# Patient Record
Sex: FEMALE | Race: BLACK OR AFRICAN AMERICAN | HISPANIC OR LATINO | Employment: UNEMPLOYED | URBAN - METROPOLITAN AREA
[De-identification: names, ages, dates, MRNs, and addresses within clinical notes are randomized per-mention and may not be internally consistent; named-entity substitution may affect disease eponyms.]

---

## 2018-09-02 ENCOUNTER — HOSPITAL ENCOUNTER (EMERGENCY)
Facility: HOSPITAL | Age: 53
Discharge: HOME | End: 2018-09-02
Attending: EMERGENCY MEDICINE | Admitting: EMERGENCY MEDICINE

## 2018-09-02 VITALS
OXYGEN SATURATION: 100 % | DIASTOLIC BLOOD PRESSURE: 97 MMHG | HEART RATE: 59 BPM | SYSTOLIC BLOOD PRESSURE: 183 MMHG | RESPIRATION RATE: 16 BRPM | TEMPERATURE: 97.5 F | BODY MASS INDEX: 26.93 KG/M2 | WEIGHT: 171.96 LBS

## 2018-09-02 DIAGNOSIS — K29.70 GASTRITIS: Primary | ICD-10-CM

## 2018-09-02 DIAGNOSIS — K82.8 BILIARY DYSKINESIA: ICD-10-CM

## 2018-09-02 LAB
ALBUMIN SERPL BCP-MCNC: 4.1 G/DL (ref 3–5.2)
ALP SERPL-CCNC: 48 U/L (ref 43–122)
ALT SERPL W P-5'-P-CCNC: 24 U/L (ref 9–52)
ANION GAP SERPL CALCULATED.3IONS-SCNC: 3 MMOL/L (ref 5–14)
AST SERPL W P-5'-P-CCNC: 36 U/L (ref 14–36)
ATRIAL RATE: 47 BPM
BASOPHILS # BLD AUTO: 0 THOUSANDS/ΜL (ref 0–0.1)
BASOPHILS NFR BLD AUTO: 1 % (ref 0–1)
BILIRUB SERPL-MCNC: 0.8 MG/DL
BILIRUB UR QL STRIP: NEGATIVE
BUN SERPL-MCNC: 9 MG/DL (ref 5–25)
CALCIUM SERPL-MCNC: 8.4 MG/DL (ref 8.4–10.2)
CHLORIDE SERPL-SCNC: 104 MMOL/L (ref 97–108)
CLARITY UR: CLEAR
CO2 SERPL-SCNC: 29 MMOL/L (ref 22–30)
COLOR UR: YELLOW
CREAT SERPL-MCNC: 0.64 MG/DL (ref 0.6–1.2)
EOSINOPHIL # BLD AUTO: 0.3 THOUSAND/ΜL (ref 0–0.4)
EOSINOPHIL NFR BLD AUTO: 8 % (ref 0–6)
ERYTHROCYTE [DISTWIDTH] IN BLOOD BY AUTOMATED COUNT: 14.1 %
EXT PREG TEST URINE: NORMAL
GFR SERPL CREATININE-BSD FRML MDRD: 104 ML/MIN/1.73SQ M
GLUCOSE SERPL-MCNC: 85 MG/DL (ref 70–99)
GLUCOSE UR STRIP-MCNC: NEGATIVE MG/DL
HCT VFR BLD AUTO: 39.5 % (ref 36–46)
HGB BLD-MCNC: 13.1 G/DL (ref 12–16)
HGB UR QL STRIP.AUTO: NEGATIVE
KETONES UR STRIP-MCNC: NEGATIVE MG/DL
LEUKOCYTE ESTERASE UR QL STRIP: NEGATIVE
LIPASE SERPL-CCNC: 49 U/L (ref 23–300)
LYMPHOCYTES # BLD AUTO: 1.5 THOUSANDS/ΜL (ref 0.5–4)
LYMPHOCYTES NFR BLD AUTO: 34 % (ref 20–50)
MCH RBC QN AUTO: 33.2 PG (ref 26–34)
MCHC RBC AUTO-ENTMCNC: 33.1 G/DL (ref 31–36)
MCV RBC AUTO: 100 FL (ref 80–100)
MONOCYTES # BLD AUTO: 0.5 THOUSAND/ΜL (ref 0.2–0.9)
MONOCYTES NFR BLD AUTO: 11 % (ref 1–10)
NEUTROPHILS # BLD AUTO: 2.1 THOUSANDS/ΜL (ref 1.8–7.8)
NEUTS SEG NFR BLD AUTO: 47 % (ref 45–65)
NITRITE UR QL STRIP: NEGATIVE
P AXIS: 49 DEGREES
PH UR STRIP.AUTO: 7 [PH] (ref 4.5–8)
PLATELET # BLD AUTO: 299 THOUSANDS/UL (ref 150–450)
PLATELET BLD QL SMEAR: ADEQUATE
PMV BLD AUTO: 7.3 FL (ref 8.9–12.7)
POTASSIUM SERPL-SCNC: 5 MMOL/L (ref 3.6–5)
PR INTERVAL: 146 MS
PROT SERPL-MCNC: 7.9 G/DL (ref 5.9–8.4)
PROT UR STRIP-MCNC: NEGATIVE MG/DL
QRS AXIS: -8 DEGREES
QRSD INTERVAL: 86 MS
QT INTERVAL: 478 MS
QTC INTERVAL: 423 MS
RBC # BLD AUTO: 3.94 MILLION/UL (ref 4–5.2)
RBC MORPH BLD: NORMAL
SODIUM SERPL-SCNC: 136 MMOL/L (ref 137–147)
SP GR UR STRIP.AUTO: 1.01 (ref 1–1.04)
T WAVE AXIS: 26 DEGREES
TROPONIN I SERPL-MCNC: <0.01 NG/ML (ref 0–0.03)
UROBILINOGEN UA: NEGATIVE MG/DL
VENTRICULAR RATE: 47 BPM
WBC # BLD AUTO: 4.5 THOUSAND/UL (ref 4.5–11)

## 2018-09-02 PROCEDURE — 36415 COLL VENOUS BLD VENIPUNCTURE: CPT | Performed by: PHYSICIAN ASSISTANT

## 2018-09-02 PROCEDURE — 99284 EMERGENCY DEPT VISIT MOD MDM: CPT

## 2018-09-02 PROCEDURE — 96361 HYDRATE IV INFUSION ADD-ON: CPT

## 2018-09-02 PROCEDURE — 83690 ASSAY OF LIPASE: CPT | Performed by: PHYSICIAN ASSISTANT

## 2018-09-02 PROCEDURE — 80053 COMPREHEN METABOLIC PANEL: CPT | Performed by: PHYSICIAN ASSISTANT

## 2018-09-02 PROCEDURE — 96374 THER/PROPH/DIAG INJ IV PUSH: CPT

## 2018-09-02 PROCEDURE — 85025 COMPLETE CBC W/AUTO DIFF WBC: CPT | Performed by: PHYSICIAN ASSISTANT

## 2018-09-02 PROCEDURE — 93010 ELECTROCARDIOGRAM REPORT: CPT | Performed by: INTERNAL MEDICINE

## 2018-09-02 PROCEDURE — 81003 URINALYSIS AUTO W/O SCOPE: CPT | Performed by: PHYSICIAN ASSISTANT

## 2018-09-02 PROCEDURE — 96375 TX/PRO/DX INJ NEW DRUG ADDON: CPT

## 2018-09-02 PROCEDURE — 84484 ASSAY OF TROPONIN QUANT: CPT | Performed by: PHYSICIAN ASSISTANT

## 2018-09-02 PROCEDURE — 81025 URINE PREGNANCY TEST: CPT | Performed by: PHYSICIAN ASSISTANT

## 2018-09-02 PROCEDURE — 93005 ELECTROCARDIOGRAM TRACING: CPT

## 2018-09-02 RX ORDER — SODIUM CHLORIDE 9 MG/ML
250 INJECTION, SOLUTION INTRAVENOUS CONTINUOUS
Status: DISCONTINUED | OUTPATIENT
Start: 2018-09-02 | End: 2018-09-02 | Stop reason: HOSPADM

## 2018-09-02 RX ORDER — KETOROLAC TROMETHAMINE 30 MG/ML
30 INJECTION, SOLUTION INTRAMUSCULAR; INTRAVENOUS ONCE
Status: COMPLETED | OUTPATIENT
Start: 2018-09-02 | End: 2018-09-02

## 2018-09-02 RX ORDER — SUCRALFATE ORAL 1 G/10ML
SUSPENSION ORAL
Status: COMPLETED
Start: 2018-09-02 | End: 2018-09-02

## 2018-09-02 RX ORDER — SUCRALFATE 1 G/1
1 TABLET ORAL 4 TIMES DAILY
Qty: 40 TABLET | Refills: 0 | Status: SHIPPED | OUTPATIENT
Start: 2018-09-02 | End: 2018-12-15 | Stop reason: ALTCHOICE

## 2018-09-02 RX ORDER — SUCRALFATE ORAL 1 G/10ML
1000 SUSPENSION ORAL ONCE
Status: COMPLETED | OUTPATIENT
Start: 2018-09-02 | End: 2018-09-02

## 2018-09-02 RX ORDER — KETOROLAC TROMETHAMINE 30 MG/ML
INJECTION, SOLUTION INTRAMUSCULAR; INTRAVENOUS
Status: COMPLETED
Start: 2018-09-02 | End: 2018-09-02

## 2018-09-02 RX ORDER — ONDANSETRON 2 MG/ML
4 INJECTION INTRAMUSCULAR; INTRAVENOUS ONCE
Status: COMPLETED | OUTPATIENT
Start: 2018-09-02 | End: 2018-09-02

## 2018-09-02 RX ORDER — ONDANSETRON 2 MG/ML
INJECTION INTRAMUSCULAR; INTRAVENOUS
Status: COMPLETED
Start: 2018-09-02 | End: 2018-09-02

## 2018-09-02 RX ORDER — MAGNESIUM HYDROXIDE/ALUMINUM HYDROXICE/SIMETHICONE 120; 1200; 1200 MG/30ML; MG/30ML; MG/30ML
SUSPENSION ORAL
Status: COMPLETED
Start: 2018-09-02 | End: 2018-09-02

## 2018-09-02 RX ORDER — MAGNESIUM HYDROXIDE/ALUMINUM HYDROXICE/SIMETHICONE 120; 1200; 1200 MG/30ML; MG/30ML; MG/30ML
15 SUSPENSION ORAL ONCE
Status: COMPLETED | OUTPATIENT
Start: 2018-09-02 | End: 2018-09-02

## 2018-09-02 RX ORDER — ONDANSETRON 4 MG/1
4 TABLET, FILM COATED ORAL EVERY 6 HOURS
Qty: 12 TABLET | Refills: 0 | Status: SHIPPED | OUTPATIENT
Start: 2018-09-02 | End: 2018-12-15 | Stop reason: ALTCHOICE

## 2018-09-02 RX ORDER — FAMOTIDINE 20 MG/1
20 TABLET, FILM COATED ORAL 2 TIMES DAILY
Qty: 30 TABLET | Refills: 0 | Status: SHIPPED | OUTPATIENT
Start: 2018-09-02 | End: 2018-12-15 | Stop reason: ALTCHOICE

## 2018-09-02 RX ADMIN — SODIUM CHLORIDE 250 ML/HR: 9 INJECTION, SOLUTION INTRAVENOUS at 07:56

## 2018-09-02 RX ADMIN — MAGNESIUM HYDROXIDE/ALUMINUM HYDROXICE/SIMETHICONE 15 ML: 120; 1200; 1200 SUSPENSION ORAL at 09:47

## 2018-09-02 RX ADMIN — ONDANSETRON 4 MG: 2 INJECTION INTRAMUSCULAR; INTRAVENOUS at 07:53

## 2018-09-02 RX ADMIN — KETOROLAC TROMETHAMINE 30 MG: 30 INJECTION, SOLUTION INTRAMUSCULAR; INTRAVENOUS at 09:47

## 2018-09-02 RX ADMIN — ONDANSETRON 4 MG: 2 INJECTION, SOLUTION INTRAMUSCULAR; INTRAVENOUS at 07:53

## 2018-09-02 RX ADMIN — ALUMINUM HYDROXIDE, MAGNESIUM HYDROXIDE, AND SIMETHICONE 15 ML: 200; 200; 20 SUSPENSION ORAL at 09:47

## 2018-09-02 RX ADMIN — SUCRALFATE 1000 MG: 1 SUSPENSION ORAL at 09:47

## 2018-09-02 RX ADMIN — SUCRALFATE ORAL 1000 MG: 1 SUSPENSION ORAL at 09:47

## 2018-09-02 NOTE — DISCHARGE INSTRUCTIONS
Discinesia biliar   LO QUE NECESITA SABER:   ¿Qué es la discinesia biliar? La discinesia biliar es nicole condición que causa dolor en torres vesícula biliar (en la parte superior derecha de torres abdomen)  La vesícula biliar almacena bilis creado por el hígado  La bilis se [place] para descomponer la grasa que usted consume  La vesícula biliar tiene nicole válvula llamada esfínter que previene que la bilis se salga de la vesícula biliar hasta que sea necesario  La bilis se mueve a través de un conducto al intestino pequeño  Si el esfínter esta cicatrizado o tiene espasmos, la bilis no puede salir de la vesícula biliar  La bilis entonces regresa a torres vesícula biliar y causa dolor  ¿Qué aumenta mi riesgo de discinesia biliar? · Inflamación en los músculos que controlan la bilis que sale de torres vesícula biliar    · Problemas con la forma que shan músculos colaboran juntos    · Nicole enfermedad crónica josé antonio diabetes o celiaquía    · Obesidad    · Desequilibrio hormonal  ¿Cuáles son los signos y síntomas de la discinesia biliar? · Dolor en la parte superior derecha de torres abdomen que dura por lo menos 30 minutos a la vez, y va y viene    · Dolor severo que no le permite hacer shan actividades diarias o lo despierta en la noche    · Dolor después de comer que continua aún después de jairo tenido nicole evacuación intestinal o cambia de posición    · Ictericia    · Náusea, vómito, o inflamación    · Pérdida de peso sin proponérselo o falta de apetito  ¿Cómo se diagnostica la discinesia biliar? Torres médico lo examinará y le hará preguntas acerca de shan síntomas  Infórmele cuando ocurre el dolor y cuanto dura cada vez que ocurre  Infórmele si usted tiene [employer] después de comer ciertos alimentos  Es posible que también necesite alguno de los siguientes tratamientos:  · Un escán de hígado y vesícula biliar  También llamado [place] de hígado y de la vesícula biliar   A usted le administran nicole pequeña cantidad de tinte radioactivo por medio de [facility] y se nichole imágenes con un escáner  Torres médico [address] imágenes para determinar si torres hígado y vesícula biliar están funcionando normalmente  · Los análisis de norma:  podrían usarse para revisar las enzimas de torres hígado  Offerle muestra cuan ayesha torres hígado esta funcionando  · Un ultrasonido o nicole tomografía computarizada (TC)  se pueden usar para buscar cálculos biliares u otros problemas en el área de torres vesícula biliar  El dolor de discinesia biliar ocurre sin cálculos biliares  · Nicole colangiopancreatografía retrógrada endoscópica  es un procedimiento usado para revisar los conductos que transportan la bilis de la vesícula biliar  Torres médico guía un endoscopio por torres boca y dentro de [employer] abertura entre torres estómago e intestino pequeño  Se nichole radiografías de los conductos  Se [place] un líquido de contraste para ayudar a que las imágenes se vean mejor en la radiografía  Dígale al médico si usted alguna vez ha tenido nicole reacción alérgica al líquido de [place]  ¿Cómo se trata la discinesia biliar? Ju síntomas podrían desaparecer sin tratamiento  Usted podría necesitar alguno de los siguientes si ju síntomas son graves o persisten:  · Un medicamento con receta para el dolor  podrían ser [place]  Pregunte al médico cómo debe edith reji medicamento de forma norton  Algunos medicamentos recetados para el dolor contienen acetaminofén  No tome otros medicamentos que contengan acetaminofén sin consultarlo con torres médico  Demasiado acetaminofeno puede causar daño al hígado  Los medicamentos recetados para el dolor podrían causar estreñimiento  Pregunte a torres médico josé antonio prevenir o tratar estreñimiento  · AINEs (Analgésicos antiinflamatorios no esteroides) josé antonio el ibuprofeno, ayudan a disminuir la inflamación, el dolor y la [place]  Reji medicamento esta disponible con o sin nicole receta médica   Los AINEs pueden causar sangrado estomacal o problemas renales en ciertas personas  Si usted esta tomando un anticoágulante,  siempre  pregunte si los AINEs son seguros para usted  Siempre radha la etiqueta de reji medicamento y [place] instrucciones  No administre reji medicamento a niños menores de 6 meses de jannette sin antes obtener la autorización de torres médico      · Cirugía  para remover torres vesícula biliar  Por lo general, nicole cirugía de vesícula biliar no se realiza en niños pequeños  ¿Qué puedo hacer para manejar la discinesia biliar? · Mantenga un peso saludable  El [place] afb de [place] puede aumentar torres riesgo para problemas con torres vesícula biliar y empeorar torres dolor  Trate de no aumentar o perder nicole cantidad de peso rápidamente  [place] también podría aumentar o empeorar torres riesgo para problemas con torres vesícula biliar  Solicite a torres médico que lo ayude a crear un programa para perder peso si tiene sobrepeso  · Consuma alimentos saludables y variados  Los alimentos saludables incluyen frutas, vegetales, productos lácteos bajos en grasa, oneil magras, pescado, y frijoles cocidos  Pregunte si necesita seguir nicole dieta especial  Torres médico podría recomendar nicole dieta baja en grasa  Elija grasas saludables josé antonio el aceite de [place], canola, aguacate, y nueces  Las grasas [place] 3 también son saludables  Las [place] 3 se encuentran en pescados, josé antonio el Vannie Rujo[place] y [place] breeze  También se encuentra en plantas josé antonio linaza, nueces, y soya  Es posible que usted también necesite evitar alimentos que podrían desencadenar ju síntomas  ¿Cuándo clive buscar atención inmediata? · Usted tiene fiebre o escalofríos  · Ju ojos o piel se vuelven thanh  ¿Cuándo clive comunicarme con mi médico?   · Torres orina está oscura  · Torres dolor no mejora aún con medicamento para el dolor  · Usted tiene evacuaciones intestinales del color de arcilla  · Usted tiene nuevos síntomas o ju síntomas empeoran  · Usted tiene preguntas o inquietudes acerca de torres condición o cuidado    ACUERDOS SOBRE TORRES CUIDADO:   Usted tiene el derecho de ayudar a planear land cuidado  Aprenda todo lo que pueda sobre land condición y josé antonio darle tratamiento  Discuta ju opciones de tratamiento con ju médicos para decidir el cuidado que usted desea recibir  Usted siempre tiene el derecho de rechazar el tratamiento  Esta información es sólo para uso en educación  Land intención no es darle un consejo médico sobre enfermedades o tratamientos  Colsulte con land Estevan Hoover farmacéutico antes de seguir cualquier régimen médico para saber si es seguro y efectivo para usted  © 2017 [address] Information is for End User's use only and may not be sold, redistributed or otherwise used for commercial purposes  All illustrations and images included in CareNotes® are the copyrighted property of [employer]  or Cleve Chun  Gastritis   CUIDADO AMBULATORIO:   Gastritis  es nicole inflamación o irritación del revestimiento del estómago  Los síntomas más comunes [facility] siguientes:   · Dolor de [place], ardor o dolor con la palpación    · Sensación de llenura y opresión    · Náuseas o vómito    · Falta de apetito o rápidamente se siente lleno mientras está comiendo    · Mal aliento    · Fatiga o más cansancio que de costumbre    · Acidez estomacal  Llame al 911 en cathy de presentar lo siguiente:   · Tiene dolor de pecho o le falta el aire  Busque atención médica de inmediato si:   · Usted vomita norma  · Usted tiene evacuaciones intestinales negras o con norma  · Usted tiene un kike dolor de estómago o de espalda  Pregúntele a land Nadja Remedies vitaminas y minerales son adecuados para usted  · Usted tiene fiebre  · Usted tiene síntomas nuevos o estos empeoran, aun después del [place]  · Usted tiene preguntas o inquietudes acerca de land condición o cuidado  Tratamiento para la gastritis:  Ju síntomas podrían desaparecer sin tratamiento   El tratamiento dependerá de lo que le está causando torres gastritis  Torres médico le podría recomendar cambios a los [place] sina  Los medicamentos podrían ser recetados para ayudar a tratar la infección bacteriana o para disminuir el ácido estomacal    Maneje o evite la gastritis:   · No fume  La nicotina y otras sustancias químicas de los cigarrillos y los cigarros pueden empeorar shan síntomas y causar daño pulmonar  Pida información a torres médico si usted actualmente fuma y necesita ayuda para dejar de fumar  Los cigarrillos electrónicos o tabaco sin humo todavía contienen nicotina  Consulte con torres médico antes de [employer]  · No consuma alcohol  El alcohol puede evitar la cicatrización y empeorar la gastritis  Consulte con torres médico si usted necesita ayuda para dejar de edith alcohol  · No tome medicamentos JOSE o aspirina a menos que así se lo indiquen  Estos analgésicos y medicamentos similares pueden causar irritación  Si torres médico lo autoriza a edith The mir [place] con la comida  · No coma alimentos que le provocan irritación:  Los alimentos josé antonio las naranjas y la salsa pueden causar ardor o dolor  Consuma alimentos saludables y variados  Unos [address] frutas (no las cítricas), verduras, productos lácteos descremados, legumbres, pan integral al [employer] las oneil [place] y pescado  Trate de comer porciones más pequeñas y edith agua con shan comidas  No coma nada al menos por 3 horas antes de acostarse  · Encuentre maneras de relajarse y reducir el estrés  El estrés puede aumentar el ácido estomacal y empeorar la gastritis  Las [employer] yoga, la meditación o el escuchar música pueden ayudarlo a [place]  Pase tiempo con amigos, o riley cosas que disfruta  Acuda a shan consultas de control con torres médico según le indicaron  Puede que necesite exámenes o tratamiento continuos o derivación a un gastroenterólogo   Anote shan preguntas para que se acuerde de hacerlas seun shan visitas  © 2017 [address] Information is for End User's use only and may not be sold, redistributed or otherwise used for commercial purposes  All illustrations and images included in CareNotes® are the copyrighted property of A D A M , Inc  or Cleve Chun  Esta información es sólo para uso en educación  Land intención no es darle un consejo médico sobre enfermedades o tratamientos  Colsulte con land Kailey Kind farmacéutico antes de seguir cualquier régimen médico para saber si es seguro y efectivo para usted

## 2018-09-02 NOTE — ED PROVIDER NOTES
History  Chief Complaint   Patient presents with    Abdominal Pain     "For 2 months now I throw up all the time, I have too much acid  I take Prilosec but it's not helping  And it hurts here too (RUQ)  It used to come and go but now it's constant"       History provided by:  Patient   used: No    Medical Problem   Location:  Pt with nausea  abdomen pain for months and months worse over past week   Severity:  Moderate  Onset quality:  Gradual  Duration:  6 months  Timing:  Intermittent  Progression:  Unchanged  Chronicity:  Recurrent  Associated symptoms: abdominal pain, diarrhea, nausea and vomiting    Associated symptoms: no chest pain, no congestion, no cough, no ear pain, no fatigue, no fever, no headaches, no loss of consciousness, no myalgias, no rash, no rhinorrhea, no shortness of breath, no sore throat and no wheezing        None       Past Medical History:   Diagnosis Date    GERD (gastroesophageal reflux disease)     Hypertension        Past Surgical History:   Procedure Laterality Date    APPENDECTOMY       SECTION      HERNIA REPAIR         No family history on file  I have reviewed and agree with the history as documented  Social History   Substance Use Topics    Smoking status: Never Smoker    Smokeless tobacco: Never Used    Alcohol use Yes      Comment: occas        Review of Systems   Constitutional: Negative  Negative for fatigue and fever  HENT: Negative  Negative for congestion, ear pain, rhinorrhea and sore throat  Eyes: Negative  Respiratory: Negative  Negative for cough, shortness of breath and wheezing  Cardiovascular: Negative  Negative for chest pain  Gastrointestinal: Positive for abdominal pain, diarrhea, nausea and vomiting  Endocrine: Negative  Genitourinary: Negative  Musculoskeletal: Negative  Negative for myalgias  Skin: Negative  Negative for rash  Allergic/Immunologic: Negative  Neurological: Negative  Negative for loss of consciousness and headaches  Hematological: Negative  Psychiatric/Behavioral: Negative  All other systems reviewed and are negative  Physical Exam  Physical Exam   Constitutional: She appears well-developed and well-nourished  Pt states her family doctor and this er never help her  Pt screaming in exam room     Using   Discussed with pt about having another gb test a hida scan pt still screaming     Using    Discussed having other tests outpt  hida scan endoscopy gi specialist    None of this available today      Pt states she is unhappy with her family doctor since she has had tests done and she does not know the results   Suggest to pt to ask for new family doctor at office      HENT:   Head: Normocephalic and atraumatic  Right Ear: External ear normal    Left Ear: External ear normal    Nose: Nose normal    Mouth/Throat: Oropharynx is clear and moist    Eyes: Conjunctivae and EOM are normal  Pupils are equal, round, and reactive to light  Neck: Normal range of motion  Neck supple  Cardiovascular: Normal rate, regular rhythm and normal heart sounds  Pulmonary/Chest: Effort normal and breath sounds normal    Abdominal: Soft  Bowel sounds are normal    midepigastric and ruq tender    Musculoskeletal: Normal range of motion  Neurological: She is alert  Skin: Skin is warm  Psychiatric: She has a normal mood and affect  Her behavior is normal  Judgment and thought content normal    Nursing note and vitals reviewed        Vital Signs  ED Triage Vitals   Temperature Pulse Respirations Blood Pressure SpO2   09/02/18 0723 09/02/18 0723 09/02/18 0723 09/02/18 0723 09/02/18 0723   97 5 °F (36 4 °C) 72 18 (!) 172/102 99 %      Temp src Heart Rate Source Patient Position - Orthostatic VS BP Location FiO2 (%)   -- 09/02/18 0947 -- -- --    Monitor         Pain Score       --                  Vitals:    09/02/18 0723 09/02/18 0947   BP: (!) 172/102 (!) 183/97 Pulse: 72 59       Visual Acuity      ED Medications  Medications   ondansetron (ZOFRAN) injection 4 mg (4 mg Intravenous Given 9/2/18 0753)   ketorolac (TORADOL) injection 30 mg (30 mg Intravenous Given 9/2/18 0947)   aluminum-magnesium hydroxide-simethicone (MYLANTA) 200-200-20 mg/5 mL oral suspension 15 mL (15 mL Oral Given 9/2/18 0947)   sucralfate (CARAFATE) oral suspension 1,000 mg (1,000 mg Oral Given 9/2/18 0947)       Diagnostic Studies  Results Reviewed     Procedure Component Value Units Date/Time    UA w Reflex to Microscopic w Reflex to Culture [[id number]]  (Normal) Collected:  09/02/18 0808    Lab Status:  Final result Specimen:  Urine from Urine, Clean Catch Updated:  09/02/18 0937     Color, UA Yellow     Clarity, UA Clear     Specific Gravity, UA 1 015     pH, UA 7 0     Leukocytes, UA Negative     Nitrite, UA Negative     Protein, UA Negative mg/dl      Glucose, UA Negative mg/dl      Ketones, UA Negative mg/dl      Bilirubin, UA Negative     Blood, UA Negative     UROBILINOGEN UA Negative mg/dL     Troponin I [[id number]]  (Normal) Collected:  09/02/18 0806    Lab Status:  Final result Specimen:  Blood from Arm, Left Updated:  09/02/18 0856     Troponin I <0 01 ng/mL     Lipase [[id number]]  (Normal) Collected:  09/02/18 0801    Lab Status:  Final result Specimen:  Blood from Arm, Right Updated:  09/02/18 0826     Lipase 49 u/L     Narrative:       Hemolysis    Comprehensive metabolic panel [[id number]]  (Abnormal) Collected:  09/02/18 0801    Lab Status:  Final result Specimen:  Blood from Arm, Right Updated:  09/02/18 0826     Sodium 136 (L) mmol/L      Potassium 5 0 mmol/L      Chloride 104 mmol/L      CO2 29 mmol/L      ANION GAP 3 (L) mmol/L      BUN 9 mg/dL      Creatinine 0 64 mg/dL      Glucose 85 mg/dL      Calcium 8 4 mg/dL      AST 36 U/L      ALT 24 U/L      Alkaline Phosphatase 48 U/L      Total Protein 7 9 g/dL      Albumin 4 1 g/dL      Total Bilirubin 0 80 mg/dL      eGFR 104 ml/min/1 73sq m     Narrative:       Hemolysis  National Kidney Disease Education Program recommendations are as follows:  GFR calculation is accurate only with a steady state creatinine  Chronic Kidney disease less than 60 ml/min/1 73 sq  meters  Kidney failure less than 15 ml/min/1 73 sq  meters  CBC and differential [[id number]]  (Abnormal) Collected:  09/02/18 0801    Lab Status:  Final result Specimen:  Blood from Arm, Right Updated:  09/02/18 0822     WBC 4 50 Thousand/uL      RBC 3 94 (L) Million/uL      Hemoglobin 13 1 g/dL      Hematocrit 39 5 %       fL      MCH 33 2 pg      MCHC 33 1 g/dL      RDW 14 1 %      MPV 7 3 (L) fL      Platelets 215 Thousands/uL      Neutrophils Relative 47 %      Lymphocytes Relative 34 %      Monocytes Relative 11 (H) %      Eosinophils Relative 8 (H) %      Basophils Relative 1 %      Neutrophils Absolute 2 10 Thousands/µL      Lymphocytes Absolute 1 50 Thousands/µL      Monocytes Absolute 0 50 Thousand/µL      Eosinophils Absolute 0 30 Thousand/µL      Basophils Absolute 0 00 Thousands/µL     POCT pregnancy, urine [[id number]]  (Normal) Resulted:  09/02/18 0809    Lab Status:  Final result Updated:  09/02/18 0809     EXT PREG TEST UR (Ref: Negative) neg preg  No orders to display              Procedures  Procedures       Phone Contacts  ED Phone Contact    ED Course                               MDM  CritCare Time    Disposition  Final diagnoses:   Gastritis   Biliary dyskinesia     Time reflects when diagnosis was documented in both MDM as applicable and the Disposition within this note     Time User Action Codes Description Comment    9/2/2018 10:10 AM Monica Bolus Add [K29 70] Gastritis     9/2/2018 10:10 AM Monica Bolus Add [K82 8] Biliary dyskinesia       ED Disposition     ED Disposition Condition Comment    Discharge  Katie Ackerman discharge to home/self care      Condition at discharge: Good        Follow-up Information     Follow up With Specialties Details Why [address], [address] Medicine Schedule an appointment as soon as possible for a visit  [address], [address] [address] [phone]          Discharge Medication List as of 9/2/2018 10:12 AM      START taking these medications    Details   famotidine (PEPCID) 20 mg tablet Take 1 tablet (20 mg total) by mouth 2 (two) times a day, Starting Sun 9/2/2018, Print      ondansetron (ZOFRAN) 4 mg tablet Take 1 tablet (4 mg total) by mouth every 6 (six) hours, Starting Sun 9/2/2018, Print      sucralfate (CARAFATE) 1 g tablet Take 1 tablet (1 g total) by mouth 4 (four) times a day, Starting Sun 9/2/2018, Print           No discharge procedures on file      ED Provider  Electronically Signed by           Ivelisse Layton PA-C  09/02/18 9641

## 2018-09-02 NOTE — ED NOTES
Pt arguing with PA at this time,  at bedside to also assist with care, pt wants IV removed at this time, pt continuing to argue in [address] to       Marquis Melanie RN  09/02/18 6310

## 2018-12-15 ENCOUNTER — APPOINTMENT (EMERGENCY)
Dept: CT IMAGING | Facility: HOSPITAL | Age: 53
End: 2018-12-15

## 2018-12-15 ENCOUNTER — HOSPITAL ENCOUNTER (EMERGENCY)
Facility: HOSPITAL | Age: 53
Discharge: HOME | End: 2018-12-15
Attending: EMERGENCY MEDICINE | Admitting: EMERGENCY MEDICINE

## 2018-12-15 VITALS
DIASTOLIC BLOOD PRESSURE: 74 MMHG | OXYGEN SATURATION: 100 % | SYSTOLIC BLOOD PRESSURE: 155 MMHG | WEIGHT: 180 LBS | RESPIRATION RATE: 18 BRPM | TEMPERATURE: 97.6 F | HEART RATE: 73 BPM | BODY MASS INDEX: 28.19 KG/M2

## 2018-12-15 DIAGNOSIS — R10.9 CHRONIC ABDOMINAL PAIN: ICD-10-CM

## 2018-12-15 DIAGNOSIS — K57.92 DIVERTICULITIS: Primary | ICD-10-CM

## 2018-12-15 DIAGNOSIS — G89.29 CHRONIC ABDOMINAL PAIN: ICD-10-CM

## 2018-12-15 LAB
ALBUMIN SERPL BCP-MCNC: 3.9 G/DL (ref 3.5–5)
ALP SERPL-CCNC: 58 U/L (ref 46–116)
ALT SERPL W P-5'-P-CCNC: 29 U/L (ref 12–78)
ANION GAP SERPL CALCULATED.3IONS-SCNC: 9 MMOL/L (ref 4–13)
AST SERPL W P-5'-P-CCNC: 18 U/L (ref 5–45)
BASOPHILS # BLD AUTO: 0.04 THOUSANDS/ΜL (ref 0–0.1)
BASOPHILS NFR BLD AUTO: 1 % (ref 0–1)
BILIRUB SERPL-MCNC: 0.27 MG/DL (ref 0.2–1)
BILIRUB UR QL STRIP: NEGATIVE
BUN SERPL-MCNC: 15 MG/DL (ref 5–25)
CALCIUM SERPL-MCNC: 8.3 MG/DL (ref 8.3–10.1)
CHLORIDE SERPL-SCNC: 104 MMOL/L (ref 100–108)
CLARITY UR: ABNORMAL
CO2 SERPL-SCNC: 26 MMOL/L (ref 21–32)
COLOR UR: ABNORMAL
CREAT SERPL-MCNC: 0.91 MG/DL (ref 0.6–1.3)
EOSINOPHIL # BLD AUTO: 0.29 THOUSAND/ΜL (ref 0–0.61)
EOSINOPHIL NFR BLD AUTO: 5 % (ref 0–6)
ERYTHROCYTE [DISTWIDTH] IN BLOOD BY AUTOMATED COUNT: 13.3 % (ref 11.6–15.1)
EXT PREG TEST URINE: NEGATIVE
GFR SERPL CREATININE-BSD FRML MDRD: 73 ML/MIN/1.73SQ M
GLUCOSE SERPL-MCNC: 92 MG/DL (ref 65–140)
GLUCOSE UR STRIP-MCNC: NEGATIVE MG/DL
HCT VFR BLD AUTO: 37.5 % (ref 34.8–46.1)
HGB BLD-MCNC: 12.4 G/DL (ref 11.5–15.4)
HGB UR QL STRIP.AUTO: NEGATIVE
IMM GRANULOCYTES # BLD AUTO: 0.01 THOUSAND/UL (ref 0–0.2)
IMM GRANULOCYTES NFR BLD AUTO: 0 % (ref 0–2)
KETONES UR STRIP-MCNC: ABNORMAL MG/DL
LEUKOCYTE ESTERASE UR QL STRIP: NEGATIVE
LIPASE SERPL-CCNC: 142 U/L (ref 73–393)
LYMPHOCYTES # BLD AUTO: 2.31 THOUSANDS/ΜL (ref 0.6–4.47)
LYMPHOCYTES NFR BLD AUTO: 37 % (ref 14–44)
MCH RBC QN AUTO: 32.7 PG (ref 26.8–34.3)
MCHC RBC AUTO-ENTMCNC: 33.1 G/DL (ref 31.4–37.4)
MCV RBC AUTO: 99 FL (ref 82–98)
MONOCYTES # BLD AUTO: 0.55 THOUSAND/ΜL (ref 0.17–1.22)
MONOCYTES NFR BLD AUTO: 9 % (ref 4–12)
NEUTROPHILS # BLD AUTO: 3.05 THOUSANDS/ΜL (ref 1.85–7.62)
NEUTS SEG NFR BLD AUTO: 48 % (ref 43–75)
NITRITE UR QL STRIP: NEGATIVE
NRBC BLD AUTO-RTO: 0 /100 WBCS
PH UR STRIP.AUTO: 8.5 [PH] (ref 4.5–8)
PLATELET # BLD AUTO: 308 THOUSANDS/UL (ref 149–390)
PMV BLD AUTO: 8.9 FL (ref 8.9–12.7)
POTASSIUM SERPL-SCNC: 3.6 MMOL/L (ref 3.5–5.3)
PROT SERPL-MCNC: 7.2 G/DL (ref 6.4–8.2)
PROT UR STRIP-MCNC: NEGATIVE MG/DL
RBC # BLD AUTO: 3.79 MILLION/UL (ref 3.81–5.12)
SODIUM SERPL-SCNC: 139 MMOL/L (ref 136–145)
SP GR UR STRIP.AUTO: 1.02 (ref 1–1.03)
UROBILINOGEN UR QL STRIP.AUTO: 0.2 E.U./DL
WBC # BLD AUTO: 6.25 THOUSAND/UL (ref 4.31–10.16)

## 2018-12-15 PROCEDURE — 99284 EMERGENCY DEPT VISIT MOD MDM: CPT

## 2018-12-15 PROCEDURE — 36415 COLL VENOUS BLD VENIPUNCTURE: CPT

## 2018-12-15 PROCEDURE — 83690 ASSAY OF LIPASE: CPT | Performed by: EMERGENCY MEDICINE

## 2018-12-15 PROCEDURE — 96375 TX/PRO/DX INJ NEW DRUG ADDON: CPT

## 2018-12-15 PROCEDURE — 74177 CT ABD & PELVIS W/CONTRAST: CPT

## 2018-12-15 PROCEDURE — 96374 THER/PROPH/DIAG INJ IV PUSH: CPT

## 2018-12-15 PROCEDURE — 96361 HYDRATE IV INFUSION ADD-ON: CPT

## 2018-12-15 PROCEDURE — C9113 INJ PANTOPRAZOLE SODIUM, VIA: HCPCS | Performed by: EMERGENCY MEDICINE

## 2018-12-15 PROCEDURE — 81025 URINE PREGNANCY TEST: CPT | Performed by: EMERGENCY MEDICINE

## 2018-12-15 PROCEDURE — 85025 COMPLETE CBC W/AUTO DIFF WBC: CPT | Performed by: EMERGENCY MEDICINE

## 2018-12-15 PROCEDURE — 80053 COMPREHEN METABOLIC PANEL: CPT | Performed by: EMERGENCY MEDICINE

## 2018-12-15 PROCEDURE — 81003 URINALYSIS AUTO W/O SCOPE: CPT

## 2018-12-15 RX ORDER — PANTOPRAZOLE SODIUM 40 MG/1
40 INJECTION, POWDER, FOR SOLUTION INTRAVENOUS ONCE
Status: COMPLETED | OUTPATIENT
Start: 2018-12-15 | End: 2018-12-15

## 2018-12-15 RX ORDER — AMOXICILLIN AND CLAVULANATE POTASSIUM 500; 125 MG/1; MG/1
1 TABLET, FILM COATED ORAL ONCE
Status: COMPLETED | OUTPATIENT
Start: 2018-12-15 | End: 2018-12-15

## 2018-12-15 RX ORDER — ONDANSETRON 2 MG/ML
4 INJECTION INTRAMUSCULAR; INTRAVENOUS ONCE
Status: COMPLETED | OUTPATIENT
Start: 2018-12-15 | End: 2018-12-15

## 2018-12-15 RX ORDER — LEVOTHYROXINE SODIUM 112 UG/1
112 TABLET ORAL DAILY
COMMUNITY
End: 2021-03-01 | Stop reason: SDUPTHER

## 2018-12-15 RX ORDER — SUCRALFATE 1 G/1
1 TABLET ORAL
Qty: 56 TABLET | Refills: 0 | Status: SHIPPED | OUTPATIENT
Start: 2018-12-15 | End: 2019-11-30

## 2018-12-15 RX ORDER — AMOXICILLIN AND CLAVULANATE POTASSIUM 500; 125 MG/1; MG/1
1 TABLET, FILM COATED ORAL 2 TIMES DAILY
Qty: 14 TABLET | Refills: 0 | Status: SHIPPED | OUTPATIENT
Start: 2018-12-15 | End: 2018-12-22

## 2018-12-15 RX ORDER — OMEPRAZOLE 20 MG/1
20 CAPSULE, DELAYED RELEASE ORAL DAILY
Qty: 30 CAPSULE | Refills: 0 | Status: SHIPPED | OUTPATIENT
Start: 2018-12-15 | End: 2020-06-05 | Stop reason: SDUPTHER

## 2018-12-15 RX ORDER — LISINOPRIL 30 MG/1
25 TABLET ORAL DAILY
COMMUNITY
End: 2020-06-05 | Stop reason: SDUPTHER

## 2018-12-15 RX ORDER — MAGNESIUM HYDROXIDE/ALUMINUM HYDROXICE/SIMETHICONE 120; 1200; 1200 MG/30ML; MG/30ML; MG/30ML
30 SUSPENSION ORAL ONCE
Status: COMPLETED | OUTPATIENT
Start: 2018-12-15 | End: 2018-12-15

## 2018-12-15 RX ADMIN — ONDANSETRON 4 MG: 2 INJECTION INTRAMUSCULAR; INTRAVENOUS at 18:47

## 2018-12-15 RX ADMIN — ALUMINUM HYDROXIDE, MAGNESIUM HYDROXIDE, AND SIMETHICONE 30 ML: 200; 200; 20 SUSPENSION ORAL at 18:47

## 2018-12-15 RX ADMIN — PANTOPRAZOLE SODIUM 40 MG: 40 INJECTION, POWDER, FOR SOLUTION INTRAVENOUS at 18:45

## 2018-12-15 RX ADMIN — IOHEXOL 100 ML: 350 INJECTION, SOLUTION INTRAVENOUS at 19:24

## 2018-12-15 RX ADMIN — AMOXICILLIN AND CLAVULANATE POTASSIUM 1 TABLET: 500; 125 TABLET, FILM COATED ORAL at 21:04

## 2018-12-15 RX ADMIN — SODIUM CHLORIDE 1000 ML: 0.9 INJECTION, SOLUTION INTRAVENOUS at 18:47

## 2018-12-15 RX ADMIN — LIDOCAINE HYDROCHLORIDE 10 ML: 20 SOLUTION ORAL; TOPICAL at 18:47

## 2018-12-15 NOTE — ED PROVIDER NOTES
History  Chief Complaint   Patient presents with    Abdominal Pain     States with upper abd pain and back pain x1 month seen at [place] for told nothing is wrong pain getting worse, feeling like she is going to pass out, diarrhea, vomiting, and dark colored urine  47 yo female c/o abdominal pain, which she localizes to epigastric and RUQ, unrelenting and constant for 1 month since she was seen at [facility] ED, which at that time she said was already a longstanding issue for years, that she had been following up with her PCP, but she stopped trying to go to her regular doctor, "because he always told me nothing was wrong", which mirrors her frustration with her experience at [facility], where she was told everything was "normal", and "there is nothing they could do for me "  She says it causes her great anxiety, and is not allowing her to work  She seems to affirm that eating and drinking are exacerbating factors, uses OTC prilosec and mylanta  History provided by:  Patient  Abdominal Pain   Pain location:  Suprapubic, LUQ and RUQ  Pain quality: aching    Pain radiates to:  Does not radiate  Timing:  Constant  Chronicity:  Chronic  Relieved by:  Nothing  Worsened by:  Nothing  Associated symptoms: nausea    Associated symptoms: no fever        Prior to Admission Medications   Prescriptions Last Dose Informant Patient Reported?  Taking?   levothyroxine 112 mcg tablet   Yes No   Sig: Take 112 mcg by mouth daily   lisinopril (ZESTRIL) 30 mg tablet   Yes No   Sig: Take 25 mg by mouth daily      Facility-Administered Medications: None       Past Medical History:   Diagnosis Date    GERD (gastroesophageal reflux disease)     Hypertension     Hyperthyroiditis     HYPERTHYROID; POST IODINE THERAPY       Past Surgical History:   Procedure Laterality Date    APPENDECTOMY       SECTION      HERNIA REPAIR         Family History   Problem Relation Age of Onset    Endometrial cancer Family      I have reviewed and agree with the history as documented  Social History   Substance Use Topics    Smoking status: Never Smoker    Smokeless tobacco: Never Used    Alcohol use Yes      Comment: occas, DOES NOT DRINK ALCOHOL AS PER 9/22/15 IN [facility]        Review of Systems   Constitutional: Negative for fever  Gastrointestinal: Positive for abdominal pain and nausea  All other systems reviewed and are negative  Physical Exam  Physical Exam   Constitutional: She is oriented to person, place, and time  Vital signs are normal  She appears well-developed and well-nourished  Non-toxic appearance  HENT:   Head: Normocephalic and atraumatic  Right Ear: Tympanic membrane and external ear normal    Left Ear: Tympanic membrane and external ear normal    Nose: Nose normal    Mouth/Throat: Oropharynx is clear and moist    Eyes: Pupils are equal, round, and reactive to light  Conjunctivae and EOM are normal    Neck: Normal range of motion and full passive range of motion without pain  Neck supple  No Brudzinski's sign and no Kernig's sign noted  Cardiovascular: Normal rate, regular rhythm, normal heart sounds, intact distal pulses and normal pulses  No murmur heard  Pulmonary/Chest: Effort normal and breath sounds normal  No tachypnea  No respiratory distress  She has no wheezes  Abdominal: Soft  Bowel sounds are normal  She exhibits no distension  There is no tenderness  There is no rigidity, no rebound and no guarding  Musculoskeletal: Normal range of motion  Right lower leg: She exhibits no swelling  Left lower leg: She exhibits no swelling  Lymphadenopathy:     She has no cervical adenopathy  Neurological: She is alert and oriented to person, place, and time  She has normal strength and normal reflexes  No cranial nerve deficit or sensory deficit  Coordination and gait normal  GCS eye subscore is 4  GCS verbal subscore is 5  GCS motor subscore is 6  Skin: Skin is warm and dry  No rash noted  She is not diaphoretic  No pallor  Psychiatric: Her speech is normal and behavior is normal  Judgment and thought content normal  Her mood appears anxious  Cognition and memory are normal    Nursing note and vitals reviewed        Vital Signs  ED Triage Vitals [12/15/18 1646]   Temperature Pulse Respirations Blood Pressure SpO2   97 6 °F (36 4 °C) 82 20 (!) 172/117 100 %      Temp Source Heart Rate Source Patient Position - Orthostatic VS BP Location FiO2 (%)   Oral Monitor Sitting Left arm --      Pain Score       Worst Possible Pain           Vitals:    12/15/18 1646 12/15/18 1927   BP: (!) 172/117 155/74   Pulse: 82 73   Patient Position - Orthostatic VS: Sitting Sitting       Visual Acuity      ED Medications  Medications   sodium chloride 0 9 % bolus 1,000 mL (0 mL Intravenous Stopped 12/15/18 2100)   ondansetron (ZOFRAN) injection 4 mg (4 mg Intravenous Given 12/15/18 1847)   aluminum-magnesium hydroxide-simethicone (MYLANTA) 200-200-20 mg/5 mL oral suspension 30 mL (30 mL Oral Given 12/15/18 1847)   lidocaine viscous (XYLOCAINE) 2 % mucosal solution 10 mL (10 mL Swish & Swallow Given 12/15/18 1847)   pantoprazole (PROTONIX) injection 40 mg (40 mg Intravenous Given 12/15/18 1845)   iohexol (OMNIPAQUE) 350 MG/ML injection (MULTI-DOSE) 100 mL (100 mL Intravenous Given 12/15/18 1924)   amoxicillin-clavulanate (AUGMENTIN) 500-125 mg per tablet 1 tablet (1 tablet Oral Given 12/15/18 2104)       Diagnostic Studies  Results Reviewed     Procedure Component Value Units Date/Time    Comprehensive metabolic panel [[id number]] Collected:  12/15/18 1713    Lab Status:  Final result Specimen:  Blood from Arm, Right Updated:  12/15/18 1747     Sodium 139 mmol/L      Potassium 3 6 mmol/L      Chloride 104 mmol/L      CO2 26 mmol/L      ANION GAP 9 mmol/L      BUN 15 mg/dL      Creatinine 0 91 mg/dL      Glucose 92 mg/dL      Calcium 8 3 mg/dL      AST 18 U/L      ALT 29 U/L      Alkaline Phosphatase 58 U/L Total Protein 7 2 g/dL      Albumin 3 9 g/dL      Total Bilirubin 0 27 mg/dL      eGFR 73 ml/min/1 73sq m     Narrative:         National Kidney Disease Education Program recommendations are as follows:  GFR calculation is accurate only with a steady state creatinine  Chronic Kidney disease less than 60 ml/min/1 73 sq  meters  Kidney failure less than 15 ml/min/1 73 sq  meters      Lipase [[id number]]  (Normal) Collected:  12/15/18 1713    Lab Status:  Final result Specimen:  Blood from Arm, Right Updated:  12/15/18 1747     Lipase 142 u/L     CBC and differential [[id number]]  (Abnormal) Collected:  12/15/18 1713    Lab Status:  Final result Specimen:  Blood from Arm, Right Updated:  12/15/18 1721     WBC 6 25 Thousand/uL      RBC 3 79 (L) Million/uL      Hemoglobin 12 4 g/dL      Hematocrit 37 5 %      MCV 99 (H) fL      MCH 32 7 pg      MCHC 33 1 g/dL      RDW 13 3 %      MPV 8 9 fL      Platelets 295 Thousands/uL      nRBC 0 /100 WBCs      Neutrophils Relative 48 %      Immat GRANS % 0 %      Lymphocytes Relative 37 %      Monocytes Relative 9 %      Eosinophils Relative 5 %      Basophils Relative 1 %      Neutrophils Absolute 3 05 Thousands/µL      Immature Grans Absolute 0 01 Thousand/uL      Lymphocytes Absolute 2 31 Thousands/µL      Monocytes Absolute 0 55 Thousand/µL      Eosinophils Absolute 0 29 Thousand/µL      Basophils Absolute 0 04 Thousands/µL     POCT urinalysis dipstick [[id number]]  (Abnormal) Resulted:  12/15/18 1709    Lab Status:  Final result Specimen:  Urine from Urine, Other Updated:  12/15/18 1709    POCT pregnancy, urine [[id number]]  (Normal) Resulted:  12/15/18 1709    Lab Status:  Final result Specimen:  Urine Updated:  12/15/18 1709     EXT PREG TEST UR (Ref: Negative) Negative    ED Urine Macroscopic [[id number]]  (Abnormal) Collected:  12/15/18 1717    Lab Status:  Final result Specimen:  Urine Updated:  12/15/18 1703     Color, UA Antoinette     Clarity, UA Cloudy     pH, UA 8 5 (H) Leukocytes, UA Negative     Nitrite, UA Negative     Protein, UA Negative mg/dl      Glucose, UA Negative mg/dl      Ketones, UA Trace (A) mg/dl      Urobilinogen, UA 0 2 E U /dl      Bilirubin, UA Negative     Blood, UA Negative     Specific Gravity, UA 1 020    Narrative:       CLINITEK RESULT                 CT abdomen pelvis with contrast   Final Result by Mirella Almanza DO (12/15 2037)      Colonic diverticulosis with minimal stranding in the left lower quadrant which may represent acute diverticulitis  No drainable fluid collection  Recommend posttreatment colonoscopy to rule out underlying neoplasm  The study was marked in [facility] for immediate notification  Workstation performed: [id number]                    Procedures  Procedures       Phone Contacts  ED Phone Contact    ED Course  ED Course as of Dec 16 0030   Sat Dec 15, 2018   2044 CT findings showing some diverticulitis CT abdomen pelvis with contrast                               MDM  CritCare Time    Disposition  Final diagnoses:   Diverticulitis   Chronic abdominal pain     Time reflects when diagnosis was documented in both MDM as applicable and the Disposition within this note     Time User Action Codes Description Comment    12/15/2018  8:59 PM Donnamae Rebekah Add [R10 9] Abdominal pain     12/15/2018  8:59 PM Bart Castillo [R10 9] Abdominal pain     12/15/2018  8:59 PM Donnamae Rebekah Add [K57 92] Diverticulitis     12/15/2018  8:59 PM Donnamae Rebekah Add [R10 9,  G89 29] Chronic abdominal pain       ED Disposition     ED Disposition Condition Comment    Discharge  Vince James discharge to home/self care      Condition at discharge: Good        Follow-up Information     Follow up With Specialties Details Why Contact Info Additional [address], [address] [place] [address]  [address] [address] [facility] Call For [facility] [id number] [phone] [facility] [place], [address], [place], [place], [address]          Discharge Medication List as of 12/15/2018  9:12 PM      START taking these medications    Details   amoxicillin-clavulanate (AUGMENTIN) 500-125 mg per tablet Take 1 tablet by mouth 2 (two) times a day for 7 days, Starting Sat 12/15/2018, Until Sat 12/22/2018, Print      omeprazole (PriLOSEC) 20 mg delayed release capsule Take 1 capsule (20 mg total) by mouth daily, Starting Sat 12/15/2018, Print      sucralfate (CARAFATE) 1 g tablet Take 1 tablet (1 g total) by mouth 4 (four) times a day (before meals and at bedtime) for 14 days, Starting Sat 12/15/2018, Until Sat 12/29/2018, Print         CONTINUE these medications which have NOT CHANGED    Details   levothyroxine 112 mcg tablet Take 112 mcg by mouth daily, Historical Med      lisinopril (ZESTRIL) 30 mg tablet Take 25 mg by mouth daily, Historical Med           No discharge procedures on file      ED Provider  Electronically Signed by           Deisi Gracia MD  12/16/18 7786

## 2018-12-16 NOTE — DISCHARGE INSTRUCTIONS
Diverticulitis  LO QUE NECESITAS SABER:  La diverticulitis es nicole condición que hace que las bolsas pequeñas a lo jae de torres intestino, llamadas divertículos, se inflamen o se infecten  [place] se debe a movimientos intestinales duros, alimentos o bacterias que se atascan en los bolsillos  INSTRUCCIONES DE DESCARGA:  Regrese al departamento de emergencias si:    Usted tiene diarrea severa  Usted orina menos de lo normal o nada  Usted no puede tener un movimiento intestinal     No puedes dejar de vomitar  Tiene dolor abdominal intenso, fiebre y torres abdomen es más johnson de lo normal     Usted tiene norma nueva o mayor en shan movimientos intestinales  [place] alimentos blandos  Cuando tengas [place], comienza a comer alimentos blandos y [place]  Elizabeth Span son los plátanos, la sopa josé luis, las carlos eduardo y la compota de [place]  No tome productos lácteos, alcohol, bebidas azucaradas o bebidas con cafeína hasta que se sienta mejor

## 2019-11-30 ENCOUNTER — HOSPITAL ENCOUNTER (EMERGENCY)
Facility: HOSPITAL | Age: 54
Discharge: HOME | End: 2019-11-30
Attending: EMERGENCY MEDICINE

## 2019-11-30 ENCOUNTER — APPOINTMENT (EMERGENCY)
Dept: CT IMAGING | Facility: HOSPITAL | Age: 54
End: 2019-11-30

## 2019-11-30 VITALS
HEART RATE: 60 BPM | BODY MASS INDEX: 27.41 KG/M2 | WEIGHT: 175 LBS | RESPIRATION RATE: 16 BRPM | SYSTOLIC BLOOD PRESSURE: 153 MMHG | OXYGEN SATURATION: 100 % | TEMPERATURE: 97.9 F | DIASTOLIC BLOOD PRESSURE: 86 MMHG

## 2019-11-30 DIAGNOSIS — K52.9 GASTROENTERITIS: Primary | ICD-10-CM

## 2019-11-30 LAB
ALBUMIN SERPL BCP-MCNC: 3.5 G/DL (ref 3.5–5)
ALP SERPL-CCNC: 50 U/L (ref 46–116)
ALT SERPL W P-5'-P-CCNC: 22 U/L (ref 12–78)
ANION GAP SERPL CALCULATED.3IONS-SCNC: 6 MMOL/L (ref 4–13)
AST SERPL W P-5'-P-CCNC: 14 U/L (ref 5–45)
BACTERIA UR QL AUTO: ABNORMAL /HPF
BASOPHILS # BLD AUTO: 0.04 THOUSANDS/ΜL (ref 0–0.1)
BASOPHILS NFR BLD AUTO: 1 % (ref 0–1)
BILIRUB SERPL-MCNC: 0.45 MG/DL (ref 0.2–1)
BILIRUB UR QL STRIP: NEGATIVE
BUN SERPL-MCNC: 8 MG/DL (ref 5–25)
CALCIUM SERPL-MCNC: 8.4 MG/DL (ref 8.3–10.1)
CHLORIDE SERPL-SCNC: 104 MMOL/L (ref 100–108)
CLARITY UR: CLEAR
CO2 SERPL-SCNC: 26 MMOL/L (ref 21–32)
COLOR UR: YELLOW
COLOR, POC: YELLOW
CREAT SERPL-MCNC: 0.8 MG/DL (ref 0.6–1.3)
EOSINOPHIL # BLD AUTO: 0.28 THOUSAND/ΜL (ref 0–0.61)
EOSINOPHIL NFR BLD AUTO: 6 % (ref 0–6)
ERYTHROCYTE [DISTWIDTH] IN BLOOD BY AUTOMATED COUNT: 13.7 % (ref 11.6–15.1)
GFR SERPL CREATININE-BSD FRML MDRD: 85 ML/MIN/1.73SQ M
GLUCOSE SERPL-MCNC: 88 MG/DL (ref 65–140)
GLUCOSE UR STRIP-MCNC: NEGATIVE MG/DL
HCT VFR BLD AUTO: 38.6 % (ref 34.8–46.1)
HGB BLD-MCNC: 12.7 G/DL (ref 11.5–15.4)
HGB UR QL STRIP.AUTO: ABNORMAL
IMM GRANULOCYTES # BLD AUTO: 0.01 THOUSAND/UL (ref 0–0.2)
IMM GRANULOCYTES NFR BLD AUTO: 0 % (ref 0–2)
KETONES UR STRIP-MCNC: NEGATIVE MG/DL
LEUKOCYTE ESTERASE UR QL STRIP: NEGATIVE
LIPASE SERPL-CCNC: 108 U/L (ref 73–393)
LYMPHOCYTES # BLD AUTO: 1.84 THOUSANDS/ΜL (ref 0.6–4.47)
LYMPHOCYTES NFR BLD AUTO: 40 % (ref 14–44)
MCH RBC QN AUTO: 32.5 PG (ref 26.8–34.3)
MCHC RBC AUTO-ENTMCNC: 32.9 G/DL (ref 31.4–37.4)
MCV RBC AUTO: 99 FL (ref 82–98)
MONOCYTES # BLD AUTO: 0.43 THOUSAND/ΜL (ref 0.17–1.22)
MONOCYTES NFR BLD AUTO: 9 % (ref 4–12)
NEUTROPHILS # BLD AUTO: 2.05 THOUSANDS/ΜL (ref 1.85–7.62)
NEUTS SEG NFR BLD AUTO: 44 % (ref 43–75)
NITRITE UR QL STRIP: NEGATIVE
NON-SQ EPI CELLS URNS QL MICRO: ABNORMAL /HPF
NRBC BLD AUTO-RTO: 0 /100 WBCS
PH UR STRIP.AUTO: 6.5 [PH] (ref 4.5–8)
PLATELET # BLD AUTO: 307 THOUSANDS/UL (ref 149–390)
PMV BLD AUTO: 8.7 FL (ref 8.9–12.7)
POTASSIUM SERPL-SCNC: 3.7 MMOL/L (ref 3.5–5.3)
PROT SERPL-MCNC: 7.1 G/DL (ref 6.4–8.2)
PROT UR STRIP-MCNC: NEGATIVE MG/DL
RBC # BLD AUTO: 3.91 MILLION/UL (ref 3.81–5.12)
RBC #/AREA URNS AUTO: ABNORMAL /HPF
SODIUM SERPL-SCNC: 136 MMOL/L (ref 136–145)
SP GR UR STRIP.AUTO: 1.02 (ref 1–1.03)
UROBILINOGEN UR QL STRIP.AUTO: 0.2 E.U./DL
WBC # BLD AUTO: 4.65 THOUSAND/UL (ref 4.31–10.16)
WBC #/AREA URNS AUTO: ABNORMAL /HPF

## 2019-11-30 PROCEDURE — 85025 COMPLETE CBC W/AUTO DIFF WBC: CPT | Performed by: EMERGENCY MEDICINE

## 2019-11-30 PROCEDURE — 36415 COLL VENOUS BLD VENIPUNCTURE: CPT | Performed by: EMERGENCY MEDICINE

## 2019-11-30 PROCEDURE — 81001 URINALYSIS AUTO W/SCOPE: CPT

## 2019-11-30 PROCEDURE — 74177 CT ABD & PELVIS W/CONTRAST: CPT

## 2019-11-30 PROCEDURE — 99284 EMERGENCY DEPT VISIT MOD MDM: CPT

## 2019-11-30 PROCEDURE — 80053 COMPREHEN METABOLIC PANEL: CPT | Performed by: EMERGENCY MEDICINE

## 2019-11-30 PROCEDURE — 83690 ASSAY OF LIPASE: CPT | Performed by: EMERGENCY MEDICINE

## 2019-11-30 PROCEDURE — 96375 TX/PRO/DX INJ NEW DRUG ADDON: CPT

## 2019-11-30 PROCEDURE — 96374 THER/PROPH/DIAG INJ IV PUSH: CPT

## 2019-11-30 PROCEDURE — 96361 HYDRATE IV INFUSION ADD-ON: CPT

## 2019-11-30 PROCEDURE — 99284 EMERGENCY DEPT VISIT MOD MDM: CPT | Performed by: EMERGENCY MEDICINE

## 2019-11-30 RX ORDER — SUCRALFATE 1 G/1
1 TABLET ORAL 4 TIMES DAILY
Qty: 20 TABLET | Refills: 0 | Status: SHIPPED | OUTPATIENT
Start: 2019-11-30 | End: 2020-06-05 | Stop reason: ALTCHOICE

## 2019-11-30 RX ORDER — SUCRALFATE 1 G/1
1 TABLET ORAL 4 TIMES DAILY
Qty: 20 TABLET | Refills: 0 | Status: SHIPPED | OUTPATIENT
Start: 2019-11-30 | End: 2019-11-30 | Stop reason: SDUPTHER

## 2019-11-30 RX ORDER — ONDANSETRON 4 MG/1
4 TABLET, ORALLY DISINTEGRATING ORAL EVERY 8 HOURS PRN
Qty: 20 TABLET | Refills: 0 | Status: SHIPPED | OUTPATIENT
Start: 2019-11-30 | End: 2019-11-30 | Stop reason: SDUPTHER

## 2019-11-30 RX ORDER — FAMOTIDINE 20 MG/1
20 TABLET, FILM COATED ORAL 2 TIMES DAILY
Qty: 30 TABLET | Refills: 0 | Status: SHIPPED | OUTPATIENT
Start: 2019-11-30 | End: 2020-06-05 | Stop reason: SDUPTHER

## 2019-11-30 RX ORDER — FAMOTIDINE 20 MG/1
20 TABLET, FILM COATED ORAL 2 TIMES DAILY
Qty: 30 TABLET | Refills: 0 | Status: SHIPPED | OUTPATIENT
Start: 2019-11-30 | End: 2020-06-05 | Stop reason: ALTCHOICE

## 2019-11-30 RX ORDER — KETOROLAC TROMETHAMINE 30 MG/ML
15 INJECTION, SOLUTION INTRAMUSCULAR; INTRAVENOUS ONCE
Status: COMPLETED | OUTPATIENT
Start: 2019-11-30 | End: 2019-11-30

## 2019-11-30 RX ORDER — ONDANSETRON 2 MG/ML
4 INJECTION INTRAMUSCULAR; INTRAVENOUS ONCE
Status: COMPLETED | OUTPATIENT
Start: 2019-11-30 | End: 2019-11-30

## 2019-11-30 RX ORDER — ONDANSETRON 4 MG/1
4 TABLET, ORALLY DISINTEGRATING ORAL EVERY 8 HOURS PRN
Qty: 20 TABLET | Refills: 0 | Status: SHIPPED | OUTPATIENT
Start: 2019-11-30 | End: 2020-06-05 | Stop reason: ALTCHOICE

## 2019-11-30 RX ORDER — DICYCLOMINE HCL 20 MG
20 TABLET ORAL 2 TIMES DAILY
Qty: 20 TABLET | Refills: 0 | Status: SHIPPED | OUTPATIENT
Start: 2019-11-30 | End: 2020-06-05 | Stop reason: ALTCHOICE

## 2019-11-30 RX ADMIN — KETOROLAC TROMETHAMINE 15 MG: 30 INJECTION, SOLUTION INTRAMUSCULAR at 09:47

## 2019-11-30 RX ADMIN — IOHEXOL 100 ML: 350 INJECTION, SOLUTION INTRAVENOUS at 10:32

## 2019-11-30 RX ADMIN — ONDANSETRON 4 MG: 2 INJECTION INTRAMUSCULAR; INTRAVENOUS at 09:45

## 2019-11-30 RX ADMIN — SODIUM CHLORIDE 1000 ML: 0.9 INJECTION, SOLUTION INTRAVENOUS at 09:40

## 2019-11-30 NOTE — ED PROVIDER NOTES
History  Chief Complaint   Patient presents with    Abdominal Pain     Pt reports b/l abd pain and vomiting for the past 5 days  Also diarrhea for 2 days  Denies fevers    Back Pain     reports mid lower back pain  No Hx kidney stones  Denies urinary symptoms     57-year-old [place]i-speaking female presents for abdominal pain back pain and vomiting  Number he is non bloody vomiting for the past five days  Up to 5 times a day  Has little appetite  Also reports midback pain lower  Abdominal pain is in the upper quadrants mainly  Cannot tell if it is worse after eating  She does have diarrhea that is nonbloody  No fevers or chills no chest pain or shortness of breath no other modifying factors or associated symptoms  She has previous history of  section x3 also incisional hernia repair it sounds like  She has a history of GERD hypertension and hypothyroidism  On exam she has tenderness in the upper quadrant without rebound and has a negative Daniel sign  She has no evidence of peritonitis  She has no evidence of CVA tenderness  Assessment plan:  Abdominal pain and back pain  Could be reflux versus pancreatitis versus obstruction although unlikely  Plan is to check labs treat for gastritis CT abdomen pelvis given age and multiple surgeries  Prior to Admission Medications   Prescriptions Last Dose Informant Patient Reported?  Taking?   levothyroxine 112 mcg tablet   Yes Yes   Sig: Take 112 mcg by mouth daily   lisinopril (ZESTRIL) 30 mg tablet 2019 at Unknown time  Yes Yes   Sig: Take 25 mg by mouth daily   omeprazole (PriLOSEC) 20 mg delayed release capsule 2019 at Unknown time  No Yes   Sig: Take 1 capsule (20 mg total) by mouth daily      Facility-Administered Medications: None       Past Medical History:   Diagnosis Date    GERD (gastroesophageal reflux disease)     Hypertension     Hyperthyroiditis     HYPERTHYROID; POST IODINE THERAPY       Past Surgical History: Procedure Laterality Date    APPENDECTOMY       SECTION      HERNIA REPAIR         Family History   Problem Relation Age of Onset    Endometrial cancer Family      I have reviewed and agree with the history as documented  Social History     Tobacco Use    Smoking status: Never Smoker    Smokeless tobacco: Never Used   Substance Use Topics    Alcohol use: Yes     Comment: occas, DOES NOT DRINK ALCOHOL AS PER 9/22/15 IN [facility]    Drug use: No        Review of Systems   Constitutional: Negative for chills, fatigue and fever  Eyes: Negative for photophobia and visual disturbance  Respiratory: Negative for cough and shortness of breath  Cardiovascular: Negative for chest pain, palpitations and leg swelling  Gastrointestinal: Negative for diarrhea, nausea and vomiting  Endocrine: Negative for polydipsia and polyuria  Genitourinary: Negative for decreased urine volume, difficulty urinating, dysuria and frequency  Musculoskeletal: Negative for back pain, neck pain and neck stiffness  Skin: Negative for color change and rash  Allergic/Immunologic: Negative for environmental allergies and immunocompromised state  Neurological: Negative for dizziness and headaches  Hematological: Negative for adenopathy  Does not bruise/bleed easily  Psychiatric/Behavioral: Negative for dysphoric mood  The patient is not nervous/anxious  Physical Exam  Physical Exam   Constitutional: She is oriented to person, place, and time  She appears well-developed and well-nourished  No distress  HENT:   Head: Normocephalic and atraumatic  Nose: Nose normal    Eyes: Pupils are equal, round, and reactive to light  Conjunctivae and EOM are normal  No scleral icterus  Neck: Normal range of motion  Neck supple  No JVD present  No tracheal deviation present  No thyromegaly present  Cardiovascular: Normal rate, regular rhythm, normal heart sounds and intact distal pulses   Exam reveals no gallop and no friction rub  Pulmonary/Chest: Effort normal and breath sounds normal  No respiratory distress  She has no wheezes  She has no rales  She exhibits no tenderness  Abdominal: Soft  Bowel sounds are normal  She exhibits no distension and no mass  There is no tenderness  There is no rebound and no guarding  No hernia  Musculoskeletal: Normal range of motion  She exhibits no edema, tenderness or deformity  Neurological: She is alert and oriented to person, place, and time  She has normal reflexes  No cranial nerve deficit  Coordination normal    Skin: Skin is warm and dry  She is not diaphoretic  No erythema  Psychiatric: She has a normal mood and affect  Her behavior is normal    Nursing note and vitals reviewed  Vital Signs  ED Triage Vitals [11/30/19 0917]   Temperature Pulse Respirations Blood Pressure SpO2   97 8 °F (36 6 °C) 66 18 (!) 178/84 99 %      Temp Source Heart Rate Source Patient Position - Orthostatic VS BP Location FiO2 (%)   Oral Monitor Lying Left arm --      Pain Score       8           Vitals:    11/30/19 0917   BP: (!) 178/84   Pulse: 66   Patient Position - Orthostatic VS: Lying         Visual Acuity      ED Medications  Medications   sodium chloride 0 9 % bolus 1,000 mL (has no administration in time range)   ketorolac (TORADOL) injection 15 mg (has no administration in time range)   ondansetron (ZOFRAN) injection 4 mg (has no administration in time range)       Diagnostic Studies  Results Reviewed     Procedure Component Value Units Date/Time    Urine Microscopic [[id number]] Collected:  11/30/19 0932    Lab Status:   In process Specimen:  Urine, Clean Catch Updated:  11/30/19 0935    Urine Macroscopic, POC [[id number]]  (Abnormal) Collected:  11/30/19 0932    Lab Status:  Final result Specimen:  Urine Updated:  11/30/19 0933     Color, UA Yellow     Clarity, UA Clear     pH, UA 6 5     Leukocytes, UA Negative     Nitrite, UA Negative     Protein, UA Negative mg/dl      Glucose, UA Negative mg/dl      Ketones, UA Negative mg/dl      Urobilinogen, UA 0 2 E U /dl      Bilirubin, UA Negative     Blood, UA Small     Specific [place], UA 1 020    Narrative:       CLINITEK RESULT    CBC and differential [[id number]]     Lab Status:  No result Specimen:  Blood     Comprehensive metabolic panel [[id number]]     Lab Status:  No result Specimen:  Blood     Lipase [[id number]]     Lab Status:  No result Specimen:  Blood     POCT urinalysis dipstick [[id number]]     Lab Status:  No result Specimen:  Urine                  No orders to display              Procedures  Procedures       ED Course                               MDM    Disposition  Final diagnoses:   None     ED Disposition     None      Follow-up Information    None         Patient's Medications   Discharge Prescriptions    No medications on file     No discharge procedures on file      ED Provider  Electronically Signed by           Arnaud Phillips DO  12/02/19 [address] [place] [address],   12/06/19 9070

## 2020-06-05 ENCOUNTER — OFFICE VISIT (OUTPATIENT)
Dept: FAMILY MEDICINE CLINIC | Facility: CLINIC | Age: 55
End: 2020-06-05

## 2020-06-05 VITALS
SYSTOLIC BLOOD PRESSURE: 130 MMHG | OXYGEN SATURATION: 99 % | HEART RATE: 59 BPM | HEIGHT: 67 IN | TEMPERATURE: 97.5 F | DIASTOLIC BLOOD PRESSURE: 90 MMHG | BODY MASS INDEX: 29.03 KG/M2 | WEIGHT: 185 LBS | RESPIRATION RATE: 18 BRPM

## 2020-06-05 DIAGNOSIS — K21.9 GASTROESOPHAGEAL REFLUX DISEASE WITHOUT ESOPHAGITIS: ICD-10-CM

## 2020-06-05 DIAGNOSIS — I10 ESSENTIAL HYPERTENSION: Primary | ICD-10-CM

## 2020-06-05 DIAGNOSIS — E03.9 HYPOTHYROIDISM, UNSPECIFIED TYPE: ICD-10-CM

## 2020-06-05 DIAGNOSIS — R10.11 COLICKY RUQ ABDOMINAL PAIN: ICD-10-CM

## 2020-06-05 DIAGNOSIS — K64.4 EXTERNAL HEMORRHOIDS: ICD-10-CM

## 2020-06-05 PROCEDURE — 99203 OFFICE O/P NEW LOW 30 MIN: CPT | Performed by: FAMILY MEDICINE

## 2020-06-05 PROCEDURE — 3008F BODY MASS INDEX DOCD: CPT | Performed by: FAMILY MEDICINE

## 2020-06-05 PROCEDURE — 3075F SYST BP GE 130 - 139MM HG: CPT | Performed by: FAMILY MEDICINE

## 2020-06-05 PROCEDURE — 1036F TOBACCO NON-USER: CPT | Performed by: FAMILY MEDICINE

## 2020-06-05 PROCEDURE — 3080F DIAST BP >= 90 MM HG: CPT | Performed by: FAMILY MEDICINE

## 2020-06-05 RX ORDER — HYDROCORTISONE 25 MG/G
CREAM TOPICAL 2 TIMES DAILY
Qty: 1 TUBE | Refills: 1 | Status: SHIPPED | OUTPATIENT
Start: 2020-06-05 | End: 2021-04-16

## 2020-06-05 RX ORDER — MULTIVITAMIN
1 CAPSULE ORAL DAILY
COMMUNITY
End: 2021-12-04

## 2020-06-05 RX ORDER — OMEPRAZOLE 20 MG/1
20 CAPSULE, DELAYED RELEASE ORAL DAILY
Qty: 30 CAPSULE | Refills: 0 | Status: SHIPPED | OUTPATIENT
Start: 2020-06-05 | End: 2020-08-17 | Stop reason: SDUPTHER

## 2020-06-05 RX ORDER — LISINOPRIL 20 MG/1
20 TABLET ORAL DAILY
Qty: 30 TABLET | Refills: 1 | Status: SHIPPED | OUTPATIENT
Start: 2020-06-05 | End: 2020-06-19

## 2020-06-18 ENCOUNTER — HOSPITAL ENCOUNTER (EMERGENCY)
Facility: HOSPITAL | Age: 55
Discharge: HOME | End: 2020-06-18
Attending: EMERGENCY MEDICINE | Admitting: EMERGENCY MEDICINE

## 2020-06-18 ENCOUNTER — APPOINTMENT (EMERGENCY)
Dept: CT IMAGING | Facility: HOSPITAL | Age: 55
End: 2020-06-18

## 2020-06-18 VITALS
HEART RATE: 62 BPM | BODY MASS INDEX: 28.66 KG/M2 | WEIGHT: 182.98 LBS | OXYGEN SATURATION: 100 % | TEMPERATURE: 97.6 F | RESPIRATION RATE: 16 BRPM | SYSTOLIC BLOOD PRESSURE: 162 MMHG | DIASTOLIC BLOOD PRESSURE: 90 MMHG

## 2020-06-18 DIAGNOSIS — R11.0 NAUSEA: ICD-10-CM

## 2020-06-18 DIAGNOSIS — R10.10 PAIN OF UPPER ABDOMEN: Primary | ICD-10-CM

## 2020-06-18 LAB
ALBUMIN SERPL BCP-MCNC: 3.4 G/DL (ref 3.5–5)
ALP SERPL-CCNC: 51 U/L (ref 46–116)
ALT SERPL W P-5'-P-CCNC: 29 U/L (ref 12–78)
ANION GAP SERPL CALCULATED.3IONS-SCNC: 7 MMOL/L (ref 4–13)
AST SERPL W P-5'-P-CCNC: 27 U/L (ref 5–45)
BASOPHILS # BLD AUTO: 0.04 THOUSANDS/ΜL (ref 0–0.1)
BASOPHILS NFR BLD AUTO: 1 % (ref 0–1)
BILIRUB DIRECT SERPL-MCNC: 0.1 MG/DL (ref 0–0.2)
BILIRUB SERPL-MCNC: 0.36 MG/DL (ref 0.2–1)
BILIRUB UR QL STRIP: NEGATIVE
BUN SERPL-MCNC: 12 MG/DL (ref 5–25)
CALCIUM SERPL-MCNC: 8.2 MG/DL (ref 8.3–10.1)
CHLORIDE SERPL-SCNC: 105 MMOL/L (ref 100–108)
CLARITY UR: CLEAR
CLARITY, POC: CLEAR
CO2 SERPL-SCNC: 26 MMOL/L (ref 21–32)
COLOR UR: YELLOW
COLOR, POC: YELLOW
CREAT SERPL-MCNC: 0.79 MG/DL (ref 0.6–1.3)
EOSINOPHIL # BLD AUTO: 0.2 THOUSAND/ΜL (ref 0–0.61)
EOSINOPHIL NFR BLD AUTO: 5 % (ref 0–6)
ERYTHROCYTE [DISTWIDTH] IN BLOOD BY AUTOMATED COUNT: 13.4 % (ref 11.6–15.1)
GFR SERPL CREATININE-BSD FRML MDRD: 86 ML/MIN/1.73SQ M
GLUCOSE SERPL-MCNC: 90 MG/DL (ref 65–140)
GLUCOSE UR STRIP-MCNC: NEGATIVE MG/DL
HCT VFR BLD AUTO: 38.4 % (ref 34.8–46.1)
HGB BLD-MCNC: 12.8 G/DL (ref 11.5–15.4)
HGB UR QL STRIP.AUTO: NEGATIVE
IMM GRANULOCYTES # BLD AUTO: 0.01 THOUSAND/UL (ref 0–0.2)
IMM GRANULOCYTES NFR BLD AUTO: 0 % (ref 0–2)
KETONES UR STRIP-MCNC: NEGATIVE MG/DL
LEUKOCYTE ESTERASE UR QL STRIP: NEGATIVE
LIPASE SERPL-CCNC: 87 U/L (ref 73–393)
LYMPHOCYTES # BLD AUTO: 1.84 THOUSANDS/ΜL (ref 0.6–4.47)
LYMPHOCYTES NFR BLD AUTO: 45 % (ref 14–44)
MCH RBC QN AUTO: 33.8 PG (ref 26.8–34.3)
MCHC RBC AUTO-ENTMCNC: 33.3 G/DL (ref 31.4–37.4)
MCV RBC AUTO: 101 FL (ref 82–98)
MONOCYTES # BLD AUTO: 0.47 THOUSAND/ΜL (ref 0.17–1.22)
MONOCYTES NFR BLD AUTO: 12 % (ref 4–12)
NEUTROPHILS # BLD AUTO: 1.53 THOUSANDS/ΜL (ref 1.85–7.62)
NEUTS SEG NFR BLD AUTO: 37 % (ref 43–75)
NITRITE UR QL STRIP: NEGATIVE
NRBC BLD AUTO-RTO: 0 /100 WBCS
PH UR STRIP.AUTO: 7.5 [PH] (ref 4.5–8)
PLATELET # BLD AUTO: 273 THOUSANDS/UL (ref 149–390)
PMV BLD AUTO: 9 FL (ref 8.9–12.7)
POTASSIUM SERPL-SCNC: 4.2 MMOL/L (ref 3.5–5.3)
PROT SERPL-MCNC: 6.8 G/DL (ref 6.4–8.2)
PROT UR STRIP-MCNC: NEGATIVE MG/DL
RBC # BLD AUTO: 3.79 MILLION/UL (ref 3.81–5.12)
SODIUM SERPL-SCNC: 138 MMOL/L (ref 136–145)
SP GR UR STRIP.AUTO: 1.02 (ref 1–1.03)
TSH SERPL DL<=0.05 MIU/L-ACNC: 0.74 UIU/ML (ref 0.36–3.74)
UROBILINOGEN UR QL STRIP.AUTO: 0.2 E.U./DL
WBC # BLD AUTO: 4.09 THOUSAND/UL (ref 4.31–10.16)

## 2020-06-18 PROCEDURE — 74177 CT ABD & PELVIS W/CONTRAST: CPT

## 2020-06-18 PROCEDURE — 96361 HYDRATE IV INFUSION ADD-ON: CPT

## 2020-06-18 PROCEDURE — 80048 BASIC METABOLIC PNL TOTAL CA: CPT | Performed by: EMERGENCY MEDICINE

## 2020-06-18 PROCEDURE — 85025 COMPLETE CBC W/AUTO DIFF WBC: CPT | Performed by: EMERGENCY MEDICINE

## 2020-06-18 PROCEDURE — 96374 THER/PROPH/DIAG INJ IV PUSH: CPT

## 2020-06-18 PROCEDURE — 84443 ASSAY THYROID STIM HORMONE: CPT | Performed by: EMERGENCY MEDICINE

## 2020-06-18 PROCEDURE — 83690 ASSAY OF LIPASE: CPT | Performed by: EMERGENCY MEDICINE

## 2020-06-18 PROCEDURE — 99284 EMERGENCY DEPT VISIT MOD MDM: CPT

## 2020-06-18 PROCEDURE — 96375 TX/PRO/DX INJ NEW DRUG ADDON: CPT

## 2020-06-18 PROCEDURE — 99284 EMERGENCY DEPT VISIT MOD MDM: CPT | Performed by: EMERGENCY MEDICINE

## 2020-06-18 PROCEDURE — 36415 COLL VENOUS BLD VENIPUNCTURE: CPT | Performed by: EMERGENCY MEDICINE

## 2020-06-18 PROCEDURE — 80076 HEPATIC FUNCTION PANEL: CPT | Performed by: EMERGENCY MEDICINE

## 2020-06-18 PROCEDURE — 81003 URINALYSIS AUTO W/O SCOPE: CPT

## 2020-06-18 RX ORDER — SUCRALFATE 1 G/1
1 TABLET ORAL 4 TIMES DAILY
Qty: 40 TABLET | Refills: 0 | Status: SHIPPED | OUTPATIENT
Start: 2020-06-18 | End: 2020-08-17 | Stop reason: SDUPTHER

## 2020-06-18 RX ORDER — ONDANSETRON 4 MG/1
4 TABLET, ORALLY DISINTEGRATING ORAL EVERY 6 HOURS PRN
Qty: 20 TABLET | Refills: 0 | Status: SHIPPED | OUTPATIENT
Start: 2020-06-18 | End: 2020-06-19 | Stop reason: ALTCHOICE

## 2020-06-18 RX ORDER — METOCLOPRAMIDE HYDROCHLORIDE 5 MG/ML
10 INJECTION INTRAMUSCULAR; INTRAVENOUS ONCE
Status: COMPLETED | OUTPATIENT
Start: 2020-06-18 | End: 2020-06-18

## 2020-06-18 RX ORDER — PANTOPRAZOLE SODIUM 40 MG/1
40 TABLET, DELAYED RELEASE ORAL DAILY
Qty: 30 TABLET | Refills: 0 | Status: SHIPPED | OUTPATIENT
Start: 2020-06-18 | End: 2020-08-17 | Stop reason: SDUPTHER

## 2020-06-18 RX ADMIN — IOHEXOL 100 ML: 350 INJECTION, SOLUTION INTRAVENOUS at 10:56

## 2020-06-18 RX ADMIN — FAMOTIDINE 20 MG: 10 INJECTION, SOLUTION INTRAVENOUS at 09:11

## 2020-06-18 RX ADMIN — METOCLOPRAMIDE 10 MG: 5 INJECTION, SOLUTION INTRAMUSCULAR; INTRAVENOUS at 09:11

## 2020-06-18 RX ADMIN — SODIUM CHLORIDE 1000 ML: 0.9 INJECTION, SOLUTION INTRAVENOUS at 09:11

## 2020-06-19 ENCOUNTER — OFFICE VISIT (OUTPATIENT)
Dept: FAMILY MEDICINE CLINIC | Facility: CLINIC | Age: 55
End: 2020-06-19

## 2020-06-19 VITALS
OXYGEN SATURATION: 96 % | HEART RATE: 86 BPM | DIASTOLIC BLOOD PRESSURE: 92 MMHG | RESPIRATION RATE: 12 BRPM | SYSTOLIC BLOOD PRESSURE: 132 MMHG | WEIGHT: 185 LBS | BODY MASS INDEX: 28.98 KG/M2 | TEMPERATURE: 96.9 F

## 2020-06-19 DIAGNOSIS — I10 ESSENTIAL HYPERTENSION: ICD-10-CM

## 2020-06-19 DIAGNOSIS — K21.9 GASTROESOPHAGEAL REFLUX DISEASE WITHOUT ESOPHAGITIS: Primary | ICD-10-CM

## 2020-06-19 PROCEDURE — 3080F DIAST BP >= 90 MM HG: CPT | Performed by: FAMILY MEDICINE

## 2020-06-19 PROCEDURE — 3075F SYST BP GE 130 - 139MM HG: CPT | Performed by: FAMILY MEDICINE

## 2020-06-19 PROCEDURE — 1036F TOBACCO NON-USER: CPT | Performed by: FAMILY MEDICINE

## 2020-06-19 PROCEDURE — 99213 OFFICE O/P EST LOW 20 MIN: CPT | Performed by: FAMILY MEDICINE

## 2020-06-19 RX ORDER — LISINOPRIL 40 MG/1
40 TABLET ORAL DAILY
Qty: 30 TABLET | Refills: 2 | Status: SHIPPED | OUTPATIENT
Start: 2020-06-19 | End: 2020-12-28 | Stop reason: SDUPTHER

## 2020-06-25 ENCOUNTER — TELEPHONE (OUTPATIENT)
Dept: FAMILY MEDICINE CLINIC | Facility: CLINIC | Age: 55
End: 2020-06-25

## 2020-07-16 ENCOUNTER — TELEPHONE (OUTPATIENT)
Dept: FAMILY MEDICINE CLINIC | Facility: CLINIC | Age: 55
End: 2020-07-16

## 2020-08-17 PROBLEM — M54.50 LOW BACK PAIN: Status: ACTIVE | Noted: 2020-08-17

## 2020-08-17 PROBLEM — Z12.11 ENCOUNTER FOR SCREENING COLONOSCOPY: Status: ACTIVE | Noted: 2020-08-17

## 2020-09-03 ENCOUNTER — OFFICE VISIT (OUTPATIENT)
Dept: FAMILY MEDICINE CLINIC | Facility: CLINIC | Age: 55
End: 2020-09-03

## 2020-09-03 VITALS
RESPIRATION RATE: 16 BRPM | OXYGEN SATURATION: 98 % | DIASTOLIC BLOOD PRESSURE: 88 MMHG | TEMPERATURE: 97.1 F | BODY MASS INDEX: 28.35 KG/M2 | HEART RATE: 68 BPM | SYSTOLIC BLOOD PRESSURE: 128 MMHG | WEIGHT: 181 LBS

## 2020-09-03 DIAGNOSIS — M54.31 SCIATICA OF RIGHT SIDE: ICD-10-CM

## 2020-09-03 DIAGNOSIS — Z12.31 SCREENING MAMMOGRAM, ENCOUNTER FOR: ICD-10-CM

## 2020-09-03 DIAGNOSIS — I10 ESSENTIAL HYPERTENSION: Primary | ICD-10-CM

## 2020-09-03 PROCEDURE — 99213 OFFICE O/P EST LOW 20 MIN: CPT | Performed by: INTERNAL MEDICINE

## 2020-09-03 PROCEDURE — 1036F TOBACCO NON-USER: CPT | Performed by: INTERNAL MEDICINE

## 2020-09-03 RX ORDER — LIDOCAINE 50 MG/G
1 PATCH TOPICAL DAILY
Qty: 15 PATCH | Refills: 1 | Status: SHIPPED | OUTPATIENT
Start: 2020-09-03 | End: 2021-04-16

## 2020-09-03 RX ORDER — NAPROXEN 500 MG/1
500 TABLET ORAL 2 TIMES DAILY WITH MEALS
Qty: 60 TABLET | Refills: 1 | Status: SHIPPED | OUTPATIENT
Start: 2020-09-03 | End: 2020-10-09 | Stop reason: HOSPADM

## 2020-09-03 NOTE — PATIENT INSTRUCTIONS
Ciática   LO QUE NECESITA SABER:   ¿Qué es la ciática? La ciática es nicole condición que causa dolor en el nervio ciático  El nervio ciático sale de la agata dorsal y corre por ambos lados de los glúteos  Luego baja por la parte posterior del muslo, la parte inferior de la pierna y el pie  El nervio ciático puede estar comprimido, [place], irritado o estirado en alguna parte y causar síntomas  ¿Qué causa la ciática? La ciática puede estar relacionada con ciertas actividades, kayy postura y tensión física o psicológica  [place] de los factores siguientes puede causar o incrementar torres riesgo de tener ciática:  · Problema con los discos:  La causa más común de la ciática es nicole hernia de disco (tejido acolchado que se encuentra entre los huesos de la columna)  Es posible que el disco ponga presión en el nervio ciático  Puede que un hueso de la columna se deslice sobre otro, o que se haya estrechado el espacio en los huesos de la columna  · Lesión muscular:  [place] puede suceder después de torcerse o levantar un objeto pesado  La inflamación que resulta del esguince o irritación muscular en los glúteos, los muslos o las piernas pone presión en el nervio ciático     · Obesidad o embarazo:  El exceso de peso pone más presión en la espalda y las piernas  · Trauma:  Los golpes [employer], los muslos o las piernas, los accidentes automovilísticos o las caídas pueden lesionar el nervio ciático     · Enfermedades de la columna vertebral:  La artritis, osteoporosis, cáncer o infección de la columna también pueden afectar el nervio ciático   ¿Cuáles son los signos y síntomas de la ciática?   La ciática puede tener síntomas a corto o a jae plazo:  · Dolor que comienza en la parte inferior de la espalda y baja por los glúteos y la parte posterior del muslo    · Pérdida de la sensación u hormigueo en los glúteos y las piernas    · Debilidad muscular, dificultad para moverse o para controlar la pierna o el pie    · Dolor en las piernas que aumenta cuando está de pie, sentado o en cuclillas  ¿Cómo se diagnostica la ciática? Torres médico le preguntará sobre shan otras condiciones médicas  Puede que le pregunte acerca de torres Sabina Hence, torres historial de dolor en la espalda y las enfermedades o cirugías que lucia tenido  [place] y [employer] las piernas para comprobar qué hace que el dolor aumente  Es posible que también necesite alguno de los siguientes tratamientos:  · Radiografía: Son imágenes de los huesos y los tejidos de torres espalda, caderas, muslos o piernas  Es posible que esta prueba muestre otros [place], josé antonio fracturas (Iwona Gallop)  · Tomografía computarizada:  Sandee examen también se conoce josé antonio escán TAC  Nilesh Bulla de ben x [place] nicole computadora para edith imágenes de shan caderas, muslos y piernas  Puede que las imágenes muestren torres nervio ciático, músculos y vasos sanguíneos  Es posible que le administren un medio de contraste antes de edith las imágenes para que los médicos las puedan jw con mayor claridad  Dígale al médico si usted alguna vez ha tenido nicole reacción alérgica al tinte de [place]  · Imágenes por resonancia magnética (IRM):  En sandee estudio se utilizan imanes potentes y Loann León computadora para edith imágenes de las caderas, muslos y piernas  Puede que la IRM muestre si se ha dañado los nervios, músculos, huesos y vasos sanguíneos  Le podrían administrar un tinte para ayudar a que las imágenes se vean mejor  Dígale al médico si usted alguna vez ha tenido nicole reacción alérgica al tinte de [place]  No entre a la ovi donde se realiza la resonancia magnética con algo de metal  El metal puede causar lesiones serias  Dígale al médico si usted tiene algo de metal por dentro o sobre torres cuerpo  · Rozanne Nearing (EMG)  es un examen que mide la actividad eléctrica de shan músculos en descanso y en movimiento      · Pruebas de conducción nerviosa:  Estos estudios comprueban la manera en que los nervios superficiales y los músculos relacionados reaccionan a la estimulación  Se colocan electrodos con cables o agujas diminutas en ciertas áreas, josé antonio los glúteos y las piernas  ¿Cuál es el tratamiento para la ciática? · AINEs (analgésicos antiinflamatorios no esteroides):  Estos medicamentos disminuyen la inflamación y el dolor  Los AINEs se pueden obtener sin receta médica  Consulte con torres médico cuál medicamento es el adecuado para usted  Pregunte cuánto edith y cuándo  Tómelos josé antonio se le indique  Cuando no se nichole de la [employer], los medicamentos antiinflamatorios no esteroides pueden causar sangrado estomacal o problemas renales  · Acetaminofeno:  Sandee medicamento disminuye el dolor  El acetaminofeno puede obtener sin receta médica  Pregunte la cantidad y la frecuencia con que debe tomarlos  [id number]  El acetaminofén puede causar daño en el hígado cuando no se sina de forma correcta  · Relajantes musculares  ayudan a reducir dolor y espasmos musculares  · Medicamento esteroide epidural:  Puede incluir tanto un anestésico (medicamento para adormecer) josé antonio un esteroide, que puede bajar la inflamación y calmar el dolor  Se administra en forma de inyección cerca de la agata dorsal, en el área donde siente el dolor  · Quimionucleólisis:  Se administra sherry inyección en el disco afectado para ablandarlo o encogerlo  · Cirugía:  Es posible que se realice sherry cirugía para corregir problemas josé antonio un disco dañado o un tumor en la columna  Se puede realizar para disminuir la presión en el nervio ciático  Los médicos también pueden liberar los músculos que están presionando el nervio ciático   ¿Cómo puedo ayudar a controlar la ciática? · Terapia de ultrasonido:  Sherry máquina emplea ondas sonoras para aliviar el dolor  Es posible que se use además un medicamento tópico para contribuir a calmar el dolor y la inflamación      · Fisioterapia:  [place] fisioterapeuta le puede enseñar ejercicios para ayudarle a mejorar el movimiento y la fuerza, y para disminuir el dolor  Un terapeuta ocupacional le enseña habilidades para ayudarlo con shan actividades diarias  · Dispositivos de asistencia:  Es posible que deba usar un soporte para la espalda, josé antonio nicole Harlene Seamen  Puede que necesite muletas, un bastón o un andador para disminuir la presión en los músculos de la parte inferior de la espalda y las piernas  Pregunte a torres médico por más [employer] dispositivos de asistencia y cómo se usan de forma correcta  ¿Cómo se puede prevenir la ciática? · Evite la presión en la espalda y las piernas:  No  levante objetos pesados, ni esté de pie o sentado por períodos prolongados de tiempo  · Levante los objetos de [place]: Mantenga la Janice Passer y doble las rodillas para alzar un objeto  No doble ni tuerza la espalda cuando levante algo  · Mantenga un peso saludable:  Consulte con torres médico cuánto debería pesar  Pida que le ayude a crear un plan para bajar de peso si usted tiene sobrepeso  · Ejercicio:  Pídale a torres médico que le recomiende el programa de estiramiento, calentamiento y ejercicio más apropiado para usted  ¿Cuáles son los riesgos de la ciática? Si no se administra correctamente, la inyección epidural de esteroide puede resultar en trastornos de dolor o parálisis  También puede causar dolor de santiago, dolor en la pierna y bloqueo del flujo sanguíneo a la médula gallegos  Puede sangrar o contraer nicole infección josé antonio resultado de Diannia Loveless  Si no recibe tratamiento, shan músculos y nervios se podrían dañar de forma permanente  Es posible que tenga menos fuerza  Es posible que no pueda  la pierna o controlar cuándo orina o [employer] intestinos  ¿Cuándo clive comunicarme con mi médico?   · Le duele la parte inferior de la espalda por la noche o cuando descansa      · Le duele la parte inferior de la espalda y se le adormece la pierna por debajo de la rodilla  · Tiene debilidad en nicole pierna solamente  · Usted tiene preguntas o inquietudes acerca de arvizu condición o cuidado  ¿Cuándo clive buscar atención inmediata o llamar al 911? · Tiene dificultad para contener la orina o las evacuaciones intestinales  · Tiene debilidad en ambas piernas  · Pierde la sensación en la jyothi o los glúteos  ACUERDOS SOBRE ARVIZU CUIDADO:   Usted tiene el derecho de ayudar a planear arvizu cuidado  Aprenda todo lo que pueda sobre arvizu condición y josé antonio darle tratamiento  Discuta shan opciones de tratamiento con shan médicos para decidir el cuidado que usted desea recibir  Usted siempre tiene el derecho de rechazar el tratamiento  Esta información es sólo para uso en educación  Arvizu intención no es darle un consejo médico sobre enfermedades o tratamientos  Colsulte con arvizu Priscilla [place]orn[place] farmacéutico antes de seguir cualquier régimen médico para saber si es seguro y efectivo para usted  © 2017 [address] Information is for End User's use only and may not be sold, redistributed or otherwise used for commercial purposes  All illustrations and images included in CareNotes® are the copyrighted property of [employer]  or Cleve Chun

## 2020-09-03 NOTE — ASSESSMENT & PLAN NOTE
Well controlled at this time, though she has infrequent readings at 919 mmHg systolic Significantly improved control with reduced stress at work and lifestyle modifications  Reviewed BP goals with patient  Goal BP <140/90 mmHg per JNC 8 guidelines  Patient congratulated on efforts  Continue to maintain healthy balanced diet with focus on low salt intake  Encouraged physical activity  Limit alcohol intake  Encouraged home blood pressure monitoring

## 2020-09-03 NOTE — PROGRESS NOTES
Assessment/Plan:    Essential hypertension  Well controlled at this time, though she has infrequent readings at 041 mmHg systolic Significantly improved control with reduced stress at work and lifestyle modifications  Reviewed BP goals with patient  Goal BP <140/90 mmHg per JNC 8 guidelines  Patient congratulated on efforts  Continue to maintain healthy balanced diet with focus on low salt intake  Encouraged physical activity  Limit alcohol intake  Encouraged home blood pressure monitoring  Sciatica of right side  History suggestive of right sided sciatica with positive straight leg raise  Advised naproxen 500 mg BID for one week  Also advised application of heat, lidocaine patches if needed, as well as physical therapy  Sleeping on her side with a pillow between her legs can also be helpful  BMI Counseling: Body mass index is 28 35 kg/m²  The BMI is above normal  Nutrition recommendations include reducing portion sizes, decreasing overall calorie intake, 3-5 servings of fruits/vegetables daily and decreasing soda and/or juice intake  Exercise recommendations include exercising 3-5 times per week  Return in about 3 months (around 12/3/2020) for Next scheduled follow up  Return for GYN exam with pap smear  Patient Instructions     Ciática   LO QUE NECESITA SABER:   ¿Qué es la ciática? La ciática es nicole condición que causa dolor en el nervio ciático  El nervio ciático sale de la agata dorsal y corre por ambos lados de los glúteos  Luego baja por la parte posterior del muslo, la parte inferior de la pierna y el pie  El nervio ciático puede estar comprimido, [place], irritado o estirado en alguna parte y causar síntomas  ¿Qué causa la ciática? La ciática puede estar relacionada con ciertas actividades, kayy postura y tensión física o psicológica   [place] de los factores siguientes puede causar o incrementar torres riesgo de tener ciática:  · Problema con los discos:  La causa más común de la ciática es nicole hernia de disco (tejido acolchado que se encuentra entre los huesos de la columna)  Es posible que el disco ponga presión en el nervio ciático  Puede que un hueso de la columna se deslice sobre otro, o que se haya estrechado el espacio en los huesos de la columna  · Lesión muscular:  [place] puede suceder después de torcerse o levantar un objeto pesado  La inflamación que resulta del esguince o irritación muscular en los glúteos, los muslos o las piernas pone presión en el nervio ciático     · Obesidad o embarazo:  El exceso de peso pone más presión en la espalda y las piernas  · Trauma:  Los golpes [place], los muslos o las piernas, los accidentes automovilísticos o las caídas pueden lesionar el nervio ciático     · Enfermedades de la columna vertebral:  La artritis, osteoporosis, cáncer o infección de la columna también pueden afectar el nervio ciático   ¿Cuáles son los signos y síntomas de la ciática? La ciática puede tener síntomas a corto o a jae plazo:  · Dolor que comienza en la parte inferior de la espalda y baja por los glúteos y la parte posterior del muslo    · Pérdida de la sensación u hormigueo en los glúteos y las piernas    · Debilidad muscular, dificultad para moverse o para controlar la pierna o el pie    · Dolor en las piernas que aumenta cuando está de pie, sentado o en cuclillas  ¿Cómo se diagnostica la ciática? Torres médico le preguntará sobre shan otras condiciones médicas  Puede que le pregunte acerca de torres [place], torres historial de dolor en la espalda y las enfermedades o cirugías que lucia tenido  Judyann Brine y [employer] las piernas para comprobar qué hace que el dolor aumente  Es posible que también necesite alguno de los siguientes tratamientos:  · Radiografía: Son imágenes de los huesos y los tejidos de torres espalda, caderas, muslos o piernas  Es posible que esta prueba muestre otros [address], josé antonio fracturas (Shalini Aures)       · Tomografía computarizada:  Sandee examen también se conoce josé antonio escán TAC  Verdene Evans de ben x [place] nicole computadora para edith imágenes de shan caderas, muslos y piernas  Puede que las imágenes muestren torres nervio ciático, músculos y vasos sanguíneos  Es posible que le administren un medio de contraste antes de edith las imágenes para que los médicos las puedan jw con mayor claridad  Dígale al médico si usted alguna vez ha tenido nicole reacción alérgica al tinte de [place]  · Imágenes por resonancia magnética (IRM):  En sandee estudio se utilizan imanes potentes y Cait Shillings computadora para edith imágenes de las caderas, muslos y piernas  Puede que la IRM muestre si se ha dañado los nervios, músculos, huesos y vasos sanguíneos  Le podrían administrar un tinte para ayudar a que las imágenes se vean mejor  Dígale al médico si usted alguna vez ha tenido nicole reacción alérgica al tinte de [place]  No entre a la ovi donde se realiza la resonancia magnética con algo de metal  El metal puede causar lesiones serias  Dígale al médico si usted tiene algo de metal por dentro o sobre torres cuerpo  · Tia Nati (EMG)  es un examen que mide la actividad eléctrica de shan músculos en descanso y en movimiento  · Pruebas de conducción nerviosa:  Estos estudios comprueban la manera en que los nervios superficiales y los músculos relacionados reaccionan a la estimulación  Se colocan electrodos con cables o agujas diminutas en ciertas áreas, josé antonio los glúteos y las piernas  ¿Cuál es el tratamiento para la ciática? · AINEs (analgésicos antiinflamatorios no esteroides):  Estos medicamentos disminuyen la inflamación y el dolor  Los AINEs se pueden obtener sin receta médica  Consulte con torres médico cuál medicamento es el adecuado para usted  Pregunte cuánto edith y cuándo  Tómelos josé antonio se le indique   Cuando no se nichole de la [employer], los medicamentos antiinflamatorios no esteroides pueden causar sangrado estomacal o problemas renales  · Acetaminofeno:  Sandee medicamento disminuye el dolor  El acetaminofeno puede obtener sin receta médica  Pregunte la cantidad y la frecuencia con que debe tomarlos  [address]  El acetaminofén puede causar daño en el hígado cuando no se sina de forma correcta  · Relajantes musculares  ayudan a reducir dolor y espasmos musculares  · Medicamento esteroide epidural:  Puede incluir tanto un anestésico (medicamento para adormecer) josé antonio un esteroide, que puede bajar la inflamación y calmar el dolor  Se administra en forma de inyección cerca de la agata dorsal, en el área donde siente el dolor  · Quimionucleólisis:  Se administra sherry inyección en el disco afectado para ablandarlo o encogerlo  · Cirugía:  Es posible que se realice sherry cirugía para corregir problemas josé antonio un disco dañado o un tumor en la columna  Se puede realizar para disminuir la presión en el nervio ciático  Los médicos también pueden liberar los músculos que están presionando el nervio ciático   ¿Cómo puedo ayudar a controlar la ciática? · Terapia de ultrasonido:  Sherry máquina emplea ondas sonoras para aliviar el dolor  Es posible que se use además un medicamento tópico para contribuir a calmar el dolor y la inflamación  · Fisioterapia:  Un fisioterapeuta le puede enseñar ejercicios para ayudarle a mejorar el movimiento y la fuerza, y para disminuir el dolor  Un terapeuta ocupacional le enseña habilidades para ayudarlo con shan actividades diarias  · Dispositivos de asistencia:  Es posible que deba usar un soporte para la espalda, josé antonio sherry Zoey Royce  Puede que necesite muletas, un bastón o un andador para disminuir la presión en los músculos de la parte inferior de la espalda y las piernas  Pregunte a torres médico por más [employer] dispositivos de asistencia y cómo se usan de forma correcta  ¿Cómo se puede prevenir la ciática?    · Evite la presión en la espalda y las piernas:  No  levante objetos pesados, ni esté de pie o sentado por períodos prolongados de tiempo  · Levante los objetos de [place]: Mantenga la Jannice Myla y doble las rodillas para alzar un objeto  No doble ni tuerza la espalda cuando levante algo  · Mantenga un peso saludable:  Consulte con torres médico cuánto debería pesar  Pida que le ayude a crear un plan para bajar de peso si usted tiene sobrepeso  · Ejercicio:  [place] a torres médico que le recomiende el programa de estiramiento, calentamiento y ejercicio más apropiado para usted  ¿Cuáles son los riesgos de la ciática? Si no se administra correctamente, la inyección epidural de esteroide puede resultar en trastornos de dolor o parálisis  También puede causar dolor de santiago, dolor en la pierna y bloqueo del flujo sanguíneo a la médula gallegos  Puede sangrar o contraer nicole infección josé antonio resultado de Evelina Bilberry  Si no recibe tratamiento, shan músculos y nervios se podrían dañar de forma permanente  Es posible que tenga menos fuerza  Es posible que no pueda  la pierna o controlar cuándo orina o [employer] intestinos  ¿Cuándo clive comunicarme con mi médico?   · Le duele la parte inferior de la espalda por la noche o cuando descansa  · Le duele la parte inferior de la espalda y se le adormece la pierna por debajo de la rodilla  · Tiene debilidad en nicole pierna solamente  · Usted tiene preguntas o inquietudes acerca de torres condición o cuidado  ¿Cuándo clive buscar atención inmediata o llamar al 911? · Tiene dificultad para contener la orina o las evacuaciones intestinales  · Tiene debilidad en ambas piernas  · Pierde la sensación en la jyothi o los glúteos  ACUERDOS SOBRE TORRES CUIDADO:   Usted tiene el derecho de ayudar a planear torres cuidado  Aprenda todo lo que pueda sobre torres condición y josé antonio darle tratamiento  Discuta shan opciones de tratamiento con shan médicos para decidir el cuidado que usted desea recibir   Usted siempre tiene el derecho de rechazar el tratamiento  Esta información es sólo para uso en educación  Torres intención no es darle un consejo médico sobre enfermedades o tratamientos  Colsulte con torres Taniya Jason farmacéutico antes de seguir cualquier régimen médico para saber si es seguro y efectivo para usted  © 2017 [address] Information is for End User's use only and may not be sold, redistributed or otherwise used for commercial purposes  All illustrations and images included in CareNotes® are the copyrighted property of A D A M , Inc  or [place]  Diagnoses and all orders for this visit:    Essential hypertension    Sciatica of right side  -     Ambulatory referral to Physical Therapy; Future  -     naproxen (NAPROSYN) 500 mg tablet; Take 1 tablet (500 mg total) by mouth 2 (two) times a day with meals  -     lidocaine (LIDODERM) 5 %; Apply 1 patch topically daily Remove & Discard patch within 12 hours or as directed by MD    Screening mammogram, encounter for  -     Mammo screening bilateral w cad; Future          Subjective:     Darinel Siddiqi is a 50 y o  female who  has a past medical history of GERD (gastroesophageal reflux disease), Hypertension, and Hyperthyroiditis  who presented to the office today for follow up of hypertension  She also reports back pain  HPI  Back Pain   This is a chronic problem  The current episode started more than 1 month ago  The problem occurs daily  The problem is unchanged  The pain is present in the gluteal  The quality of the pain is described as aching and shooting  The pain radiates to the right thigh and right knee  The pain is at a severity of 5/10  The pain is moderate  The pain is worse during the night  The symptoms are aggravated by lying down, standing and twisting  Associated symptoms include numbness, paresthesias and tingling   Pertinent negatives include no abdominal pain, bladder incontinence, bowel incontinence, chest pain, dysuria, fever or weakness  Risk factors include obesity  She has tried NSAIDs and heat (sleeping on her side with pillow between her legs) for the symptoms  The treatment provided moderate relief  Her hypertension is managed with lisinopril 40 mg daily  Her daughter measures her blood pressure at home  It is usually less than 451 mmHg systolic  However, if she is stressed she can have blood pressures as high as 180 mmHg associated with dizziness  Since our last visit, her workload has been reduced, and she feels less stressed  She goes jogging with her daughter several times per week  She takes care to avoid adding salt, carbohydrates in her diet  She drinks mostly water  She denies any chest pain, SOB, palpitations, headaches, blurry vision, dizziness, or any other symptoms at this time  Screening colonoscopy is scheduled for 28-Sept-2020  Review of Systems   Constitutional: Negative for fatigue and fever  Respiratory: Negative for cough and shortness of breath  Cardiovascular: Negative for chest pain and palpitations  Gastrointestinal: Negative for abdominal pain and bowel incontinence  Genitourinary: Negative for bladder incontinence and dysuria  Musculoskeletal: Positive for back pain  Negative for myalgias  Skin: Negative for rash  Neurological: Positive for tingling, numbness and paresthesias  Negative for dizziness and weakness  Objective:    /88 (BP Location: Left arm, Patient Position: Sitting, Cuff Size: Adult)   Pulse 68   Temp (!) 97 1 °F (36 2 °C) (Temporal)   Resp 16   Wt 82 1 kg (181 lb)   LMP 08/30/2020   SpO2 98%   BMI 28 35 kg/m²     Physical Exam  Constitutional:       General: She is not in acute distress  Appearance: She is well-developed  HENT:      Head: Normocephalic and atraumatic  Eyes:      General: No scleral icterus  Conjunctiva/sclera: Conjunctivae normal       Pupils: Pupils are equal, round, and reactive to light     Neck: Musculoskeletal: Normal range of motion and neck supple  Trachea: No tracheal deviation  Cardiovascular:      Rate and Rhythm: Normal rate and regular rhythm  Heart sounds: Normal heart sounds  No murmur  No friction rub  Pulmonary:      Effort: Pulmonary effort is normal  No respiratory distress  Breath sounds: Normal breath sounds  No wheezing  Musculoskeletal:         General: No tenderness or deformity  Comments: Right sided low back/gluteal pain that radiates down to the right knee  Straight leg raise is positive at 30 degrees leg elevation  Skin:     General: Skin is warm and dry  Findings: No rash  Neurological:      Mental Status: She is alert and oriented to person, place, and time     Psychiatric:         Mood and Affect: Mood normal          Toya Cronin MD  09/03/20  9:54 AM

## 2020-09-03 NOTE — ASSESSMENT & PLAN NOTE
History suggestive of right sided sciatica with positive straight leg raise  Advised naproxen 500 mg BID for one week  Also advised application of heat, lidocaine patches if needed, as well as physical therapy  Sleeping on her side with a pillow between her legs can also be helpful

## 2020-09-28 ENCOUNTER — OFFICE VISIT (OUTPATIENT)
Dept: GASTROENTEROLOGY | Facility: MEDICAL CENTER | Age: 55
End: 2020-09-28
Payer: COMMERCIAL

## 2020-09-28 VITALS
WEIGHT: 183 LBS | DIASTOLIC BLOOD PRESSURE: 90 MMHG | HEIGHT: 67 IN | HEART RATE: 86 BPM | SYSTOLIC BLOOD PRESSURE: 160 MMHG | TEMPERATURE: 98.4 F | BODY MASS INDEX: 28.72 KG/M2

## 2020-09-28 DIAGNOSIS — K92.0 HEMATEMESIS, PRESENCE OF NAUSEA NOT SPECIFIED: ICD-10-CM

## 2020-09-28 DIAGNOSIS — K21.9 GASTROESOPHAGEAL REFLUX DISEASE WITHOUT ESOPHAGITIS: Primary | ICD-10-CM

## 2020-09-28 DIAGNOSIS — Z12.11 ENCOUNTER FOR SCREENING COLONOSCOPY: ICD-10-CM

## 2020-09-28 PROCEDURE — 3008F BODY MASS INDEX DOCD: CPT | Performed by: INTERNAL MEDICINE

## 2020-09-28 PROCEDURE — 1036F TOBACCO NON-USER: CPT | Performed by: INTERNAL MEDICINE

## 2020-09-28 PROCEDURE — 99204 OFFICE O/P NEW MOD 45 MIN: CPT | Performed by: INTERNAL MEDICINE

## 2020-09-28 RX ORDER — OMEPRAZOLE 20 MG/1
20 CAPSULE, DELAYED RELEASE ORAL DAILY
Qty: 30 CAPSULE | Refills: 3 | Status: SHIPPED | OUTPATIENT
Start: 2020-09-28 | End: 2020-10-09 | Stop reason: SDUPTHER

## 2020-09-28 NOTE — PATIENT INSTRUCTIONS
Enfermedad por reflujo gastroesofágico   CUIDADO AMBULATORIO:   La enfermedad por reflujo gastroesofágico  (Dalton Brash) ocurre cuando el ácido y los alimentos en el estómago regresan al esófago  La enfermedad por reflujo gastroesofágico es el reflujo que se produce más de [address] a la semana seun varias semanas  Generalmente causa acidez y otros síntomas  La ERGE puede causar otros problemas de codie con el tiempo si no es tratada  Los síntomas comunes incluyen:  La acidez es el síntoma más común de la Dalton Brash  Usted podría sentir dolor quemante en el pecho o debajo del esternón  [place] ocurre generalmente después de las comidas y se extiende al hosea, a la mandíbula o al hombro  El dolor se lo cuando cambia de posición  Usted también podría presentar alguno de los siguientes:  · Sabor amargo o ácido en torres boca    · Tos seca    · Dificultad para tragar o dolor al tragar    · Ronquera o dolor de garganta    · Eructar o tener hipo con frecuencia    · Sensación de llenura pronto después de empezar a comer  Busque atención médica de inmediato si:  · Usted siente William Tabor y no puede eructar o vomitar  · Usted siente dolor kike en el pecho y dificultad repentina para respirar  · Ju evacuaciones intestinales son de color sofi, con [place], o de apariencia alquitranada  · Torres vómito parece josé antonio café molido o contiene norma  Pregúntele a torres Osiel Banker vitaminas y minerales son adecuados para usted  · Vomita grandes cantidades, o vomita con frecuencia  · Tiene dificultad para respirar después de vomitar  · Tiene dificultad para tragar o dolor al tragar  · Usted pierde peso sin proponérselo  · Ju síntomas empeoran o no mejoran con el tratamiento  · Usted tiene preguntas o inquietudes acerca de torres condición o cuidado    El tratamiento para la ERGE:  Es probable que torres médico le recete medicamentos para disminuir el ácido estomacal  También podría recetarle medicamentos que [place] a torres esófago y estómago a  los alimentos y líquidos a shan intestinos  Se podría requerir de [place] en cathy de que otros tratamientos no hayan funcionado  Usted también podría necesitar de cirugía para envolver la parte superior del estómago alrededor del esfínter esofágico  [place] fortalecerá el esfínter y prevendrá el reflujo  Control de la ERGE:   · No consuma alimentos o bebidas que puedan aumentar la [place]  Estos incluyen chocolate, menta, comidas fritas o grasosas, bebidas que contienen cafeína o bebidas gaseosas  Otros alimentos incluyen comidas picantes, cebollas, tomates y alimentos a base de tomate  No consuma alimentos y bebidas que puedan irritar torres esófago, josé antonio las frutas cítricas, los jugos y las bebidas alcohólicas  · No ingiera comidas abundantes  Cuando usted come [employer] a la vez, torres estómago necesita más ácido para digerirla  Consuma 6 comidas pequeñas al día en vez de 3 comidas grandes y coma lentamente  No consuma alimentos entre 2 y 3 horas antes de [id number]  · Eleve la cabecera de torres cama  Coloque bloques de 6 pulgadas debajo de la cabecera de la estructura de torres cama  También podría usar más nicole almohada para apoyar torres santiago y hombros mientras duerme  · Mantenga un peso saludable  Si usted tiene sobrepeso, la pérdida de peso podría ayudar a aliviar los síntomas de la [id number]  · No fume  Fumar debilita el esfínter esofágico inferior y [place] el riesgo de [id number]  Pida información a torres médico si usted actualmente fuma y necesita ayuda para dejar de fumar  Los cigarrillos electrónicos o tabaco sin humo todavía contienen nicotina  Consulte con torres médico antes de [employer]  · No use ropa que [place] alrededor de la cintura  La ropa apretada puede ejercer presión [employer] y causar o empeorar los síntomas de la [id number]  Acuda a shan consultas de control con torres médico según le indicaron    Anote shan preguntas para que se acuerde de hacerlas seun shan visitas  © 2017 [address] [facility] Information is for End User's use only and may not be sold, redistributed or otherwise used for commercial purposes  All illustrations and images included in CareNotes® are the copyrighted property of A D A M , Inc  or Cleve Chun  Esta información es sólo para uso en educación  Torres intención no es darle un consejo médico sobre enfermedades o tratamientos  Colsulte con torres [facility] [place] farmacéutico antes de seguir cualquier régimen médico para saber si es seguro y efectivo para usted  The patient is scheduled at [facility] for a colon/egd with Eligio Cruz on 10/09/2020  miralax/dulcolax prep instructions have been gone over in the office, with the patient, by the MA  The patient is aware that they will receive a call with the arrival time the day prior to procedure and that they will need a  the day of the procedure   I have asked the patient to call with any questions that they might have prior to procedure,

## 2020-09-28 NOTE — PROGRESS NOTES
[facility]  Kota Reyna 50 y o  female MRN: [id number]  Encounter: [id number]          ASSESSMENT AND PLAN:   50 y o  female with a history of hypothyroidism, hypertension who presents for evaluation of GERD and for colonoscopy  1  Gastroesophageal reflux disease without esophagitis  2  Hematemesis, presence of nausea not specified    She reports symptoms of gastroesophageal reflux are minimally in the past which has been worsening recently  She had 2 episodes of nausea 1 of which had small volume streak of red blood  Differential diagnosis includes Loretta-Contreras tear, gastritis, peptic ulcer disease  I recommend she continue to take omeprazole 20 mg daily 30-60 minutes before breakfast   I discussed dietary/lifestyle anti-reflux measures with her including sleeping with the head of the bed elevated, avoiding trigger foods, not eating 2-3 hours before bed and weight loss  Upper endoscopy will be performed for evaluation of mucosal abnormality, biopsy for H pylori and screen for Lawson's esophagus given her history of reflux for greater than 5 years  - omeprazole (PriLOSEC) 20 mg delayed release capsule; Take 1 capsule (20 mg total) by mouth daily  Dispense: 30 capsule; Refill: 3  - continue carafate four times daily    - EGD; Future      3  Encounter for screening colonoscopy  She has no prior colonoscopy in the past and is overdue for screening  I discussed the indication, risk and benefit of colonoscopy for colon cancer screening with her today  She is also due for screening for history of diverticulitis in the past  Informed consent was obtained for the procedure  Risks of infection, perforation and hemorrhage were discussed  The patient was agreeable to proceed with the procedure  - Colonoscopy;  Future    Follow up after procedures    ______________________________________________________________________    HPI:  Kota Reyna is a 50 y o  female with a history of hypothyroidism, hypertension who presents for evaluation of GERD and for colonoscopy  She reports greater than 5 years of intermittent gastroesophageal reflux  This is been worsening in the last several months  She reports nausea and 2 episodes of emesis, 1 was streaked with bright red blood  This has since resolved  She distally reports epigastric abdominal pain which is burning and radiates to her throat  She has had intermittent voice hoarseness and sour taste in her mouth  Her symptoms can be worse after certain kinds of foods like red sauces and heavy foods  She denies dysphagia or odynophagia  Her appetite is good her weight is increasing  She reports having a daily, formed bowel movement  She denies melena, hematochezia, diarrhea or constipation  She reports history of hemorrhoids which cause anal itching for which she uses Anusol topical cream with good relief of her symptoms  She reports no family history of colorectal cancer or other gastrointestinal diseases  Her past GI surgical history includes appendectomy  She takes no anti-platelet or anticoagulant medications  She denies NSAID use  She has history of diverticulitis twice in the past last was in 2018 which was treated with antibiotics  Prior EGD/colonoscopy:  Undergoing an upper endoscopy greater than 10 years ago which was normal other than gastritis  REVIEW OF SYSTEMS:    CONSTITUTIONAL: Denies any fever, chills, rigors, and weight loss  HEENT: No earache or tinnitus  Denies hearing loss or visual disturbances  CARDIOVASCULAR: No chest pain or palpitations  RESPIRATORY: Denies any cough, hemoptysis, shortness of breath or dyspnea on exertion  GASTROINTESTINAL: As noted in the History of Present Illness  GENITOURINARY: No problems with urination  Denies any hematuria or dysuria  NEUROLOGIC: No dizziness or vertigo, denies headaches  MUSCULOSKELETAL: Denies any muscle or joint pain  SKIN: Denies skin rashes or itching  ENDOCRINE: Denies excessive thirst  Denies intolerance to heat or cold  PSYCHOSOCIAL: Denies depression or anxiety  Denies any recent memory loss  Historical Information   Past Medical History:   Diagnosis Date    GERD (gastroesophageal reflux disease)     Hypertension     Hyperthyroiditis     HYPERTHYROID; POST IODINE THERAPY     Past Surgical History:   Procedure Laterality Date    APPENDECTOMY       SECTION      HERNIA REPAIR       Social History   Social History     Substance and Sexual Activity   Alcohol Use Yes    Comment: occas, DOES NOT DRINK ALCOHOL AS PER 9/22/15 IN [facility]     Social History     Substance and Sexual Activity   Drug Use No     Social History     Tobacco Use   Smoking Status Never Smoker   Smokeless Tobacco Never Used     Family History   Problem Relation Age of Onset    Endometrial cancer Family     Ovarian cancer Mother     Heart defect Mother     Heart attack Father     Stroke Brother        Meds/Allergies       Current Outpatient Medications:     hydrocortisone (ANUSOL-HC) 2 5 % rectal cream    levothyroxine 112 mcg tablet    lidocaine (LIDODERM) 5 %    lisinopril (ZESTRIL) 40 mg tablet    Multiple Vitamin (MULTIVITAMIN) capsule    naproxen (NAPROSYN) 500 mg tablet    omeprazole (PriLOSEC) 20 mg delayed release capsule    sucralfate (CARAFATE) 1 g tablet    No Known Allergies        Objective     Blood pressure 160/90, pulse 86, temperature 98 4 °F (36 9 °C), temperature source Tympanic, height 5' 7" (1 702 m), weight 83 kg (183 lb), last menstrual period 2020  Body mass index is 28 66 kg/m²  PHYSICAL EXAM:      General Appearance:   Well-appearing older female Alert, cooperative, no distress   HEENT:   Normocephalic, atraumatic, anicteric       Neck:  Supple, symmetrical, trachea midline   Lungs:   Clear to auscultation bilaterally; no rales, rhonchi or wheezing; respirations unlabored    Heart[de-identified] Regular rate and rhythm; no murmur, rub, or gallop  Abdomen:   Soft, mild epigastric tenderness to deep palpation without rebound or guarding non-distended; normal bowel sounds; no masses, no organomegaly    Genitalia:   Deferred    Rectal:   Deferred    Extremities:  No cyanosis, clubbing or edema    Pulses:  2+ and symmetric    Skin:  No jaundice, rashes, or lesions    Lymph nodes:  No palpable cervical lymphadenopathy        Lab Results:   No visits with results within 1 Day(s) from this visit     Latest known visit with results is:   Admission on 06/18/2020, Discharged on 06/18/2020   Component Date Value    WBC 06/18/2020 4 09*    RBC 06/18/2020 3 79*    Hemoglobin 06/18/2020 12 8     Hematocrit 06/18/2020 38 4     MCV 06/18/2020 101*    MCH 06/18/2020 33 8     MCHC 06/18/2020 33 3     RDW 06/18/2020 13 4     MPV 06/18/2020 9 0     Platelets 10/35/9023 273     nRBC 06/18/2020 0     Neutrophils Relative 06/18/2020 37*    Immat GRANS % 06/18/2020 0     Lymphocytes Relative 06/18/2020 45*    Monocytes Relative 06/18/2020 12     Eosinophils Relative 06/18/2020 5     Basophils Relative 06/18/2020 1     Neutrophils Absolute 06/18/2020 1 53*    Immature Grans Absolute 06/18/2020 0 01     Lymphocytes Absolute 06/18/2020 1 84     Monocytes Absolute 06/18/2020 0 47     Eosinophils Absolute 06/18/2020 0 20     Basophils Absolute 06/18/2020 0 04     Sodium 06/18/2020 138     Potassium 06/18/2020 4 2     Chloride 06/18/2020 105     CO2 06/18/2020 26     ANION GAP 06/18/2020 7     BUN 06/18/2020 12     Creatinine 06/18/2020 0 79     Glucose 06/18/2020 90     Calcium 06/18/2020 8 2*    eGFR 06/18/2020 86     Total Bilirubin 06/18/2020 0 36     Bilirubin, Direct 06/18/2020 0 10     Alkaline Phosphatase 06/18/2020 51     AST 06/18/2020 27     ALT 06/18/2020 29     Total Protein 06/18/2020 6 8     Albumin 06/18/2020 3 4*    Color, UA 06/18/2020 yellow     Clarity, UA 06/18/2020 clear  Lipase 06/18/2020 87     TSH 3RD GENERATON 06/18/2020 0 743     Color, UA 06/18/2020 Yellow     Clarity, UA 06/18/2020 Clear     pH, UA 06/18/2020 7 5     Leukocytes, UA 06/18/2020 Negative     Nitrite, UA 06/18/2020 Negative     Protein, UA 06/18/2020 Negative     Glucose, UA 06/18/2020 Negative     Ketones, UA 06/18/2020 Negative     Urobilinogen, UA 06/18/2020 0 2     Bilirubin, UA 06/18/2020 Negative     Blood, UA 06/18/2020 Negative     Specific Gravity, UA 06/18/2020 1 020          Radiology Results:   No results found

## 2020-10-05 ENCOUNTER — TRANSCRIBE ORDERS (OUTPATIENT)
Dept: GASTROENTEROLOGY | Facility: CLINIC | Age: 55
End: 2020-10-05

## 2020-10-08 ENCOUNTER — ANESTHESIA EVENT (OUTPATIENT)
Dept: GASTROENTEROLOGY | Facility: MEDICAL CENTER | Age: 55
End: 2020-10-08

## 2020-10-09 ENCOUNTER — HOSPITAL ENCOUNTER (OUTPATIENT)
Dept: GASTROENTEROLOGY | Facility: MEDICAL CENTER | Age: 55
Setting detail: OUTPATIENT SURGERY
Discharge: HOME | End: 2020-10-09
Payer: COMMERCIAL

## 2020-10-09 ENCOUNTER — ANESTHESIA (OUTPATIENT)
Dept: GASTROENTEROLOGY | Facility: MEDICAL CENTER | Age: 55
End: 2020-10-09

## 2020-10-09 VITALS
SYSTOLIC BLOOD PRESSURE: 146 MMHG | HEART RATE: 55 BPM | RESPIRATION RATE: 18 BRPM | OXYGEN SATURATION: 99 % | DIASTOLIC BLOOD PRESSURE: 82 MMHG

## 2020-10-09 VITALS — HEART RATE: 59 BPM

## 2020-10-09 DIAGNOSIS — K21.9 GASTROESOPHAGEAL REFLUX DISEASE WITHOUT ESOPHAGITIS: ICD-10-CM

## 2020-10-09 DIAGNOSIS — Z12.11 ENCOUNTER FOR SCREENING COLONOSCOPY: ICD-10-CM

## 2020-10-09 DIAGNOSIS — R10.10 PAIN OF UPPER ABDOMEN: ICD-10-CM

## 2020-10-09 DIAGNOSIS — K92.0 HEMATEMESIS, PRESENCE OF NAUSEA NOT SPECIFIED: ICD-10-CM

## 2020-10-09 PROCEDURE — 88342 IMHCHEM/IMCYTCHM 1ST ANTB: CPT | Performed by: PATHOLOGY

## 2020-10-09 PROCEDURE — 43239 EGD BIOPSY SINGLE/MULTIPLE: CPT | Performed by: INTERNAL MEDICINE

## 2020-10-09 PROCEDURE — 88305 TISSUE EXAM BY PATHOLOGIST: CPT | Performed by: PATHOLOGY

## 2020-10-09 PROCEDURE — G0121 COLON CA SCRN NOT HI RSK IND: HCPCS | Performed by: INTERNAL MEDICINE

## 2020-10-09 PROCEDURE — NC001 PR NO CHARGE: Performed by: INTERNAL MEDICINE

## 2020-10-09 RX ORDER — SUCRALFATE 1 G/1
1 TABLET ORAL 4 TIMES DAILY
Qty: 120 TABLET | Refills: 0 | Status: SHIPPED | OUTPATIENT
Start: 2020-10-09 | End: 2021-06-14 | Stop reason: SDUPTHER

## 2020-10-09 RX ORDER — LIDOCAINE HYDROCHLORIDE 20 MG/ML
INJECTION, SOLUTION EPIDURAL; INFILTRATION; INTRACAUDAL; PERINEURAL AS NEEDED
Status: DISCONTINUED | OUTPATIENT
Start: 2020-10-09 | End: 2020-10-09

## 2020-10-09 RX ORDER — OMEPRAZOLE 20 MG/1
40 CAPSULE, DELAYED RELEASE ORAL DAILY
Qty: 60 CAPSULE | Refills: 3 | Status: SHIPPED | OUTPATIENT
Start: 2020-10-09 | End: 2021-03-01 | Stop reason: SDUPTHER

## 2020-10-09 RX ORDER — PROPOFOL 10 MG/ML
INJECTION, EMULSION INTRAVENOUS AS NEEDED
Status: DISCONTINUED | OUTPATIENT
Start: 2020-10-09 | End: 2020-10-09

## 2020-10-09 RX ORDER — SODIUM CHLORIDE 9 MG/ML
125 INJECTION, SOLUTION INTRAVENOUS CONTINUOUS
Status: DISCONTINUED | OUTPATIENT
Start: 2020-10-09 | End: 2020-10-13 | Stop reason: HOSPADM

## 2020-10-09 RX ADMIN — PROPOFOL 100 MG: 10 INJECTION, EMULSION INTRAVENOUS at 08:24

## 2020-10-09 RX ADMIN — PROPOFOL 50 MG: 10 INJECTION, EMULSION INTRAVENOUS at 08:26

## 2020-10-09 RX ADMIN — PROPOFOL 50 MG: 10 INJECTION, EMULSION INTRAVENOUS at 08:46

## 2020-10-09 RX ADMIN — PROPOFOL 50 MG: 10 INJECTION, EMULSION INTRAVENOUS at 08:31

## 2020-10-09 RX ADMIN — PROPOFOL 50 MG: 10 INJECTION, EMULSION INTRAVENOUS at 08:25

## 2020-10-09 RX ADMIN — PROPOFOL 50 MG: 10 INJECTION, EMULSION INTRAVENOUS at 08:43

## 2020-10-09 RX ADMIN — LIDOCAINE HYDROCHLORIDE 100 MG: 20 INJECTION, SOLUTION EPIDURAL; INFILTRATION; INTRACAUDAL; PERINEURAL at 08:24

## 2020-10-09 RX ADMIN — PROPOFOL 50 MG: 10 INJECTION, EMULSION INTRAVENOUS at 08:34

## 2020-10-09 RX ADMIN — SODIUM CHLORIDE 125 ML/HR: 0.9 INJECTION, SOLUTION INTRAVENOUS at 07:58

## 2020-10-09 RX ADMIN — PROPOFOL 50 MG: 10 INJECTION, EMULSION INTRAVENOUS at 08:28

## 2020-10-09 RX ADMIN — PROPOFOL 50 MG: 10 INJECTION, EMULSION INTRAVENOUS at 08:49

## 2020-10-09 RX ADMIN — PROPOFOL 50 MG: 10 INJECTION, EMULSION INTRAVENOUS at 08:51

## 2020-10-09 RX ADMIN — PROPOFOL 50 MG: 10 INJECTION, EMULSION INTRAVENOUS at 08:37

## 2020-10-09 RX ADMIN — PROPOFOL 50 MG: 10 INJECTION, EMULSION INTRAVENOUS at 08:27

## 2020-10-09 RX ADMIN — PROPOFOL 50 MG: 10 INJECTION, EMULSION INTRAVENOUS at 08:41

## 2020-10-16 DIAGNOSIS — R10.10 PAIN OF UPPER ABDOMEN: Primary | ICD-10-CM

## 2020-11-03 ENCOUNTER — LAB (OUTPATIENT)
Dept: LAB | Facility: HOSPITAL | Age: 55
End: 2020-11-03
Payer: COMMERCIAL

## 2020-11-03 DIAGNOSIS — E03.9 HYPOTHYROIDISM, UNSPECIFIED TYPE: ICD-10-CM

## 2020-11-03 DIAGNOSIS — R10.10 PAIN OF UPPER ABDOMEN: ICD-10-CM

## 2020-11-03 DIAGNOSIS — R10.11 COLICKY RUQ ABDOMINAL PAIN: ICD-10-CM

## 2020-11-03 LAB
ALBUMIN SERPL BCP-MCNC: 4.2 G/DL (ref 3–5.2)
ALP SERPL-CCNC: 54 U/L (ref 43–122)
ALT SERPL W P-5'-P-CCNC: 23 U/L (ref 9–52)
ANION GAP SERPL CALCULATED.3IONS-SCNC: 7 MMOL/L (ref 5–14)
AST SERPL W P-5'-P-CCNC: 24 U/L (ref 14–36)
BASOPHILS # BLD AUTO: 0 THOUSANDS/ΜL (ref 0–0.1)
BASOPHILS NFR BLD AUTO: 0 % (ref 0–1)
BILIRUB SERPL-MCNC: 0.6 MG/DL
BUN SERPL-MCNC: 11 MG/DL (ref 5–25)
CALCIUM SERPL-MCNC: 8.8 MG/DL (ref 8.4–10.2)
CHLORIDE SERPL-SCNC: 102 MMOL/L (ref 97–108)
CO2 SERPL-SCNC: 27 MMOL/L (ref 22–30)
CREAT SERPL-MCNC: 0.62 MG/DL (ref 0.6–1.2)
EOSINOPHIL # BLD AUTO: 0.2 THOUSAND/ΜL (ref 0–0.4)
EOSINOPHIL NFR BLD AUTO: 3 % (ref 0–6)
ERYTHROCYTE [DISTWIDTH] IN BLOOD BY AUTOMATED COUNT: 13.9 %
GFR SERPL CREATININE-BSD FRML MDRD: 103 ML/MIN/1.73SQ M
GLUCOSE P FAST SERPL-MCNC: 87 MG/DL (ref 70–99)
HCT VFR BLD AUTO: 40 % (ref 36–46)
HGB BLD-MCNC: 13.1 G/DL (ref 12–16)
LIPASE SERPL-CCNC: 91 U/L (ref 23–300)
LYMPHOCYTES # BLD AUTO: 2.2 THOUSANDS/ΜL (ref 0.5–4)
LYMPHOCYTES NFR BLD AUTO: 29 % (ref 25–45)
MCH RBC QN AUTO: 32.4 PG (ref 26–34)
MCHC RBC AUTO-ENTMCNC: 32.8 G/DL (ref 31–36)
MCV RBC AUTO: 99 FL (ref 80–100)
MONOCYTES # BLD AUTO: 0.5 THOUSAND/ΜL (ref 0.2–0.9)
MONOCYTES NFR BLD AUTO: 7 % (ref 1–10)
NEUTROPHILS # BLD AUTO: 4.6 THOUSANDS/ΜL (ref 1.8–7.8)
NEUTS SEG NFR BLD AUTO: 61 % (ref 45–65)
PLATELET # BLD AUTO: 308 THOUSANDS/UL (ref 150–450)
PMV BLD AUTO: 7.9 FL (ref 8.9–12.7)
POTASSIUM SERPL-SCNC: 3.9 MMOL/L (ref 3.6–5)
PROT SERPL-MCNC: 7.6 G/DL (ref 5.9–8.4)
RBC # BLD AUTO: 4.04 MILLION/UL (ref 4–5.2)
SODIUM SERPL-SCNC: 136 MMOL/L (ref 137–147)
T4 FREE SERPL-MCNC: 0.72 NG/DL (ref 0.76–1.46)
TSH SERPL DL<=0.05 MIU/L-ACNC: 0.94 UIU/ML (ref 0.47–4.68)
WBC # BLD AUTO: 7.5 THOUSAND/UL (ref 4.5–11)

## 2020-11-03 PROCEDURE — 85025 COMPLETE CBC W/AUTO DIFF WBC: CPT

## 2020-11-03 PROCEDURE — 82784 ASSAY IGA/IGD/IGG/IGM EACH: CPT

## 2020-11-03 PROCEDURE — 86255 FLUORESCENT ANTIBODY SCREEN: CPT

## 2020-11-03 PROCEDURE — 84439 ASSAY OF FREE THYROXINE: CPT

## 2020-11-03 PROCEDURE — 36415 COLL VENOUS BLD VENIPUNCTURE: CPT

## 2020-11-03 PROCEDURE — 83516 IMMUNOASSAY NONANTIBODY: CPT

## 2020-11-03 PROCEDURE — 80053 COMPREHEN METABOLIC PANEL: CPT

## 2020-11-03 PROCEDURE — 84443 ASSAY THYROID STIM HORMONE: CPT

## 2020-11-03 PROCEDURE — 83690 ASSAY OF LIPASE: CPT

## 2020-11-05 LAB
ENDOMYSIUM IGA SER QL: NEGATIVE
GLIADIN PEPTIDE IGA SER-ACNC: 8 UNITS (ref 0–19)
GLIADIN PEPTIDE IGG SER-ACNC: 3 UNITS (ref 0–19)
IGA SERPL-MCNC: 192 MG/DL (ref 87–352)
TTG IGA SER-ACNC: <2 U/ML (ref 0–3)
TTG IGG SER-ACNC: <2 U/ML (ref 0–5)

## 2020-11-16 ENCOUNTER — HOSPITAL ENCOUNTER (OUTPATIENT)
Dept: MAMMOGRAPHY | Facility: CLINIC | Age: 55
Discharge: HOME | End: 2020-11-16
Payer: COMMERCIAL

## 2020-11-16 DIAGNOSIS — Z12.31 SCREENING MAMMOGRAM, ENCOUNTER FOR: ICD-10-CM

## 2020-11-16 DIAGNOSIS — Z12.31 ENCOUNTER FOR SCREENING MAMMOGRAM FOR MALIGNANT NEOPLASM OF BREAST: ICD-10-CM

## 2020-11-16 PROCEDURE — 77067 SCR MAMMO BI INCL CAD: CPT

## 2020-11-16 PROCEDURE — 77063 BREAST TOMOSYNTHESIS BI: CPT

## 2020-12-27 DIAGNOSIS — I10 ESSENTIAL HYPERTENSION: ICD-10-CM

## 2020-12-28 RX ORDER — LISINOPRIL 40 MG/1
TABLET ORAL
Qty: 30 TABLET | Refills: 2 | Status: SHIPPED | OUTPATIENT
Start: 2020-12-28 | End: 2021-03-01 | Stop reason: SDUPTHER

## 2021-01-26 ENCOUNTER — APPOINTMENT (EMERGENCY)
Dept: CT IMAGING | Facility: HOSPITAL | Age: 56
End: 2021-01-26
Payer: COMMERCIAL

## 2021-01-26 ENCOUNTER — HOSPITAL ENCOUNTER (EMERGENCY)
Facility: HOSPITAL | Age: 56
Discharge: HOME | End: 2021-01-26
Attending: EMERGENCY MEDICINE | Admitting: EMERGENCY MEDICINE
Payer: COMMERCIAL

## 2021-01-26 VITALS
WEIGHT: 173.06 LBS | OXYGEN SATURATION: 99 % | DIASTOLIC BLOOD PRESSURE: 76 MMHG | TEMPERATURE: 98.2 F | RESPIRATION RATE: 18 BRPM | BODY MASS INDEX: 27.11 KG/M2 | HEART RATE: 72 BPM | SYSTOLIC BLOOD PRESSURE: 132 MMHG

## 2021-01-26 DIAGNOSIS — R11.0 NAUSEA: ICD-10-CM

## 2021-01-26 DIAGNOSIS — R05.9 COUGH: Primary | ICD-10-CM

## 2021-01-26 DIAGNOSIS — B34.9 VIRAL ILLNESS: ICD-10-CM

## 2021-01-26 DIAGNOSIS — R10.9 FLANK PAIN: ICD-10-CM

## 2021-01-26 LAB
ANION GAP SERPL CALCULATED.3IONS-SCNC: 8 MMOL/L (ref 4–13)
BACTERIA UR QL AUTO: ABNORMAL /HPF
BASOPHILS # BLD AUTO: 0.02 THOUSANDS/ΜL (ref 0–0.1)
BASOPHILS NFR BLD AUTO: 0 % (ref 0–1)
BILIRUB UR QL STRIP: ABNORMAL
BUN SERPL-MCNC: 6 MG/DL (ref 5–25)
CALCIUM SERPL-MCNC: 8.6 MG/DL (ref 8.3–10.1)
CHLORIDE SERPL-SCNC: 102 MMOL/L (ref 100–108)
CLARITY UR: CLEAR
CO2 SERPL-SCNC: 27 MMOL/L (ref 21–32)
COLOR UR: YELLOW
CREAT SERPL-MCNC: 0.71 MG/DL (ref 0.6–1.3)
EOSINOPHIL # BLD AUTO: 0.02 THOUSAND/ΜL (ref 0–0.61)
EOSINOPHIL NFR BLD AUTO: 0 % (ref 0–6)
ERYTHROCYTE [DISTWIDTH] IN BLOOD BY AUTOMATED COUNT: 13.7 % (ref 11.6–15.1)
GFR SERPL CREATININE-BSD FRML MDRD: 98 ML/MIN/1.73SQ M
GLUCOSE SERPL-MCNC: 88 MG/DL (ref 65–140)
GLUCOSE UR STRIP-MCNC: NEGATIVE MG/DL
HCT VFR BLD AUTO: 38.6 % (ref 34.8–46.1)
HGB BLD-MCNC: 12.8 G/DL (ref 11.5–15.4)
HGB UR QL STRIP.AUTO: NEGATIVE
IMM GRANULOCYTES # BLD AUTO: 0.01 THOUSAND/UL (ref 0–0.2)
IMM GRANULOCYTES NFR BLD AUTO: 0 % (ref 0–2)
KETONES UR STRIP-MCNC: ABNORMAL MG/DL
LEUKOCYTE ESTERASE UR QL STRIP: NEGATIVE
LYMPHOCYTES # BLD AUTO: 1.66 THOUSANDS/ΜL (ref 0.6–4.47)
LYMPHOCYTES NFR BLD AUTO: 32 % (ref 14–44)
MCH RBC QN AUTO: 32.3 PG (ref 26.8–34.3)
MCHC RBC AUTO-ENTMCNC: 33.2 G/DL (ref 31.4–37.4)
MCV RBC AUTO: 98 FL (ref 82–98)
MONOCYTES # BLD AUTO: 0.55 THOUSAND/ΜL (ref 0.17–1.22)
MONOCYTES NFR BLD AUTO: 11 % (ref 4–12)
NEUTROPHILS # BLD AUTO: 2.97 THOUSANDS/ΜL (ref 1.85–7.62)
NEUTS SEG NFR BLD AUTO: 57 % (ref 43–75)
NITRITE UR QL STRIP: NEGATIVE
NON-SQ EPI CELLS URNS QL MICRO: ABNORMAL /HPF
NRBC BLD AUTO-RTO: 0 /100 WBCS
PH UR STRIP.AUTO: 5.5 [PH] (ref 4.5–8)
PLATELET # BLD AUTO: 268 THOUSANDS/UL (ref 149–390)
PMV BLD AUTO: 9.1 FL (ref 8.9–12.7)
POTASSIUM SERPL-SCNC: 4 MMOL/L (ref 3.5–5.3)
PROT UR STRIP-MCNC: ABNORMAL MG/DL
RBC # BLD AUTO: 3.96 MILLION/UL (ref 3.81–5.12)
RBC #/AREA URNS AUTO: ABNORMAL /HPF
SARS-COV-2 N GENE RESP QL NAA+PROBE: POSITIVE
SODIUM SERPL-SCNC: 137 MMOL/L (ref 136–145)
SP GR UR STRIP.AUTO: 1.02 (ref 1–1.03)
UROBILINOGEN UR QL STRIP.AUTO: 0.2 E.U./DL
WBC # BLD AUTO: 5.23 THOUSAND/UL (ref 4.31–10.16)
WBC #/AREA URNS AUTO: ABNORMAL /HPF

## 2021-01-26 PROCEDURE — 74176 CT ABD & PELVIS W/O CONTRAST: CPT

## 2021-01-26 PROCEDURE — 96375 TX/PRO/DX INJ NEW DRUG ADDON: CPT

## 2021-01-26 PROCEDURE — U0003 INFECTIOUS AGENT DETECTION BY NUCLEIC ACID (DNA OR RNA); SEVERE ACUTE RESPIRATORY SYNDROME CORONAVIRUS 2 (SARS-COV-2) (CORONAVIRUS DISEASE [COVID-19]), AMPLIFIED PROBE TECHNIQUE, MAKING USE OF HIGH THROUGHPUT TECHNOLOGIES AS DESCRIBED BY CMS-2020-01-R: HCPCS | Performed by: PHYSICIAN ASSISTANT

## 2021-01-26 PROCEDURE — 99284 EMERGENCY DEPT VISIT MOD MDM: CPT | Performed by: PHYSICIAN ASSISTANT

## 2021-01-26 PROCEDURE — 99284 EMERGENCY DEPT VISIT MOD MDM: CPT

## 2021-01-26 PROCEDURE — 80048 BASIC METABOLIC PNL TOTAL CA: CPT | Performed by: PHYSICIAN ASSISTANT

## 2021-01-26 PROCEDURE — 36415 COLL VENOUS BLD VENIPUNCTURE: CPT | Performed by: PHYSICIAN ASSISTANT

## 2021-01-26 PROCEDURE — U0005 INFEC AGEN DETEC AMPLI PROBE: HCPCS | Performed by: PHYSICIAN ASSISTANT

## 2021-01-26 PROCEDURE — G1004 CDSM NDSC: HCPCS

## 2021-01-26 PROCEDURE — 85025 COMPLETE CBC W/AUTO DIFF WBC: CPT | Performed by: PHYSICIAN ASSISTANT

## 2021-01-26 PROCEDURE — 96374 THER/PROPH/DIAG INJ IV PUSH: CPT

## 2021-01-26 PROCEDURE — 81001 URINALYSIS AUTO W/SCOPE: CPT

## 2021-01-26 PROCEDURE — 96361 HYDRATE IV INFUSION ADD-ON: CPT

## 2021-01-26 RX ORDER — ACETAMINOPHEN 325 MG/1
650 TABLET ORAL EVERY 6 HOURS PRN
Qty: 30 TABLET | Refills: 0 | Status: SHIPPED | OUTPATIENT
Start: 2021-01-26

## 2021-01-26 RX ORDER — ONDANSETRON 2 MG/ML
4 INJECTION INTRAMUSCULAR; INTRAVENOUS ONCE
Status: COMPLETED | OUTPATIENT
Start: 2021-01-26 | End: 2021-01-26

## 2021-01-26 RX ORDER — ONDANSETRON 4 MG/1
4 TABLET, ORALLY DISINTEGRATING ORAL EVERY 6 HOURS PRN
Qty: 20 TABLET | Refills: 0 | Status: SHIPPED | OUTPATIENT
Start: 2021-01-26 | End: 2021-09-22 | Stop reason: SDUPTHER

## 2021-01-26 RX ORDER — KETOROLAC TROMETHAMINE 30 MG/ML
15 INJECTION, SOLUTION INTRAMUSCULAR; INTRAVENOUS ONCE
Status: COMPLETED | OUTPATIENT
Start: 2021-01-26 | End: 2021-01-26

## 2021-01-26 RX ADMIN — SODIUM CHLORIDE 1000 ML: 0.9 INJECTION, SOLUTION INTRAVENOUS at 11:40

## 2021-01-26 RX ADMIN — ONDANSETRON 4 MG: 2 INJECTION INTRAMUSCULAR; INTRAVENOUS at 11:40

## 2021-01-26 RX ADMIN — KETOROLAC TROMETHAMINE 15 MG: 30 INJECTION, SOLUTION INTRAMUSCULAR at 11:42

## 2021-01-26 NOTE — DISCHARGE INSTRUCTIONS
Return to ED if you develop shortness of breath, chest pain or worsening of symptoms     You should take the following medications:  Vitamin D3 2000 IU daily  Vitamin C One Gram every 12 hours  Daily multivitamin

## 2021-01-26 NOTE — Clinical Note
Willem Hudson was seen and treated in our emergency department on 1/26/2021  Diagnosis: Suspected Covid-19    Lakeshia    She may return on this date:     Quarantine until Covid-19 results are back  If positive, self isolate/quarantine for 14 days and until symptoms are resolved for at least 3 days  If negative, quarantine until symptoms are resolved for at least 3 days  If you have any questions or concerns, please don't hesitate to call        Kimberlyn Leung PA-C    ______________________________           _______________          _______________  Hospital Representative                              Date                                Time

## 2021-01-26 NOTE — ED PROVIDER NOTES
History  Chief Complaint   Patient presents with    Fever - 9 weeks to 74 years     Pt requesting test for COVID  Pt reports fevers at home and lower back pain that radiates into both sides of abdomen since Saturday  Pt reports coughing last night   Back Pain     Patient is a 51 y/o female presenting to the ED with right-sided flank radiating to the RLQ and dysuria x3 days  Patient describes the pain as sharp, 10/10 and says it is constant, waxing and waning  She has associated nausea/vomiting and urinary frequency/urgency  Last episode of vomiting was yesterday and was non-bilious/non-bloody  She denies headaches, dizziness, chest pain, SOB, hematuria, diarrhea or constipation  Patient also c/o a cough and fevers/chills that started yesterday  She took Tylenol last night and this AM (last dose 0800) with no relief of symptoms  She reports multiple +covid cases at her work and would like to be tested  Prior to Admission Medications   Prescriptions Last Dose Informant Patient Reported? Taking?    Multiple Vitamin (MULTIVITAMIN) capsule Not Taking at Unknown time  Yes No   Sig: Take 1 capsule by mouth daily   hydrocortisone (ANUSOL-HC) 2 5 % rectal cream Not Taking at Unknown time  No No   Sig: Apply topically 2 (two) times a day   Patient not taking: Reported on 1/26/2021   levothyroxine 112 mcg tablet Not Taking at Unknown time  Yes No   Sig: Take 112 mcg by mouth daily   lidocaine (LIDODERM) 5 % Not Taking at Unknown time  No No   Sig: Apply 1 patch topically daily Remove & Discard patch within 12 hours or as directed by MD   Patient not taking: Reported on 1/26/2021   lisinopril (ZESTRIL) 40 mg tablet Not Taking at Unknown time  No No   Sig: take 1 tablet by mouth once daily   Patient not taking: Reported on 1/26/2021   omeprazole (PriLOSEC) 20 mg delayed release capsule Not Taking at Unknown time  No No   Sig: Take 2 capsules (40 mg total) by mouth daily   Patient not taking: Reported on 1/26/2021 sucralfate (CARAFATE) 1 g tablet   No No   Sig: Take 1 tablet (1 g total) by mouth 4 (four) times a day      Facility-Administered Medications: None       Past Medical History:   Diagnosis Date    GERD (gastroesophageal reflux disease)     Hypertension     Hyperthyroiditis     HYPERTHYROID; POST IODINE THERAPY       Past Surgical History:   Procedure Laterality Date    APPENDECTOMY       SECTION      HERNIA REPAIR         Family History   Problem Relation Age of Onset    Endometrial cancer Family     Ovarian cancer Mother     Heart defect Mother     Heart attack Father     Stroke Brother     Breast cancer Paternal Grandmother 61     I have reviewed and agree with the history as documented  E-Cigarette/Vaping    E-Cigarette Use Never User      E-Cigarette/Vaping Substances    Nicotine No     THC No     CBD No      Social History     Tobacco Use    Smoking status: Never Smoker    Smokeless tobacco: Never Used   Substance Use Topics    Alcohol use: Yes     Comment: occas, DOES NOT DRINK ALCOHOL AS PER 9/22/15 IN [facility]    Drug use: No       Review of Systems   Constitutional: Positive for chills, fatigue and fever  HENT: Negative for congestion, rhinorrhea, sinus pressure, sinus pain and sore throat  Eyes: Negative for photophobia and visual disturbance  Respiratory: Positive for cough  Negative for shortness of breath and wheezing  Cardiovascular: Negative for chest pain, palpitations and leg swelling  Gastrointestinal: Positive for abdominal pain (RLQ), nausea and vomiting  Negative for abdominal distention, blood in stool, constipation and diarrhea  Genitourinary: Positive for dysuria, flank pain (right-sided), frequency and urgency  Negative for decreased urine volume, difficulty urinating, hematuria, vaginal bleeding and vaginal discharge  Musculoskeletal: Negative for arthralgias, back pain, myalgias and neck pain  Skin: Negative for pallor and rash  Neurological: Negative for dizziness, syncope, weakness, light-headedness and headaches  All other systems reviewed and are negative  Physical Exam  Physical Exam  Vitals signs and nursing note reviewed  Constitutional:       General: She is awake  Appearance: Normal appearance  She is well-developed  She is not toxic-appearing  HENT:      Head: Normocephalic and atraumatic  Nose: Nose normal       Mouth/Throat:      Lips: Pink  Mouth: Mucous membranes are dry  Eyes:      Conjunctiva/sclera: Conjunctivae normal    Neck:      Musculoskeletal: Normal range of motion and neck supple  Cardiovascular:      Rate and Rhythm: Normal rate and regular rhythm  Pulses: Normal pulses  Heart sounds: Normal heart sounds, S1 normal and S2 normal    Pulmonary:      Effort: Pulmonary effort is normal  No accessory muscle usage or respiratory distress  Breath sounds: Normal breath sounds  No decreased breath sounds, wheezing, rhonchi or rales  Abdominal:      General: Abdomen is flat  Bowel sounds are normal  There is no distension  Palpations: Abdomen is soft  Tenderness: There is abdominal tenderness in the right lower quadrant  There is right CVA tenderness  There is no left CVA tenderness, guarding or rebound  Negative signs include Daniel's sign  Musculoskeletal:      Right lower leg: No edema  Left lower leg: No edema  Lymphadenopathy:      Cervical: No cervical adenopathy  Skin:     General: Skin is warm and dry  Capillary Refill: Capillary refill takes less than 2 seconds  Coloration: Skin is not jaundiced or pale  Neurological:      Mental Status: She is alert and oriented to person, place, and time  GCS: GCS eye subscore is 4  GCS verbal subscore is 5  GCS motor subscore is 6  Psychiatric:         Behavior: Behavior is cooperative           Vital Signs  ED Triage Vitals [01/26/21 1100]   Temperature Pulse Respirations Blood Pressure SpO2 98 2 °F (36 8 °C) 94 19 (!) 172/80 99 %      Temp Source Heart Rate Source Patient Position - Orthostatic VS BP Location FiO2 (%)   Oral Monitor Sitting Right arm --      Pain Score       Worst Possible Pain           Vitals:    01/26/21 1100 01/26/21 1242   BP: (!) 172/80 132/76   Pulse: 94 72   Patient Position - Orthostatic VS: Sitting Lying         Visual Acuity      ED Medications  Medications   ketorolac (TORADOL) injection 15 mg (15 mg Intravenous Given 1/26/21 1142)   sodium chloride 0 9 % bolus 1,000 mL (1,000 mL Intravenous New Bag 1/26/21 1140)   ondansetron (ZOFRAN) injection 4 mg (4 mg Intravenous Given 1/26/21 1140)       Diagnostic Studies  Results Reviewed     Procedure Component Value Units Date/Time    Urine Microscopic [[id number]] Collected: 01/26/21 1323    Lab Status:  In process Specimen: Urine, Clean Catch Updated: 01/26/21 1328    Urine Macroscopic, POC [[id number]]  (Abnormal) Collected: 01/26/21 1323    Lab Status: Final result Specimen: Urine Updated: 01/26/21 1324     Color, UA Yellow     Clarity, UA Clear     pH, UA 5 5     Leukocytes, UA Negative     Nitrite, UA Negative     Protein, UA 30 (1+) mg/dl      Glucose, UA Negative mg/dl      Ketones, UA 40 (2+) mg/dl      Urobilinogen, UA 0 2 E U /dl      Bilirubin, UA Interference- unable to analyze     Blood, UA Negative     Specific Gravity, UA 1 025    Narrative:      CLINITEK RESULT    Basic metabolic panel [[id number]] Collected: 01/26/21 1140    Lab Status: Final result Specimen: Blood from Arm, Right Updated: 01/26/21 1210     Sodium 137 mmol/L      Potassium 4 0 mmol/L      Chloride 102 mmol/L      CO2 27 mmol/L      ANION GAP 8 mmol/L      BUN 6 mg/dL      Creatinine 0 71 mg/dL      Glucose 88 mg/dL      Calcium 8 6 mg/dL      eGFR 98 ml/min/1 73sq m     Narrative:      [facility] guidelines for Chronic Kidney Disease (CKD):     Stage 1 with normal or high GFR (GFR > 90 mL/min/1 73 square meters)    Stage 2 Mild CKD (GFR = 60-89 mL/min/1 73 square meters)    Stage 3A Moderate CKD (GFR = 45-59 mL/min/1 73 square meters)    Stage 3B Moderate CKD (GFR = 30-44 mL/min/1 73 square meters)    Stage 4 Severe CKD (GFR = 15-29 mL/min/1 73 square meters)    Stage 5 End Stage CKD (GFR <15 mL/min/1 73 square meters)  Note: GFR calculation is accurate only with a steady state creatinine    CBC and differential [[id number]] Collected: 01/26/21 1140    Lab Status: Final result Specimen: Blood from Arm, Right Updated: 01/26/21 1152     WBC 5 23 Thousand/uL      RBC 3 96 Million/uL      Hemoglobin 12 8 g/dL      Hematocrit 38 6 %      MCV 98 fL      MCH 32 3 pg      MCHC 33 2 g/dL      RDW 13 7 %      MPV 9 1 fL      Platelets 397 Thousands/uL      nRBC 0 /100 WBCs      Neutrophils Relative 57 %      Immat GRANS % 0 %      Lymphocytes Relative 32 %      Monocytes Relative 11 %      Eosinophils Relative 0 %      Basophils Relative 0 %      Neutrophils Absolute 2 97 Thousands/µL      Immature Grans Absolute 0 01 Thousand/uL      Lymphocytes Absolute 1 66 Thousands/µL      Monocytes Absolute 0 55 Thousand/µL      Eosinophils Absolute 0 02 Thousand/µL      Basophils Absolute 0 02 Thousands/µL     Novel Coronavirus Janice So [[id number]] Collected: 01/26/21 1140    Lab Status: In process Specimen: Nares from Nose Updated: 01/26/21 1148    POCT urinalysis dipstick [[id number]]     Lab Status: No result Specimen: Urine                  CT renal stone study abdomen pelvis without contrast   Final Result by Brandee Cabrera MD (01/26 1252)      No urinary tract calculi  No hydronephrosis  Patchy peripheral groundglass airspace opacities are noted at the lung bases in a pattern that is highly suspicious for viral pneumonia due to COVID-19 infection  Extensive colonic diverticulosis without findings to suggest acute diverticulitis  Small sliding-type hernia        This examination was marked "immediate notification" in Epic in order to begin the standard process by which the radiology reading room liaison alerts the referring practitioner  Workstation performed: [id number]                    Procedures  Procedures         ED Course  ED Course as of Jan 26 1349   Tue Jan 26, 2021   1302 CT renal stone study abdomen pelvis without contrast   1327 Urine Macroscopic, POC(!)                             SBIRT 22yo+      Most Recent Value   SBIRT (23 yo +)   In order to provide better care to our patients, we are screening all of our patients for alcohol and drug use  Would it be okay to ask you these screening questions? No Filed at: 01/26/2021 1129                    MDM  Number of Diagnoses or Management Options  Cough:   Flank pain:   Nausea:   Viral illness:   Diagnosis management comments: Patient presented with RLQ/flank pain and dysuria x3 days  Concern for nephrolithiasis or pyelo; patient had appendix removed as a child so appendicitis ruled-out  CT renal stone obtained and was negative for nephrolithiasis but had an incidental finding of patchy peripheral groundglass airspace opacities at the lung bases in a pattern that is highly suspicious for viral pneumonia due to COVID-19 infection  No nitrites or leukocytes on UA  A specimen was collected for COVID-19 as patient had cough, subjective fevers and +exposure  There is no clear clinical evidence to support serious bacterial process, the patient is not hypoxic, is not in respiratory distress, lungs are clear, oxygen saturation is >92% on room air, well hydrated and is nontoxic appearing  Patient educated to self isolate/quarantine at home away from family members and pets for 14 days and until symptoms are completely resolved for at least 3 days  Patient is medically stable for discharge home  Patient was instructed on infection prevention, to stay well-hydrated, and control fever with OTC anti-pyretics   Strict return precautions were given including, but not limited to difficulty breathing, dizziness, or worsening symptoms  Patient demonstrated understanding and agreement with plan  The management plan was discussed in detail with the patient at bedside and all questions were answered  Prior to [employer] and written instructions were provided  All questions were answered and patient was comfortable with the plan of care and discharged to home  The patient verbalized understanding of our discussion and plan of care, and agrees to return to the Emergency Department for concerns and progression of illness  Amount and/or Complexity of Data Reviewed  Clinical lab tests: ordered and reviewed  Tests in the radiology section of CPT®: ordered and reviewed        Disposition  Final diagnoses:   Cough   Viral illness   Nausea   Flank pain     Time reflects when diagnosis was documented in both MDM as applicable and the Disposition within this note     Time User Action Codes Description Comment    1/26/2021  1:04 PM Boris Corona Add [R05] Cough     1/26/2021  1:13 PM Boris Corona Add [B34 9] Viral illness     1/26/2021  1:35 PM Roberta Dixon Add [R11 0] Nausea     1/26/2021  1:36 PM Boris Corona Add [R10 9] Flank pain       ED Disposition     ED Disposition Condition Date/Time Comment    Discharge Stable Tue Jan 26, 2021  1:14  [address] discharge to home/self care              Follow-up Information     Follow up With Specialties Details Why Contact Info Additional Information    [facility] Emergency Department Emergency Medicine  If symptoms worsen [facility] [id number]  [address] [place] [address] Emergency Department, [address] [place] [address], [address] [facility] Family Medicine  As needed [address], [address] [facility] [id number]  [address], [address], [address], [address], [address]          Patient's Medications   Discharge Prescriptions    ACETAMINOPHEN (TYLENOL) 325 MG TABLET    Take 2 tablets (650 mg total) by mouth every 6 (six) hours as needed for mild pain, headaches or fever       Start Date: 1/26/2021 End Date: --       Order Dose: 650 mg       Quantity: 30 tablet    Refills: 0    ONDANSETRON (ZOFRAN-ODT) 4 MG DISINTEGRATING TABLET    Take 1 tablet (4 mg total) by mouth every 6 (six) hours as needed for nausea or vomiting       Start Date: 1/26/2021 End Date: --       Order Dose: 4 mg       Quantity: 20 tablet    Refills: 0     No discharge procedures on file      PDMP Review     None          ED Provider  Electronically Signed by           Sofie Sharp PA-C  01/26/21 3684

## 2021-01-27 NOTE — RESULT ENCOUNTER NOTE
Spoke with patient  Your COVID test is positive  Please stay quarantined in your home  Do not interact with your family or other people in the community  You must stay quarantined for a minimum of 10 days after symptoms have begun and be symptom-free for 24 hours before you go out of quarantine  The people you live with or people you exposed to the virus must stay quarantine for 14 days from their last exposure of you      Vitamin D3 2000 units daily  Vitamin-C 1 g every 12 hours while awake  Zinc 220 mg daily    These can be purchased over-the-counter at any pharmacy

## 2021-03-01 ENCOUNTER — OFFICE VISIT (OUTPATIENT)
Dept: FAMILY MEDICINE CLINIC | Facility: CLINIC | Age: 56
End: 2021-03-01

## 2021-03-01 VITALS
OXYGEN SATURATION: 98 % | SYSTOLIC BLOOD PRESSURE: 126 MMHG | BODY MASS INDEX: 27.99 KG/M2 | DIASTOLIC BLOOD PRESSURE: 82 MMHG | HEART RATE: 89 BPM | TEMPERATURE: 97.4 F | RESPIRATION RATE: 20 BRPM | HEIGHT: 67 IN | WEIGHT: 178.3 LBS

## 2021-03-01 DIAGNOSIS — G89.29 HEEL PAIN, CHRONIC, RIGHT: ICD-10-CM

## 2021-03-01 DIAGNOSIS — M21.611 BUNION OF GREAT TOE OF RIGHT FOOT: Primary | ICD-10-CM

## 2021-03-01 DIAGNOSIS — M79.671 HEEL PAIN, CHRONIC, RIGHT: ICD-10-CM

## 2021-03-01 DIAGNOSIS — I10 ESSENTIAL HYPERTENSION: ICD-10-CM

## 2021-03-01 DIAGNOSIS — K21.9 GASTROESOPHAGEAL REFLUX DISEASE WITHOUT ESOPHAGITIS: ICD-10-CM

## 2021-03-01 DIAGNOSIS — E03.9 HYPOTHYROIDISM, UNSPECIFIED TYPE: ICD-10-CM

## 2021-03-01 PROCEDURE — 99213 OFFICE O/P EST LOW 20 MIN: CPT | Performed by: FAMILY MEDICINE

## 2021-03-01 RX ORDER — OMEPRAZOLE 20 MG/1
40 CAPSULE, DELAYED RELEASE ORAL DAILY
Qty: 60 CAPSULE | Refills: 3 | Status: SHIPPED | OUTPATIENT
Start: 2021-03-01 | End: 2021-06-14 | Stop reason: SDUPTHER

## 2021-03-01 RX ORDER — LEVOTHYROXINE SODIUM 112 UG/1
112 TABLET ORAL DAILY
Qty: 30 TABLET | Refills: 2 | Status: SHIPPED | OUTPATIENT
Start: 2021-03-01 | End: 2022-02-09 | Stop reason: SDUPTHER

## 2021-03-01 RX ORDER — LISINOPRIL 40 MG/1
40 TABLET ORAL DAILY
Qty: 30 TABLET | Refills: 2 | Status: SHIPPED | OUTPATIENT
Start: 2021-03-01 | End: 2021-08-06 | Stop reason: SDUPTHER

## 2021-03-01 NOTE — ASSESSMENT & PLAN NOTE
Has been out of medication for 3 weeks  Complaints of neck swelling but no thyromegaly on exam    Last TSH in November  Will repeat TSH, T4 and adjust medication as needed

## 2021-03-01 NOTE — ASSESSMENT & PLAN NOTE
Well controlled on lisinopril 40 mg daily  Reviewed BP goals with patient  Goal BP <140/90 mmHg per JNC 8 guidelines  Patient congratulated on efforts  Continue to maintain healthy balanced diet with focus on low salt intake  Limit alcohol intake  Encouraged home blood pressure monitoring

## 2021-03-01 NOTE — PROGRESS NOTES
Assessment/Plan:    Hypothyroidism  Has been out of medication for 3 weeks  Complaints of neck swelling but no thyromegaly on exam    Last TSH in November  Will repeat TSH, T4 and adjust medication as needed  Essential hypertension  Well controlled on lisinopril 40 mg daily  Reviewed BP goals with patient  Goal BP <140/90 mmHg per JNC 8 guidelines  Patient congratulated on efforts  Continue to maintain healthy balanced diet with focus on low salt intake  Limit alcohol intake  Encouraged home blood pressure monitoring  Heel pain/right great toe bunion  Will refer to podiatry for further evaluation and management  History of COVID-19 infection  She continues to have residual chest tightness and fatigue from recent COVID infection  Advised that these symptoms may continue for some time  Return in about 3 months (around 6/1/2021) for Next scheduled follow up htn  Diagnoses and all orders for this visit:    Bunion of great toe of right foot  -     Ambulatory referral to Podiatry; Future    Gastroesophageal reflux disease without esophagitis  -     omeprazole (PriLOSEC) 20 mg delayed release capsule; Take 2 capsules (40 mg total) by mouth daily    Essential hypertension  -     lisinopril (ZESTRIL) 40 mg tablet; Take 1 tablet (40 mg total) by mouth daily    Heel pain, chronic, right  -     Ambulatory referral to Podiatry; Future    Hypothyroidism, unspecified type  -     levothyroxine 112 mcg tablet; Take 1 tablet (112 mcg total) by mouth daily  -     TSH + Free T4; Future          Subjective:     Natanael Fallon is a 50 y o  female who  has a past medical history of GERD (gastroesophageal reflux disease), Hypertension, and Hyperthyroiditis  who presented to the office today for foot pain  HPI    She has complaints of right foot pain chronically  The pain is located at the base of the right great toe, as well as on the heel  Her first few steps in the morning are painful as well       She also recently recovered from [facility]  She continues to have residual chest tightness and fatigue  Review of Systems   Constitutional: Positive for fatigue  Negative for fever  Respiratory: Positive for chest tightness  Negative for cough and shortness of breath  Cardiovascular: Negative for chest pain and palpitations  Gastrointestinal: Negative for abdominal pain  Musculoskeletal: Positive for arthralgias (foot pain)  Negative for back pain and myalgias  Skin: Negative for rash  Neurological: Negative for dizziness and weakness  Objective:    /82 (BP Location: Left arm, Patient Position: Sitting, Cuff Size: Standard)   Pulse 89   Temp (!) 97 4 °F (36 3 °C) (Temporal)   Resp 20   Ht 5' 7" (1 702 m)   Wt 80 9 kg (178 lb 4 8 oz)   LMP 02/22/2021 (Within Days)   SpO2 98%   BMI 27 93 kg/m²     Physical Exam  Constitutional:       General: She is not in acute distress  Appearance: She is well-developed  HENT:      Head: Normocephalic and atraumatic  Eyes:      General: No scleral icterus  Conjunctiva/sclera: Conjunctivae normal    Neck:      Musculoskeletal: Normal range of motion and neck supple  No muscular tenderness  Trachea: No tracheal deviation  Cardiovascular:      Rate and Rhythm: Normal rate and regular rhythm  Heart sounds: Normal heart sounds  No murmur  No friction rub  Pulmonary:      Effort: Pulmonary effort is normal  No respiratory distress  Breath sounds: Normal breath sounds  No wheezing  Musculoskeletal:         General: Tenderness (calcaneous right foot and first metartarsal joint) and deformity (right great toe bunion) present  Lymphadenopathy:      Cervical: No cervical adenopathy  Skin:     General: Skin is warm and dry  Findings: No rash  Neurological:      General: No focal deficit present  Mental Status: She is alert and oriented to person, place, and time        Gait: Gait normal    Psychiatric:         Mood and Affect: Mood normal          Amina Brian MD  03/01/21  5:09 PM

## 2021-04-05 ENCOUNTER — OFFICE VISIT (OUTPATIENT)
Dept: FAMILY MEDICINE CLINIC | Facility: CLINIC | Age: 56
End: 2021-04-05

## 2021-04-05 VITALS
RESPIRATION RATE: 18 BRPM | DIASTOLIC BLOOD PRESSURE: 82 MMHG | HEIGHT: 67 IN | OXYGEN SATURATION: 99 % | WEIGHT: 181 LBS | BODY MASS INDEX: 28.41 KG/M2 | SYSTOLIC BLOOD PRESSURE: 124 MMHG | TEMPERATURE: 97.9 F | HEART RATE: 81 BPM

## 2021-04-05 DIAGNOSIS — M21.611 BUNION OF GREAT TOE OF RIGHT FOOT: Primary | ICD-10-CM

## 2021-04-05 DIAGNOSIS — M79.671 RIGHT FOOT PAIN: ICD-10-CM

## 2021-04-05 DIAGNOSIS — M54.32 BILATERAL SCIATICA: ICD-10-CM

## 2021-04-05 DIAGNOSIS — M54.31 BILATERAL SCIATICA: ICD-10-CM

## 2021-04-05 PROCEDURE — 99203 OFFICE O/P NEW LOW 30 MIN: CPT | Performed by: PODIATRIST

## 2021-04-05 NOTE — PROGRESS NOTES
Podiatry Clinic  Grace Greenwood 50 y o  female MRN: [id number]  Encounter: [id number]      Assessment/Plan        Diagnoses and all orders for this visit:    Bunion of great toe of right foot    Right foot pain    Bilateral sciatica       Plan:   Patient was seen/examined  All questions and concerns addressed   Educated patient on etiology of her bunion deformity  Recommended wide toebox and suppoortive shoegear as well as OTC voltaren gel   Given positive neurologic symptoms including straight leg raise and tinnel, her radiating symptoms are likely coming from her lower back/sciatica  Referral placed for spine surgery evaluation   RTC PRN    Dr Hannah Gloria was present during this entire procedure  History of Present Illness     HPI:  Bunions  Patient complains of right bunions  Symptoms began several years ago  Notes progressive deformity of the right great toe  Complains of associated pain  Severity = moderate  Notes excessive wear on shoes  Symptoms having impact on normal day to day activities  Patient's symptoms have progressed to a point and plateaued  Treatment thus far has included trial of new shoes, which have been not very effective  OTC analgesics, which have been not very effective  She also reports a "shooting" feeling in her hip, leg and arch that is worse with ambulation and pressure from shoe gear  She has tried ibuprofen and new shoes as stated above, with little relief  Review of Systems   Constitutional: Negative  HENT: Negative  Eyes: Negative  Respiratory: Negative  Cardiovascular: Negative  Gastrointestinal: Negative  Musculoskeletal: foot pain  Skin: Negative  Neurological: Negative         Historical Information   Past Medical History:   Diagnosis Date    GERD (gastroesophageal reflux disease)     Hypertension     Hyperthyroiditis     HYPERTHYROID; POST IODINE THERAPY     Past Surgical History:   Procedure Laterality Date    APPENDECTOMY       SECTION      HERNIA REPAIR       Social History   Social History     Substance and Sexual Activity   Alcohol Use Yes    Comment: occasional      Social History     Substance and Sexual Activity   Drug Use No     Social History     Tobacco Use   Smoking Status Never Smoker   Smokeless Tobacco Never Used     Family History:   Family History   Problem Relation Age of Onset    Endometrial cancer Family     Ovarian cancer Mother     Heart defect Mother     Heart attack Father     Stroke Brother     Breast cancer Paternal Grandmother 61       Meds/Allergies   (Not in a hospital admission)    No Known Allergies    Objective     Current Vitals:   Blood Pressure: 124/82 (21)  Pulse: 81 (21)  Temperature: 97 9 °F (36 6 °C) (21)  Temp Source: Temporal (21)  Respirations: 18 (21)  Height: 5' 7" (170 2 cm) (21)  Weight - Scale: 82 1 kg (181 lb) (21)  SpO2: 99 % (21)        /82 (BP Location: Right arm, Patient Position: Sitting, Cuff Size: Large)   Pulse 81   Temp 97 9 °F (36 6 °C) (Temporal)   Resp 18   Ht 5' 7" (1 702 m)   Wt 82 1 kg (181 lb)   LMP 2021 (Approximate)   SpO2 99%   BMI 28 35 kg/m²       Lower Extremity Exam:    Vascular: Right foot DP/PT +2                   Left foot DP/PT +2                   There is no lower extremity edema bilateral     Musculoskeletal: There is 5/5 strength throughout the bilateral lower extremity anterior/posterior and 3/5 lateral group           limited ankle range of motion with well maintained subtalar range of motion               There is mild tenderness over the sinus tarsi, peroneus brevis insertion and origin of the plantar fascia bilateral                There is foot deformities: claw toes    Neurological: Sensation to 5 07 [place] nylon filament: positive bilaterally      Vibratory sense to distal Foot  positive bilaterally      Sharp/Dull sense is positive bilaterally    Biomechanical Exam of LE:  Hip ROM with decreased flexion/extension 2/2 pain, external and internal rotation about equal at 45° b/l  Knee ROM WNL Bilateral, no hyperextension noted b/l, no genu varum/valgum noted b/l  Ankle dorsiflexion with knee extended is limited and unable to get pass vertical on right and limited and unable to get pass vertical on left  Ankle dorsiflexion with knee flexed is limited and able to get pass vertical on right and limited and able to get pass vertical on left  Malleolar positioning is WNL b/l  STJ ROM WNL b/l, heel inversion is approximately 20° on right and 20° on left; heel eversion is approximately 10° on right and 10° on left; neutral calcaneal stance position is 0°   1st ray ROM WNL b/l, able to dorsiflex/plantarflex b/l  Tibial varum is 0° b/l, Resting calcaneal stance position is valgus and approximately 2° on right and valgus and approximately 2° on left  Gait analysis: pronated  Gross deformity noted: pes planus and hammertoes2-4 b/l   Straight leg raise reproduces "shooting" feeling and pain in leg/arch bilaterally  Positive tibial nerve tinnel sign bilaterally      Dermatology: Skin Condition:  normal     There is not evidence of macerated tissue between toe spaces  Nail Exam: normal nails without lesions       Open ulcerations: No     Calluses: No

## 2021-04-16 ENCOUNTER — CONSULT (OUTPATIENT)
Dept: NEUROSURGERY | Facility: CLINIC | Age: 56
End: 2021-04-16
Payer: COMMERCIAL

## 2021-04-16 VITALS
HEIGHT: 67 IN | SYSTOLIC BLOOD PRESSURE: 137 MMHG | DIASTOLIC BLOOD PRESSURE: 94 MMHG | BODY MASS INDEX: 28.25 KG/M2 | TEMPERATURE: 97.8 F | RESPIRATION RATE: 16 BRPM | HEART RATE: 84 BPM | WEIGHT: 180 LBS

## 2021-04-16 DIAGNOSIS — M54.42 LOW BACK PAIN WITH BILATERAL SCIATICA: Primary | ICD-10-CM

## 2021-04-16 DIAGNOSIS — M54.32 BILATERAL SCIATICA: ICD-10-CM

## 2021-04-16 DIAGNOSIS — M54.41 LOW BACK PAIN WITH BILATERAL SCIATICA: Primary | ICD-10-CM

## 2021-04-16 DIAGNOSIS — M54.31 BILATERAL SCIATICA: ICD-10-CM

## 2021-04-16 PROBLEM — G89.29 CHRONIC BILATERAL LOW BACK PAIN WITH BILATERAL SCIATICA: Status: ACTIVE | Noted: 2020-08-17

## 2021-04-16 PROCEDURE — 3075F SYST BP GE 130 - 139MM HG: CPT | Performed by: PHYSICIAN ASSISTANT

## 2021-04-16 PROCEDURE — 3008F BODY MASS INDEX DOCD: CPT | Performed by: PHYSICIAN ASSISTANT

## 2021-04-16 PROCEDURE — 1036F TOBACCO NON-USER: CPT | Performed by: PHYSICIAN ASSISTANT

## 2021-04-16 PROCEDURE — 99203 OFFICE O/P NEW LOW 30 MIN: CPT | Performed by: PHYSICIAN ASSISTANT

## 2021-04-16 PROCEDURE — 3080F DIAST BP >= 90 MM HG: CPT | Performed by: PHYSICIAN ASSISTANT

## 2021-04-16 NOTE — PROGRESS NOTES
Neurosurgery Office Note  Sathish Springer 50 y o  female MRN: [id number]      Assessment/Plan     Chronic bilateral low back pain with bilateral sciatica  Patient presents as a new patient consultation for low back pain and bilateral leg pain  · Patient reports 6 month history of worsening lower back pain with bilateral leg pain, numbness, and weakness leading to difficulties with ambulation  · Denies any bowel or bladder dysfunction  Imaging:  · CT abdomen pelvis with contrast 6/18/2020: Imaging review to evaluate lumbar spine  There is mild facet arthropathy and degenerative changes  No evidence of significant osteophytes or concern for ankylosing spondylosis  No fracture appreciated  No spondylolisthesis  Overall good anatomical alignment  Plan:  · Continue to monitor neurological symptoms  · Given complaints of radiculopathy along with weakness and difficulty with ambulation, further workup with an MRI lumbar spine without contrast is recommended  Order was placed  · Would recommend trial of conservative management to include physical therapy and pain management for which orders have also been placed  · Patient educated on signs and symptoms of cauda equina syndrome  · Will plan outpatient follow-up in approximately six weeks after MRI imaging is completed and further trial of conservative management  · Consider EMG study if MRI is negative or equivocal    · Pt understands the above recommendations and agrees with plan  · Advised patient to call neurosurgery with any questions or concerns  Diagnoses and all orders for this visit:    Low back pain with bilateral sciatica  -     MRI lumbar spine without contrast; Future  -     Ambulatory referral to Pain Management; Future    Bilateral sciatica  -     Ambulatory referral to Spine Surgery    Other orders  -     Cancel: Ambulatory referral to Physical Therapy;  Future            CHIEF COMPLAINT    Chief Complaint   Patient presents with   Mary Velasquez Consult     New patient lumbar work-up       HISTORY    History of Present Illness     Patient is a 50 y o  female with a PMHx significant for hyperthyroidism s/p therapy now on levothyroxine , hypertension, GERD, and history of COVID-19 Jan 2021 presents to the office for an evaluation of low back and leg pain, numbness, and weakness  Patient reports 6 month history of 10/10 worsening pain of the lower back, described as being a constant pressure that travels in a band-like distribution around her groin and radiates down her legs (R>L) to the ankles anteriorly  Patient states most focal significant pain travels along the right anterior lateral leg to her medial knee  Patient reports associated numbness and weakness in her legs which has attributed to difficulties with ambulation  Application of hot packs and hot showers help to relieve her pain, as well as standing and walking around  Pain feels worse at the end of the day and when bending down  Tylenol provides minimal relief of her symptoms  Aleve, ibuprofen, and other OTC painkillers are ineffective for her symptoms  Patient had engaged in PT in 2019 after experiencing similar but very mild lower back pain  Denies having ever received injections in past  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, 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  Admits to dizziness  Denies experiencing headaches, double or blurry vision, bowel or bladder dysfunction/incontinence, and other weakness/tingling/numbness except as noted above  REVIEW OF SYSTEMS    Review of Systems   Constitutional: Positive for activity change  HENT: Negative  Eyes: Negative  Respiratory: Negative  Cardiovascular: Negative  Gastrointestinal: Negative  Endocrine: Negative  Genitourinary: Negative  Musculoskeletal: Positive for arthralgias (right shoulder), back pain (10/10 low back pain into hips, buttocks, and down R>L legs) and gait problem (painful)  Skin: Negative  Allergic/Immunologic: Negative  Neurological: Positive for weakness (bilateral legs) and numbness (n/t in fingers and toes; left hand falls asleep a lot towards the night)  Hematological: Negative  Psychiatric/Behavioral: Positive for sleep disturbance (sleeps with her right arm stright up because of pain)  All other systems reviewed and are negative  Meds/Allergies     Current Outpatient Medications   Medication Sig Dispense Refill    acetaminophen (TYLENOL) 325 mg tablet Take 2 tablets (650 mg total) by mouth every 6 (six) hours as needed for mild pain, headaches or fever 30 tablet 0    levothyroxine 112 mcg tablet Take 1 tablet (112 mcg total) by mouth daily 30 tablet 2    lisinopril (ZESTRIL) 40 mg tablet Take 1 tablet (40 mg total) by mouth daily 30 tablet 2    Multiple Vitamin (MULTIVITAMIN) capsule Take 1 capsule by mouth daily      omeprazole (PriLOSEC) 20 mg delayed release capsule Take 2 capsules (40 mg total) by mouth daily 60 capsule 3    ondansetron (ZOFRAN-ODT) 4 mg disintegrating tablet Take 1 tablet (4 mg total) by mouth every 6 (six) hours as needed for nausea or vomiting 20 tablet 0    sucralfate (CARAFATE) 1 g tablet Take 1 tablet (1 g total) by mouth 4 (four) times a day 120 tablet 0     No current facility-administered medications for this visit          No Known Allergies    PAST HISTORY    Past Medical History:   Diagnosis Date    GERD (gastroesophageal reflux disease)     Hypertension     Hyperthyroiditis     HYPERTHYROID; POST IODINE THERAPY       Past Surgical History:   Procedure Laterality Date    APPENDECTOMY       SECTION      HERNIA REPAIR         Social History     Tobacco Use    Smoking status: Never Smoker    Smokeless tobacco: Never Used   Substance Use Topics    Alcohol use: Yes     Comment: occasional     Drug use: No       Family History   Problem Relation Age of Onset    Endometrial cancer Family     Ovarian cancer Mother     Heart defect Mother     Heart attack Father     Stroke Brother     Breast cancer Paternal Grandmother 61         Above history personally reviewed  EXAM    Vitals:Blood pressure 137/94, pulse 84, temperature 97 8 °F (36 6 °C), temperature source Probe, resp  rate 16, height 5' 7" (1 702 m), weight 81 6 kg (180 lb), last menstrual period 03/24/2021  ,Body mass index is 28 19 kg/m²  Physical Exam  Constitutional:       General: She is not in acute distress  Appearance: Normal appearance  She is well-developed  She is not ill-appearing  HENT:      Head: Normocephalic and atraumatic  Right Ear: External ear normal       Left Ear: External ear normal       Nose: Nose normal       Mouth/Throat:      Mouth: Mucous membranes are moist       Pharynx: Oropharynx is clear  Eyes:      General: No scleral icterus  Right eye: No discharge  Left eye: No discharge  Extraocular Movements: Extraocular movements intact and EOM normal       Conjunctiva/sclera: Conjunctivae normal       Pupils: Pupils are equal, round, and reactive to light  Neck:      Musculoskeletal: No muscular tenderness  Cardiovascular:      Rate and Rhythm: Normal rate  Pulmonary:      Effort: Pulmonary effort is normal  No respiratory distress  Abdominal:      General: There is no distension  Musculoskeletal:         General: Tenderness (L4/5 region midline) present  No swelling or deformity  Comments: Pain on palpation to lumbar spine   Skin:     General: Skin is warm and dry  Findings: No rash  Neurological:      Mental Status: She is alert  Deep Tendon Reflexes:      Reflex Scores:       Bicep reflexes are 1+ on the right side and 1+ on the left side  Brachioradialis reflexes are 1+ on the right side and 1+ on the left side  Patellar reflexes are 1+ on the right side and 1+ on the left side    Psychiatric:         Mood and Affect: Mood normal          Speech: Speech normal  Behavior: Behavior normal          Thought Content: Thought content normal          Judgment: Judgment normal          Neurologic Exam     Mental Status   Follows 2 step commands  Attention: normal  Concentration: normal    Speech: speech is normal   Level of consciousness: alert  Knowledge: good  Normal comprehension  Cranial Nerves     CN III, IV, VI   Pupils are equal, round, and reactive to light  Extraocular motions are normal    Nystagmus: none   Upgaze: normal  Conjugate gaze: present    CN V   Facial sensation intact  CN VII   Facial expression full, symmetric  CN VIII   Hearing: intact    CN IX, X   Palate: symmetric    CN XI   Right trapezius strength: normal  Left trapezius strength: normal    CN XII   Tongue: not atrophic  Fasciculations: absent  Tongue deviation: none    Motor Exam   Muscle bulk: normal  Overall muscle tone: normal    Strength   Strength 5/5 except as noted  R dorsiflexion 4+/5 pain-limited  R HF 4+/5 pain-limited     Sensory Exam   Right arm light touch: normal  Left arm light touch: normal  Right leg light touch: Decreased LT sensation to superior lateral aspect of R thigh  Left leg light touch: normal  Right leg pinprick: Decreased pinprick sensation to superior lateral aspect of R thigh    Left leg pinprick: normal    Gait, Coordination, and Reflexes     Gait  Gait: (Antalgic)    Tremor   Resting tremor: absent  Intention tremor: absent  Action tremor: absent    Reflexes   Right brachioradialis: 1+  Left brachioradialis: 1+  Right biceps: 1+  Left biceps: 1+  Right patellar: 1+  Left patellar: 1+  Right Arceo: absent  Left Arceo: absent  Right ankle clonus: absent  Left ankle clonus: absent      MEDICAL DECISION MAKING    Imaging Studies: none

## 2021-04-16 NOTE — ASSESSMENT & PLAN NOTE
Patient presents as a new patient consultation for low back pain and bilateral leg pain  · Patient reports 6 month history of worsening lower back pain with bilateral leg pain, numbness, and weakness leading to difficulties with ambulation  · Denies any bowel or bladder dysfunction  Imaging:  · CT abdomen pelvis with contrast 6/18/2020: Imaging review to evaluate lumbar spine  There is mild facet arthropathy and degenerative changes  No evidence of significant osteophytes or concern for ankylosing spondylosis  No fracture appreciated  No spondylolisthesis  Overall good anatomical alignment  Plan:  · Continue to monitor neurological symptoms  · Given complaints of radiculopathy along with weakness and difficulty with ambulation, further workup with an MRI lumbar spine without contrast is recommended  Order was placed  · Would recommend trial of conservative management to include physical therapy and pain management for which orders have also been placed  · Patient educated on signs and symptoms of cauda equina syndrome  · Will plan outpatient follow-up in approximately six weeks after MRI imaging is completed and further trial of conservative management  · Consider EMG study if MRI is negative or equivocal    · Pt understands the above recommendations and agrees with plan  · Advised patient to call neurosurgery with any questions or concerns

## 2021-04-27 ENCOUNTER — HOSPITAL ENCOUNTER (OUTPATIENT)
Dept: MRI IMAGING | Facility: HOSPITAL | Age: 56
Discharge: HOME | End: 2021-04-27
Payer: COMMERCIAL

## 2021-04-27 DIAGNOSIS — M54.41 LOW BACK PAIN WITH BILATERAL SCIATICA: ICD-10-CM

## 2021-04-27 DIAGNOSIS — M54.42 LOW BACK PAIN WITH BILATERAL SCIATICA: ICD-10-CM

## 2021-04-27 PROCEDURE — 72148 MRI LUMBAR SPINE W/O DYE: CPT

## 2021-04-27 PROCEDURE — G1004 CDSM NDSC: HCPCS

## 2021-04-30 ENCOUNTER — IMMUNIZATIONS (OUTPATIENT)
Dept: FAMILY MEDICINE CLINIC | Facility: HOSPITAL | Age: 56
End: 2021-04-30

## 2021-04-30 DIAGNOSIS — Z23 ENCOUNTER FOR IMMUNIZATION: Primary | ICD-10-CM

## 2021-04-30 PROCEDURE — 91301 SARS-COV-2 / COVID-19 MRNA VACCINE (MODERNA) 100 MCG: CPT

## 2021-04-30 PROCEDURE — 0011A SARS-COV-2 / COVID-19 MRNA VACCINE (MODERNA) 100 MCG: CPT

## 2021-05-05 NOTE — ASSESSMENT & PLAN NOTE
Patient presents for 6 week follow-up for low back pain and bilateral leg pain and to review MRI  · Patient reports 6 month history of worsening lower back pain with bilateral leg pain, numbness, and weakness leading to difficulties with ambulation  · Denies any bowel or bladder dysfunction  Imaging:  · MRI lumbar spine 04/27/2021:Mild, noncompressive degenerative changes of the lumbar spine  Plan:  · Continue to monitor neurological symptoms  · No neurosurgical intervention warranted at this time  Recommend continue with conservative management with physical therapy and pain management  New referrals place for physical therapy and pain management  · Patient educated on signs and symptoms of cauda equina syndrome  · Patient will follow-up prn or if symptoms worsen  Recommend patient exhaust all conservative management  · Pt understands the above recommendations and agrees with plan  · Advised patient to call neurosurgery with any questions or concerns

## 2021-05-06 ENCOUNTER — OFFICE VISIT (OUTPATIENT)
Dept: NEUROSURGERY | Facility: CLINIC | Age: 56
End: 2021-05-06
Payer: COMMERCIAL

## 2021-05-06 VITALS
HEART RATE: 55 BPM | RESPIRATION RATE: 16 BRPM | HEIGHT: 67 IN | WEIGHT: 180 LBS | DIASTOLIC BLOOD PRESSURE: 86 MMHG | SYSTOLIC BLOOD PRESSURE: 140 MMHG | BODY MASS INDEX: 28.25 KG/M2 | TEMPERATURE: 97 F

## 2021-05-06 DIAGNOSIS — M54.41 CHRONIC BILATERAL LOW BACK PAIN WITH BILATERAL SCIATICA: Primary | ICD-10-CM

## 2021-05-06 DIAGNOSIS — G89.29 CHRONIC BILATERAL LOW BACK PAIN WITH BILATERAL SCIATICA: Primary | ICD-10-CM

## 2021-05-06 DIAGNOSIS — M54.42 CHRONIC BILATERAL LOW BACK PAIN WITH BILATERAL SCIATICA: Primary | ICD-10-CM

## 2021-05-06 PROCEDURE — 1036F TOBACCO NON-USER: CPT | Performed by: NURSE PRACTITIONER

## 2021-05-06 PROCEDURE — 99214 OFFICE O/P EST MOD 30 MIN: CPT | Performed by: NURSE PRACTITIONER

## 2021-05-06 NOTE — PATIENT INSTRUCTIONS
Place new referral to physical therapy and pain management  Recommend patient exhaust all conservative management  Patient will follow-up prn or if symptoms worsen

## 2021-05-06 NOTE — PROGRESS NOTES
Neurosurgery Office Note  Darinel Siddiqi 50 y o  female MRN: [id number]       Assessment/Plan     Chronic bilateral low back pain with bilateral sciatica  Patient presents for 6 week follow-up for low back pain and bilateral leg pain and to review MRI  · Patient reports 6 month history of worsening lower back pain with bilateral leg pain, numbness, and weakness leading to difficulties with ambulation  · Denies any bowel or bladder dysfunction  Imaging:  · MRI lumbar spine 04/27/2021:Mild, noncompressive degenerative changes of the lumbar spine  Plan:  · Continue to monitor neurological symptoms  · No neurosurgical intervention warranted at this time  Recommend continue with conservative management with physical therapy and pain management  New referrals place for physical therapy and pain management  · Patient educated on signs and symptoms of cauda equina syndrome  · Patient will follow-up prn or if symptoms worsen  Recommend patient exhaust all conservative management  · Pt understands the above recommendations and agrees with plan  · Advised patient to call neurosurgery with any questions or concerns  Diagnoses and all orders for this visit:    Chronic bilateral low back pain with bilateral sciatica  -     Ambulatory referral to Pain Management; Future  -     Ambulatory referral to Physical Therapy; Future            CHIEF COMPLAINT    Chief Complaint   Patient presents with    Follow-up       HISTORY    History of Present Illness     50y o  year old female  with past medical history significant for hyperthyroidism s/p therapy now on levothyroxine, hypertension, GERD, and history of COVID 19 in January 2021  Patient presents to the outpatient office today for further evaluation of low back and leg pain, numbness, and weakness and to review MRI lumbar spine      Per chart review patient reported six-month history of 10/10 worsening pain of the lower back, described as being a constant pressure that troubles in a bandlike distribution around her groin and radiates down her legs right worse than left to feet  Patient currently complaining of 5/10 low back pain which radiates down bilateral legs right worse than left in the distribution of low back pain radiating to buttock down the lateral aspect of legs into foot which is constant  Patient reports at times will radiate into her bilateral groin  Patient states sitting or walking for long periods of time worsens her pain  Patient states she did yoga last weekend which helped as well as heating pads and Tylenol as needed  She denies any recent falls or traumas but does report some difficulty with her balance at times  Patient reports that she vomited twice yesterday she is unsure why this occurred  Patient also reports numbness and tingling in her bilateral fingertips which has been ongoing for years  Patient states she has not followed up with pain management or physical therapy  Patient states she has physical therapy appointment on May 28th  Patient denies ever receiving any injections in the past   She also reports some head pressure and dizziness at times  Patient also reports right-sided paraspinal and flank pain at times  She denies any blurry vision, chest pain, shortness of breath, abdominal pain, nausea, diarrhea, no problems with bowel or bladder, no new weakness or numbness/tingling  HPI    See Discussion    REVIEW OF SYSTEMS    Review of Systems   Constitutional: Positive for activity change  HENT: Negative  Eyes: Negative  Respiratory: Negative  Cardiovascular: Negative  Gastrointestinal: Negative  Endocrine: Negative  Genitourinary: Negative  Musculoskeletal: Positive for arthralgias (right shoulder), back pain (10/10 low back pain into hips, buttocks, and down R>L legs) and gait problem (painful)  Skin: Negative  Allergic/Immunologic: Negative      Neurological: Positive for weakness (bilateral legs) and numbness (n/t in fingers and toes; left hand falls asleep a lot towards the night)  Hematological: Negative  Psychiatric/Behavioral: Positive for sleep disturbance (sleeps with her right arm stright up because of pain)  All other systems reviewed and are negative  ROS reviewed with patient and agree and changes were made as needed    Meds/Allergies     Current Outpatient Medications   Medication Sig Dispense Refill    acetaminophen (TYLENOL) 325 mg tablet Take 2 tablets (650 mg total) by mouth every 6 (six) hours as needed for mild pain, headaches or fever 30 tablet 0    levothyroxine 112 mcg tablet Take 1 tablet (112 mcg total) by mouth daily 30 tablet 2    lisinopril (ZESTRIL) 40 mg tablet Take 1 tablet (40 mg total) by mouth daily 30 tablet 2    Multiple Vitamin (MULTIVITAMIN) capsule Take 1 capsule by mouth daily      omeprazole (PriLOSEC) 20 mg delayed release capsule Take 2 capsules (40 mg total) by mouth daily 60 capsule 3    ondansetron (ZOFRAN-ODT) 4 mg disintegrating tablet Take 1 tablet (4 mg total) by mouth every 6 (six) hours as needed for nausea or vomiting 20 tablet 0    sucralfate (CARAFATE) 1 g tablet Take 1 tablet (1 g total) by mouth 4 (four) times a day 120 tablet 0     No current facility-administered medications for this visit          No Known Allergies    PAST HISTORY    Past Medical History:   Diagnosis Date    GERD (gastroesophageal reflux disease)     Hypertension     Hyperthyroiditis     HYPERTHYROID; POST IODINE THERAPY       Past Surgical History:   Procedure Laterality Date    APPENDECTOMY       SECTION      HERNIA REPAIR         Social History     Tobacco Use    Smoking status: Never Smoker    Smokeless tobacco: Never Used   Substance Use Topics    Alcohol use: Yes     Comment: occasional     Drug use: No       Family History   Problem Relation Age of Onset    Endometrial cancer Family     Ovarian cancer Mother     Heart defect Mother  Heart attack Father     Stroke Brother     Breast cancer Paternal Grandmother 61         Above history personally reviewed  EXAM    Vitals:Blood pressure 140/86, pulse 55, temperature (!) 97 °F (36 1 °C), temperature source Tympanic, resp  rate 16, height 5' 7" (1 702 m), weight 81 6 kg (180 lb)  ,Body mass index is 28 19 kg/m²  Physical Exam  Vitals signs reviewed  Constitutional:       General: She is awake  She is not in acute distress  Appearance: Normal appearance  She is not ill-appearing  HENT:      Head: Normocephalic and atraumatic  Eyes:      Extraocular Movements: Extraocular movements intact and EOM normal       Conjunctiva/sclera: Conjunctivae normal       Pupils: Pupils are equal, round, and reactive to light  Neck:      Musculoskeletal: Normal range of motion and neck supple  No spinous process tenderness or muscular tenderness  Cardiovascular:      Rate and Rhythm: Normal rate  Pulmonary:      Effort: Pulmonary effort is normal  No respiratory distress  Chest:      Chest wall: No tenderness  Abdominal:      General: There is no distension  Palpations: Abdomen is soft  Tenderness: There is no abdominal tenderness  Musculoskeletal: Normal range of motion  Cervical back: She exhibits no tenderness  Thoracic back: She exhibits no tenderness  Lumbar back: She exhibits tenderness  Skin:     General: Skin is warm and dry  Neurological:      Mental Status: She is alert and oriented to person, place, and time  Coordination: Finger-Nose-Finger Test normal       Gait: Gait is intact  Deep Tendon Reflexes:      Reflex Scores:       Tricep reflexes are 1+ on the right side and 1+ on the left side  Bicep reflexes are 1+ on the right side and 1+ on the left side  Brachioradialis reflexes are 1+ on the right side and 1+ on the left side  Patellar reflexes are 1+ on the right side and 1+ on the left side         Achilles reflexes are 1+ on the right side and 1+ on the left side  Psychiatric:         Attention and Perception: Attention and perception normal          Mood and Affect: Mood and affect normal          Speech: Speech normal          Behavior: Behavior normal  Behavior is cooperative  Thought Content: Thought content normal          Cognition and Memory: Cognition and memory normal          Judgment: Judgment normal          Neurologic Exam     Mental Status   Oriented to person, place, and time  Follows 2 step commands  Attention: normal  Concentration: normal    Speech: speech is normal   Level of consciousness: alert  Knowledge: good  Able to perform simple calculations  Able to name object  Able to repeat  Normal comprehension  Cranial Nerves     CN III, IV, VI   Pupils are equal, round, and reactive to light  Extraocular motions are normal    CN III: no CN III palsy  CN VI: no CN VI palsy  Nystagmus: none   Diplopia: none  Conjugate gaze: present    CN V   Facial sensation intact  CN VII   Facial expression full, symmetric  CN VIII   CN VIII normal    Hearing: intact    CN IX, X   CN IX normal      CN XI   CN XI normal      CN XII   CN XII normal      Motor Exam   Muscle bulk: normal  Overall muscle tone: normal  Right arm pronator drift: absent  Left arm pronator drift: absent    Strength   Strength 5/5 except as noted   R HF 4+/5 2/2 pain      Sensory Exam   Right arm light touch: normal  Left arm light touch: normal  Left leg light touch: normal  Proprioception normal    Decreased sensation to LT to entire RLE     Gait, Coordination, and Reflexes     Gait  Gait: normal    Coordination   Finger to nose coordination: normal    Tremor   Resting tremor: absent  Intention tremor: absent  Action tremor: absent    Reflexes   Right brachioradialis: 1+  Left brachioradialis: 1+  Right biceps: 1+  Left biceps: 1+  Right triceps: 1+  Left triceps: 1+  Right patellar: 1+  Left patellar: 1+  Right achilles: 1+  Left achilles: 1+  Right Arceo: absent  Left Arceo: absent  Right ankle clonus: absent  Left ankle clonus: absent        MEDICAL DECISION MAKING    Imaging Studies:     Mri Lumbar Spine Without Contrast    Result Date: 4/30/2021  Narrative: MRI LUMBAR SPINE WITHOUT CONTRAST INDICATION: M54 42: Lumbago with sciatica, left side M54 41: Lumbago with sciatica, right side  COMPARISON:  X-ray 1/3/2015 TECHNIQUE:  Sagittal T1, sagittal T2, sagittal inversion recovery, axial T1 and axial T2, coronal T2   IMAGE QUALITY:  Diagnostic FINDINGS: VERTEBRAL BODIES:  There are 5 nonrib-bearing lumbar type vertebral bodies  Normal alignment of the lumbar spine  No spondylolysis or spondylolisthesis  No scoliosis  No compression fracture  Normal marrow signal is identified within the visualized  bony structures  No discrete marrow lesion  SACRUM:  Normal signal within the sacrum  No evidence of insufficiency or stress fracture  DISTAL CORD AND CONUS:  Normal size and signal within the distal cord and conus  Conus terminates at the L1 level  PARASPINAL SOFT TISSUES:  Paraspinal soft tissues are unremarkable  LOWER THORACIC DISC SPACES:  Normal disc height and signal   No disc herniation, canal stenosis or foraminal narrowing  LUMBAR DISC SPACES: L1-L2:  Normal  L2-L3:  Mild facet arthrosis L3-L4:  Mild facet arthrosis L4-L5:  Slight reduction disc height, minor circumferential bulge, moderate facet arthrosis, no root compression  L5-S1:  Minor facet arthrosis  Impression: Mild, noncompressive degenerative changes of the lumbar spine  Workstation performed: [id number]       I have personally reviewed pertinent reports     and I have personally reviewed pertinent films in PACS

## 2021-05-26 ENCOUNTER — IMMUNIZATIONS (OUTPATIENT)
Dept: FAMILY MEDICINE CLINIC | Facility: HOSPITAL | Age: 56
End: 2021-05-26

## 2021-05-26 DIAGNOSIS — Z23 ENCOUNTER FOR IMMUNIZATION: Primary | ICD-10-CM

## 2021-05-26 PROCEDURE — 91301 SARS-COV-2 / COVID-19 MRNA VACCINE (MODERNA) 100 MCG: CPT

## 2021-05-26 PROCEDURE — 0012A SARS-COV-2 / COVID-19 MRNA VACCINE (MODERNA) 100 MCG: CPT

## 2021-06-07 ENCOUNTER — OFFICE VISIT (OUTPATIENT)
Dept: FAMILY MEDICINE CLINIC | Facility: CLINIC | Age: 56
End: 2021-06-07

## 2021-06-07 VITALS
HEART RATE: 76 BPM | HEIGHT: 67 IN | TEMPERATURE: 98 F | OXYGEN SATURATION: 99 % | BODY MASS INDEX: 28.41 KG/M2 | RESPIRATION RATE: 16 BRPM | WEIGHT: 181 LBS | DIASTOLIC BLOOD PRESSURE: 92 MMHG | SYSTOLIC BLOOD PRESSURE: 146 MMHG

## 2021-06-07 DIAGNOSIS — R10.11 COLICKY RUQ ABDOMINAL PAIN: Primary | ICD-10-CM

## 2021-06-07 PROCEDURE — 99213 OFFICE O/P EST LOW 20 MIN: CPT | Performed by: INTERNAL MEDICINE

## 2021-06-07 NOTE — ASSESSMENT & PLAN NOTE
This could be secondary to gallballder pathology  There is an RUQ ultrasound ordered by patient's PCP one year ago which still active so I have provided patient with the information to schedule this  If this negative, other differentials to include may be functional dyspepsia or H pylori infection

## 2021-06-07 NOTE — PROGRESS NOTES
Assessment/Plan:    Colicky RUQ abdominal pain  This could be secondary to gallballder pathology  There is an RUQ ultrasound ordered by patient's PCP one year ago which still active so I have provided patient with the information to schedule this  If this negative, other differentials to include may be functional dyspepsia or H pylori infection  Diagnoses and all orders for this visit:    Colicky RUQ abdominal pain          Subjective:      Patient ID: Lanie Mora is a 50 y o  female  HPI    Lanie Mora is a 50year old with a past medical history of chronic back pain, hypothyroidism, hypertension who presents today who is presenting today with a chief complaint of right upper quadrant and epigastric pain which radiates to her back  Patient was first seen for this issue one year ago and had a CT of the abdomen and pelvis which did not show the presence of gallstones or gallbladder thickening  Patient states that the pain is worse with fatty and spicy foods but sometimes it can occur when she doesn't' eat anything  It is sometimes associated with nausea and abdominal fullness and bloating  She does have reflux disease and takes Omeprazole for this however this has not helped with the pain  Review of Systems   Constitutional: Negative  HENT: Negative  Eyes: Negative  Respiratory: Negative  Cardiovascular: Negative  Gastrointestinal: Positive for abdominal pain and nausea  Abdominal fullness    Genitourinary: Negative  Musculoskeletal: Negative  Skin: Negative  Neurological: Negative  Psychiatric/Behavioral: Negative  Objective:      /92 (BP Location: Right arm, Patient Position: Sitting, Cuff Size: Standard)   Pulse 76   Temp 98 °F (36 7 °C) (Temporal)   Resp 16   Ht 5' 7" (1 702 m)   Wt 82 1 kg (181 lb)   SpO2 99%   BMI 28 35 kg/m²          Physical Exam  Constitutional:       General: She is not in acute distress       Appearance: Normal appearance  HENT:      Head: Normocephalic and atraumatic  Eyes:      General:         Right eye: No discharge  Left eye: No discharge  Extraocular Movements: Extraocular movements intact  Neck:      Musculoskeletal: Normal range of motion  Cardiovascular:      Rate and Rhythm: Normal rate  Pulmonary:      Effort: Pulmonary effort is normal  No respiratory distress  Breath sounds: No wheezing  Abdominal:      General: Bowel sounds are normal  There is no distension  Tenderness: There is no guarding  Comments: Marked tenderness to palpation in the right upper quadrant and epigastrium    Neurological:      Mental Status: She is alert

## 2021-06-07 NOTE — PATIENT INSTRUCTIONS
To schedule ultrasound please contact Central Scheduling at [phone]  Patient Instructions: This test requires a 6-8 hour fasting period  ANY type of food, any type of drink,   including water, and medications can affect results  Patients will be rescheduled if they do not follow the restrictions listed here  PM patients can have a fat free breakfast 6-8 hours before the appointment   Please bring your insurance cards, a form of photo ID and a list of your medications with you  Arrive 15 minutes prior to your appointment time in order to register

## 2021-06-08 ENCOUNTER — HOSPITAL ENCOUNTER (OUTPATIENT)
Dept: ULTRASOUND IMAGING | Facility: HOSPITAL | Age: 56
Discharge: HOME | End: 2021-06-08
Payer: COMMERCIAL

## 2021-06-08 DIAGNOSIS — R10.11 COLICKY RUQ ABDOMINAL PAIN: ICD-10-CM

## 2021-06-08 PROCEDURE — 76705 ECHO EXAM OF ABDOMEN: CPT

## 2021-06-14 ENCOUNTER — ANNUAL EXAM (OUTPATIENT)
Dept: FAMILY MEDICINE CLINIC | Facility: CLINIC | Age: 56
End: 2021-06-14

## 2021-06-14 VITALS
BODY MASS INDEX: 28.38 KG/M2 | TEMPERATURE: 97.6 F | RESPIRATION RATE: 20 BRPM | DIASTOLIC BLOOD PRESSURE: 80 MMHG | OXYGEN SATURATION: 99 % | HEART RATE: 88 BPM | WEIGHT: 180.8 LBS | HEIGHT: 67 IN | SYSTOLIC BLOOD PRESSURE: 124 MMHG

## 2021-06-14 DIAGNOSIS — K21.9 GASTROESOPHAGEAL REFLUX DISEASE WITHOUT ESOPHAGITIS: ICD-10-CM

## 2021-06-14 DIAGNOSIS — K21.00 GASTROESOPHAGEAL REFLUX DISEASE WITH ESOPHAGITIS WITHOUT HEMORRHAGE: ICD-10-CM

## 2021-06-14 DIAGNOSIS — Z12.4 CERVICAL CANCER SCREENING: Primary | ICD-10-CM

## 2021-06-14 DIAGNOSIS — R10.11 COLICKY RUQ ABDOMINAL PAIN: ICD-10-CM

## 2021-06-14 DIAGNOSIS — K64.9 HEMORRHOIDS, UNSPECIFIED HEMORRHOID TYPE: ICD-10-CM

## 2021-06-14 DIAGNOSIS — K59.01 SLOW TRANSIT CONSTIPATION: ICD-10-CM

## 2021-06-14 PROCEDURE — 99213 OFFICE O/P EST LOW 20 MIN: CPT | Performed by: INTERNAL MEDICINE

## 2021-06-14 PROCEDURE — 3008F BODY MASS INDEX DOCD: CPT | Performed by: NURSE PRACTITIONER

## 2021-06-14 PROCEDURE — T1015 CLINIC SERVICE: HCPCS | Performed by: INTERNAL MEDICINE

## 2021-06-14 PROCEDURE — 87624 HPV HI-RISK TYP POOLED RSLT: CPT | Performed by: FAMILY MEDICINE

## 2021-06-14 PROCEDURE — G0145 SCR C/V CYTO,THINLAYER,RESCR: HCPCS | Performed by: FAMILY MEDICINE

## 2021-06-14 PROCEDURE — 3074F SYST BP LT 130 MM HG: CPT | Performed by: INTERNAL MEDICINE

## 2021-06-14 PROCEDURE — 3079F DIAST BP 80-89 MM HG: CPT | Performed by: INTERNAL MEDICINE

## 2021-06-14 RX ORDER — SUCRALFATE 1 G/1
1 TABLET ORAL 4 TIMES DAILY
Qty: 120 TABLET | Refills: 0 | Status: SHIPPED | OUTPATIENT
Start: 2021-06-14 | End: 2021-07-20 | Stop reason: SDUPTHER

## 2021-06-14 RX ORDER — OMEPRAZOLE 20 MG/1
40 CAPSULE, DELAYED RELEASE ORAL DAILY
Qty: 60 CAPSULE | Refills: 3 | Status: SHIPPED | OUTPATIENT
Start: 2021-06-14 | End: 2021-09-22 | Stop reason: SDUPTHER

## 2021-06-14 NOTE — ASSESSMENT & PLAN NOTE
Severe erythematous and scarred mucosa with erosion in the GE junction on EGD  Will refill omeprazole  She was also prescribed carafate

## 2021-06-14 NOTE — PROGRESS NOTES
Annual Well Woman Visit  [place]      Cervical cancer screening  Pap smear with HPV cotesting perfomed today  Will call with results  Gastroesophageal reflux disease with esophagitis without hemorrhage  Severe erythematous and scarred mucosa with erosion in the GE junction on EGD  Will refill omeprazole  She was also prescribed carafate  Colicky RUQ abdominal pain  US RUQ showed no gallstones but positive Daniel sign  Given continued symptoms and positive Daniel sign and high suspicion for gallbladder disease, will get HIDA scan  Slow transit constipation   Multiple diverticula on colonoscopy  It was recommended she increase fiber intake  Will send Metamucil and encouraged water intake  Hemorrhoids    Large internal and external hemorrhoids noted on colonoscopy  Patient reports pain with defecation  Requesting surgical referral for evaluation  Placed referral to Colorectal surgery  Sosa Velasquezas was seen today for gynecologic exam, results and medication refill  Diagnoses and all orders for this visit:    Cervical cancer screening  -     Liquid-based pap, screening    Gastroesophageal reflux disease without esophagitis  -     omeprazole (PriLOSEC) 20 mg delayed release capsule; Take 2 capsules (40 mg total) by mouth daily  -     sucralfate (CARAFATE) 1 g tablet; Take 1 tablet (1 g total) by mouth 4 (four) times a day    Hemorrhoids, unspecified hemorrhoid type  -     Ambulatory referral to Colorectal Surgery; Future    Colicky RUQ abdominal pain  -     NM hepatobiliary w rx; Future    Slow transit constipation  -     psyllium (METAMUCIL SMOOTH TEXTURE) 28 % packet; Take 1 packet by mouth 2 (two) times a day    Gastroesophageal reflux disease with esophagitis without hemorrhage      Subjective      Marshal Decent is a 50 y o  female who presents for annual well woman exam  Periods are regular every 28-30 days, lasting 5 days   No intermenstrual bleeding, spotting, or discharge  Recent EGD showed Severe erythematous and scarred mucosa with erosion in the GE junction  Colonoscopy showed multiple diverticula and large internal and external hemorrhoids  GYN:  · No vaginal discharge, labial erythema or lesions, dyspareunia  · Contraception: none  · Patient is not sexually active  · No gynecologic surgeries  · LMP June 1    OB:  · [id number] female  · Pregnancies were normal     :  · No dysuria, urinary frequency or urgency  · No hematuria, flank pain, incontinence  Breast:  · No breast mass, skin changes, dimpling, reddening, nipple retraction  · No breast discharge  · Patient does have a family history of breast (grandmother), endometrial (mother) ca  General:  · Diet: normal  · Exercise: regular exercise  · Work: recently quit her job due to stress  · ETOH use: infrequent  · Tobacco use: none  · Recreational drug use: none    Screening:  · Cervical cancer: last pap smear in 2018  Results were normal   · Breast cancer: last mammogram in Nov 2020  Results were normal   · Colon cancer: last colonoscopy in Oct 2020  Results were normal   · STD screening: declined as not sexually   Review of Systems  Pertinent items are noted in HPI        Objective      /80 (BP Location: Left arm, Patient Position: Sitting, Cuff Size: Large)   Pulse 88   Temp 97 6 °F (36 4 °C) (Temporal)   Resp 20   Ht 5' 7" (1 702 m)   Wt 82 kg (180 lb 12 8 oz)   LMP 06/01/2021 (Within Days)   SpO2 99%   BMI 28 32 kg/m²     General:   alert, appears stated age and cooperative   Abdomen: soft, non-tender, without masses or organomegaly   Vulva: normal   Vagina: normal mucosa   Cervix: multiparous appearance   Uterus: normal size   Adnexa: normal adnexa            Rafa Parks MD   Family Medicine, PGY-3  6/14/2021   3:58 PM

## 2021-06-14 NOTE — ASSESSMENT & PLAN NOTE
Multiple diverticula on colonoscopy  It was recommended she increase fiber intake  Will send Metamucil and encouraged water intake

## 2021-06-14 NOTE — ASSESSMENT & PLAN NOTE
Large internal and external hemorrhoids noted on colonoscopy  Patient reports pain with defecation  Requesting surgical referral for evaluation  Placed referral to Colorectal surgery

## 2021-06-14 NOTE — ASSESSMENT & PLAN NOTE
US RUQ showed no gallstones but positive Daniel sign  Given continued symptoms and positive Daniel sign and high suspicion for gallbladder disease, will get HIDA scan

## 2021-06-16 LAB
LAB AP GYN PRIMARY INTERPRETATION: NORMAL
Lab: NORMAL

## 2021-06-17 LAB
HPV HR 12 DNA CVX QL NAA+PROBE: NEGATIVE
HPV16 DNA CVX QL NAA+PROBE: NEGATIVE
HPV18 DNA CVX QL NAA+PROBE: NEGATIVE

## 2021-06-30 ENCOUNTER — LAB REQUISITION (OUTPATIENT)
Dept: LAB | Facility: HOSPITAL | Age: 56
End: 2021-06-30
Payer: COMMERCIAL

## 2021-06-30 DIAGNOSIS — K64.4 RESIDUAL HEMORRHOIDAL SKIN TAGS: ICD-10-CM

## 2021-06-30 DIAGNOSIS — K64.2 THIRD DEGREE HEMORRHOIDS: ICD-10-CM

## 2021-06-30 PROCEDURE — 88304 TISSUE EXAM BY PATHOLOGIST: CPT | Performed by: PATHOLOGY

## 2021-07-02 ENCOUNTER — CONSULT (OUTPATIENT)
Dept: PAIN MEDICINE | Facility: MEDICAL CENTER | Age: 56
End: 2021-07-02
Payer: COMMERCIAL

## 2021-07-02 VITALS
HEART RATE: 74 BPM | TEMPERATURE: 97.5 F | SYSTOLIC BLOOD PRESSURE: 145 MMHG | HEIGHT: 68 IN | DIASTOLIC BLOOD PRESSURE: 90 MMHG | BODY MASS INDEX: 26.98 KG/M2 | WEIGHT: 178 LBS

## 2021-07-02 DIAGNOSIS — G89.4 CHRONIC PAIN SYNDROME: Primary | ICD-10-CM

## 2021-07-02 DIAGNOSIS — M51.16 LUMBAR DISC DISEASE WITH RADICULOPATHY: ICD-10-CM

## 2021-07-02 DIAGNOSIS — M47.816 LUMBAR FACET ARTHROPATHY: ICD-10-CM

## 2021-07-02 PROCEDURE — 99244 OFF/OP CNSLTJ NEW/EST MOD 40: CPT | Performed by: PHYSICAL MEDICINE & REHABILITATION

## 2021-07-02 PROCEDURE — 1036F TOBACCO NON-USER: CPT | Performed by: PHYSICAL MEDICINE & REHABILITATION

## 2021-07-02 RX ORDER — GABAPENTIN 300 MG/1
300 CAPSULE ORAL 3 TIMES DAILY
Qty: 90 CAPSULE | Refills: 1 | Status: SHIPPED | OUTPATIENT
Start: 2021-07-02 | End: 2021-08-30

## 2021-07-02 NOTE — PROGRESS NOTES
Assessment  1  Chronic pain syndrome    2  Lumbar disc disease with radiculopathy    3  Lumbar facet arthropathy        Plan  Ms Arin Day is a pleasant 82-year-old female who presents for initial evaluation regarding 1 years duration of low back pain with radiating symptoms into the bilateral lower extremities  During today's evaluation she is demonstrating low back pain that is likely multifactorial nature with elements of both lumbar facet mediated low back pain as well as disc bulging at L4-L5  While she does have significant axial low back pain she does report radicular symptoms into the bilateral lower extremities that is her worst pain  As such we will   1  Plan for lumbar epidural steroid injection L4-L5   2  Will start the patient on gabapentin 300 mg and titrate up to 3 times a day as tolerated and necessary  3  Will likely benefit from MBB and subsequent radiofrequency ablation to better treat the axial facet mediated low back pain, however, we will start with epidural and re-evaluate afterwards    My impressions and treatment recommendations were discussed in detail with the patient who verbalized understanding and had no further questions  Discharge instructions were provided  I personally saw and examined the patient and I agree with the above discussed plan of care  Orders Placed This Encounter   Procedures    FL spine and pain procedure     Standing Status:   Future     Standing Expiration Date:   7/2/2025     Order Specific Question:   Reason for Exam:     Answer:   LESI (L4-L5)     Order Specific Question:   Is the patient pregnant? Answer:   No     Order Specific Question:   Anticoagulant hold needed? Answer:   No     New Medications Ordered This Visit   Medications    gabapentin (NEURONTIN) 300 mg capsule     Sig: Take 1 capsule (300 mg total) by mouth 3 (three) times a day     Dispense:  90 capsule     Refill:  1       History of Present Illness    Cleo Byrd is a 50 y o  female presents to [facility] for initial evaluation regarding 1 years duration of neck, bilateral shoulder, low back and bilateral lower extremity pain  Patient denies any significant inciting event or recent trauma  Today she reports 10/10 pain that is described as a severe throbbing, shooting, stabbing, numbness, sharp, aching pain that is constant 100% of the time and present throughout the day and night  Also reports upper and lower extremity weakness but denies falls  Does not use any durable medical equipment for ambulation  Symptoms are worse with lying down, bending, standing, walking, exercise, coughing, sneezing, relaxation  She has had no significant relief with relative rest and home exercises  Previously tried over-the-counter NSAIDs with minimal relief  Presents today for initial evaluation  I have personally reviewed and/or updated the patient's past medical history, past surgical history, family history, social history, current medications, allergies, and vital signs today  Review of Systems   Constitutional: Negative for fever and unexpected weight change  HENT: Negative for trouble swallowing  Eyes: Positive for pain  Negative for visual disturbance  Respiratory: Negative for shortness of breath and wheezing  Cardiovascular: Positive for leg swelling  Negative for chest pain and palpitations  Gastrointestinal: Positive for abdominal pain  Negative for constipation, diarrhea, nausea and vomiting  Endocrine: Positive for polydipsia  Negative for cold intolerance and heat intolerance  Genitourinary: Negative for difficulty urinating and frequency  Musculoskeletal: Positive for back pain, joint swelling, myalgias and neck pain  Negative for arthralgias and gait problem  Skin: Negative for rash  Neurological: Positive for dizziness, numbness and headaches  Negative for seizures, syncope and weakness     Hematological: Does not bruise/bleed easily  Psychiatric/Behavioral: Negative for dysphoric mood  All other systems reviewed and are negative        Patient Active Problem List   Diagnosis    Essential hypertension    Colicky RUQ abdominal pain    Hypothyroidism    Hemorrhoids    Gastroesophageal reflux disease with esophagitis without hemorrhage    Chronic bilateral low back pain with bilateral sciatica    Encounter for screening colonoscopy    Sciatica of right side    Screening mammogram, encounter for    Bunion of great toe of right foot    Heel pain, chronic, right    Cervical cancer screening    Slow transit constipation       Past Medical History:   Diagnosis Date    Chronic pain     GERD (gastroesophageal reflux disease)     Hypertension     Hyperthyroiditis     HYPERTHYROID; POST IODINE THERAPY       Past Surgical History:   Procedure Laterality Date    APPENDECTOMY       SECTION      HERNIA REPAIR         Family History   Problem Relation Age of Onset    Endometrial cancer Family     Ovarian cancer Mother     Heart defect Mother     Heart attack Father     Stroke Brother     Breast cancer Paternal Grandmother 61       Social History     Occupational History    Not on file   Tobacco Use    Smoking status: Never Smoker    Smokeless tobacco: Never Used   Vaping Use    Vaping Use: Never used   Substance and Sexual Activity    Alcohol use: Yes     Comment: occasional     Drug use: No    Sexual activity: Not on file       Current Outpatient Medications on File Prior to Visit   Medication Sig    acetaminophen (TYLENOL) 325 mg tablet Take 2 tablets (650 mg total) by mouth every 6 (six) hours as needed for mild pain, headaches or fever    levothyroxine 112 mcg tablet Take 1 tablet (112 mcg total) by mouth daily    lisinopril (ZESTRIL) 40 mg tablet Take 1 tablet (40 mg total) by mouth daily    omeprazole (PriLOSEC) 20 mg delayed release capsule Take 2 capsules (40 mg total) by mouth daily    Multiple Vitamin (MULTIVITAMIN) capsule Take 1 capsule by mouth daily (Patient not taking: Reported on 7/2/2021)    ondansetron (ZOFRAN-ODT) 4 mg disintegrating tablet Take 1 tablet (4 mg total) by mouth every 6 (six) hours as needed for nausea or vomiting (Patient not taking: Reported on 7/2/2021)    psyllium (METAMUCIL SMOOTH TEXTURE) 28 % packet Take 1 packet by mouth 2 (two) times a day (Patient not taking: Reported on 7/2/2021)    sucralfate (CARAFATE) 1 g tablet Take 1 tablet (1 g total) by mouth 4 (four) times a day (Patient not taking: Reported on 7/2/2021)    [DISCONTINUED] lisinopril (ZESTRIL) 40 mg tablet Take 1 tablet (40 mg total) by mouth daily     No current facility-administered medications on file prior to visit  No Known Allergies    Physical Exam    /90   Pulse 74   Temp 97 5 °F (36 4 °C)   Ht 5' 8" (1 727 m)   Wt 80 7 kg (178 lb)   BMI 27 06 kg/m²     Constitutional: normal, well developed, well nourished, alert, in no distress and non-toxic and no overt pain behavior    Eyes: anicteric  HEENT: grossly intact  Neck: supple, symmetric, trachea midline and no masses   Pulmonary:even and unlabored  Cardiovascular:No edema or pitting edema present  Skin:Normal without rashes or lesions and well hydrated  Psychiatric:Mood and affect appropriate  Neurologic:Cranial Nerves II-XII grossly intact  Musculoskeletal:antalgic, tenderness to palpation bilateral lumbar paraspinals, decreased active and passive range of motion with lumbar flexion and extension limited by pain, MMT 5/5 bilateral lower extremities except for right hip flexor and right df/EHL 4/5 limited by pain, positive straight leg raise in the seated position with radicular pain into the bilateral lower extremities, positive pain palpation lumbar facets with axial loading and rotational forces to the right and left    Imaging  Study Result    Narrative & Impression   MRI LUMBAR SPINE WITHOUT CONTRAST     INDICATION: M54 42: Lumbago with sciatica, left side  M54 41: Lumbago with sciatica, right side      COMPARISON:  X-ray 1/3/2015     TECHNIQUE:  Sagittal T1, sagittal T2, sagittal inversion recovery, axial T1 and axial T2, coronal T2     IMAGE QUALITY:  Diagnostic     FINDINGS:     VERTEBRAL BODIES:  There are 5 nonrib-bearing lumbar type vertebral bodies  Normal alignment of the lumbar spine  No spondylolysis or spondylolisthesis  No scoliosis  No compression fracture  Normal marrow signal is identified within the visualized   bony structures  No discrete marrow lesion      SACRUM:  Normal signal within the sacrum  No evidence of insufficiency or stress fracture      DISTAL CORD AND CONUS:  Normal size and signal within the distal cord and conus    Conus terminates at the L1 level      PARASPINAL SOFT TISSUES:  Paraspinal soft tissues are unremarkable      LOWER THORACIC DISC SPACES:  Normal disc height and signal   No disc herniation, canal stenosis or foraminal narrowing      LUMBAR DISC SPACES:     L1-L2:  Normal      L2-L3:  Mild facet arthrosis     L3-L4:  Mild facet arthrosis     L4-L5:  Slight reduction disc height, minor circumferential bulge, moderate facet arthrosis, no root compression      L5-S1:  Minor facet arthrosis      IMPRESSION:     Mild, noncompressive degenerative changes of the lumbar spine         Workstation performed: [id number]

## 2021-07-05 ENCOUNTER — TELEPHONE (OUTPATIENT)
Dept: FAMILY MEDICINE CLINIC | Facility: CLINIC | Age: 56
End: 2021-07-05

## 2021-07-09 ENCOUNTER — OFFICE VISIT (OUTPATIENT)
Dept: FAMILY MEDICINE CLINIC | Facility: CLINIC | Age: 56
End: 2021-07-09

## 2021-07-09 VITALS
SYSTOLIC BLOOD PRESSURE: 116 MMHG | TEMPERATURE: 97.9 F | DIASTOLIC BLOOD PRESSURE: 80 MMHG | OXYGEN SATURATION: 98 % | RESPIRATION RATE: 20 BRPM | HEART RATE: 78 BPM | WEIGHT: 183.3 LBS | BODY MASS INDEX: 27.78 KG/M2 | HEIGHT: 68 IN

## 2021-07-09 DIAGNOSIS — I10 ESSENTIAL HYPERTENSION: ICD-10-CM

## 2021-07-09 DIAGNOSIS — R42 DIZZINESS: Primary | ICD-10-CM

## 2021-07-09 DIAGNOSIS — Z13.1 DIABETES MELLITUS SCREENING: ICD-10-CM

## 2021-07-09 DIAGNOSIS — G43.009 MIGRAINE WITHOUT AURA AND WITHOUT STATUS MIGRAINOSUS, NOT INTRACTABLE: ICD-10-CM

## 2021-07-09 LAB — SL AMB POCT GLUCOSE BLD: 91

## 2021-07-09 PROCEDURE — 99213 OFFICE O/P EST LOW 20 MIN: CPT | Performed by: FAMILY MEDICINE

## 2021-07-09 PROCEDURE — 93000 ELECTROCARDIOGRAM COMPLETE: CPT | Performed by: FAMILY MEDICINE

## 2021-07-09 PROCEDURE — 3078F DIAST BP <80 MM HG: CPT | Performed by: FAMILY MEDICINE

## 2021-07-09 PROCEDURE — 3075F SYST BP GE 130 - 139MM HG: CPT | Performed by: FAMILY MEDICINE

## 2021-07-09 PROCEDURE — 82948 REAGENT STRIP/BLOOD GLUCOSE: CPT | Performed by: FAMILY MEDICINE

## 2021-07-09 PROCEDURE — 3008F BODY MASS INDEX DOCD: CPT | Performed by: PHYSICAL MEDICINE & REHABILITATION

## 2021-07-09 PROCEDURE — T1015 CLINIC SERVICE: HCPCS | Performed by: FAMILY MEDICINE

## 2021-07-09 RX ORDER — METHYLPREDNISOLONE 4 MG/1
TABLET ORAL
Qty: 21 EACH | Refills: 0 | Status: SHIPPED | OUTPATIENT
Start: 2021-07-09 | End: 2021-12-04

## 2021-07-09 RX ORDER — MECLIZINE HCL 12.5 MG/1
12.5 TABLET ORAL EVERY 8 HOURS PRN
Qty: 30 TABLET | Refills: 0 | Status: SHIPPED | OUTPATIENT
Start: 2021-07-09 | End: 2021-08-06 | Stop reason: SDUPTHER

## 2021-07-09 NOTE — PROGRESS NOTES
Assessment/Plan:    Dizziness  Present for the last 10 days and associated with headache  Feels like the 'room is spinning ' Neurological exam today unremarkable  Patient states she drinks 8 glasses of water a day and eats 3 meals a day minimum  POCT Blood Glucose: 91  Orthostatic Blood Pressures: supine 118/82 (NV 80/minute, SpO2 98%), sitting 124/84 (NV 75/minute, SpO2 95%), standing 116/80 (NV 78/minute, SpO2 98%)  POCT EKG: sinus bradycardia (59 bpm)  Possibly secondary to BPPV and will trial a course of Meclizine     - CBC ordered to rule out anemia    - Meclizine PRN ordered  - Follow up if symptoms do not improve  Migraine without aura and without status migrainosus, not intractable  Patient endorses headache that is associated with nausea/vomiting  She has been taking Tylenol daily with no relief  No added stressors  No changes in vision      - Medrol pack ordered and patient counseled on use: 6/5/4/3/2/1    - Advised to follow up if symptoms do not improve  Essential hypertension  BP Today: 130/76  At goal per JNC-8 guidelines  - Continue current regimen  Diagnoses and all orders for this visit:    Dizziness  -     CBC and differential; Future  -     meclizine (ANTIVERT) 12 5 MG tablet; Take 1 tablet (12 5 mg total) by mouth every 8 (eight) hours as needed for dizziness  -     POCT blood glucose  -     POCT ECG    Migraine without aura and without status migrainosus, not intractable  -     methylPREDNISolone 4 MG tablet therapy pack; Use as directed on package    Diabetes mellitus screening  -     HEMOGLOBIN A1C W/ EAG ESTIMATION; Future    Essential hypertension          Subjective:      Patient ID: Lurdes Gonzalez is a 50 y o  female  A 50year old female with PMH of GERD, hemorrhoids, and hypertension presented to the clinic today for dizziness and headache           The following portions of the patient's history were reviewed and updated as appropriate: allergies, current medications, past family history, past medical history, past social history, past surgical history and problem list       Review of Systems   Constitutional: Negative for activity change, appetite change, chills and fever  HENT: Negative for sore throat  Respiratory: Negative for cough and shortness of breath  Cardiovascular: Negative for chest pain, palpitations and leg swelling  Gastrointestinal: Negative for abdominal pain, constipation, diarrhea, nausea and vomiting  Musculoskeletal: Negative for back pain and gait problem  Neurological: Positive for dizziness, weakness and headaches  Negative for seizures  Objective:    /76 (BP Location: Left arm, Patient Position: Sitting, Cuff Size: Standard)   Pulse 70   Temp 97 9 °F (36 6 °C) (Temporal)   Resp 20   Ht 5' 8" (1 727 m)   Wt 83 1 kg (183 lb 4 8 oz)   LMP 07/02/2021   SpO2 98%   BMI 27 87 kg/m²        Physical Exam  Vitals reviewed  Constitutional:       General: She is not in acute distress  Appearance: She is well-developed and normal weight  She is not ill-appearing or diaphoretic  HENT:      Head: Normocephalic and atraumatic  Eyes:      Extraocular Movements: Extraocular movements intact  Conjunctiva/sclera: Conjunctivae normal       Pupils: Pupils are equal, round, and reactive to light  Cardiovascular:      Rate and Rhythm: Normal rate and regular rhythm  Heart sounds: Normal heart sounds  No murmur heard  No friction rub  No gallop  Pulmonary:      Effort: Pulmonary effort is normal  No respiratory distress  Breath sounds: Normal breath sounds  No wheezing or rales  Abdominal:      General: Bowel sounds are normal  There is no distension  Palpations: Abdomen is soft  There is no mass  Tenderness: There is no abdominal tenderness  There is no guarding  Musculoskeletal:         General: No tenderness or deformity  Normal range of motion        Cervical back: Normal range of motion  Skin:     General: Skin is warm and dry  Neurological:      General: No focal deficit present  Mental Status: She is alert and oriented to person, place, and time  Cranial Nerves: No cranial nerve deficit, dysarthria or facial asymmetry  Sensory: Sensation is intact  Motor: Motor function is intact  Coordination: Finger-Nose-Finger Test and Heel to [place] jeet Test normal       Gait: Gait is intact             Kota Royal MD  7/9/2021

## 2021-07-09 NOTE — ASSESSMENT & PLAN NOTE
Present for the last 10 days and associated with headache  Feels like the 'room is spinning ' Neurological exam today unremarkable  Patient states she drinks 8 glasses of water a day and eats 3 meals a day minimum  POCT Blood Glucose: 91  Orthostatic Blood Pressures: supine 118/82 (MT 80/minute, SpO2 98%), sitting 124/84 (MT 75/minute, SpO2 95%), standing 116/80 (MT 78/minute, SpO2 98%)  POCT EKG: sinus bradycardia (59 bpm)  Possibly secondary to BPPV and will trial a course of Meclizine     - CBC ordered to rule out anemia    - Meclizine PRN ordered  - Follow up if symptoms do not improve

## 2021-07-09 NOTE — ASSESSMENT & PLAN NOTE
Patient endorses headache that is associated with nausea/vomiting  She has been taking Tylenol daily with no relief  No added stressors  No changes in vision      - Medrol pack ordered and patient counseled on use: 6/5/4/3/2/1    - Advised to follow up if symptoms do not improve

## 2021-07-12 ENCOUNTER — APPOINTMENT (OUTPATIENT)
Dept: LAB | Facility: HOSPITAL | Age: 56
End: 2021-07-12
Payer: COMMERCIAL

## 2021-07-12 DIAGNOSIS — Z13.1 DIABETES MELLITUS SCREENING: ICD-10-CM

## 2021-07-12 DIAGNOSIS — R42 DIZZINESS: ICD-10-CM

## 2021-07-12 LAB
BASOPHILS # BLD AUTO: 0 THOUSANDS/ΜL (ref 0–0.1)
BASOPHILS NFR BLD AUTO: 1 % (ref 0–1)
EOSINOPHIL # BLD AUTO: 0.2 THOUSAND/ΜL (ref 0–0.4)
EOSINOPHIL NFR BLD AUTO: 3 % (ref 0–6)
ERYTHROCYTE [DISTWIDTH] IN BLOOD BY AUTOMATED COUNT: 14.9 %
EST. AVERAGE GLUCOSE BLD GHB EST-MCNC: 103 MG/DL
HBA1C MFR BLD: 5.2 %
HCT VFR BLD AUTO: 39.7 % (ref 36–46)
HGB BLD-MCNC: 13.4 G/DL (ref 12–16)
LYMPHOCYTES # BLD AUTO: 2.2 THOUSANDS/ΜL (ref 0.5–4)
LYMPHOCYTES NFR BLD AUTO: 37 % (ref 25–45)
MCH RBC QN AUTO: 33.2 PG (ref 26–34)
MCHC RBC AUTO-ENTMCNC: 33.7 G/DL (ref 31–36)
MCV RBC AUTO: 99 FL (ref 80–100)
MONOCYTES # BLD AUTO: 0.4 THOUSAND/ΜL (ref 0.2–0.9)
MONOCYTES NFR BLD AUTO: 8 % (ref 1–10)
NEUTROPHILS # BLD AUTO: 3 THOUSANDS/ΜL (ref 1.8–7.8)
NEUTS SEG NFR BLD AUTO: 52 % (ref 45–65)
PLATELET # BLD AUTO: 327 THOUSANDS/UL (ref 150–450)
PMV BLD AUTO: 7.5 FL (ref 8.9–12.7)
RBC # BLD AUTO: 4.03 MILLION/UL (ref 4–5.2)
WBC # BLD AUTO: 5.8 THOUSAND/UL (ref 4.5–11)

## 2021-07-12 PROCEDURE — 83036 HEMOGLOBIN GLYCOSYLATED A1C: CPT

## 2021-07-12 PROCEDURE — 36415 COLL VENOUS BLD VENIPUNCTURE: CPT

## 2021-07-12 PROCEDURE — 85025 COMPLETE CBC W/AUTO DIFF WBC: CPT

## 2021-07-20 DIAGNOSIS — K21.9 GASTROESOPHAGEAL REFLUX DISEASE WITHOUT ESOPHAGITIS: ICD-10-CM

## 2021-07-21 RX ORDER — SUCRALFATE 1 G/1
1 TABLET ORAL 4 TIMES DAILY
Qty: 120 TABLET | Refills: 0 | Status: SHIPPED | OUTPATIENT
Start: 2021-07-21 | End: 2022-02-09 | Stop reason: SDUPTHER

## 2021-08-06 ENCOUNTER — OFFICE VISIT (OUTPATIENT)
Dept: FAMILY MEDICINE CLINIC | Facility: CLINIC | Age: 56
End: 2021-08-06

## 2021-08-06 VITALS
WEIGHT: 185 LBS | SYSTOLIC BLOOD PRESSURE: 158 MMHG | RESPIRATION RATE: 16 BRPM | TEMPERATURE: 97.4 F | OXYGEN SATURATION: 98 % | HEIGHT: 68 IN | DIASTOLIC BLOOD PRESSURE: 70 MMHG | BODY MASS INDEX: 28.04 KG/M2 | HEART RATE: 72 BPM

## 2021-08-06 DIAGNOSIS — I10 ESSENTIAL HYPERTENSION: ICD-10-CM

## 2021-08-06 DIAGNOSIS — R42 DIZZINESS: ICD-10-CM

## 2021-08-06 DIAGNOSIS — I10 ESSENTIAL HYPERTENSION: Primary | ICD-10-CM

## 2021-08-06 PROCEDURE — T1015 CLINIC SERVICE: HCPCS | Performed by: FAMILY MEDICINE

## 2021-08-06 PROCEDURE — 3077F SYST BP >= 140 MM HG: CPT | Performed by: FAMILY MEDICINE

## 2021-08-06 PROCEDURE — 3008F BODY MASS INDEX DOCD: CPT | Performed by: PHYSICAL MEDICINE & REHABILITATION

## 2021-08-06 PROCEDURE — 3078F DIAST BP <80 MM HG: CPT | Performed by: FAMILY MEDICINE

## 2021-08-06 PROCEDURE — 99213 OFFICE O/P EST LOW 20 MIN: CPT | Performed by: FAMILY MEDICINE

## 2021-08-06 RX ORDER — LISINOPRIL 40 MG/1
40 TABLET ORAL DAILY
Qty: 30 TABLET | Refills: 2 | Status: SHIPPED | OUTPATIENT
Start: 2021-08-06 | End: 2021-10-04 | Stop reason: SDUPTHER

## 2021-08-06 RX ORDER — MECLIZINE HCL 12.5 MG/1
12.5 TABLET ORAL EVERY 8 HOURS PRN
Qty: 30 TABLET | Refills: 2 | Status: SHIPPED | OUTPATIENT
Start: 2021-08-06 | End: 2021-12-04 | Stop reason: SDUPTHER

## 2021-08-06 NOTE — ASSESSMENT & PLAN NOTE
BP Today: 158/70  Mildly elevated and not at goal per JNC-8 guidelines  - Continue Lisinopril 40 mg daily

## 2021-08-06 NOTE — PROGRESS NOTES
Assessment/Plan:    Essential hypertension  BP Today: 158/70  Mildly elevated and not at goal per JNC-8 guidelines  - Continue Lisinopril 40 mg daily  Dizziness  Presented approximately 1 month ago with 10 days of dizziness and associated with headache  Felt like the 'room is spinning ' Neurological exam and ear exam unremarkable  POCT glucose, orthostatic BP, and EKG all within normal limits at previous visit  CBC showed stable hemoglobin of 13  Most likely secondary to BPPV with significant improvement of symptoms after starting Meclizine at last visit  - Continue Meclizine PRN  - Follow up in 2 months to re-assess    - Consider vestibular therapy if no improvement  Migraine without aura and without status migrainosus, not intractable  Patient endorses headache that is associated with nausea/vomiting  Given Medrol pack at last visit, which she states helped her symptoms     - Migraines not long-lasting or causing significant disability, so at this time will hold off on preventive treatment for migraines  Diagnoses and all orders for this visit:    Essential hypertension    Dizziness  -     meclizine (ANTIVERT) 12 5 MG tablet; Take 1 tablet (12 5 mg total) by mouth every 8 (eight) hours as needed for dizziness          Subjective:      Patient ID: Don Osborne is a 49 y o  female  A 50year old female with PMH of GERD, hemorrhoids, and hypertension presented to the clinic today for a follow up on her dizziness  She reports significant improvement in symptoms today, with minimal dizziness since she started using the Meclizine  Vitals all within normal limits during visit today          The following portions of the patient's history were reviewed and updated as appropriate: allergies, current medications, past family history, past medical history, past social history, past surgical history and problem list     Review of Systems   Constitutional: Negative for activity change, appetite change, chills and fever  HENT: Negative for sore throat  Respiratory: Negative for cough and shortness of breath  Cardiovascular: Negative for chest pain, palpitations and leg swelling  Gastrointestinal: Negative for abdominal pain, constipation, diarrhea, nausea and vomiting  Musculoskeletal: Negative for back pain and gait problem  Neurological: Negative for dizziness, seizures, weakness and headaches  Objective:      /70 (BP Location: Left arm, Patient Position: Sitting, Cuff Size: Standard)   Pulse 72   Temp (!) 97 4 °F (36 3 °C) (Temporal)   Resp 16   Ht 5' 8" (1 727 m)   Wt 83 9 kg (185 lb)   LMP 07/25/2021 (Approximate)   SpO2 98%   Breastfeeding No   BMI 28 13 kg/m²        Physical Exam  Vitals reviewed  Constitutional:       General: She is not in acute distress  Appearance: She is well-developed  She is not diaphoretic  HENT:      Head: Normocephalic and atraumatic  Right Ear: Tympanic membrane and external ear normal       Left Ear: Tympanic membrane and external ear normal    Eyes:      Extraocular Movements: Extraocular movements intact  Conjunctiva/sclera: Conjunctivae normal    Cardiovascular:      Rate and Rhythm: Normal rate and regular rhythm  Heart sounds: Normal heart sounds  No murmur heard  No friction rub  No gallop  Pulmonary:      Effort: Pulmonary effort is normal  No respiratory distress  Breath sounds: Normal breath sounds  No wheezing or rales  Abdominal:      General: Bowel sounds are normal  There is no distension  Palpations: Abdomen is soft  There is no mass  Tenderness: There is no abdominal tenderness  There is no guarding  Musculoskeletal:         General: No tenderness or deformity  Normal range of motion  Cervical back: Normal range of motion  Right lower leg: No edema  Left lower leg: No edema  Skin:     General: Skin is warm and dry     Neurological:      General: No focal deficit present  Mental Status: She is alert and oriented to person, place, and time             Conception MD Swetha  8/6/2021

## 2021-08-06 NOTE — ASSESSMENT & PLAN NOTE
Presented approximately 1 month ago with 10 days of dizziness and associated with headache  Felt like the 'room is spinning ' Neurological exam and ear exam unremarkable  POCT glucose, orthostatic BP, and EKG all within normal limits at previous visit  CBC showed stable hemoglobin of 13  Most likely secondary to BPPV with significant improvement of symptoms after starting Meclizine at last visit  - Continue Meclizine PRN  - Follow up in 2 months to re-assess    - Consider vestibular therapy if no improvement

## 2021-08-06 NOTE — ASSESSMENT & PLAN NOTE
Patient endorses headache that is associated with nausea/vomiting  Given Medrol pack at last visit, which she states helped her symptoms     - Migraines not long-lasting or causing significant disability, so at this time will hold off on preventive treatment for migraines

## 2021-08-29 DIAGNOSIS — M47.816 LUMBAR FACET ARTHROPATHY: ICD-10-CM

## 2021-08-29 DIAGNOSIS — M51.16 LUMBAR DISC DISEASE WITH RADICULOPATHY: ICD-10-CM

## 2021-08-29 DIAGNOSIS — G89.4 CHRONIC PAIN SYNDROME: ICD-10-CM

## 2021-08-30 RX ORDER — GABAPENTIN 300 MG/1
CAPSULE ORAL
Qty: 90 CAPSULE | Refills: 1 | Status: SHIPPED | OUTPATIENT
Start: 2021-08-30 | End: 2022-01-29

## 2021-09-09 ENCOUNTER — HOSPITAL ENCOUNTER (EMERGENCY)
Facility: HOSPITAL | Age: 56
Discharge: HOME | End: 2021-09-09
Attending: EMERGENCY MEDICINE | Admitting: EMERGENCY MEDICINE
Payer: COMMERCIAL

## 2021-09-09 VITALS
RESPIRATION RATE: 16 BRPM | TEMPERATURE: 98.4 F | WEIGHT: 185.19 LBS | DIASTOLIC BLOOD PRESSURE: 69 MMHG | BODY MASS INDEX: 28.16 KG/M2 | HEART RATE: 67 BPM | SYSTOLIC BLOOD PRESSURE: 115 MMHG | OXYGEN SATURATION: 100 %

## 2021-09-09 DIAGNOSIS — G89.4 CHRONIC PAIN SYNDROME: Primary | ICD-10-CM

## 2021-09-09 PROCEDURE — 99284 EMERGENCY DEPT VISIT MOD MDM: CPT | Performed by: PHYSICIAN ASSISTANT

## 2021-09-09 PROCEDURE — 99283 EMERGENCY DEPT VISIT LOW MDM: CPT

## 2021-09-09 PROCEDURE — 96372 THER/PROPH/DIAG INJ SC/IM: CPT

## 2021-09-09 RX ORDER — LIDOCAINE 50 MG/G
1 PATCH TOPICAL ONCE
Status: DISCONTINUED | OUTPATIENT
Start: 2021-09-09 | End: 2021-09-09 | Stop reason: HOSPADM

## 2021-09-09 RX ORDER — DIAZEPAM 2 MG/1
2 TABLET ORAL ONCE
Status: COMPLETED | OUTPATIENT
Start: 2021-09-09 | End: 2021-09-09

## 2021-09-09 RX ORDER — TRAMADOL HYDROCHLORIDE 50 MG/1
50 TABLET ORAL EVERY 6 HOURS PRN
Qty: 12 TABLET | Refills: 0 | Status: SHIPPED | OUTPATIENT
Start: 2021-09-09 | End: 2021-09-19

## 2021-09-09 RX ORDER — LIDOCAINE 50 MG/G
1 PATCH TOPICAL DAILY
Qty: 6 PATCH | Refills: 0 | Status: SHIPPED | OUTPATIENT
Start: 2021-09-09 | End: 2021-12-04

## 2021-09-09 RX ORDER — KETOROLAC TROMETHAMINE 30 MG/ML
15 INJECTION, SOLUTION INTRAMUSCULAR; INTRAVENOUS ONCE
Status: COMPLETED | OUTPATIENT
Start: 2021-09-09 | End: 2021-09-09

## 2021-09-09 RX ORDER — MELOXICAM 7.5 MG/1
7.5 TABLET ORAL DAILY
Qty: 30 TABLET | Refills: 0 | Status: SHIPPED | OUTPATIENT
Start: 2021-09-09

## 2021-09-09 RX ADMIN — DIAZEPAM 2 MG: 2 TABLET ORAL at 17:39

## 2021-09-09 RX ADMIN — KETOROLAC TROMETHAMINE 15 MG: 30 INJECTION, SOLUTION INTRAMUSCULAR; INTRAVENOUS at 17:40

## 2021-09-09 RX ADMIN — LIDOCAINE 1 PATCH: 50 PATCH CUTANEOUS at 18:05

## 2021-09-09 NOTE — ED PROVIDER NOTES
History  Chief Complaint   Patient presents with    Pain     right sided pain from shoulder to leg  ongoing for months taking gabapentin pain got worse last night  51-year-old female with past no history of chronic pain syndrome, chronic low back pain, hypertension, GERD, presents to the ED for evaluation of chronic low back pain with bilateral radiculopathy  Patient has extensively been evaluated by pain management, Neurosurgery, and PCP for these symptoms in the past   Patient states symptoms today are consistent with previous chronic pain  Patient denies any injury trauma onset  Denies any recent changes in activity or trauma insinuating worsening of symptoms  Patient denies any changes in bowel bladder function  Denies any nausea or vomiting, abdominal pain  Normal ambulation  Patient states she tried to contact pain management was unable to make a follow-up appointment, but was told on previous visit that the next intervention is for her to receive injections  History provided by:  Patient      Prior to Admission Medications   Prescriptions Last Dose Informant Patient Reported? Taking?    Multiple Vitamin (MULTIVITAMIN) capsule  Self Yes No   Sig: Take 1 capsule by mouth daily   Patient not taking: Reported on 7/2/2021   acetaminophen (TYLENOL) 325 mg tablet  Self No No   Sig: Take 2 tablets (650 mg total) by mouth every 6 (six) hours as needed for mild pain, headaches or fever   gabapentin (NEURONTIN) 300 mg capsule   No No   Sig: take 1 capsule by mouth three times a day   levothyroxine 112 mcg tablet  Self No No   Sig: Take 1 tablet (112 mcg total) by mouth daily   lisinopril (ZESTRIL) 40 mg tablet   No No   Sig: Take 1 tablet (40 mg total) by mouth daily   meclizine (ANTIVERT) 12 5 MG tablet   No No   Sig: Take 1 tablet (12 5 mg total) by mouth every 8 (eight) hours as needed for dizziness   methylPREDNISolone 4 MG tablet therapy pack   No No   Sig: Use as directed on package   omeprazole (PriLOSEC) 20 mg delayed release capsule   No No   Sig: Take 2 capsules (40 mg total) by mouth daily   ondansetron (ZOFRAN-ODT) 4 mg disintegrating tablet  Self No No   Sig: Take 1 tablet (4 mg total) by mouth every 6 (six) hours as needed for nausea or vomiting   Patient not taking: Reported on 2021   psyllium (METAMUCIL SMOOTH TEXTURE) 28 % packet   No No   Sig: Take 1 packet by mouth 2 (two) times a day   Patient not taking: Reported on 2021   sucralfate (CARAFATE) 1 g tablet   No No   Sig: Take 1 tablet (1 g total) by mouth 4 (four) times a day      Facility-Administered Medications: None       Past Medical History:   Diagnosis Date    Chronic pain     GERD (gastroesophageal reflux disease)     Hypertension     Hyperthyroiditis     HYPERTHYROID; POST IODINE THERAPY       Past Surgical History:   Procedure Laterality Date    APPENDECTOMY       SECTION      HERNIA REPAIR         Family History   Problem Relation Age of Onset    Endometrial cancer Family     Ovarian cancer Mother     Heart defect Mother     Heart attack Father     Stroke Brother     Breast cancer Paternal Grandmother 61     I have reviewed and agree with the history as documented  E-Cigarette/Vaping    E-Cigarette Use Never User      E-Cigarette/Vaping Substances    Nicotine No     THC No     CBD No      Social History     Tobacco Use    Smoking status: Never Smoker    Smokeless tobacco: Never Used   Vaping Use    Vaping Use: Never used   Substance Use Topics    Alcohol use: Yes     Comment: occasional     Drug use: No       Review of Systems   Constitutional: Negative for chills, diaphoresis, fatigue and fever  HENT: Negative for congestion, ear pain, rhinorrhea, sneezing and sore throat  Eyes: Negative for pain  Respiratory: Negative for cough and shortness of breath  Cardiovascular: Negative for chest pain and palpitations     Gastrointestinal: Negative for abdominal pain, constipation, diarrhea, nausea and vomiting  Genitourinary: Negative for difficulty urinating, dysuria and hematuria  Musculoskeletal: Positive for back pain  Negative for arthralgias, gait problem, joint swelling, myalgias, neck pain and neck stiffness  Skin: Negative for color change, pallor and rash  Allergic/Immunologic: Negative for immunocompromised state  Neurological: Negative for dizziness, tremors, syncope, speech difficulty, weakness, light-headedness, numbness and headaches  Hematological: Does not bruise/bleed easily  Psychiatric/Behavioral: Negative for behavioral problems  Physical Exam  Physical Exam  Vitals and nursing note reviewed  Constitutional:       General: She is not in acute distress  Appearance: Normal appearance  She is well-developed and normal weight  She is not ill-appearing, toxic-appearing or diaphoretic  HENT:      Head: Normocephalic and atraumatic  Right Ear: Tympanic membrane, ear canal and external ear normal       Left Ear: Tympanic membrane, ear canal and external ear normal       Nose: Nose normal  No congestion or rhinorrhea  Mouth/Throat:      Mouth: Mucous membranes are moist       Pharynx: Oropharynx is clear  No oropharyngeal exudate or posterior oropharyngeal erythema  Eyes:      General: No scleral icterus  Right eye: No discharge  Left eye: No discharge  Extraocular Movements: Extraocular movements intact  Conjunctiva/sclera: Conjunctivae normal       Pupils: Pupils are equal, round, and reactive to light  Cardiovascular:      Rate and Rhythm: Normal rate and regular rhythm  Pulses: Normal pulses  Heart sounds: Normal heart sounds  No murmur heard  Pulmonary:      Effort: Pulmonary effort is normal  No respiratory distress  Breath sounds: Normal breath sounds  No wheezing  Abdominal:      General: Bowel sounds are normal  There is no distension  Palpations: Abdomen is soft  There is no mass  Tenderness: There is no abdominal tenderness  There is no right CVA tenderness, left CVA tenderness, guarding or rebound  Hernia: No hernia is present  Musculoskeletal:         General: Tenderness present  No swelling, deformity or signs of injury  Cervical back: Normal and normal range of motion  No rigidity  No muscular tenderness  Thoracic back: Normal       Lumbar back: Bony tenderness present  No swelling, edema, deformity, signs of trauma, lacerations, spasms or tenderness  Decreased range of motion  Negative right straight leg raise test and negative left straight leg raise test  No scoliosis  Back:       Right lower leg: No edema  Left lower leg: No edema  Comments: No signs injury or trauma  Lymphadenopathy:      Cervical: No cervical adenopathy  Skin:     General: Skin is warm and dry  Capillary Refill: Capillary refill takes less than 2 seconds  Coloration: Skin is not jaundiced or pale  Findings: No bruising, erythema or rash  Neurological:      Mental Status: She is alert and oriented to person, place, and time  Sensory: No sensory deficit  Motor: No weakness        Coordination: Coordination normal       Gait: Gait normal       Deep Tendon Reflexes: Reflexes normal    Psychiatric:         Mood and Affect: Mood normal          Behavior: Behavior normal          Vital Signs  ED Triage Vitals [09/09/21 1504]   Temperature Pulse Respirations Blood Pressure SpO2   98 4 °F (36 9 °C) 78 18 142/77 98 %      Temp Source Heart Rate Source Patient Position - Orthostatic VS BP Location FiO2 (%)   Oral Monitor Sitting Left arm --      Pain Score       Worst Possible Pain           Vitals:    09/09/21 1504 09/09/21 1755   BP: 142/77 115/69   Pulse: 78 67   Patient Position - Orthostatic VS: Sitting Sitting         Visual Acuity      ED Medications  Medications   lidocaine (LIDODERM) 5 % patch 1 patch (1 patch Topical Medication Applied 9/9/21 8946) ketorolac (TORADOL) injection 15 mg (15 mg Intramuscular Given 9/9/21 1740)   diazepam (VALIUM) tablet 2 mg (2 mg Oral Given 9/9/21 1730)       Diagnostic Studies  Results Reviewed     None                 No orders to display              Procedures  Procedures         ED Course                                           MDM  Number of Diagnoses or Management Options  Chronic pain syndrome: new and requires workup  Diagnosis management comments: 44-year-old female presents to the ED for evaluation of chronic pain syndrome, patient presents with chronic low back pain, has been ongoing for several years  Patient extensively evaluated previously by pain management PCP  States the current management gabapentin is not working for her  Contacted pain management unable to be seen for several days  States that in her previous discussion the next intervention is injections  Patient states her pain today is chronic in nature  No changes in bowel bladder function  Negative straight leg raise on exam   No signs injury trauma  Provided Valium, Lidoderm, and Toradol in the ED  Rx for tramadol, Mobic, and Lidoderm  Advised continue follow-up with pain management as directed         Amount and/or Complexity of Data Reviewed  Tests in the radiology section of CPT®: reviewed  Review and summarize past medical records: yes  Discuss the patient with other providers: yes  Independent visualization of images, tracings, or specimens: yes    Risk of Complications, Morbidity, and/or Mortality  Presenting problems: moderate  Diagnostic procedures: moderate  Management options: moderate    Patient Progress  Patient progress: stable      Disposition  Final diagnoses:   Chronic pain syndrome     Time reflects when diagnosis was documented in both MDM as applicable and the Disposition within this note     Time User Action Codes Description Comment    9/9/2021  5:29 PM Reyes Ruth Add [G89 4] Chronic pain syndrome       ED Disposition     ED Disposition Condition Date/Time Comment    Discharge Stable Thu Sep 9, 2021  5:29 PM Tarik Kidd discharge to home/self care  Follow-up Information     Follow up With Specialties Details Why Contact Info Additional [facility] Schedule an appointment as soon as possible for a visit   [address], [address] [facility] [address]  [address], [address], [address] [place], [place], [address]          Patient's Medications   Discharge Prescriptions    LIDOCAINE (LIDODERM) 5 %    Apply 1 patch topically daily Remove & Discard patch within 12 hours or as directed by MD       Start Date: 9/9/2021  End Date: --       Order Dose: 1 patch       Quantity: 6 patch    Refills: 0    MELOXICAM (MOBIC) 7 5 MG TABLET    Take 1 tablet (7 5 mg total) by mouth daily       Start Date: 9/9/2021  End Date: --       Order Dose: 7 5 mg       Quantity: 30 tablet    Refills: 0    TRAMADOL (ULTRAM) 50 MG TABLET    Take 1 tablet (50 mg total) by mouth every 6 (six) hours as needed for moderate pain for up to 10 days       Start Date: 9/9/2021  End Date: 9/19/2021       Order Dose: 50 mg       Quantity: 12 tablet    Refills: 0     No discharge procedures on file      PDMP Review     None          ED Provider  Electronically Signed by           Kandi Thomas PA-C  09/09/21 3591

## 2021-09-16 NOTE — ED NOTES
Patient states that is no chance of pregnancy before administration of Toradol      Author ANDRZEJ Pandey  01/26/21 3418 Anesthesia Type: 1% lidocaine with epinephrine

## 2021-09-19 ENCOUNTER — APPOINTMENT (EMERGENCY)
Dept: CT IMAGING | Facility: HOSPITAL | Age: 56
End: 2021-09-19
Payer: COMMERCIAL

## 2021-09-19 ENCOUNTER — HOSPITAL ENCOUNTER (EMERGENCY)
Facility: HOSPITAL | Age: 56
Discharge: HOME | End: 2021-09-19
Attending: EMERGENCY MEDICINE
Payer: COMMERCIAL

## 2021-09-19 VITALS
OXYGEN SATURATION: 100 % | WEIGHT: 179.2 LBS | DIASTOLIC BLOOD PRESSURE: 81 MMHG | RESPIRATION RATE: 16 BRPM | BODY MASS INDEX: 27.25 KG/M2 | HEART RATE: 65 BPM | TEMPERATURE: 98.2 F | SYSTOLIC BLOOD PRESSURE: 135 MMHG

## 2021-09-19 DIAGNOSIS — K57.92 DIVERTICULITIS: ICD-10-CM

## 2021-09-19 DIAGNOSIS — R10.9 ABDOMINAL PAIN: Primary | ICD-10-CM

## 2021-09-19 LAB
ALBUMIN SERPL BCP-MCNC: 4.1 G/DL (ref 3–5.2)
ALP SERPL-CCNC: 66 U/L (ref 43–122)
ALT SERPL W P-5'-P-CCNC: 48 U/L
ANION GAP SERPL CALCULATED.3IONS-SCNC: 6 MMOL/L (ref 5–14)
APTT PPP: 29 SECONDS (ref 23–37)
AST SERPL W P-5'-P-CCNC: 47 U/L (ref 14–36)
ATRIAL RATE: 64 BPM
BACTERIA UR QL AUTO: ABNORMAL /HPF
BASOPHILS # BLD AUTO: 0 THOUSANDS/ΜL (ref 0–0.1)
BASOPHILS NFR BLD AUTO: 0 % (ref 0–1)
BILIRUB SERPL-MCNC: 0.4 MG/DL
BILIRUB UR QL STRIP: NEGATIVE
BUN SERPL-MCNC: 10 MG/DL (ref 5–25)
CALCIUM SERPL-MCNC: 9 MG/DL (ref 8.4–10.2)
CHLORIDE SERPL-SCNC: 105 MMOL/L (ref 97–108)
CLARITY UR: CLEAR
CO2 SERPL-SCNC: 28 MMOL/L (ref 22–30)
COLOR UR: YELLOW
CREAT SERPL-MCNC: 0.69 MG/DL (ref 0.6–1.2)
EOSINOPHIL # BLD AUTO: 0.2 THOUSAND/ΜL (ref 0–0.4)
EOSINOPHIL NFR BLD AUTO: 2 % (ref 0–6)
ERYTHROCYTE [DISTWIDTH] IN BLOOD BY AUTOMATED COUNT: 14.4 %
GFR SERPL CREATININE-BSD FRML MDRD: 99 ML/MIN/1.73SQ M
GLUCOSE SERPL-MCNC: 88 MG/DL (ref 70–99)
GLUCOSE UR STRIP-MCNC: NEGATIVE MG/DL
HCT VFR BLD AUTO: 39.9 % (ref 36–46)
HGB BLD-MCNC: 13.3 G/DL (ref 12–16)
HGB UR QL STRIP.AUTO: 250
INR PPP: 1.02 (ref 0.84–1.19)
KETONES UR STRIP-MCNC: NEGATIVE MG/DL
LACTATE SERPL-SCNC: 0.8 MMOL/L (ref 0.7–2)
LEUKOCYTE ESTERASE UR QL STRIP: NEGATIVE
LIPASE SERPL-CCNC: 81 U/L (ref 23–300)
LYMPHOCYTES # BLD AUTO: 1.9 THOUSANDS/ΜL (ref 0.5–4)
LYMPHOCYTES NFR BLD AUTO: 26 % (ref 25–45)
MAGNESIUM SERPL-MCNC: 2.1 MG/DL (ref 1.6–2.3)
MCH RBC QN AUTO: 33.1 PG (ref 26–34)
MCHC RBC AUTO-ENTMCNC: 33.3 G/DL (ref 31–36)
MCV RBC AUTO: 100 FL (ref 80–100)
MONOCYTES # BLD AUTO: 0.6 THOUSAND/ΜL (ref 0.2–0.9)
MONOCYTES NFR BLD AUTO: 8 % (ref 1–10)
NEUTROPHILS # BLD AUTO: 4.8 THOUSANDS/ΜL (ref 1.8–7.8)
NEUTS SEG NFR BLD AUTO: 64 % (ref 45–65)
NITRITE UR QL STRIP: NEGATIVE
NON-SQ EPI CELLS URNS QL MICRO: ABNORMAL /HPF
P AXIS: 49 DEGREES
PH UR STRIP.AUTO: 5 [PH]
PLATELET # BLD AUTO: 310 THOUSANDS/UL (ref 150–450)
PMV BLD AUTO: 7.7 FL (ref 8.9–12.7)
POTASSIUM SERPL-SCNC: 4.3 MMOL/L (ref 3.6–5)
PR INTERVAL: 152 MS
PROT SERPL-MCNC: 7.6 G/DL (ref 5.9–8.4)
PROT UR STRIP-MCNC: NEGATIVE MG/DL
PROTHROMBIN TIME: 13 SECONDS (ref 11.6–14.5)
QRS AXIS: 4 DEGREES
QRSD INTERVAL: 94 MS
QT INTERVAL: 440 MS
QTC INTERVAL: 453 MS
RBC # BLD AUTO: 4.01 MILLION/UL (ref 4–5.2)
RBC #/AREA URNS AUTO: ABNORMAL /HPF
SODIUM SERPL-SCNC: 139 MMOL/L (ref 137–147)
SP GR UR STRIP.AUTO: 1.01 (ref 1–1.04)
T WAVE AXIS: 50 DEGREES
TROPONIN I SERPL-MCNC: <0.01 NG/ML (ref 0–0.03)
UROBILINOGEN UA: NEGATIVE MG/DL
VENTRICULAR RATE: 64 BPM
WBC # BLD AUTO: 7.5 THOUSAND/UL (ref 4.5–11)
WBC #/AREA URNS AUTO: ABNORMAL /HPF

## 2021-09-19 PROCEDURE — 84484 ASSAY OF TROPONIN QUANT: CPT | Performed by: EMERGENCY MEDICINE

## 2021-09-19 PROCEDURE — 85025 COMPLETE CBC W/AUTO DIFF WBC: CPT | Performed by: EMERGENCY MEDICINE

## 2021-09-19 PROCEDURE — 74177 CT ABD & PELVIS W/CONTRAST: CPT

## 2021-09-19 PROCEDURE — 96375 TX/PRO/DX INJ NEW DRUG ADDON: CPT

## 2021-09-19 PROCEDURE — 99285 EMERGENCY DEPT VISIT HI MDM: CPT | Performed by: EMERGENCY MEDICINE

## 2021-09-19 PROCEDURE — 99284 EMERGENCY DEPT VISIT MOD MDM: CPT

## 2021-09-19 PROCEDURE — 81001 URINALYSIS AUTO W/SCOPE: CPT | Performed by: EMERGENCY MEDICINE

## 2021-09-19 PROCEDURE — 80053 COMPREHEN METABOLIC PANEL: CPT | Performed by: EMERGENCY MEDICINE

## 2021-09-19 PROCEDURE — 85610 PROTHROMBIN TIME: CPT | Performed by: EMERGENCY MEDICINE

## 2021-09-19 PROCEDURE — 83690 ASSAY OF LIPASE: CPT | Performed by: EMERGENCY MEDICINE

## 2021-09-19 PROCEDURE — 93010 ELECTROCARDIOGRAM REPORT: CPT | Performed by: INTERNAL MEDICINE

## 2021-09-19 PROCEDURE — 96361 HYDRATE IV INFUSION ADD-ON: CPT

## 2021-09-19 PROCEDURE — 93005 ELECTROCARDIOGRAM TRACING: CPT

## 2021-09-19 PROCEDURE — 96374 THER/PROPH/DIAG INJ IV PUSH: CPT

## 2021-09-19 PROCEDURE — 85730 THROMBOPLASTIN TIME PARTIAL: CPT | Performed by: EMERGENCY MEDICINE

## 2021-09-19 PROCEDURE — 83735 ASSAY OF MAGNESIUM: CPT | Performed by: EMERGENCY MEDICINE

## 2021-09-19 PROCEDURE — 36415 COLL VENOUS BLD VENIPUNCTURE: CPT | Performed by: EMERGENCY MEDICINE

## 2021-09-19 PROCEDURE — 83605 ASSAY OF LACTIC ACID: CPT | Performed by: EMERGENCY MEDICINE

## 2021-09-19 RX ORDER — METOCLOPRAMIDE HYDROCHLORIDE 5 MG/ML
10 INJECTION INTRAMUSCULAR; INTRAVENOUS ONCE
Status: COMPLETED | OUTPATIENT
Start: 2021-09-19 | End: 2021-09-19

## 2021-09-19 RX ORDER — METRONIDAZOLE 500 MG/1
500 TABLET ORAL EVERY 8 HOURS SCHEDULED
Qty: 21 TABLET | Refills: 0 | Status: SHIPPED | OUTPATIENT
Start: 2021-09-19 | End: 2021-09-26

## 2021-09-19 RX ORDER — METRONIDAZOLE 500 MG/1
500 TABLET ORAL ONCE
Status: COMPLETED | OUTPATIENT
Start: 2021-09-19 | End: 2021-09-19

## 2021-09-19 RX ORDER — CEPHALEXIN 500 MG/1
500 CAPSULE ORAL ONCE
Status: COMPLETED | OUTPATIENT
Start: 2021-09-19 | End: 2021-09-19

## 2021-09-19 RX ORDER — CEPHALEXIN 500 MG/1
500 CAPSULE ORAL EVERY 6 HOURS SCHEDULED
Qty: 28 CAPSULE | Refills: 0 | Status: SHIPPED | OUTPATIENT
Start: 2021-09-19 | End: 2021-09-26

## 2021-09-19 RX ORDER — DIPHENHYDRAMINE HYDROCHLORIDE 50 MG/ML
25 INJECTION INTRAMUSCULAR; INTRAVENOUS ONCE
Status: COMPLETED | OUTPATIENT
Start: 2021-09-19 | End: 2021-09-19

## 2021-09-19 RX ORDER — LACTOBACILLUS ACIDOPH-L.BULGARICUS 1 MILLION CELL CHEWABLE TABLET 1MM CELL
1 TABLET,CHEWABLE ORAL
Qty: 15 TABLET | Refills: 0 | Status: SHIPPED | OUTPATIENT
Start: 2021-09-19 | End: 2021-09-24

## 2021-09-19 RX ADMIN — CEPHALEXIN 500 MG: 500 CAPSULE ORAL at 12:49

## 2021-09-19 RX ADMIN — METRONIDAZOLE 500 MG: 500 TABLET, FILM COATED ORAL at 12:49

## 2021-09-19 RX ADMIN — SODIUM CHLORIDE 1000 ML: 0.9 INJECTION, SOLUTION INTRAVENOUS at 11:07

## 2021-09-19 RX ADMIN — IOHEXOL 100 ML: 350 INJECTION, SOLUTION INTRAVENOUS at 11:57

## 2021-09-19 RX ADMIN — DIPHENHYDRAMINE HYDROCHLORIDE 25 MG: 50 INJECTION, SOLUTION INTRAMUSCULAR; INTRAVENOUS at 11:10

## 2021-09-19 RX ADMIN — METOCLOPRAMIDE 10 MG: 5 INJECTION, SOLUTION INTRAMUSCULAR; INTRAVENOUS at 11:12

## 2021-09-19 NOTE — ED PROVIDER NOTES
History  Chief Complaint   Patient presents with    Epigastric Pain     Pt reports banding epigastric pain that extend to her back x 1 week  Patient is a 68-year-old female otherwise healthy except for history of hypertension coming in today with abdominal pain  She states that started approximately 1 week ago and is been intermittent in nature  She reports that his epigastric but states that it moves all over and radiates into the back  She has some nauseousness and vomited 3 times yesterday without any hematemesis or coffee-ground emesis  She has no fevers or chills  She has no diarrhea, changes in bowel movement  She denies any melena or bright red blood per rectum  She has been urinating well with good p o  Intake  She has no trauma to the abdomen  She has no recent travel or sick contacts or recent antibiotic use  v patient reports that ever since she was diagnosed with COVID several months ago she has not felt the same        History provided by:  Patient   used: No    Abdominal Pain  Pain location:  Generalized  Pain quality: aching, cramping, fullness and pressure    Pain radiates to:  Does not radiate  Pain severity:  Mild  Onset quality:  Gradual  Duration:  1 week  Timing:  Intermittent  Progression:  Waxing and waning  Chronicity:  New  Context: not alcohol use, not awakening from sleep, not diet changes, not eating, not laxative use, not medication withdrawal, not previous surgeries, not recent illness, not recent sexual activity, not recent travel, not retching, not sick contacts, not suspicious food intake and not trauma    Relieved by:  None tried  Worsened by:  Nothing  Ineffective treatments:  None tried  Associated symptoms: nausea and vomiting    Associated symptoms: no anorexia, no belching, no chest pain, no chills, no constipation, no cough, no diarrhea, no dysuria, no fatigue, no fever, no flatus, no hematemesis, no hematochezia, no hematuria, no melena, no shortness of breath, no sore throat, no vaginal bleeding and no vaginal discharge    Nausea:     Severity:  Mild    Onset quality:  Gradual    Duration:  1 week    Timing:  Intermittent    Progression:  Waxing and waning  Vomiting:     Quality:  Unable to specify    Number of occurrences:  3    Severity:  Mild    Timing:  Rare    Progression:  Resolved  Risk factors: no alcohol abuse, no aspirin use, not elderly, has not had multiple surgeries, no NSAID use, not obese, not pregnant and no recent hospitalization        Prior to Admission Medications   Prescriptions Last Dose Informant Patient Reported? Taking?    Multiple Vitamin (MULTIVITAMIN) capsule  Self Yes No   Sig: Take 1 capsule by mouth daily   Patient not taking: Reported on 7/2/2021   acetaminophen (TYLENOL) 325 mg tablet  Self No No   Sig: Take 2 tablets (650 mg total) by mouth every 6 (six) hours as needed for mild pain, headaches or fever   gabapentin (NEURONTIN) 300 mg capsule   No No   Sig: take 1 capsule by mouth three times a day   levothyroxine 112 mcg tablet  Self No No   Sig: Take 1 tablet (112 mcg total) by mouth daily   lidocaine (LIDODERM) 5 %   No No   Sig: Apply 1 patch topically daily Remove & Discard patch within 12 hours or as directed by MD   lisinopril (ZESTRIL) 40 mg tablet   No No   Sig: Take 1 tablet (40 mg total) by mouth daily   meclizine (ANTIVERT) 12 5 MG tablet   No No   Sig: Take 1 tablet (12 5 mg total) by mouth every 8 (eight) hours as needed for dizziness   meloxicam (MOBIC) 7 5 mg tablet   No No   Sig: Take 1 tablet (7 5 mg total) by mouth daily   methylPREDNISolone 4 MG tablet therapy pack   No No   Sig: Use as directed on package   omeprazole (PriLOSEC) 20 mg delayed release capsule   No No   Sig: Take 2 capsules (40 mg total) by mouth daily   ondansetron (ZOFRAN-ODT) 4 mg disintegrating tablet  Self No No   Sig: Take 1 tablet (4 mg total) by mouth every 6 (six) hours as needed for nausea or vomiting   Patient not taking: Reported on 2021   psyllium (METAMUCIL SMOOTH TEXTURE) 28 % packet   No No   Sig: Take 1 packet by mouth 2 (two) times a day   Patient not taking: Reported on 2021   sucralfate (CARAFATE) 1 g tablet   No No   Sig: Take 1 tablet (1 g total) by mouth 4 (four) times a day   traMADol (ULTRAM) 50 mg tablet   No No   Sig: Take 1 tablet (50 mg total) by mouth every 6 (six) hours as needed for moderate pain for up to 10 days      Facility-Administered Medications: None       Past Medical History:   Diagnosis Date    Chronic pain     GERD (gastroesophageal reflux disease)     Hypertension     Hyperthyroiditis     HYPERTHYROID; POST IODINE THERAPY       Past Surgical History:   Procedure Laterality Date    APPENDECTOMY       SECTION      HERNIA REPAIR         Family History   Problem Relation Age of Onset    Endometrial cancer Family     Ovarian cancer Mother     Heart defect Mother     Heart attack Father     Stroke Brother     Breast cancer Paternal Grandmother 61     I have reviewed and agree with the history as documented  E-Cigarette/Vaping    E-Cigarette Use Never User      E-Cigarette/Vaping Substances    Nicotine No     THC No     CBD No      Social History     Tobacco Use    Smoking status: Never Smoker    Smokeless tobacco: Never Used   Vaping Use    Vaping Use: Never used   Substance Use Topics    Alcohol use: Yes     Comment: occasional     Drug use: No       Review of Systems   Constitutional: Negative  Negative for chills, fatigue and fever  HENT: Negative  Negative for ear pain and sore throat  Eyes: Negative  Negative for pain and visual disturbance  Respiratory: Negative  Negative for cough and shortness of breath  Cardiovascular: Negative  Negative for chest pain and palpitations  Gastrointestinal: Positive for abdominal pain, nausea and vomiting  Negative for anorexia, constipation, diarrhea, flatus, hematemesis, hematochezia and melena     Endocrine: Negative  Genitourinary: Negative  Negative for dysuria, hematuria, vaginal bleeding and vaginal discharge  Musculoskeletal: Negative  Negative for arthralgias and back pain  Skin: Negative  Negative for color change and rash  Neurological: Negative  Negative for seizures and syncope  Hematological: Negative  Psychiatric/Behavioral: Negative  All other systems reviewed and are negative  Physical Exam  Physical Exam  Vitals and nursing note reviewed  Constitutional:       General: She is not in acute distress  Appearance: She is well-developed  HENT:      Head: Normocephalic and atraumatic  Comments: Patient maintaining airway and secretions  No stridor   No brawniness under tongue  Mouth/Throat:      Mouth: Mucous membranes are moist    Eyes:      Extraocular Movements: Extraocular movements intact  Conjunctiva/sclera: Conjunctivae normal       Pupils: Pupils are equal, round, and reactive to light  Cardiovascular:      Rate and Rhythm: Normal rate and regular rhythm  Heart sounds: No murmur heard  Pulmonary:      Effort: Pulmonary effort is normal  No respiratory distress  Breath sounds: Normal breath sounds  Abdominal:      Palpations: Abdomen is soft  Tenderness: There is generalized abdominal tenderness  There is no right CVA tenderness, left CVA tenderness, guarding or rebound  Negative signs include Daniel's sign, Rovsing's sign, McBurney's sign, psoas sign and obturator sign  Musculoskeletal:         General: Normal range of motion  Cervical back: Neck supple  Skin:     General: Skin is warm and dry  Capillary Refill: Capillary refill takes less than 2 seconds  Neurological:      General: No focal deficit present  Mental Status: She is alert and oriented to person, place, and time  Psychiatric:         Mood and Affect: Mood normal          Behavior: Behavior normal          Thought Content:  Thought content normal  Judgment: Judgment normal          Vital Signs  ED Triage Vitals   Temperature Pulse Respirations Blood Pressure SpO2   09/19/21 1039 09/19/21 1039 09/19/21 1039 09/19/21 1039 09/19/21 1039   98 2 °F (36 8 °C) 76 18 133/88 100 %      Temp Source Heart Rate Source Patient Position - Orthostatic VS BP Location FiO2 (%)   09/19/21 1039 09/19/21 1039 09/19/21 1039 09/19/21 1039 --   Oral Monitor Sitting Left arm       Pain Score       09/19/21 1226       5           Vitals:    09/19/21 1039 09/19/21 1226   BP: 133/88 135/81   Pulse: 76 65   Patient Position - Orthostatic VS: Sitting Lying         Visual Acuity      ED Medications  Medications   sodium chloride 0 9 % bolus 1,000 mL (0 mL Intravenous Stopped 9/19/21 1249)   metoclopramide (REGLAN) injection 10 mg (10 mg Intravenous Given 9/19/21 1112)   diphenhydrAMINE (BENADRYL) injection 25 mg (25 mg Intravenous Given 9/19/21 1110)   iohexol (OMNIPAQUE) 350 MG/ML injection (SINGLE-DOSE) 100 mL (100 mL Intravenous Given 9/19/21 1157)   cephalexin (KEFLEX) capsule 500 mg (500 mg Oral Given 9/19/21 1249)   metroNIDAZOLE (FLAGYL) tablet 500 mg (500 mg Oral Given 9/19/21 1249)       Diagnostic Studies  Results Reviewed     Procedure Component Value Units Date/Time    Troponin I [[id number]]  (Normal) Collected: 09/19/21 1105    Lab Status: Final result Specimen: Blood from Arm, Right Updated: 09/19/21 1139     Troponin I <0 01 ng/mL     Urine Microscopic [[id number]]  (Abnormal) Collected: 09/19/21 1105    Lab Status: Final result Specimen: Urine, Clean Catch Updated: 09/19/21 1138     RBC, UA 20-30 /hpf      WBC, UA 0-1 /hpf      Epithelial Cells Occasional /hpf      Bacteria, UA None Seen /hpf     UA (URINE) with reflex to Scope [[id number]]  (Abnormal) Collected: 09/19/21 1105    Lab Status: Final result Specimen: Urine, Clean Catch Updated: 09/19/21 1132     Color, UA Yellow     Clarity, UA Clear     Specific Gravity, UA 1 015     pH, UA 5 0     Leukocytes, UA Negative     Nitrite, UA Negative     Protein, UA Negative mg/dl      Glucose, UA Negative mg/dl      Ketones, UA Negative mg/dl      Bilirubin, UA Negative     Blood,  0     UROBILINOGEN UA Negative mg/dL     Protime-INR [[id number]]  (Normal) Collected: 09/19/21 1105    Lab Status: Final result Specimen: Blood from Arm, Right Updated: 09/19/21 1130     Protime 13 0 seconds      INR 1 02    APTT [[id number]]  (Normal) Collected: 09/19/21 1105    Lab Status: Final result Specimen: Blood from Arm, Right Updated: 09/19/21 1130     PTT 29 seconds     Comprehensive metabolic panel [[id number]]  (Abnormal) Collected: 09/19/21 1105    Lab Status: Final result Specimen: Blood from Arm, Right Updated: 09/19/21 1128     Sodium 139 mmol/L      Potassium 4 3 mmol/L      Chloride 105 mmol/L      CO2 28 mmol/L      ANION GAP 6 mmol/L      BUN 10 mg/dL      Creatinine 0 69 mg/dL      Glucose 88 mg/dL      Calcium 9 0 mg/dL      AST 47 U/L      ALT 48 U/L      Alkaline Phosphatase 66 U/L      Total Protein 7 6 g/dL      Albumin 4 1 g/dL      Total Bilirubin 0 40 mg/dL      eGFR 99 ml/min/1 73sq m     Narrative:      [place] guidelines for Chronic Kidney Disease (CKD):     Stage 1 with normal or high GFR (GFR > 90 mL/min/1 73 square meters)    Stage 2 Mild CKD (GFR = 60-89 mL/min/1 73 square meters)    Stage 3A Moderate CKD (GFR = 45-59 mL/min/1 73 square meters)    Stage 3B Moderate CKD (GFR = 30-44 mL/min/1 73 square meters)    Stage 4 Severe CKD (GFR = 15-29 mL/min/1 73 square meters)    Stage 5 End Stage CKD (GFR <15 mL/min/1 73 square meters)  Note: GFR calculation is accurate only with a steady state creatinine    Lipase [[id number]]  (Normal) Collected: 09/19/21 1105    Lab Status: Final result Specimen: Blood from Arm, Right Updated: 09/19/21 1128     Lipase 81 u/L     Magnesium [[id number]]  (Normal) Collected: 09/19/21 1105    Lab Status: Final result Specimen: Blood from Arm, Right Updated: 09/19/21 1128     Magnesium 2 1 mg/dL     Lactic acid [[id number]]  (Normal) Collected: 09/19/21 1105    Lab Status: Final result Specimen: Blood from Arm, Right Updated: 09/19/21 1128     LACTIC ACID 0 8 mmol/L     Narrative:      Result may be elevated if tourniquet was used during collection  CBC and differential [[id number]]  (Abnormal) Collected: 09/19/21 1105    Lab Status: Final result Specimen: Blood from Arm, Right Updated: 09/19/21 1122     WBC 7 50 Thousand/uL      RBC 4 01 Million/uL      Hemoglobin 13 3 g/dL      Hematocrit 39 9 %       fL      MCH 33 1 pg      MCHC 33 3 g/dL      RDW 14 4 %      MPV 7 7 fL      Platelets 241 Thousands/uL      Neutrophils Relative 64 %      Lymphocytes Relative 26 %      Monocytes Relative 8 %      Eosinophils Relative 2 %      Basophils Relative 0 %      Neutrophils Absolute 4 80 Thousands/µL      Lymphocytes Absolute 1 90 Thousands/µL      Monocytes Absolute 0 60 Thousand/µL      Eosinophils Absolute 0 20 Thousand/µL      Basophils Absolute 0 00 Thousands/µL                  CT abdomen pelvis with contrast   Final Result by Viktoriya Lombardi MD (09/19 1224)   Short segment of acute diverticulitis involving the splenic flexure  No adjacent fluid collection to suggest pericolonic abscess  Recommend short interval follow-up CT in 3 months or colonoscopy (if not recently performed) following    treatment to ensure resolution/exclude an underlying mass at this site  The study was marked in EPIC for significant notification  Workstation performed: [id number]                    Procedures  Procedures         ED Course  ED Course as of Sep 19 1313   Mary Pitch Sep 19, 2021   1057 Patient 49year old female coming in with persistent abdominal pain described as cramping  On exam she is neuro intact no focal deficits non peritoneal and hemodynamically stable    Will start medical workup for intra-abdominal pathology      Portions of the record may have been created with voice recognition software  Occasional wrong word or "sound a like" substitutions may have occurred due to the inherent limitations of voice recognition software  Read the chart carefully and recognize, using context, where substitutions have occurred  1138 Patient's labs are stable except for mild transaminitis  Nonseptic appearing hemodynamically stable  Clear for CT      1236 CT with noted acute diverticulitis  Will plan for antibiotics and plan for DC home  She has no septic criteria and hemodynamically stable with no evidence of end-organ damage  Will give 1st dose of antibiotics here and plan for DC home      1243 Patient was sleeping  Updated on plan for DC home and instructions of diverticulitis  Will give 1st dose of antibiotics  Patient is agreeable plan for DC home                HEART Risk Score      Most Recent Value   Heart Score Risk Calculator   History  0 Filed at: 09/19/2021 1144   ECG  1 Filed at: 09/19/2021 1144   Age  1 Filed at: 09/19/2021 1144   Risk Factors  1 Filed at: 09/19/2021 1144   Troponin  0 Filed at: 09/19/2021 1144   HEART Score  3 Filed at: 09/19/2021 1144                      SBIRT 20yo+      Most Recent Value   SBIRT (23 yo +)   In order to provide better care to our patients, we are screening all of our patients for alcohol and drug use  Would it be okay to ask you these screening questions? No Filed at: 09/19/2021 1139                    MDM  Number of Diagnoses or Management Options  Diagnosis management comments:     EKG INTERPRETATION @ 1129AM  RHYTHM:  Normal sinus rhythm at 60 beats per minute  AXIS:  Normal axis  INTERVALS: AK interval measured at 152 milliseconds  QRS COMPLEX:  QRS measured at 94 milliseconds  Incomplete right bundle-branch block  ST SEGMENT:  Nonspecific ST segment changes  Diffuse artifact  Diffuse T-wave flattening in aVL, V1 V2  QT INTERVAL:  QTC measured at 453 milliseconds  COMPARED WITH PRIOR   Ferol Jeferson   Interpretation by Angela Bell,     Differential diagnosis includes but not limited to:  Appendicitis, viral syndrome, constipation, AMI, NSTEMI, pneumonia, pneuothorax, gerd, gastritis,  mesenteric ischemia, mesenteric adenitis, pancreatitis, cholecystitis, choledocholithiasis, hepatitis, bowel obstruction, ileus, gastroenteritis, colitis, malignancy, AAA, perforation, toxicologic poisoning, renal infarct, acute kidney injury, splenic infarct, splenic injury, nephrolithiasis, UTI, muscular strain, intra-abdominal hematoma, hernia,         Amount and/or Complexity of Data Reviewed  Clinical lab tests: ordered and reviewed  Tests in the radiology section of CPT®: ordered and reviewed  Tests in the medicine section of CPT®: ordered and reviewed  Independent visualization of images, tracings, or specimens: yes        Disposition  Final diagnoses:   Abdominal pain   Diverticulitis     Time reflects when diagnosis was documented in both MDM as applicable and the Disposition within this note     Time User Action Codes Description Comment    9/19/2021 12:37 PM Ginger Delay DIPTI Add [R10 9] Abdominal pain     9/19/2021 12:37 PM Mana Lockwood Add [K57 92] Diverticulitis       ED Disposition     ED Disposition Condition Date/Time Comment    Discharge Stable Sun Sep 19, 2021 12:37  [address] discharge to home/self care              Follow-up Information     Follow up With Specialties Details Why Contact Info Additional [address] [facility] Schedule an appointment as soon as possible for a visit in 1 week  [address], [address] [facility] [id number]  [address], [address], [address] [place], [place], [address]    Amber Loyd MD General Surgery Schedule an appointment as soon as possible for a visit in 3 days  [address] [facility] [address] [facility]  [phone] Discharge Medication List as of 9/19/2021 12:40 PM      START taking these medications    Details   cephalexin (KEFLEX) 500 mg capsule Take 1 capsule (500 mg total) by mouth every 6 (six) hours for 7 days, Starting Sun 9/19/2021, Until Sun 9/26/2021, Normal      lactobacillus acidophilus-bulgaricus (LACTINEX) chewable tablet Chew 1 tablet 3 (three) times a day with meals for 5 days, Starting Sun 9/19/2021, Until Fri 9/24/2021, Normal      metroNIDAZOLE (FLAGYL) 500 mg tablet Take 1 tablet (500 mg total) by mouth every 8 (eight) hours for 7 days, Starting Sun 9/19/2021, Until Sun 9/26/2021, Normal         CONTINUE these medications which have NOT CHANGED    Details   acetaminophen (TYLENOL) 325 mg tablet Take 2 tablets (650 mg total) by mouth every 6 (six) hours as needed for mild pain, headaches or fever, Starting Tue 1/26/2021, Normal      gabapentin (NEURONTIN) 300 mg capsule take 1 capsule by mouth three times a day, Normal      levothyroxine 112 mcg tablet Take 1 tablet (112 mcg total) by mouth daily, Starting Mon 3/1/2021, Normal      lidocaine (LIDODERM) 5 % Apply 1 patch topically daily Remove & Discard patch within 12 hours or as directed by MD, Starting Thu 9/9/2021, Normal      lisinopril (ZESTRIL) 40 mg tablet Take 1 tablet (40 mg total) by mouth daily, Starting Fri 8/6/2021, Normal      meclizine (ANTIVERT) 12 5 MG tablet Take 1 tablet (12 5 mg total) by mouth every 8 (eight) hours as needed for dizziness, Starting Fri 8/6/2021, Normal      meloxicam (MOBIC) 7 5 mg tablet Take 1 tablet (7 5 mg total) by mouth daily, Starting Thu 9/9/2021, Normal      methylPREDNISolone 4 MG tablet therapy pack Use as directed on package, Normal      Multiple Vitamin (MULTIVITAMIN) capsule Take 1 capsule by mouth daily, Historical Med      omeprazole (PriLOSEC) 20 mg delayed release capsule Take 2 capsules (40 mg total) by mouth daily, Starting Mon 6/14/2021, Normal      ondansetron (ZOFRAN-ODT) 4 mg disintegrating tablet Take 1 tablet (4 mg total) by mouth every 6 (six) hours as needed for nausea or vomiting, Starting Tue 1/26/2021, Normal      psyllium (METAMUCIL SMOOTH TEXTURE) 28 % packet Take 1 packet by mouth 2 (two) times a day, Starting Mon 6/14/2021, Normal      sucralfate (CARAFATE) 1 g tablet Take 1 tablet (1 g total) by mouth 4 (four) times a day, Starting Wed 7/21/2021, Until Fri 8/20/2021, Normal      traMADol (ULTRAM) 50 mg tablet Take 1 tablet (50 mg total) by mouth every 6 (six) hours as needed for moderate pain for up to 10 days, Starting Thu 9/9/2021, Until Sun 9/19/2021 at 2359, Normal               PDMP Review     None          ED Provider  Electronically Signed by           Heena Solorzano DO  09/19/21 [id number]

## 2021-09-19 NOTE — DISCHARGE INSTRUCTIONS
MAKE SURE YOU COMPLETE FULL COURSE OF ANTIBIOTICS  MAKE SURE YOU FOLLOW-UP WITH FAMILY DOCTOR AS WELL AS SURGEON FOR FOLLOW-UP POSSIBLE COLONOSCOPY

## 2021-09-19 NOTE — Clinical Note
Agapito Bustamante was seen and treated in our emergency department on 9/19/2021  Diagnosis:     Lakeshia    She may return on this date: 09/22/2021         If you have any questions or concerns, please don't hesitate to call        Mel Ibarra, DO    ______________________________           _______________          _______________  Hospital Representative                              Date                                Time

## 2021-09-22 ENCOUNTER — OFFICE VISIT (OUTPATIENT)
Dept: FAMILY MEDICINE CLINIC | Facility: CLINIC | Age: 56
End: 2021-09-22

## 2021-09-22 ENCOUNTER — HOSPITAL ENCOUNTER (EMERGENCY)
Facility: HOSPITAL | Age: 56
Discharge: HOME | End: 2021-09-22
Attending: EMERGENCY MEDICINE | Admitting: EMERGENCY MEDICINE
Payer: COMMERCIAL

## 2021-09-22 ENCOUNTER — APPOINTMENT (EMERGENCY)
Dept: CT IMAGING | Facility: HOSPITAL | Age: 56
End: 2021-09-22
Payer: COMMERCIAL

## 2021-09-22 VITALS
TEMPERATURE: 97.6 F | WEIGHT: 181.2 LBS | HEART RATE: 79 BPM | RESPIRATION RATE: 20 BRPM | DIASTOLIC BLOOD PRESSURE: 70 MMHG | OXYGEN SATURATION: 98 % | SYSTOLIC BLOOD PRESSURE: 92 MMHG | HEIGHT: 68 IN | BODY MASS INDEX: 27.46 KG/M2

## 2021-09-22 VITALS
RESPIRATION RATE: 22 BRPM | HEART RATE: 74 BPM | TEMPERATURE: 98.7 F | OXYGEN SATURATION: 100 % | SYSTOLIC BLOOD PRESSURE: 125 MMHG | DIASTOLIC BLOOD PRESSURE: 107 MMHG

## 2021-09-22 DIAGNOSIS — R11.0 NAUSEA: ICD-10-CM

## 2021-09-22 DIAGNOSIS — R11.2 NAUSEA AND VOMITING: Primary | ICD-10-CM

## 2021-09-22 DIAGNOSIS — R51.9 HEADACHE: ICD-10-CM

## 2021-09-22 DIAGNOSIS — R11.10 INTRACTABLE VOMITING, PRESENCE OF NAUSEA NOT SPECIFIED, UNSPECIFIED VOMITING TYPE: Primary | ICD-10-CM

## 2021-09-22 DIAGNOSIS — K21.9 GASTROESOPHAGEAL REFLUX DISEASE WITHOUT ESOPHAGITIS: ICD-10-CM

## 2021-09-22 LAB
ALBUMIN SERPL BCP-MCNC: 4.8 G/DL (ref 3–5.2)
ALP SERPL-CCNC: 89 U/L (ref 43–122)
ALT SERPL W P-5'-P-CCNC: 55 U/L
ANION GAP SERPL CALCULATED.3IONS-SCNC: 12 MMOL/L (ref 5–14)
APTT PPP: 30 SECONDS (ref 23–37)
AST SERPL W P-5'-P-CCNC: 39 U/L (ref 14–36)
BASOPHILS # BLD AUTO: 0 THOUSANDS/ΜL (ref 0–0.1)
BASOPHILS NFR BLD AUTO: 1 % (ref 0–1)
BILIRUB SERPL-MCNC: 0.51 MG/DL
BUN SERPL-MCNC: 9 MG/DL (ref 5–25)
CALCIUM SERPL-MCNC: 9.8 MG/DL (ref 8.4–10.2)
CHLORIDE SERPL-SCNC: 102 MMOL/L (ref 97–108)
CO2 SERPL-SCNC: 24 MMOL/L (ref 22–30)
CREAT SERPL-MCNC: 0.79 MG/DL (ref 0.6–1.2)
EOSINOPHIL # BLD AUTO: 0.1 THOUSAND/ΜL (ref 0–0.4)
EOSINOPHIL NFR BLD AUTO: 2 % (ref 0–6)
ERYTHROCYTE [DISTWIDTH] IN BLOOD BY AUTOMATED COUNT: 13.9 %
GFR SERPL CREATININE-BSD FRML MDRD: 85 ML/MIN/1.73SQ M
GLUCOSE SERPL-MCNC: 115 MG/DL (ref 70–99)
HCT VFR BLD AUTO: 42.3 % (ref 36–46)
HGB BLD-MCNC: 14.1 G/DL (ref 12–16)
INR PPP: 0.98 (ref 0.84–1.19)
LIPASE SERPL-CCNC: 87 U/L (ref 23–300)
LYMPHOCYTES # BLD AUTO: 2.1 THOUSANDS/ΜL (ref 0.5–4)
LYMPHOCYTES NFR BLD AUTO: 37 % (ref 25–45)
MCH RBC QN AUTO: 32.6 PG (ref 26–34)
MCHC RBC AUTO-ENTMCNC: 33.2 G/DL (ref 31–36)
MCV RBC AUTO: 98 FL (ref 80–100)
MONOCYTES # BLD AUTO: 0.5 THOUSAND/ΜL (ref 0.2–0.9)
MONOCYTES NFR BLD AUTO: 10 % (ref 1–10)
NEUTROPHILS # BLD AUTO: 2.8 THOUSANDS/ΜL (ref 1.8–7.8)
NEUTS SEG NFR BLD AUTO: 51 % (ref 45–65)
PLATELET # BLD AUTO: 366 THOUSANDS/UL (ref 150–450)
PMV BLD AUTO: 7.9 FL (ref 8.9–12.7)
POTASSIUM SERPL-SCNC: 3.6 MMOL/L (ref 3.6–5)
PROT SERPL-MCNC: 8.7 G/DL (ref 5.9–8.4)
PROTHROMBIN TIME: 12.6 SECONDS (ref 11.6–14.5)
RBC # BLD AUTO: 4.31 MILLION/UL (ref 4–5.2)
SODIUM SERPL-SCNC: 138 MMOL/L (ref 137–147)
WBC # BLD AUTO: 5.6 THOUSAND/UL (ref 4.5–11)

## 2021-09-22 PROCEDURE — 96361 HYDRATE IV INFUSION ADD-ON: CPT

## 2021-09-22 PROCEDURE — 96375 TX/PRO/DX INJ NEW DRUG ADDON: CPT

## 2021-09-22 PROCEDURE — 85025 COMPLETE CBC W/AUTO DIFF WBC: CPT | Performed by: EMERGENCY MEDICINE

## 2021-09-22 PROCEDURE — 99284 EMERGENCY DEPT VISIT MOD MDM: CPT | Performed by: EMERGENCY MEDICINE

## 2021-09-22 PROCEDURE — 96374 THER/PROPH/DIAG INJ IV PUSH: CPT

## 2021-09-22 PROCEDURE — 83690 ASSAY OF LIPASE: CPT | Performed by: EMERGENCY MEDICINE

## 2021-09-22 PROCEDURE — 80053 COMPREHEN METABOLIC PANEL: CPT | Performed by: EMERGENCY MEDICINE

## 2021-09-22 PROCEDURE — 36415 COLL VENOUS BLD VENIPUNCTURE: CPT | Performed by: EMERGENCY MEDICINE

## 2021-09-22 PROCEDURE — 99214 OFFICE O/P EST MOD 30 MIN: CPT | Performed by: FAMILY MEDICINE

## 2021-09-22 PROCEDURE — 3074F SYST BP LT 130 MM HG: CPT | Performed by: FAMILY MEDICINE

## 2021-09-22 PROCEDURE — 85610 PROTHROMBIN TIME: CPT | Performed by: EMERGENCY MEDICINE

## 2021-09-22 PROCEDURE — 3080F DIAST BP >= 90 MM HG: CPT | Performed by: FAMILY MEDICINE

## 2021-09-22 PROCEDURE — 99284 EMERGENCY DEPT VISIT MOD MDM: CPT

## 2021-09-22 PROCEDURE — 70450 CT HEAD/BRAIN W/O DYE: CPT

## 2021-09-22 PROCEDURE — 85730 THROMBOPLASTIN TIME PARTIAL: CPT | Performed by: EMERGENCY MEDICINE

## 2021-09-22 PROCEDURE — 93005 ELECTROCARDIOGRAM TRACING: CPT

## 2021-09-22 RX ORDER — KETOROLAC TROMETHAMINE 30 MG/ML
30 INJECTION, SOLUTION INTRAMUSCULAR; INTRAVENOUS ONCE
Status: COMPLETED | OUTPATIENT
Start: 2021-09-22 | End: 2021-09-22

## 2021-09-22 RX ORDER — DIPHENHYDRAMINE HYDROCHLORIDE 50 MG/ML
25 INJECTION INTRAMUSCULAR; INTRAVENOUS ONCE
Status: COMPLETED | OUTPATIENT
Start: 2021-09-22 | End: 2021-09-22

## 2021-09-22 RX ORDER — ONDANSETRON 4 MG/1
4 TABLET, ORALLY DISINTEGRATING ORAL EVERY 6 HOURS PRN
Qty: 20 TABLET | Refills: 0 | Status: SHIPPED | OUTPATIENT
Start: 2021-09-22 | End: 2022-06-14

## 2021-09-22 RX ORDER — OMEPRAZOLE 20 MG/1
40 CAPSULE, DELAYED RELEASE ORAL DAILY
Qty: 60 CAPSULE | Refills: 3 | Status: SHIPPED | OUTPATIENT
Start: 2021-09-22 | End: 2021-12-01 | Stop reason: SDUPTHER

## 2021-09-22 RX ORDER — METOCLOPRAMIDE HYDROCHLORIDE 5 MG/ML
10 INJECTION INTRAMUSCULAR; INTRAVENOUS ONCE
Status: COMPLETED | OUTPATIENT
Start: 2021-09-22 | End: 2021-09-22

## 2021-09-22 RX ADMIN — SODIUM CHLORIDE 1000 ML: 0.9 INJECTION, SOLUTION INTRAVENOUS at 19:57

## 2021-09-22 RX ADMIN — KETOROLAC TROMETHAMINE 30 MG: 30 INJECTION, SOLUTION INTRAMUSCULAR; INTRAVENOUS at 19:57

## 2021-09-22 RX ADMIN — DIPHENHYDRAMINE HYDROCHLORIDE 25 MG: 50 INJECTION, SOLUTION INTRAMUSCULAR; INTRAVENOUS at 19:57

## 2021-09-22 RX ADMIN — METOCLOPRAMIDE 10 MG: 5 INJECTION, SOLUTION INTRAMUSCULAR; INTRAVENOUS at 19:57

## 2021-09-22 NOTE — ED PROVIDER NOTES
History  Chief Complaint   Patient presents with    Vomiting     since last visit here last Sunday    Headache    Facial Numbness     left side with arm and leg tingling     50 yo female with a history of GERD, HTN, and several chronic pain syndromes presents to the ED complaining of nausea, vomiting, headache, and left sided facial numbness "all day"  The patient reports nausea and multiple episodes of NBNB vomiting since waking this morning  No abdominal pain  (+) Recent diagnosis of diverticulitis  She was able to tolerate all of her daily medications, including the antibiotics she was prescribed during her last ED visit  No weakness  (+) Vague lightheadedness  No falls or syncopal events  She denies fevers/chills  (+) Vague left sided "throbbing" headache  No neck pain/stiffness  No other specific complaints  Of note, the patient was seen at her PCP's office this afternoon for all of these issues  Prior to Admission Medications   Prescriptions Last Dose Informant Patient Reported? Taking?    Multiple Vitamin (MULTIVITAMIN) capsule  Self Yes No   Sig: Take 1 capsule by mouth daily   Patient not taking: Reported on 7/2/2021   acetaminophen (TYLENOL) 325 mg tablet  Self No No   Sig: Take 2 tablets (650 mg total) by mouth every 6 (six) hours as needed for mild pain, headaches or fever   cephalexin (KEFLEX) 500 mg capsule   No No   Sig: Take 1 capsule (500 mg total) by mouth every 6 (six) hours for 7 days   gabapentin (NEURONTIN) 300 mg capsule   No No   Sig: take 1 capsule by mouth three times a day   lactobacillus acidophilus-bulgaricus (LACTINEX) chewable tablet   No No   Sig: Chew 1 tablet 3 (three) times a day with meals for 5 days   levothyroxine 112 mcg tablet  Self No No   Sig: Take 1 tablet (112 mcg total) by mouth daily   lidocaine (LIDODERM) 5 %   No No   Sig: Apply 1 patch topically daily Remove & Discard patch within 12 hours or as directed by MD   lisinopril (ZESTRIL) 40 mg tablet   No No Sig: Take 1 tablet (40 mg total) by mouth daily   meclizine (ANTIVERT) 12 5 MG tablet   No No   Sig: Take 1 tablet (12 5 mg total) by mouth every 8 (eight) hours as needed for dizziness   meloxicam (MOBIC) 7 5 mg tablet   No No   Sig: Take 1 tablet (7 5 mg total) by mouth daily   methylPREDNISolone 4 MG tablet therapy pack   No No   Sig: Use as directed on package   metroNIDAZOLE (FLAGYL) 500 mg tablet   No No   Sig: Take 1 tablet (500 mg total) by mouth every 8 (eight) hours for 7 days   omeprazole (PriLOSEC) 20 mg delayed release capsule   No No   Sig: Take 2 capsules (40 mg total) by mouth daily   ondansetron (ZOFRAN-ODT) 4 mg disintegrating tablet   No No   Sig: Take 1 tablet (4 mg total) by mouth every 6 (six) hours as needed for nausea or vomiting   psyllium (METAMUCIL SMOOTH TEXTURE) 28 % packet   No No   Sig: Take 1 packet by mouth 2 (two) times a day   Patient not taking: Reported on 2021   sucralfate (CARAFATE) 1 g tablet   No No   Sig: Take 1 tablet (1 g total) by mouth 4 (four) times a day      Facility-Administered Medications: None       Past Medical History:   Diagnosis Date    Chronic pain     GERD (gastroesophageal reflux disease)     Hypertension     Hyperthyroiditis     HYPERTHYROID; POST IODINE THERAPY       Past Surgical History:   Procedure Laterality Date    APPENDECTOMY       SECTION      HERNIA REPAIR         Family History   Problem Relation Age of Onset    Endometrial cancer Family     Ovarian cancer Mother     Heart defect Mother     Heart attack Father     Stroke Brother     Breast cancer Paternal Grandmother 61     I have reviewed and agree with the history as documented      E-Cigarette/Vaping    E-Cigarette Use Never User      E-Cigarette/Vaping Substances    Nicotine No     THC No     CBD No      Social History     Tobacco Use    Smoking status: Never Smoker    Smokeless tobacco: Never Used   Vaping Use    Vaping Use: Never used   Substance Use Topics  Alcohol use: Yes     Comment: occasional     Drug use: No       Review of Systems   Constitutional: Negative for chills and fever  HENT: Negative for sore throat  Eyes: Negative for visual disturbance  Respiratory: Negative for shortness of breath  Cardiovascular: Negative for chest pain  Gastrointestinal: Positive for nausea and vomiting  Negative for abdominal pain, anal bleeding, blood in stool and diarrhea  Endocrine: Negative for cold intolerance and heat intolerance  Genitourinary: Negative for dysuria and frequency  Musculoskeletal: Negative for back pain, neck pain and neck stiffness  Skin: Negative for rash  Allergic/Immunologic: Negative for immunocompromised state  Neurological: Positive for light-headedness, numbness and headaches  Negative for facial asymmetry and weakness  Hematological: Negative for adenopathy  Psychiatric/Behavioral: Negative for self-injury  Physical Exam  Physical Exam  Constitutional:       General: She is not in acute distress  Appearance: She is well-developed  HENT:      Head: Normocephalic and atraumatic  Eyes:      Pupils: Pupils are equal, round, and reactive to light  Cardiovascular:      Rate and Rhythm: Normal rate and regular rhythm  Pulmonary:      Effort: Pulmonary effort is normal       Breath sounds: Normal breath sounds  Abdominal:      General: There is no distension  Palpations: Abdomen is soft  Tenderness: There is no abdominal tenderness  Musculoskeletal:         General: Normal range of motion  Cervical back: Normal range of motion and neck supple  Skin:     General: Skin is warm and dry  Neurological:      Mental Status: She is alert and oriented to person, place, and time           Vital Signs  ED Triage Vitals   Temperature Pulse Respirations Blood Pressure SpO2   09/22/21 1933 09/22/21 1926 09/22/21 1926 09/22/21 1926 09/22/21 1926   98 7 °F (37 1 °C) 74 (!) 24 (!) 125/107 100 % Temp Source Heart Rate Source Patient Position - Orthostatic VS BP Location FiO2 (%)   09/22/21 1933 -- -- 09/22/21 1926 --   Oral   Left arm       Pain Score       09/22/21 1931       Worst Possible Pain           Vitals:    09/22/21 1926   BP: (!) 125/107   Pulse: 74         Visual Acuity      ED Medications  Medications   sodium chloride 0 9 % bolus 1,000 mL (0 mL Intravenous Stopped 9/22/21 2150)   ketorolac (TORADOL) injection 30 mg (30 mg Intravenous Given 9/22/21 1957)   metoclopramide (REGLAN) injection 10 mg (10 mg Intravenous Given 9/22/21 1957)   diphenhydrAMINE (BENADRYL) injection 25 mg (25 mg Intravenous Given 9/22/21 1957)       Diagnostic Studies  Results Reviewed     Procedure Component Value Units Date/Time    Lipase [[id number]]  (Normal) Collected: 09/22/21 1953    Lab Status: Final result Specimen: Blood from Arm, Right Updated: 09/22/21 2009     Lipase 87 u/L     Comprehensive metabolic panel [[id number]]  (Abnormal) Collected: 09/22/21 1953    Lab Status: Final result Specimen: Blood from Arm, Right Updated: 09/22/21 2009     Sodium 138 mmol/L      Potassium 3 6 mmol/L      Chloride 102 mmol/L      CO2 24 mmol/L      ANION GAP 12 mmol/L      BUN 9 mg/dL      Creatinine 0 79 mg/dL      Glucose 115 mg/dL      Calcium 9 8 mg/dL      AST 39 U/L      ALT 55 U/L      Alkaline Phosphatase 89 U/L      Total Protein 8 7 g/dL      Albumin 4 8 g/dL      Total Bilirubin 0 51 mg/dL      eGFR 85 ml/min/1 73sq m     Narrative:      Holly guidelines for Chronic Kidney Disease (CKD):     Stage 1 with normal or high GFR (GFR > 90 mL/min/1 73 square meters)    Stage 2 Mild CKD (GFR = 60-89 mL/min/1 73 square meters)    Stage 3A Moderate CKD (GFR = 45-59 mL/min/1 73 square meters)    Stage 3B Moderate CKD (GFR = 30-44 mL/min/1 73 square meters)    Stage 4 Severe CKD (GFR = 15-29 mL/min/1 73 square meters)    Stage 5 End Stage CKD (GFR <15 mL/min/1 73 square meters)  Note: GFR calculation is accurate only with a steady state creatinine    Protime-INR [[id number]]  (Normal) Collected: 09/22/21 1953    Lab Status: Final result Specimen: Blood from Arm, Right Updated: 09/22/21 2009     Protime 12 6 seconds      INR 0 98    APTT [[id number]]  (Normal) Collected: 09/22/21 1953    Lab Status: Final result Specimen: Blood from Arm, Right Updated: 09/22/21 2009     PTT 30 seconds     CBC and differential [[id number]]  (Abnormal) Collected: 09/22/21 1953    Lab Status: Final result Specimen: Blood from Arm, Right Updated: 09/22/21 2003     WBC 5 60 Thousand/uL      RBC 4 31 Million/uL      Hemoglobin 14 1 g/dL      Hematocrit 42 3 %      MCV 98 fL      MCH 32 6 pg      MCHC 33 2 g/dL      RDW 13 9 %      MPV 7 9 fL      Platelets 283 Thousands/uL      Neutrophils Relative 51 %      Lymphocytes Relative 37 %      Monocytes Relative 10 %      Eosinophils Relative 2 %      Basophils Relative 1 %      Neutrophils Absolute 2 80 Thousands/µL      Lymphocytes Absolute 2 10 Thousands/µL      Monocytes Absolute 0 50 Thousand/µL      Eosinophils Absolute 0 10 Thousand/µL      Basophils Absolute 0 00 Thousands/µL     UA w Reflex to Microscopic w Reflex to Culture [[id number]]     Lab Status: No result Specimen: Urine     Rapid drug screen, urine [[id number]]     Lab Status: No result Specimen: Urine                  CT head without contrast   Final Result by Onel Hernandes MD (09/22 2035)      No acute intracranial abnormality  Workstation performed: [id number]                    Procedures  ECG 12 Lead Documentation Only    Date/Time: 9/22/2021 9:46 PM  Performed by: Aime Adhikari MD  Authorized by: Aime Adhikari MD                ED Course                             SBIRT 22yo+      Most Recent Value   SBIRT (23 yo +)   In order to provide better care to our patients, we are screening all of our patients for alcohol and drug use   Would it be okay to ask you these screening questions? Yes Filed at: 09/22/2021 1934   Initial Alcohol Screen: US AUDIT-C    1  How often do you have a drink containing alcohol? 1 Filed at: 09/22/2021 1934   2  How many drinks containing alcohol do you have on a typical day you are drinking? 0 Filed at: 09/22/2021 1934   3b  FEMALE Any Age, or MALE 65+: How often do you have 4 or more drinks on one occassion? 0 Filed at: 09/22/2021 1934   Audit-C Score  1 Filed at: 09/22/2021 1934   AIDE: How many times in the past year have you    Used an illegal drug or used a prescription medication for non-medical reasons? Never Filed at: 09/22/2021 1934                    MDM  Number of Diagnoses or Management Options  Headache  Nausea and vomiting  Diagnosis management comments: The patient is uncomfortable appearing but with stable vital signs and a benign exam  No appreciable neurologic deficits  Unclear etiology of her symptoms  Will check EKG, basic labs, lipase, and CT head  IVFs, Reglan, Toradol, and Benadryl administered  Will continue to monitor in the ED  21:40 All symptoms resolved  No current numbness, headache, or nausea  Workup unremarkable  The patient says she feels "much better" and is tolerating POs without difficulty  Family Practice resident came to the ED to evaluate the patient  She says she feels comfortable returning home and will follow up with them in the office later this week  Strict return precautions provided         Amount and/or Complexity of Data Reviewed  Clinical lab tests: ordered and reviewed  Tests in the radiology section of CPT®: ordered and reviewed  Tests in the medicine section of CPT®: ordered and reviewed  Discuss the patient with other providers: yes    Risk of Complications, Morbidity, and/or Mortality  Presenting problems: high  Diagnostic procedures: high  Management options: high    Patient Progress  Patient progress: improved      Disposition  Final diagnoses:   Nausea and vomiting   Headache     Time reflects when diagnosis was documented in both MDM as applicable and the Disposition within this note     Time User Action Codes Description Comment    9/22/2021  9:40 PM Marion Parisi Add [R11 2] Nausea and vomiting     9/22/2021  9:40 PM Marion Gold Add [R51 9] Headache       ED Disposition     ED Disposition Condition Date/Time Comment    Discharge Stable Wed Sep 22, 2021  9:40 PM Abelino Nava discharge to home/self care              Follow-up Information     Follow up With Specialties Details Why Contact Info Additional [facility] Schedule an appointment as soon as possible for a visit   [address], [address] [id number]  [address], [address], [address] [facility], [address]          Discharge Medication List as of 9/22/2021  9:41 PM      CONTINUE these medications which have NOT CHANGED    Details   acetaminophen (TYLENOL) 325 mg tablet Take 2 tablets (650 mg total) by mouth every 6 (six) hours as needed for mild pain, headaches or fever, Starting Tue 1/26/2021, Normal      cephalexin (KEFLEX) 500 mg capsule Take 1 capsule (500 mg total) by mouth every 6 (six) hours for 7 days, Starting Sun 9/19/2021, Until Sun 9/26/2021, Normal      gabapentin (NEURONTIN) 300 mg capsule take 1 capsule by mouth three times a day, Normal      lactobacillus acidophilus-bulgaricus (LACTINEX) chewable tablet Chew 1 tablet 3 (three) times a day with meals for 5 days, Starting Sun 9/19/2021, Until Fri 9/24/2021, Normal      levothyroxine 112 mcg tablet Take 1 tablet (112 mcg total) by mouth daily, Starting Mon 3/1/2021, Normal      lidocaine (LIDODERM) 5 % Apply 1 patch topically daily Remove & Discard patch within 12 hours or as directed by MD, Starting u 9/9/2021, Normal      lisinopril (ZESTRIL) 40 mg tablet Take 1 tablet (40 mg total) by mouth daily, Starting Fri 8/6/2021, Normal      meclizine (ANTIVERT) 12 5 MG tablet Take 1 tablet (12 5 mg total) by mouth every 8 (eight) hours as needed for dizziness, Starting Fri 8/6/2021, Normal      meloxicam (MOBIC) 7 5 mg tablet Take 1 tablet (7 5 mg total) by mouth daily, Starting Thu 9/9/2021, Normal      methylPREDNISolone 4 MG tablet therapy pack Use as directed on package, Normal      metroNIDAZOLE (FLAGYL) 500 mg tablet Take 1 tablet (500 mg total) by mouth every 8 (eight) hours for 7 days, Starting Sun 9/19/2021, Until Sun 9/26/2021, Normal      Multiple Vitamin (MULTIVITAMIN) capsule Take 1 capsule by mouth daily, Historical Med      omeprazole (PriLOSEC) 20 mg delayed release capsule Take 2 capsules (40 mg total) by mouth daily, Starting Wed 9/22/2021, Normal      ondansetron (ZOFRAN-ODT) 4 mg disintegrating tablet Take 1 tablet (4 mg total) by mouth every 6 (six) hours as needed for nausea or vomiting, Starting Wed 9/22/2021, Normal      psyllium (METAMUCIL SMOOTH TEXTURE) 28 % packet Take 1 packet by mouth 2 (two) times a day, Starting Mon 6/14/2021, Normal      sucralfate (CARAFATE) 1 g tablet Take 1 tablet (1 g total) by mouth 4 (four) times a day, Starting Wed 7/21/2021, Until Fri 8/20/2021, Normal           No discharge procedures on file      PDMP Review     None          ED Provider  Electronically Signed by           Abhi Negron MD  09/22/21 1278

## 2021-09-22 NOTE — PROGRESS NOTES
Assessment/Plan:     Intractable Nausea and Vomiting  - Continue Omeprazole 20 mg x2 daily  - Continue Ondansetran 4 mg Q6 PRN  - Ambulatory referral to GI    Subjective:     Patient ID: Estefania Goetz is a 49 y o  female  HPI   Estefania Goetz is a 50 y/o female with PMH of GERD, HTN and chronic pain syndromes present to the office for follow up visit from the ED for nausea and vomiting  Patient report multiple previous episodes of vomiting, nonbilous and non bloody  She had one episode of vomiting in the office  She states that the vomiting is not related to food  She has no abdominal pain or tenderness  She was recently diagnosis with diverticulitis and was treated with antibiotics  She has been eating and drinking lots of fluids  She reports dizziness  She states that she has been on medication for nausea and vomiting without relief  She took 2 doses of her blood pressure medication because she her BP was elevated this morning after the first dose  She denies fever, chills, headache, syncope, chest pain or palpitation, sick contacts or recent travels  Review of Systems   Constitutional: Negative for chills and fever  HENT: Negative for ear pain and sore throat  Eyes: Negative for pain and visual disturbance  Respiratory: Negative for cough, shortness of breath and wheezing  Cardiovascular: Negative for chest pain, palpitations and leg swelling  Gastrointestinal: Positive for nausea and vomiting  Negative for abdominal distention, abdominal pain, constipation and diarrhea  Genitourinary: Negative for dysuria, frequency and hematuria  Musculoskeletal: Negative for arthralgias, back pain and joint swelling  Skin: Negative for color change and rash  Neurological: Positive for dizziness  Negative for seizures, syncope, weakness and headaches  Psychiatric/Behavioral: Negative  All other systems reviewed and are negative          Objective:     Physical Exam  Constitutional:

## 2021-09-24 LAB
ATRIAL RATE: 70 BPM
P AXIS: 46 DEGREES
PR INTERVAL: 150 MS
QRS AXIS: -10 DEGREES
QRSD INTERVAL: 84 MS
QT INTERVAL: 436 MS
QTC INTERVAL: 470 MS
T WAVE AXIS: 52 DEGREES
VENTRICULAR RATE: 70 BPM

## 2021-09-24 PROCEDURE — 93010 ELECTROCARDIOGRAM REPORT: CPT | Performed by: INTERNAL MEDICINE

## 2021-10-04 ENCOUNTER — OFFICE VISIT (OUTPATIENT)
Dept: FAMILY MEDICINE CLINIC | Facility: CLINIC | Age: 56
End: 2021-10-04

## 2021-10-04 VITALS
WEIGHT: 183 LBS | DIASTOLIC BLOOD PRESSURE: 80 MMHG | BODY MASS INDEX: 27.74 KG/M2 | HEIGHT: 68 IN | SYSTOLIC BLOOD PRESSURE: 130 MMHG | OXYGEN SATURATION: 98 % | HEART RATE: 91 BPM | RESPIRATION RATE: 16 BRPM | TEMPERATURE: 98 F

## 2021-10-04 DIAGNOSIS — I10 ESSENTIAL HYPERTENSION: Primary | ICD-10-CM

## 2021-10-04 DIAGNOSIS — M54.41 CHRONIC BILATERAL LOW BACK PAIN WITH BILATERAL SCIATICA: ICD-10-CM

## 2021-10-04 DIAGNOSIS — E03.9 HYPOTHYROIDISM, UNSPECIFIED TYPE: ICD-10-CM

## 2021-10-04 DIAGNOSIS — M54.42 CHRONIC BILATERAL LOW BACK PAIN WITH BILATERAL SCIATICA: ICD-10-CM

## 2021-10-04 DIAGNOSIS — Z11.59 NEED FOR HEPATITIS C SCREENING TEST: ICD-10-CM

## 2021-10-04 DIAGNOSIS — Z11.4 SCREENING FOR HIV (HUMAN IMMUNODEFICIENCY VIRUS): ICD-10-CM

## 2021-10-04 DIAGNOSIS — G89.29 CHRONIC BILATERAL LOW BACK PAIN WITH BILATERAL SCIATICA: ICD-10-CM

## 2021-10-04 DIAGNOSIS — R42 DIZZINESS: ICD-10-CM

## 2021-10-04 PROCEDURE — 3075F SYST BP GE 130 - 139MM HG: CPT | Performed by: FAMILY MEDICINE

## 2021-10-04 PROCEDURE — 3079F DIAST BP 80-89 MM HG: CPT | Performed by: FAMILY MEDICINE

## 2021-10-04 PROCEDURE — 99213 OFFICE O/P EST LOW 20 MIN: CPT | Performed by: FAMILY MEDICINE

## 2021-10-04 RX ORDER — LISINOPRIL 40 MG/1
40 TABLET ORAL DAILY
Qty: 30 TABLET | Refills: 2 | Status: SHIPPED | OUTPATIENT
Start: 2021-10-04 | End: 2022-03-02

## 2021-10-28 ENCOUNTER — TELEPHONE (OUTPATIENT)
Dept: FAMILY MEDICINE CLINIC | Facility: CLINIC | Age: 56
End: 2021-10-28

## 2021-11-03 ENCOUNTER — OFFICE VISIT (OUTPATIENT)
Dept: FAMILY MEDICINE CLINIC | Facility: CLINIC | Age: 56
End: 2021-11-03

## 2021-11-03 ENCOUNTER — LAB (OUTPATIENT)
Dept: LAB | Facility: HOSPITAL | Age: 56
End: 2021-11-03
Payer: COMMERCIAL

## 2021-11-03 VITALS
OXYGEN SATURATION: 98 % | DIASTOLIC BLOOD PRESSURE: 84 MMHG | HEIGHT: 68 IN | BODY MASS INDEX: 27.66 KG/M2 | WEIGHT: 182.5 LBS | HEART RATE: 88 BPM | SYSTOLIC BLOOD PRESSURE: 136 MMHG | RESPIRATION RATE: 20 BRPM | TEMPERATURE: 97.7 F

## 2021-11-03 DIAGNOSIS — Z11.4 SCREENING FOR HIV (HUMAN IMMUNODEFICIENCY VIRUS): ICD-10-CM

## 2021-11-03 DIAGNOSIS — Z11.59 NEED FOR HEPATITIS C SCREENING TEST: ICD-10-CM

## 2021-11-03 DIAGNOSIS — E03.9 HYPOTHYROIDISM, UNSPECIFIED TYPE: ICD-10-CM

## 2021-11-03 DIAGNOSIS — I10 ESSENTIAL HYPERTENSION: ICD-10-CM

## 2021-11-03 DIAGNOSIS — K21.00 GASTROESOPHAGEAL REFLUX DISEASE WITH ESOPHAGITIS WITHOUT HEMORRHAGE: Primary | ICD-10-CM

## 2021-11-03 LAB
HCV AB SER QL: NORMAL
T4 FREE SERPL-MCNC: 0.67 NG/DL (ref 0.76–1.46)
TSH SERPL DL<=0.05 MIU/L-ACNC: 1.09 UIU/ML (ref 0.47–4.68)

## 2021-11-03 PROCEDURE — 36415 COLL VENOUS BLD VENIPUNCTURE: CPT

## 2021-11-03 PROCEDURE — 84439 ASSAY OF FREE THYROXINE: CPT

## 2021-11-03 PROCEDURE — 3079F DIAST BP 80-89 MM HG: CPT | Performed by: FAMILY MEDICINE

## 2021-11-03 PROCEDURE — 86803 HEPATITIS C AB TEST: CPT

## 2021-11-03 PROCEDURE — 84443 ASSAY THYROID STIM HORMONE: CPT

## 2021-11-03 PROCEDURE — 3075F SYST BP GE 130 - 139MM HG: CPT | Performed by: FAMILY MEDICINE

## 2021-11-03 PROCEDURE — 87389 HIV-1 AG W/HIV-1&-2 AB AG IA: CPT

## 2021-11-03 PROCEDURE — 99213 OFFICE O/P EST LOW 20 MIN: CPT | Performed by: FAMILY MEDICINE

## 2021-11-03 RX ORDER — PANTOPRAZOLE SODIUM 40 MG/1
40 TABLET, DELAYED RELEASE ORAL DAILY
Qty: 30 TABLET | Refills: 2 | Status: SHIPPED | OUTPATIENT
Start: 2021-11-03 | End: 2021-12-01

## 2021-11-04 LAB — HIV 1+2 AB+HIV1 P24 AG SERPL QL IA: NORMAL

## 2021-12-01 ENCOUNTER — OFFICE VISIT (OUTPATIENT)
Dept: GASTROENTEROLOGY | Facility: MEDICAL CENTER | Age: 56
End: 2021-12-01
Payer: COMMERCIAL

## 2021-12-01 VITALS
DIASTOLIC BLOOD PRESSURE: 85 MMHG | WEIGHT: 184.8 LBS | SYSTOLIC BLOOD PRESSURE: 135 MMHG | BODY MASS INDEX: 28.1 KG/M2 | HEART RATE: 72 BPM | TEMPERATURE: 98.1 F

## 2021-12-01 DIAGNOSIS — K21.9 GASTROESOPHAGEAL REFLUX DISEASE WITHOUT ESOPHAGITIS: ICD-10-CM

## 2021-12-01 DIAGNOSIS — R10.10 PAIN OF UPPER ABDOMEN: ICD-10-CM

## 2021-12-01 DIAGNOSIS — R11.10 INTRACTABLE VOMITING, PRESENCE OF NAUSEA NOT SPECIFIED, UNSPECIFIED VOMITING TYPE: Primary | ICD-10-CM

## 2021-12-01 DIAGNOSIS — K57.92 DIVERTICULITIS: ICD-10-CM

## 2021-12-01 PROCEDURE — 99214 OFFICE O/P EST MOD 30 MIN: CPT | Performed by: PHYSICIAN ASSISTANT

## 2021-12-01 RX ORDER — OMEPRAZOLE 20 MG/1
40 CAPSULE, DELAYED RELEASE ORAL 2 TIMES DAILY
Qty: 60 CAPSULE | Refills: 5 | Status: SHIPPED | OUTPATIENT
Start: 2021-12-01 | End: 2022-02-09 | Stop reason: SDUPTHER

## 2021-12-01 RX ORDER — DICYCLOMINE HCL 20 MG
20 TABLET ORAL EVERY 6 HOURS
Qty: 120 TABLET | Refills: 5 | Status: SHIPPED | OUTPATIENT
Start: 2021-12-01 | End: 2022-06-14 | Stop reason: SDUPTHER

## 2021-12-04 ENCOUNTER — APPOINTMENT (EMERGENCY)
Dept: CT IMAGING | Facility: HOSPITAL | Age: 56
End: 2021-12-04
Payer: COMMERCIAL

## 2021-12-04 ENCOUNTER — HOSPITAL ENCOUNTER (EMERGENCY)
Facility: HOSPITAL | Age: 56
Discharge: HOME | End: 2021-12-04
Attending: EMERGENCY MEDICINE
Payer: COMMERCIAL

## 2021-12-04 VITALS
HEART RATE: 67 BPM | WEIGHT: 184.6 LBS | RESPIRATION RATE: 16 BRPM | DIASTOLIC BLOOD PRESSURE: 89 MMHG | BODY MASS INDEX: 28.07 KG/M2 | SYSTOLIC BLOOD PRESSURE: 120 MMHG | TEMPERATURE: 97.8 F | OXYGEN SATURATION: 100 %

## 2021-12-04 DIAGNOSIS — R42 VERTIGO: ICD-10-CM

## 2021-12-04 DIAGNOSIS — K29.70 GASTRITIS: Primary | ICD-10-CM

## 2021-12-04 DIAGNOSIS — R42 DIZZINESS: ICD-10-CM

## 2021-12-04 LAB
ALBUMIN SERPL BCP-MCNC: 4.3 G/DL (ref 3–5.2)
ALP SERPL-CCNC: 70 U/L (ref 43–122)
ALT SERPL W P-5'-P-CCNC: 37 U/L
ANION GAP SERPL CALCULATED.3IONS-SCNC: 4 MMOL/L (ref 5–14)
AST SERPL W P-5'-P-CCNC: 49 U/L (ref 14–36)
ATRIAL RATE: 62 BPM
ATRIAL RATE: 65 BPM
BASOPHILS # BLD AUTO: 0 THOUSANDS/ΜL (ref 0–0.1)
BASOPHILS NFR BLD AUTO: 1 % (ref 0–1)
BILIRUB SERPL-MCNC: 0.95 MG/DL
BILIRUB UR QL STRIP: NEGATIVE
BUN SERPL-MCNC: 11 MG/DL (ref 5–25)
CALCIUM SERPL-MCNC: 8.4 MG/DL (ref 8.4–10.2)
CARDIAC TROPONIN I PNL SERPL HS: 2 NG/L
CHLORIDE SERPL-SCNC: 107 MMOL/L (ref 97–108)
CLARITY UR: CLEAR
CO2 SERPL-SCNC: 24 MMOL/L (ref 22–30)
COLOR UR: NORMAL
CREAT SERPL-MCNC: 0.64 MG/DL (ref 0.6–1.2)
EOSINOPHIL # BLD AUTO: 0.1 THOUSAND/ΜL (ref 0–0.4)
EOSINOPHIL NFR BLD AUTO: 1 % (ref 0–6)
ERYTHROCYTE [DISTWIDTH] IN BLOOD BY AUTOMATED COUNT: 15.1 %
EXT PREG TEST URINE: NORMAL
EXT. CONTROL ED NAV: NORMAL
GFR SERPL CREATININE-BSD FRML MDRD: 101 ML/MIN/1.73SQ M
GLUCOSE SERPL-MCNC: 86 MG/DL (ref 70–99)
GLUCOSE UR STRIP-MCNC: NEGATIVE MG/DL
HCT VFR BLD AUTO: 41.5 % (ref 36–46)
HGB BLD-MCNC: 13.6 G/DL (ref 12–16)
HGB UR QL STRIP.AUTO: NEGATIVE
KETONES UR STRIP-MCNC: NEGATIVE MG/DL
LEUKOCYTE ESTERASE UR QL STRIP: NEGATIVE
LIPASE SERPL-CCNC: 62 U/L (ref 23–300)
LYMPHOCYTES # BLD AUTO: 1.9 THOUSANDS/ΜL (ref 0.5–4)
LYMPHOCYTES NFR BLD AUTO: 34 % (ref 25–45)
MCH RBC QN AUTO: 32.6 PG (ref 26–34)
MCHC RBC AUTO-ENTMCNC: 32.9 G/DL (ref 31–36)
MCV RBC AUTO: 99 FL (ref 80–100)
MONOCYTES # BLD AUTO: 0.5 THOUSAND/ΜL (ref 0.2–0.9)
MONOCYTES NFR BLD AUTO: 9 % (ref 1–10)
NEUTROPHILS # BLD AUTO: 3 THOUSANDS/ΜL (ref 1.8–7.8)
NEUTS SEG NFR BLD AUTO: 55 % (ref 45–65)
NITRITE UR QL STRIP: NEGATIVE
P AXIS: 42 DEGREES
P AXIS: 48 DEGREES
PH UR STRIP.AUTO: 7 [PH]
PLATELET # BLD AUTO: 333 THOUSANDS/UL (ref 150–450)
PMV BLD AUTO: 7.7 FL (ref 8.9–12.7)
POTASSIUM SERPL-SCNC: 5.3 MMOL/L (ref 3.6–5)
PR INTERVAL: 154 MS
PR INTERVAL: 154 MS
PROT SERPL-MCNC: 8.1 G/DL (ref 5.9–8.4)
PROT UR STRIP-MCNC: NEGATIVE MG/DL
QRS AXIS: -9 DEGREES
QRS AXIS: -9 DEGREES
QRSD INTERVAL: 90 MS
QRSD INTERVAL: 92 MS
QT INTERVAL: 436 MS
QT INTERVAL: 438 MS
QTC INTERVAL: 442 MS
QTC INTERVAL: 455 MS
RBC # BLD AUTO: 4.19 MILLION/UL (ref 4–5.2)
SODIUM SERPL-SCNC: 135 MMOL/L (ref 137–147)
SP GR UR STRIP.AUTO: 1.01 (ref 1–1.04)
T WAVE AXIS: 34 DEGREES
T WAVE AXIS: 38 DEGREES
UROBILINOGEN UA: NEGATIVE MG/DL
VENTRICULAR RATE: 62 BPM
VENTRICULAR RATE: 65 BPM
WBC # BLD AUTO: 5.5 THOUSAND/UL (ref 4.5–11)

## 2021-12-04 PROCEDURE — 99285 EMERGENCY DEPT VISIT HI MDM: CPT | Performed by: EMERGENCY MEDICINE

## 2021-12-04 PROCEDURE — 85025 COMPLETE CBC W/AUTO DIFF WBC: CPT | Performed by: EMERGENCY MEDICINE

## 2021-12-04 PROCEDURE — 81003 URINALYSIS AUTO W/O SCOPE: CPT | Performed by: EMERGENCY MEDICINE

## 2021-12-04 PROCEDURE — 99284 EMERGENCY DEPT VISIT MOD MDM: CPT

## 2021-12-04 PROCEDURE — 74177 CT ABD & PELVIS W/CONTRAST: CPT

## 2021-12-04 PROCEDURE — 83690 ASSAY OF LIPASE: CPT | Performed by: EMERGENCY MEDICINE

## 2021-12-04 PROCEDURE — 81025 URINE PREGNANCY TEST: CPT | Performed by: EMERGENCY MEDICINE

## 2021-12-04 PROCEDURE — 93010 ELECTROCARDIOGRAM REPORT: CPT | Performed by: INTERNAL MEDICINE

## 2021-12-04 PROCEDURE — 80053 COMPREHEN METABOLIC PANEL: CPT | Performed by: EMERGENCY MEDICINE

## 2021-12-04 PROCEDURE — 36415 COLL VENOUS BLD VENIPUNCTURE: CPT | Performed by: EMERGENCY MEDICINE

## 2021-12-04 PROCEDURE — G1004 CDSM NDSC: HCPCS

## 2021-12-04 PROCEDURE — 96361 HYDRATE IV INFUSION ADD-ON: CPT

## 2021-12-04 PROCEDURE — 93005 ELECTROCARDIOGRAM TRACING: CPT

## 2021-12-04 PROCEDURE — 96375 TX/PRO/DX INJ NEW DRUG ADDON: CPT

## 2021-12-04 PROCEDURE — 96374 THER/PROPH/DIAG INJ IV PUSH: CPT

## 2021-12-04 PROCEDURE — 84484 ASSAY OF TROPONIN QUANT: CPT | Performed by: EMERGENCY MEDICINE

## 2021-12-04 RX ORDER — LORAZEPAM 2 MG/ML
2 INJECTION INTRAMUSCULAR ONCE
Status: COMPLETED | OUTPATIENT
Start: 2021-12-04 | End: 2021-12-04

## 2021-12-04 RX ORDER — FAMOTIDINE 20 MG/1
20 TABLET, FILM COATED ORAL 2 TIMES DAILY
Qty: 28 TABLET | Refills: 0 | Status: SHIPPED | OUTPATIENT
Start: 2021-12-04 | End: 2022-03-07

## 2021-12-04 RX ORDER — MECLIZINE HCL 12.5 MG/1
25 TABLET ORAL ONCE
Status: COMPLETED | OUTPATIENT
Start: 2021-12-04 | End: 2021-12-04

## 2021-12-04 RX ORDER — ONDANSETRON 2 MG/ML
4 INJECTION INTRAMUSCULAR; INTRAVENOUS ONCE
Status: COMPLETED | OUTPATIENT
Start: 2021-12-04 | End: 2021-12-04

## 2021-12-04 RX ORDER — MECLIZINE HCL 12.5 MG/1
12.5 TABLET ORAL EVERY 8 HOURS PRN
Qty: 20 TABLET | Refills: 0 | Status: ON HOLD | OUTPATIENT
Start: 2021-12-04 | End: 2022-01-30 | Stop reason: SDUPTHER

## 2021-12-04 RX ORDER — HYDROMORPHONE HCL/PF 1 MG/ML
0.5 SYRINGE (ML) INJECTION ONCE
Status: COMPLETED | OUTPATIENT
Start: 2021-12-04 | End: 2021-12-04

## 2021-12-04 RX ADMIN — MECLIZINE 25 MG: 12.5 TABLET ORAL at 14:17

## 2021-12-04 RX ADMIN — FAMOTIDINE 20 MG: 10 INJECTION INTRAVENOUS at 14:12

## 2021-12-04 RX ADMIN — SODIUM CHLORIDE 1000 ML: 0.9 INJECTION, SOLUTION INTRAVENOUS at 10:49

## 2021-12-04 RX ADMIN — HYDROMORPHONE HYDROCHLORIDE 0.5 MG: 1 INJECTION, SOLUTION INTRAMUSCULAR; INTRAVENOUS; SUBCUTANEOUS at 11:01

## 2021-12-04 RX ADMIN — LORAZEPAM 2 MG: 2 INJECTION INTRAMUSCULAR; INTRAVENOUS at 12:59

## 2021-12-04 RX ADMIN — IOHEXOL 100 ML: 350 INJECTION, SOLUTION INTRAVENOUS at 11:58

## 2021-12-04 RX ADMIN — ONDANSETRON 4 MG: 2 INJECTION INTRAMUSCULAR; INTRAVENOUS at 10:55

## 2021-12-13 ENCOUNTER — HOSPITAL ENCOUNTER (OUTPATIENT)
Dept: CT IMAGING | Facility: HOSPITAL | Age: 56
Discharge: HOME | End: 2021-12-13
Payer: COMMERCIAL

## 2021-12-13 DIAGNOSIS — R10.10 PAIN OF UPPER ABDOMEN: ICD-10-CM

## 2021-12-13 PROCEDURE — G1004 CDSM NDSC: HCPCS

## 2021-12-13 PROCEDURE — 74177 CT ABD & PELVIS W/CONTRAST: CPT

## 2021-12-13 RX ADMIN — IOHEXOL 100 ML: 350 INJECTION, SOLUTION INTRAVENOUS at 08:32

## 2021-12-23 ENCOUNTER — TELEPHONE (OUTPATIENT)
Dept: GASTROENTEROLOGY | Facility: MEDICAL CENTER | Age: 56
End: 2021-12-23

## 2022-01-17 ENCOUNTER — TELEPHONE (OUTPATIENT)
Dept: FAMILY MEDICINE CLINIC | Facility: CLINIC | Age: 57
End: 2022-01-17

## 2022-01-17 NOTE — TELEPHONE ENCOUNTER
Pt called the nurse line stating that she has been dealing with dizziness for quit a while x2 months they advise her all she as is vertigo she has try medication with no relief, per pt she is already tired of taking meds for the dizziness and it doesn't help, pt stated that maybe something else is happening

## 2022-01-29 ENCOUNTER — HOSPITAL ENCOUNTER (OUTPATIENT)
Facility: HOSPITAL | Age: 57
Setting detail: OBSERVATION
Discharge: HOME | End: 2022-01-30
Attending: EMERGENCY MEDICINE | Admitting: FAMILY MEDICINE
Payer: COMMERCIAL

## 2022-01-29 ENCOUNTER — APPOINTMENT (EMERGENCY)
Dept: CT IMAGING | Facility: HOSPITAL | Age: 57
End: 2022-01-29
Payer: COMMERCIAL

## 2022-01-29 DIAGNOSIS — R42 VERTIGO: ICD-10-CM

## 2022-01-29 DIAGNOSIS — R26.2 AMBULATORY DYSFUNCTION: ICD-10-CM

## 2022-01-29 DIAGNOSIS — H53.40 VISUAL FIELD DEFECTS: ICD-10-CM

## 2022-01-29 DIAGNOSIS — D32.9 MENINGIOMA (HCC): ICD-10-CM

## 2022-01-29 DIAGNOSIS — R42 DIZZINESS: Primary | ICD-10-CM

## 2022-01-29 DIAGNOSIS — R93.89 ABNORMAL CT SCAN: ICD-10-CM

## 2022-01-29 DIAGNOSIS — R29.898 WEAKNESS OF RIGHT UPPER EXTREMITY: Chronic | ICD-10-CM

## 2022-01-29 PROBLEM — G93.89 BRAIN MASS: Status: ACTIVE | Noted: 2022-01-29

## 2022-01-29 PROBLEM — K21.00 GASTROESOPHAGEAL REFLUX DISEASE WITH ESOPHAGITIS WITHOUT HEMORRHAGE: Status: RESOLVED | Noted: 2020-06-05 | Resolved: 2022-01-29

## 2022-01-29 LAB
2HR DELTA HS TROPONIN: 0 NG/L
4HR DELTA HS TROPONIN: 0 NG/L
ALBUMIN SERPL BCP-MCNC: 3.6 G/DL (ref 3–5.2)
ALP SERPL-CCNC: 82 U/L (ref 43–122)
ALT SERPL W P-5'-P-CCNC: 33 U/L
ANION GAP SERPL CALCULATED.3IONS-SCNC: 3 MMOL/L (ref 5–14)
APTT PPP: 28 SECONDS (ref 23–37)
AST SERPL W P-5'-P-CCNC: 26 U/L (ref 14–36)
BASOPHILS # BLD AUTO: 0 THOUSANDS/ΜL (ref 0–0.1)
BASOPHILS NFR BLD AUTO: 0 % (ref 0–1)
BILIRUB SERPL-MCNC: 0.45 MG/DL
BUN SERPL-MCNC: 13 MG/DL (ref 5–25)
CALCIUM SERPL-MCNC: 8.5 MG/DL (ref 8.4–10.2)
CARDIAC TROPONIN I PNL SERPL HS: 6 NG/L
CHLORIDE SERPL-SCNC: 102 MMOL/L (ref 97–108)
CO2 SERPL-SCNC: 30 MMOL/L (ref 22–30)
CREAT SERPL-MCNC: 0.67 MG/DL (ref 0.6–1.2)
EOSINOPHIL # BLD AUTO: 0.1 THOUSAND/ΜL (ref 0–0.4)
EOSINOPHIL NFR BLD AUTO: 1 % (ref 0–6)
ERYTHROCYTE [DISTWIDTH] IN BLOOD BY AUTOMATED COUNT: 15.5 %
GFR SERPL CREATININE-BSD FRML MDRD: 99 ML/MIN/1.73SQ M
GLUCOSE SERPL-MCNC: 123 MG/DL (ref 70–99)
HCT VFR BLD AUTO: 37.9 % (ref 36–46)
HGB BLD-MCNC: 12.9 G/DL (ref 12–16)
INR PPP: 0.92 (ref 0.84–1.19)
LYMPHOCYTES # BLD AUTO: 1.8 THOUSANDS/ΜL (ref 0.5–4)
LYMPHOCYTES NFR BLD AUTO: 25 % (ref 25–45)
MAGNESIUM SERPL-MCNC: 2 MG/DL (ref 1.6–2.3)
MCH RBC QN AUTO: 33.4 PG (ref 26–34)
MCHC RBC AUTO-ENTMCNC: 34 G/DL (ref 31–36)
MCV RBC AUTO: 98 FL (ref 80–100)
MONOCYTES # BLD AUTO: 0.7 THOUSAND/ΜL (ref 0.2–0.9)
MONOCYTES NFR BLD AUTO: 9 % (ref 1–10)
NEUTROPHILS # BLD AUTO: 4.8 THOUSANDS/ΜL (ref 1.8–7.8)
NEUTS SEG NFR BLD AUTO: 65 % (ref 45–65)
PLATELET # BLD AUTO: 255 THOUSANDS/UL (ref 150–450)
PMV BLD AUTO: 6.8 FL (ref 8.9–12.7)
POTASSIUM SERPL-SCNC: 3.8 MMOL/L (ref 3.6–5)
PROT SERPL-MCNC: 6.9 G/DL (ref 5.9–8.4)
PROTHROMBIN TIME: 12 SECONDS (ref 11.6–14.5)
RBC # BLD AUTO: 3.86 MILLION/UL (ref 4–5.2)
SODIUM SERPL-SCNC: 135 MMOL/L (ref 137–147)
TSH SERPL DL<=0.05 MIU/L-ACNC: 0.58 UIU/ML (ref 0.47–4.68)
WBC # BLD AUTO: 7.4 THOUSAND/UL (ref 4.5–11)

## 2022-01-29 PROCEDURE — 99285 EMERGENCY DEPT VISIT HI MDM: CPT

## 2022-01-29 PROCEDURE — 96374 THER/PROPH/DIAG INJ IV PUSH: CPT

## 2022-01-29 PROCEDURE — 84443 ASSAY THYROID STIM HORMONE: CPT | Performed by: EMERGENCY MEDICINE

## 2022-01-29 PROCEDURE — 85025 COMPLETE CBC W/AUTO DIFF WBC: CPT | Performed by: EMERGENCY MEDICINE

## 2022-01-29 PROCEDURE — 96361 HYDRATE IV INFUSION ADD-ON: CPT

## 2022-01-29 PROCEDURE — 85610 PROTHROMBIN TIME: CPT | Performed by: EMERGENCY MEDICINE

## 2022-01-29 PROCEDURE — 70496 CT ANGIOGRAPHY HEAD: CPT

## 2022-01-29 PROCEDURE — 36415 COLL VENOUS BLD VENIPUNCTURE: CPT | Performed by: EMERGENCY MEDICINE

## 2022-01-29 PROCEDURE — 83735 ASSAY OF MAGNESIUM: CPT | Performed by: EMERGENCY MEDICINE

## 2022-01-29 PROCEDURE — 0241U HB NFCT DS VIR RESP RNA 4 TRGT: CPT | Performed by: EMERGENCY MEDICINE

## 2022-01-29 PROCEDURE — 70498 CT ANGIOGRAPHY NECK: CPT

## 2022-01-29 PROCEDURE — 85730 THROMBOPLASTIN TIME PARTIAL: CPT | Performed by: EMERGENCY MEDICINE

## 2022-01-29 PROCEDURE — NC001 PR NO CHARGE: Performed by: FAMILY MEDICINE

## 2022-01-29 PROCEDURE — 84484 ASSAY OF TROPONIN QUANT: CPT | Performed by: EMERGENCY MEDICINE

## 2022-01-29 PROCEDURE — 96375 TX/PRO/DX INJ NEW DRUG ADDON: CPT

## 2022-01-29 PROCEDURE — G1004 CDSM NDSC: HCPCS

## 2022-01-29 PROCEDURE — 93005 ELECTROCARDIOGRAM TRACING: CPT

## 2022-01-29 PROCEDURE — 99285 EMERGENCY DEPT VISIT HI MDM: CPT | Performed by: EMERGENCY MEDICINE

## 2022-01-29 PROCEDURE — 80053 COMPREHEN METABOLIC PANEL: CPT | Performed by: EMERGENCY MEDICINE

## 2022-01-29 RX ORDER — ONDANSETRON 2 MG/ML
4 INJECTION INTRAMUSCULAR; INTRAVENOUS ONCE
Status: COMPLETED | OUTPATIENT
Start: 2022-01-29 | End: 2022-01-29

## 2022-01-29 RX ORDER — DIAZEPAM 5 MG/ML
2.5 INJECTION, SOLUTION INTRAMUSCULAR; INTRAVENOUS ONCE
Status: COMPLETED | OUTPATIENT
Start: 2022-01-29 | End: 2022-01-29

## 2022-01-29 RX ADMIN — IOHEXOL 100 ML: 350 INJECTION, SOLUTION INTRAVENOUS at 19:39

## 2022-01-29 RX ADMIN — DIAZEPAM 2.5 MG: 10 INJECTION, SOLUTION INTRAMUSCULAR; INTRAVENOUS at 18:47

## 2022-01-29 RX ADMIN — SODIUM CHLORIDE 1000 ML: 0.9 INJECTION, SOLUTION INTRAVENOUS at 18:38

## 2022-01-29 RX ADMIN — ONDANSETRON 4 MG: 2 INJECTION INTRAMUSCULAR; INTRAVENOUS at 18:41

## 2022-01-29 NOTE — ED PROVIDER NOTES
History  Chief Complaint   Patient presents with    Dizziness     states she has been dizzy every day since November 28th  generalized weakness as well    Dental Pain     top of mouth  states she feels 2 balls there     Patient is a 44-year-old female with a history of hypertension, hypothyroid, GERD, chronic pain coming in today for persistent dizziness for the past 2 months  Patient states this started November  She was seen emergency department and give meclizine  She states that she did follow-up with her PCP but have persistent dizziness every day  She denies any fevers, chills, head trauma  She denies any ear pain or pressure  She has no slurred speech, difficulty eating or drinking, paresthesias throughout the bilateral upper extremities  She denies any paresthesias and or focal weakness throughout the bilateral upper extremities as well  She has no chest pain, palpitations or syncope  She has no shortness of breath  She has no recent surgery  She reports that she has been taking meclizine without relief  She had called her family doctor and was told to follow-up  She has not seen a specialist   She states that the dizziness is there all day and describes it as room spinning  She has some nauseousness without any vomiting  She does not report that anything makes this dizziness worse or better  Patient also complains of pain on the roof of her mouth described as balls"     She has no recent dental work, difficulty eating or drinking  There is no pain but I can just feel it        History provided by:  Patient  Dizziness  Quality:  Room spinning  Severity:  Mild  Onset quality:  Gradual  Timing:  Constant  Progression:  Unchanged  Chronicity:  Recurrent  Context: not when bending over, not with bowel movement, not with ear pain, not with eye movement, not with head movement, not with inactivity, not with loss of consciousness, not with medication, not with physical activity, not when standing up and not when urinating    Relieved by:  Nothing  Worsened by:  Nothing  Ineffective treatments:  None tried  Associated symptoms: nausea    Associated symptoms: no blood in stool, no chest pain, no diarrhea, no headaches, no hearing loss, no palpitations, no shortness of breath, no syncope, no tinnitus, no vision changes, no vomiting and no weakness    Risk factors: no anemia, no heart disease, no hx of stroke, no hx of vertigo, no Meniere's disease, no multiple medications and no new medications        Prior to Admission Medications   Prescriptions Last Dose Informant Patient Reported?  Taking?   acetaminophen (TYLENOL) 325 mg tablet Not Taking at Unknown time Self No No   Sig: Take 2 tablets (650 mg total) by mouth every 6 (six) hours as needed for mild pain, headaches or fever   Patient not taking: Reported on 1/29/2022    dicyclomine (BENTYL) 20 mg tablet 1/28/2022 at Unknown time  No Yes   Sig: Take 1 tablet (20 mg total) by mouth every 6 (six) hours   famotidine (PEPCID) 20 mg tablet 1/28/2022 at Unknown time  No Yes   Sig: Take 1 tablet (20 mg total) by mouth 2 (two) times a day for 14 days   gabapentin (NEURONTIN) 300 mg capsule 1/28/2022 at Unknown time  No Yes   Sig: take 1 capsule by mouth three times a day   levothyroxine 112 mcg tablet 1/28/2022 at Unknown time Self No Yes   Sig: Take 1 tablet (112 mcg total) by mouth daily   lisinopril (ZESTRIL) 40 mg tablet 1/28/2022 at Unknown time  No Yes   Sig: Take 1 tablet (40 mg total) by mouth daily   meclizine (ANTIVERT) 12 5 MG tablet 1/28/2022 at Unknown time  No Yes   Sig: Take 1 tablet (12 5 mg total) by mouth every 8 (eight) hours as needed for dizziness   meloxicam (MOBIC) 7 5 mg tablet Past Week at Unknown time  No Yes   Sig: Take 1 tablet (7 5 mg total) by mouth daily   omeprazole (PriLOSEC) 20 mg delayed release capsule 1/28/2022 at Unknown time  No Yes   Sig: Take 2 capsules (40 mg total) by mouth 2 (two) times a day   ondansetron (ZOFRAN-ODT) 4 mg disintegrating tablet 2022 at Unknown time  No Yes   Sig: Take 1 tablet (4 mg total) by mouth every 6 (six) hours as needed for nausea or vomiting   sucralfate (CARAFATE) 1 g tablet 2022 at Unknown time  No Yes   Sig: Take 1 tablet (1 g total) by mouth 4 (four) times a day      Facility-Administered Medications: None       Past Medical History:   Diagnosis Date    Chronic pain     GERD (gastroesophageal reflux disease)     Hypertension     Hyperthyroiditis     HYPERTHYROID; POST IODINE THERAPY       Past Surgical History:   Procedure Laterality Date    APPENDECTOMY       SECTION      HERNIA REPAIR         Family History   Problem Relation Age of Onset    Endometrial cancer Family     Ovarian cancer Mother     Heart defect Mother     Heart attack Father     Stroke Brother     Breast cancer Paternal Grandmother 61     I have reviewed and agree with the history as documented  E-Cigarette/Vaping    E-Cigarette Use Never User      E-Cigarette/Vaping Substances    Nicotine No     THC No     CBD No      Social History     Tobacco Use    Smoking status: Never Smoker    Smokeless tobacco: Never Used   Vaping Use    Vaping Use: Never used   Substance Use Topics    Alcohol use: Yes     Comment: occasional     Drug use: No       Review of Systems   Constitutional: Negative  Negative for chills and fever  HENT: Negative  Negative for ear pain, hearing loss, sore throat and tinnitus  Eyes: Negative  Negative for pain and visual disturbance  Respiratory: Negative  Negative for cough and shortness of breath  Cardiovascular: Negative  Negative for chest pain, palpitations and syncope  Gastrointestinal: Positive for nausea  Negative for abdominal pain, blood in stool, diarrhea and vomiting  Genitourinary: Negative for dysuria and hematuria  Musculoskeletal: Negative  Negative for arthralgias and back pain  Skin: Negative    Negative for color change and rash  Neurological: Positive for dizziness  Negative for seizures, syncope, weakness and headaches  Hematological: Negative  Psychiatric/Behavioral: Negative  All other systems reviewed and are negative  Physical Exam  Physical Exam  Vitals and nursing note reviewed  Constitutional:       General: She is not in acute distress  Appearance: She is well-developed  HENT:      Head: Normocephalic and atraumatic  Comments: Patient maintaining airway and secretions  No stridor   No brawniness under tongue  Mouth/Throat:      Mouth: Mucous membranes are moist       Pharynx: Oropharynx is clear  Uvula midline  Eyes:      Extraocular Movements: Extraocular movements intact  Conjunctiva/sclera: Conjunctivae normal       Pupils: Pupils are equal, round, and reactive to light  Cardiovascular:      Rate and Rhythm: Normal rate and regular rhythm  Heart sounds: No murmur heard  Pulmonary:      Effort: Pulmonary effort is normal  No respiratory distress  Breath sounds: Normal breath sounds  Abdominal:      Palpations: Abdomen is soft  Tenderness: There is no abdominal tenderness  Musculoskeletal:      Cervical back: Neck supple  Skin:     General: Skin is warm and dry  Capillary Refill: Capillary refill takes less than 2 seconds  Neurological:      General: No focal deficit present  Mental Status: She is alert and oriented to person, place, and time  GCS: GCS eye subscore is 4  GCS verbal subscore is 5  GCS motor subscore is 6  Cranial Nerves: Cranial nerves are intact  Sensory: Sensation is intact  Motor: Motor function is intact  Coordination: Coordination is intact  Romberg sign negative  Gait: Gait is intact  Comments: Patient ambulated from the waiting room to her room with a nonantalgic gait  No nystagmus  No focal deficits  NIH 0   Negative pronator drift    Psychiatric:         Mood and Affect: Mood normal          Behavior: Behavior normal          Thought Content: Thought content normal          Judgment: Judgment normal          Vital Signs  ED Triage Vitals   Temperature Pulse Respirations Blood Pressure SpO2   01/29/22 1808 01/29/22 1806 01/29/22 1806 01/29/22 1808 01/29/22 1806   98 4 °F (36 9 °C) 90 16 (!) 158/108 100 %      Temp Source Heart Rate Source Patient Position - Orthostatic VS BP Location FiO2 (%)   01/29/22 1806 01/29/22 1806 01/29/22 1806 01/29/22 1806 --   Oral Monitor Sitting Left arm       Pain Score       --                  Vitals:    01/29/22 1808 01/29/22 1830 01/29/22 1900 01/29/22 2030   BP: (!) 158/108 143/87 154/79 135/80   Pulse:  77 81 77   Patient Position - Orthostatic VS:             Visual Acuity      ED Medications  Medications   sodium chloride 0 9 % bolus 1,000 mL (0 mL Intravenous Stopped 1/29/22 2053)   ondansetron (ZOFRAN) injection 4 mg (4 mg Intravenous Given 1/29/22 1841)   diazepam (VALIUM) injection 2 5 mg (2 5 mg Intravenous Given 1/29/22 1847)   iohexol (OMNIPAQUE) 350 MG/ML injection (SINGLE-DOSE) 100 mL (100 mL Intravenous Given 1/29/22 1939)       Diagnostic Studies  Results Reviewed     Procedure Component Value Units Date/Time    HS Troponin I 2hr [[id number]]  (Normal) Collected: 01/29/22 2053    Lab Status: Final result Specimen: Blood from Line, Venous Updated: 01/29/22 2120     hs TnI 2hr 6 ng/L      Delta 2hr hsTnI 0 ng/L     HS Troponin I 4hr [[id number]]     Lab Status: No result Specimen: Blood     TSH [[id number]]  (Normal) Collected: 01/29/22 1845    Lab Status: Final result Specimen: Blood from Arm, Left Updated: 01/29/22 1943     TSH 3RD GENERATON 0 577 uIU/mL     Narrative:      Patients undergoing fluorescein dye angiography may retain small amounts of fluorescein in the body for 48-72 hours post procedure  Samples containing fluorescein can produce falsely depressed TSH values   If the patient had this procedure,a specimen should be resubmitted post fluorescein clearance        HS Troponin 0hr (reflex protocol) [[id number]]  (Normal) Collected: 01/29/22 1845    Lab Status: Final result Specimen: Blood from Arm, Left Updated: 01/29/22 1923     hs TnI 0hr 6 ng/L     Magnesium [[id number]]  (Normal) Collected: 01/29/22 1845    Lab Status: Final result Specimen: Blood from Arm, Left Updated: 01/29/22 1916     Magnesium 2 0 mg/dL     Comprehensive metabolic panel [[id number]]  (Abnormal) Collected: 01/29/22 1845    Lab Status: Final result Specimen: Blood from Arm, Left Updated: 01/29/22 1916     Sodium 135 mmol/L      Potassium 3 8 mmol/L      Chloride 102 mmol/L      CO2 30 mmol/L      ANION GAP 3 mmol/L      BUN 13 mg/dL      Creatinine 0 67 mg/dL      Glucose 123 mg/dL      Calcium 8 5 mg/dL      AST 26 U/L      ALT 33 U/L      Alkaline Phosphatase 82 U/L      Total Protein 6 9 g/dL      Albumin 3 6 g/dL      Total Bilirubin 0 45 mg/dL      eGFR 99 ml/min/1 73sq m     Narrative:      [facility] guidelines for Chronic Kidney Disease (CKD):     Stage 1 with normal or high GFR (GFR > 90 mL/min/1 73 square meters)    Stage 2 Mild CKD (GFR = 60-89 mL/min/1 73 square meters)    Stage 3A Moderate CKD (GFR = 45-59 mL/min/1 73 square meters)    Stage 3B Moderate CKD (GFR = 30-44 mL/min/1 73 square meters)    Stage 4 Severe CKD (GFR = 15-29 mL/min/1 73 square meters)    Stage 5 End Stage CKD (GFR <15 mL/min/1 73 square meters)  Note: GFR calculation is accurate only with a steady state creatinine    Protime-INR [[id number]]  (Normal) Collected: 01/29/22 1845    Lab Status: Final result Specimen: Blood from Arm, Left Updated: 01/29/22 1912     Protime 12 0 seconds      INR 0 92    APTT [[id number]]  (Normal) Collected: 01/29/22 1845    Lab Status: Final result Specimen: Blood from Arm, Left Updated: 01/29/22 1912     PTT 28 seconds     CBC and differential [[id number]]  (Abnormal) Collected: 01/29/22 1845    Lab Status: Final result Specimen: Blood from Arm, Left Updated: 01/29/22 1905     WBC 7 40 Thousand/uL      RBC 3 86 Million/uL      Hemoglobin 12 9 g/dL      Hematocrit 37 9 %      MCV 98 fL      MCH 33 4 pg      MCHC 34 0 g/dL      RDW 15 5 %      MPV 6 8 fL      Platelets 042 Thousands/uL      Neutrophils Relative 65 %      Lymphocytes Relative 25 %      Monocytes Relative 9 %      Eosinophils Relative 1 %      Basophils Relative 0 %      Neutrophils Absolute 4 80 Thousands/µL      Lymphocytes Absolute 1 80 Thousands/µL      Monocytes Absolute 0 70 Thousand/µL      Eosinophils Absolute 0 10 Thousand/µL      Basophils Absolute 0 00 Thousands/µL                  CTA head and neck with and without contrast   Final Result by Raymund Boxer, MD (01/29 2026)      No acute intracranial abnormality  Mild cerebral chronic microangiopathy disease  Artifact obscures evaluation of the left temporal lobe  Right petroclinoid meningioma  The right superior cerebellar artery and posterior communicating artery branches are intimately associated along the dorsal and medial borders of the lesion  Follow-up MRI imaging with and without contrast and    consultation with the neurosurgical service is recommended  No cervical or intracranial large vessel occlusion  Workstation performed: [id number]                    Procedures  Procedures         ED Course  ED Course as of 01/29/22 2251   Sat Jan 29, 2022   1816 Patient is a 28-year-old female coming in today for persistent dizziness as well as dental pain  On exam she is well-appearing in no distress  She is NIH 0 no focal findings  This is patient's repeated med for dizziness  Will CTA as well as EKG labs give Valium and Zofran    Portions of the record may have been created with voice recognition software  Occasional wrong word or "sound a like" substitutions may have occurred due to the inherent limitations of voice recognition software   Read the chart carefully and recognize, using context, where substitutions have occurred  1918 Patient's labs are stable no evidence of end-organ damage  Creatinine stable  Cleared for CT   1945 Patient's troponin 6 with heart score 3  Will complete 2nd troponin  Patient's troponin 6      2035 CTA reveals no acute pathology  There is artifact with obstruction of the left temporal lobe  No allergic closure   2035 Patient states that she feels better after the Valium  Dizziness is improved  Also updated on CT as well as reprint CT for patient for home with referral to ENT and Neurology  2044 Will reach out to [facility] - Nicole Morrison PA-C on call    2059 Did discuss with neuro surgery who wishes for MRI  We have MRI in the morning and patient is agreeable for admission  She does follow with the [address]  Will reach out to [facility] at our facility   2123 Discussed with FP residents and will place admit order for obs  2139 Family practice resident state that there is no MRI tech available tomorrow  Will place order for transfer   2212 Patient was complaining of pain is the right side of her face  On exam NIH is 0  No neuro changes  2219 Family practice will come down evaluate patient  Family practice touch base with neurosurgery who states that MRI can be done outpatient     2250 Family practice and room for evaluation             HEART Risk Score      Most Recent Value   Heart Score Risk Calculator    History 0 Filed at: 01/29/2022 1945   ECG 1 Filed at: 01/29/2022 1945   Age 1 Filed at: 01/29/2022 1945   Risk Factors 1 Filed at: 01/29/2022 1945   Troponin 0 Filed at: 01/29/2022 1945   HEART Score 3 Filed at: 01/29/2022 1945           Stroke Assessment     Row Name 01/29/22 1818             NIH Stroke Scale    Interval Baseline      Level of Consciousness (1a ) 0      LOC Questions (1b ) 0      LOC Commands (1c ) 0      Best Gaze (2 ) 0      Visual (3 ) 0      Facial Palsy (4 ) 0      Motor Arm, Left (5a ) 0      Motor Arm, Right (5b ) 0      Motor Leg, Left (6a ) 0      Motor Leg, Right (6b ) 0      Limb Ataxia (7 ) 0      Sensory (8 ) 0      Best Language (9 ) 0      Dysarthria (10 ) 0      Extinction and Inattention (11 ) (Formerly Neglect) 0      Total 0                Most Recent Value   TPA Decision Options    TPA Decision Patient not a TPA candidate  Patient is not a candidate options Unclear time of onset outside appropriate time window  SBIRT 22yo+      Most Recent Value   SBIRT (22 yo +)    In order to provide better care to our patients, we are screening all of our patients for alcohol and drug use  Would it be okay to ask you these screening questions? Yes Filed at: 01/29/2022 1858   Initial Alcohol Screen: US AUDIT-C     1  How often do you have a drink containing alcohol? 1 Filed at: 01/29/2022 1858   2  How many drinks containing alcohol do you have on a typical day you are drinking? 1 Filed at: 01/29/2022 1858   3b  FEMALE Any Age, or MALE 65+: How often do you have 4 or more drinks on one occassion? 0 Filed at: 01/29/2022 1858   Audit-C Score 2 Filed at: 01/29/2022 1858   AIDE: How many times in the past year have you    Used an illegal drug or used a prescription medication for non-medical reasons? Never Filed at: 01/29/2022 1858                    MDM  Number of Diagnoses or Management Options  Abnormal CT scan  Dizziness  Diagnosis management comments:     EKG INTERPRETATION @ 1817  RHYTHM:  Normal sinus rhythm at 90 beats per minute  AXIS:  Normal axis  INTERVALS:  OK interval measured at 138 milliseconds  QRS COMPLEX:  QRS measured at 86 milliseconds  ST SEGMENT:  Nonspecific ST segment changes  Diffuse artifact  QT INTERVAL:  QTC measured at 462 milliseconds  COMPARED WITH PRIOR   [facility]   Interpretation by Martha Hassan, DO    Differential diagnosis includes but not limited to: BPPV, Menière's, labyrinthitis, CVA, TIA, arrhthymias, MARTHA, electrolyte dysfunction, orthostatic hypotension, dehydration, medication reaction, OM, vestibular neuritis  EKG INTERPRETATION @ 2048  RHYTHM:  Normal sinus rhythm at 73 beats per minute  AXIS:  Normal axis  INTERVALS:  KS interval measured at 142 milliseconds  QRS COMPLEX:  QRS measured at 88 milliseconds  ST SEGMENT:  Nonspecific ST segment changes diffuse artifact  QT INTERVAL:  QTC measured at 462 milliseconds  COMPARED WITH PRIOR no acute change from prior  Interpretation by Antonio Stephen DO    EKG INTERPRETATION @ 2247  RHYTHM:  Normal sinus rhythm at 70 beats  AXIS:  Normal axis  INTERVALS:  KS interval measured at 140 milliseconds  QRS COMPLEX:  QRS measured at 84 milliseconds  ST SEGMENT:  Nonspecific ST segment changes  Diffuse artifact  QT INTERVAL:  QTC measured at 438 milliseconds  COMPARED WITH PRIOR   Lajean Cassette Interpretation by Antonio Stephen DO             Amount and/or Complexity of Data Reviewed  Clinical lab tests: ordered and reviewed  Tests in the radiology section of CPT®: reviewed and ordered  Tests in the medicine section of CPT®: ordered and reviewed  Review and summarize past medical records: yes  Independent visualization of images, tracings, or specimens: yes        Disposition  Final diagnoses:   Dizziness   Abnormal CT scan     Time reflects when diagnosis was documented in both MDM as applicable and the Disposition within this note     Time User Action Codes Description Comment    1/29/2022  9:23 PM Cas Holly Add [R42] Dizziness     1/29/2022  9:23 PM Cas Holly Add [R93 [id number]] Abnormal CT scan       ED Disposition     ED Disposition Condition Date/Time Comment    Admit Stable Sat Jan 29, 2022  9:23 PM Case was discussed with FP and the patient's admission status was agreed to be Admission Status: observation status to the service of Dr Tray Turcios           Follow-up Information     Follow up With Specialties Details Why Contact Info Additional Irena [address] Schedule an appointment as soon as possible for a visit in 3 days  [address], [address] [facility] [address]  [address] [facility] [address] [facility] [address] [address], [address], [address] [place], [place], [address]          Patient's Medications   Discharge Prescriptions    No medications on file       No discharge procedures on file      PDMP Review     None          ED Provider  Electronically Signed by           Benoit Quinn DO  01/30/22 0021

## 2022-01-30 ENCOUNTER — APPOINTMENT (OUTPATIENT)
Dept: MRI IMAGING | Facility: HOSPITAL | Age: 57
End: 2022-01-30
Payer: COMMERCIAL

## 2022-01-30 VITALS
DIASTOLIC BLOOD PRESSURE: 78 MMHG | OXYGEN SATURATION: 98 % | TEMPERATURE: 96.9 F | BODY MASS INDEX: 27.77 KG/M2 | SYSTOLIC BLOOD PRESSURE: 122 MMHG | HEART RATE: 61 BPM | RESPIRATION RATE: 18 BRPM | HEIGHT: 68 IN | WEIGHT: 183.2 LBS

## 2022-01-30 PROBLEM — R26.2 AMBULATORY DYSFUNCTION: Status: RESOLVED | Noted: 2022-01-30 | Resolved: 2022-01-30

## 2022-01-30 PROBLEM — R29.898 WEAKNESS OF RIGHT UPPER EXTREMITY: Chronic | Status: ACTIVE | Noted: 2022-01-30

## 2022-01-30 PROBLEM — H53.40 VISUAL FIELD DEFECTS: Status: ACTIVE | Noted: 2022-01-30

## 2022-01-30 PROBLEM — D32.9 MENINGIOMA (HCC): Status: ACTIVE | Noted: 2022-01-29

## 2022-01-30 PROBLEM — R26.2 AMBULATORY DYSFUNCTION: Status: ACTIVE | Noted: 2022-01-30

## 2022-01-30 LAB
ATRIAL RATE: 72 BPM
ATRIAL RATE: 73 BPM
ATRIAL RATE: 95 BPM
FLUAV RNA RESP QL NAA+PROBE: NEGATIVE
FLUBV RNA RESP QL NAA+PROBE: NEGATIVE
P AXIS: 50 DEGREES
P AXIS: 51 DEGREES
P AXIS: 60 DEGREES
PR INTERVAL: 138 MS
PR INTERVAL: 140 MS
PR INTERVAL: 142 MS
QRS AXIS: -10 DEGREES
QRS AXIS: -13 DEGREES
QRS AXIS: -13 DEGREES
QRSD INTERVAL: 84 MS
QRSD INTERVAL: 86 MS
QRSD INTERVAL: 88 MS
QT INTERVAL: 368 MS
QT INTERVAL: 400 MS
QT INTERVAL: 420 MS
QTC INTERVAL: 438 MS
QTC INTERVAL: 462 MS
QTC INTERVAL: 462 MS
RSV RNA RESP QL NAA+PROBE: NEGATIVE
SARS-COV-2 RNA RESP QL NAA+PROBE: NEGATIVE
T WAVE AXIS: 38 DEGREES
T WAVE AXIS: 39 DEGREES
T WAVE AXIS: 69 DEGREES
VENTRICULAR RATE: 72 BPM
VENTRICULAR RATE: 73 BPM
VENTRICULAR RATE: 95 BPM

## 2022-01-30 PROCEDURE — 99236 HOSP IP/OBS SAME DATE HI 85: CPT | Performed by: FAMILY MEDICINE

## 2022-01-30 PROCEDURE — G1004 CDSM NDSC: HCPCS

## 2022-01-30 PROCEDURE — 93010 ELECTROCARDIOGRAM REPORT: CPT

## 2022-01-30 PROCEDURE — A9585 GADOBUTROL INJECTION: HCPCS | Performed by: STUDENT IN AN ORGANIZED HEALTH CARE EDUCATION/TRAINING PROGRAM

## 2022-01-30 PROCEDURE — 70553 MRI BRAIN STEM W/O & W/DYE: CPT

## 2022-01-30 RX ORDER — PANTOPRAZOLE SODIUM 40 MG/1
40 TABLET, DELAYED RELEASE ORAL
Status: DISCONTINUED | OUTPATIENT
Start: 2022-01-30 | End: 2022-01-30 | Stop reason: HOSPADM

## 2022-01-30 RX ORDER — MECLIZINE HCL 12.5 MG/1
12.5 TABLET ORAL EVERY 8 HOURS PRN
Qty: 30 TABLET | Refills: 0 | Status: SHIPPED | OUTPATIENT
Start: 2022-01-30 | End: 2022-02-09 | Stop reason: SDUPTHER

## 2022-01-30 RX ORDER — LEVOTHYROXINE SODIUM 112 UG/1
112 TABLET ORAL
Status: DISCONTINUED | OUTPATIENT
Start: 2022-01-30 | End: 2022-01-30 | Stop reason: HOSPADM

## 2022-01-30 RX ORDER — SUCRALFATE 1 G/1
1 TABLET ORAL 4 TIMES DAILY
Status: DISCONTINUED | OUTPATIENT
Start: 2022-01-30 | End: 2022-01-30 | Stop reason: HOSPADM

## 2022-01-30 RX ORDER — ONDANSETRON 4 MG/1
4 TABLET, ORALLY DISINTEGRATING ORAL EVERY 6 HOURS PRN
Status: DISCONTINUED | OUTPATIENT
Start: 2022-01-30 | End: 2022-01-30 | Stop reason: HOSPADM

## 2022-01-30 RX ORDER — LISINOPRIL 20 MG/1
40 TABLET ORAL DAILY
Status: DISCONTINUED | OUTPATIENT
Start: 2022-01-30 | End: 2022-01-30 | Stop reason: HOSPADM

## 2022-01-30 RX ORDER — MECLIZINE HCL 12.5 MG/1
12.5 TABLET ORAL EVERY 8 HOURS PRN
Status: DISCONTINUED | OUTPATIENT
Start: 2022-01-30 | End: 2022-01-30 | Stop reason: HOSPADM

## 2022-01-30 RX ORDER — FAMOTIDINE 20 MG/1
20 TABLET, FILM COATED ORAL 2 TIMES DAILY
Status: DISCONTINUED | OUTPATIENT
Start: 2022-01-30 | End: 2022-01-30 | Stop reason: HOSPADM

## 2022-01-30 RX ORDER — HEPARIN SODIUM 5000 [USP'U]/ML
5000 INJECTION, SOLUTION INTRAVENOUS; SUBCUTANEOUS EVERY 8 HOURS SCHEDULED
Status: DISCONTINUED | OUTPATIENT
Start: 2022-01-31 | End: 2022-01-30 | Stop reason: HOSPADM

## 2022-01-30 RX ADMIN — SUCRALFATE 1 G: 1 TABLET ORAL at 08:19

## 2022-01-30 RX ADMIN — FAMOTIDINE 20 MG: 20 TABLET ORAL at 08:19

## 2022-01-30 RX ADMIN — PANTOPRAZOLE SODIUM 40 MG: 40 TABLET, DELAYED RELEASE ORAL at 05:45

## 2022-01-30 RX ADMIN — LEVOTHYROXINE SODIUM 112 MCG: 112 TABLET ORAL at 07:29

## 2022-01-30 RX ADMIN — ENOXAPARIN SODIUM 40 MG: 40 INJECTION SUBCUTANEOUS at 08:19

## 2022-01-30 RX ADMIN — MECLIZINE 12.5 MG: 12.5 TABLET ORAL at 05:54

## 2022-01-30 RX ADMIN — GADOBUTROL 8 ML: 604.72 INJECTION INTRAVENOUS at 11:55

## 2022-01-30 RX ADMIN — LISINOPRIL 40 MG: 20 TABLET ORAL at 08:19

## 2022-01-30 RX ADMIN — SUCRALFATE 1 G: 1 TABLET ORAL at 12:31

## 2022-01-30 NOTE — ASSESSMENT & PLAN NOTE
Home medications:  Lisinopril 40 mg daily    Patient's blood pressure well controlled and stable throughout ED course however most recent BP reading demonstrated elevation possibly secondary to stress from explanation of diagnosis    Will continue to monitor and adjust medications as needed    Plan  - continue home medications

## 2022-01-30 NOTE — ASSESSMENT & PLAN NOTE
CTA head neck with and without contrast (1/29/2022)- Right petroclinoid meningioma  The right superior cerebellar artery and posterior communicating artery branches are intimately associated along the dorsal and medial borders of the lesion  Follow-up MRI imaging with and without contrast and consultation with the neurosurgical service is recommended  No cervical or intracranial large vessel occlusion    Patient presented with chronic dizziness and fatigue  Patient demonstrated decrease in sensation, motor weakness, and visual blurriness of right-sided body on physical examination  Patient dizziness and weakness has improved  Plan  -her MRI to be done today

## 2022-01-30 NOTE — ASSESSMENT & PLAN NOTE
Home medication:  Famotidine 20 mg b i d , omeprazole 20 mg delayed release daily, sucralfate 1 g q i d      Plan  - continue home medications

## 2022-01-30 NOTE — ED NOTES
Patient transported to [address], RN  [facility]

## 2022-01-30 NOTE — NURSING NOTE
Patient discharged to home, IV removed  Patient and  given discharge instructions with stated understanding  Patient left unit with belongings via w/c in stable condition

## 2022-01-30 NOTE — ASSESSMENT & PLAN NOTE
Patient endorsing ambulatory dysfunction with 3 recent falls  Likely secondary to intracranial mass effect  Denying head trauma with falls and CT imaging negative for intracranial bleeding      Plan  - Fall precautions

## 2022-01-30 NOTE — H&P
History and Physical - Dionna 6    Patient Information: Dewey Oliver 49 y o  female MRN: [id number]  Unit/Bed#: ED 6 Encounter: [id number]  Admitting Physician: Irving Gomez MD  PCP: Joey Barragan MD  Date of Admission:  01/30/22    Assessment and Plan    * Brain mass  Assessment & Plan  CTA head neck with and without contrast (1/29/2022)- Right petroclinoid meningioma  The right superior cerebellar artery and posterior communicating artery branches are intimately associated along the dorsal and medial borders of the lesion  Follow-up MRI imaging with and without contrast and consultation with the neurosurgical service is recommended  No cervical or intracranial large vessel occlusion    Patient given 1 L normal saline, Zofran 4 mg IV, and Valium 2 5 mg in ED  Patient presented with chronic dizziness and fatigue  Patient demonstrated decrease in sensation, motor weakness, and visual blurriness of right-sided body on physical examination  Presenting dizziness is now 5/10 on admission  Patient vital signs currently stable  Plan  - Neurosurgery consulted with the following recommendations-patient to be admitted for observation and if continued medical stability she can be discharged tomorrow (01/30/2022) with symptom management medication and outpatient MRI to be done on Monday (01/31/2022)      Ambulatory dysfunction  Assessment & Plan  Patient endorsing ambulatory dysfunction with 3 recent falls  Likely secondary to intracranial mass effect  Denying head trauma with falls and CT imaging negative for intracranial bleeding  Plan  - Fall precautions    Essential hypertension  Assessment & Plan  Home medications:  Lisinopril 40 mg daily    Patient's blood pressure well controlled and stable throughout ED course however most recent BP reading demonstrated elevation possibly secondary to stress from explanation of diagnosis    Will continue to monitor and adjust medications as needed    Plan  - continue home medications    Hypothyroidism  Assessment & Plan  Home medication:  levothyroxine 112 mcg    Plan  - continue home medication    Gastroesophageal reflux disease without esophagitis  Assessment & Plan  Home medication:  Famotidine 20 mg b i d , omeprazole 20 mg delayed release daily, sucralfate 1 g q i d  Plan  - continue home medications      VTE Prophylaxis: Enoxaparin (Lovenox)  Code Status: Full Code  Anticipated Length of Stay:  Patient will be admitted on an Observation basis with an anticipated length of stay of  less than 2 midnights  Justification for Hospital Stay: observation needed for unilateral motor/sensory changes in setting of newly discovered intracranial mass  Total Time for Visit, including Counseling / Coordination of Care: 60 mins  Greater than 50% of this total time spent on direct patient counseling and coordination of care  Chief Complaint:     Chief Complaint   Patient presents with    Dizziness     states she has been dizzy every day since November 28th  generalized weakness as well    Dental Pain     top of mouth  states she feels 2 balls there     History of Present Illness:    Salvador Park is a 49 y o  female with past medical history of hypertension, GERD, and hypothyroidism  Patient presented to the ED for chronic dizziness that has been present since November 28, 2021  Patient states she was previously worked up with imaging that did not uncover underlying etiology  Patient also states she was given medication which has not helped her  Patient endorses dizziness is associated with increasing total-body instability, bilateral leg weakness, and fatigue  Patient also feels pressure-like pain and increasing blurry vision in right eye  Patient notes decreased sensation and increasing weakness of right side of body specifically    Patient states she has fallen 3 times with last occurrence being this morning however she is denying any head trauma with falls  Denying chest pain, SOB, headache, palpitations, abdominal pain, and N/V/D  Denying smoking, etoh, and illicite drug use  ED course-throughout ED stay patient demonstrated stable vital signs  Patient was given 1 L normal saline, 4 mg of Zofran IV, and Valium 2 5 mg IV which alleviated presenting symptoms to some degree  CTA demonstrated Right petroclinoid meningioma  The right superior cerebellar artery and posterior communicating artery branches are intimately associated along the dorsal and medial borders of the lesion  Neurosurgery was consulted and decision was made to admit patient to family medicine service for continued observation  Review of Systems:  Review of Systems   Constitutional: Positive for fatigue  Negative for chills and fever  HENT: Negative for congestion and sore throat  Eyes: Positive for pain and visual disturbance  Respiratory: Negative for cough and shortness of breath  Cardiovascular: Negative for chest pain and palpitations  Gastrointestinal: Negative for abdominal pain, blood in stool, constipation, diarrhea, nausea and vomiting  Endocrine: Negative for polyuria  Genitourinary: Negative for difficulty urinating, dysuria and hematuria  Musculoskeletal: Negative for arthralgias and back pain  Skin: Negative for color change and rash  Neurological: Positive for dizziness and weakness  Negative for seizures, syncope, facial asymmetry and headaches  All other systems reviewed and are negative  Past Medical and Surgical History:   Past Medical History:   Diagnosis Date    Chronic pain     GERD (gastroesophageal reflux disease)     Hypertension     Hyperthyroiditis     HYPERTHYROID; POST IODINE THERAPY     Past Surgical History:   Procedure Laterality Date    APPENDECTOMY       SECTION      HERNIA REPAIR       Meds/Allergies:   Allergies: No Known Allergies  Prior to Admission Medications   Prescriptions Last Dose Informant Patient Reported?  Taking?   acetaminophen (TYLENOL) 325 mg tablet Past Week at Unknown time Self No Yes   Sig: Take 2 tablets (650 mg total) by mouth every 6 (six) hours as needed for mild pain, headaches or fever   dicyclomine (BENTYL) 20 mg tablet 1/28/2022 at Unknown time  No Yes   Sig: Take 1 tablet (20 mg total) by mouth every 6 (six) hours   famotidine (PEPCID) 20 mg tablet 1/28/2022 at Unknown time  No Yes   Sig: Take 1 tablet (20 mg total) by mouth 2 (two) times a day for 14 days   levothyroxine 112 mcg tablet 1/28/2022 at Unknown time Self No Yes   Sig: Take 1 tablet (112 mcg total) by mouth daily   lisinopril (ZESTRIL) 40 mg tablet 1/28/2022 at Unknown time  No Yes   Sig: Take 1 tablet (40 mg total) by mouth daily   meclizine (ANTIVERT) 12 5 MG tablet 1/28/2022 at Unknown time  No Yes   Sig: Take 1 tablet (12 5 mg total) by mouth every 8 (eight) hours as needed for dizziness   meloxicam (MOBIC) 7 5 mg tablet Past Week at Unknown time  No Yes   Sig: Take 1 tablet (7 5 mg total) by mouth daily   omeprazole (PriLOSEC) 20 mg delayed release capsule 1/28/2022 at Unknown time  No Yes   Sig: Take 2 capsules (40 mg total) by mouth 2 (two) times a day   ondansetron (ZOFRAN-ODT) 4 mg disintegrating tablet 1/28/2022 at Unknown time  No Yes   Sig: Take 1 tablet (4 mg total) by mouth every 6 (six) hours as needed for nausea or vomiting   sucralfate (CARAFATE) 1 g tablet 1/28/2022 at Unknown time  No Yes   Sig: Take 1 tablet (1 g total) by mouth 4 (four) times a day      Facility-Administered Medications: None     Social History:     Social History     Socioeconomic History    Marital status: Single     Spouse name: Not on file    Number of children: Not on file    Years of education: Not on file    Highest education level: Not on file   Occupational History    Not on file   Tobacco Use    Smoking status: Never Smoker    Smokeless tobacco: Never Used   Vaping Use    Vaping Use: Never used   Substance and Sexual Activity    Alcohol use: Yes     Comment: occasional     Drug use: No    Sexual activity: Not on file   Other Topics Concern    Not on file   Social History Narrative    AS OF 9/22/15 IN [employer]:        CONSUMES CAFFEINE    CAFFEINE: COFFEE     Social Determinants of Health     Financial Resource Strain: Not on file   Food Insecurity: Not on file   Transportation Needs: Not on file   Physical Activity: Not on file   Stress: Not on file   Social Connections: Not on file   Intimate Partner Violence: Not on file   Housing Stability: Not on file     Patient Pre-hospital Living Situation: Home  Patient Pre-hospital Level of Mobility: Decreased due to dizziness  Patient Pre-hospital Diet Restrictions: None    Family History:  Family History   Problem Relation Age of Onset    Endometrial cancer Family     Ovarian cancer Mother     Heart defect Mother     Heart attack Father     Stroke Brother     Breast cancer Paternal Grandmother 61       Physical Exam:   Vitals:   Blood Pressure: (!) 172/84 (01/29/22 2330)  Pulse: 71 (01/29/22 2330)  Temperature: 98 4 °F (36 9 °C) (01/29/22 1808)  Temp Source: Oral (01/29/22 1808)  Respirations: 21 (01/29/22 2330)  Weight - Scale: 84 1 kg (185 lb 6 5 oz) (01/29/22 1808)  SpO2: 98 % (01/29/22 2330)    Physical Exam  Vitals and nursing note reviewed  Constitutional:       General: She is not in acute distress  Appearance: Normal appearance  She is normal weight  She is not ill-appearing, toxic-appearing or diaphoretic  HENT:      Head: Normocephalic and atraumatic  Eyes:      General: No scleral icterus  Right eye: No discharge  Left eye: No discharge  Extraocular Movements: Extraocular movements intact  Conjunctiva/sclera: Conjunctivae normal       Pupils: Pupils are equal, round, and reactive to light  Comments: Patient notes pressure-like sensation behind right eye  As well as blurriness in right eye     Cardiovascular: Rate and Rhythm: Normal rate and regular rhythm  Pulses: Normal pulses  Heart sounds: Normal heart sounds  No murmur heard  No friction rub  No gallop  Pulmonary:      Effort: Pulmonary effort is normal  No respiratory distress  Breath sounds: Normal breath sounds  No stridor  No wheezing, rhonchi or rales  Abdominal:      General: There is no distension  Palpations: Abdomen is soft  Tenderness: There is no abdominal tenderness  There is no guarding or rebound  Musculoskeletal:         General: No swelling or tenderness  Normal range of motion  Cervical back: Normal range of motion  Right lower leg: No edema  Left lower leg: No edema  Skin:     General: Skin is warm and dry  Coloration: Skin is not jaundiced  Neurological:      Mental Status: She is alert and oriented to person, place, and time  Sensory: Sensory deficit ( abnormal sensory of right-sided face and right upper/lower extremities) present  Motor: Weakness (Right-sided weakness for hand , push/pull strength, and lower extremity) present  Coordination: Coordination normal    Psychiatric:         Mood and Affect: Mood normal          Behavior: Behavior normal          Lab Results: I have personally reviewed pertinent reports      Results from last 7 days   Lab Units 01/29/22  1845   WBC Thousand/uL 7 40   HEMOGLOBIN g/dL 12 9   HEMATOCRIT % 37 9   PLATELETS Thousands/uL 255   NEUTROS PCT % 65   LYMPHS PCT % 25   MONOS PCT % 9   EOS PCT % 1     Results from last 7 days   Lab Units 01/29/22  1845   POTASSIUM mmol/L 3 8   CHLORIDE mmol/L 102   CO2 mmol/L 30   BUN mg/dL 13   CREATININE mg/dL 0 67   CALCIUM mg/dL 8 5   ALK PHOS U/L 82   ALT U/L 33   AST U/L 26   EGFR ml/min/1 73sq m 99   MAGNESIUM mg/dL 2 0     Results from last 7 days   Lab Units 01/29/22  1845   INR  0 92                            Invalid input(s): URIBILINOGEN          Imaging: I have personally reviewed pertinent reports  CTA head and neck with and without contrast    Result Date: 1/29/2022  Narrative: CTA NECK AND BRAIN WITH AND WITHOUT CONTRAST INDICATION: Dizziness and headache  COMPARISON:   Head CT from September 22, 2021 TECHNIQUE:  Routine CT imaging of the Brain without contrast   Post contrast imaging was performed after administration of iodinated contrast through the neck and brain  Post contrast axial 0 625 mm images timed to opacify the arterial system  3D rendering was performed on an independent workstation  MIP reconstructions performed  Coronal reconstructions were performed of the noncontrast portion of the brain  Radiation dose length product (DLP) for this visit:  7421 mGy-cm   This examination, like all CT scans performed in the [facility], was performed utilizing techniques to minimize radiation dose exposure, including the use of iterative reconstruction and automated exposure control  IV Contrast:  100 mL of iohexol (OMNIPAQUE)  IMAGE QUALITY:   Diagnostic FINDINGS: NONCONTRAST BRAIN PARENCHYMA:  No acute intracranial hemorrhage or cortical infarction  Scattered subcortical hypodensities in both cerebral hemispheres likely represents the sequela of chronic microangiopathy  There is also bandlike hypoattenuation in the left temporal  lobe similar to what was noted on the prior head CT study from September 22, 2021  This may be artifactual   There is no new edema, mass effect  There is an enhancing extra-axial lesion along the right destiny clinoid ligament consistent with a meningioma  On postcontrast imaging, this lesion measures approximately 2 1 x 1 4 cm  VENTRICLES AND EXTRA-AXIAL SPACES:  Normal for the patient's age  VISUALIZED ORBITS AND PARANASAL SINUSES:  Fluid level in the right maxillary sinus with scattered frothy secretions  Partial opacification of the right posterior ethmoid air cell secondary of frothy secretions   CERVICAL VASCULATURE AORTIC ARCH AND GREAT VESSELS:  Normal aortic arch and great vessel origins  Normal visualized subclavian vessels  RIGHT VERTEBRAL ARTERY CERVICAL SEGMENT:  Normal origin  The vessel is normal in caliber throughout the neck  LEFT VERTEBRAL ARTERY CERVICAL SEGMENT:  Normal origin  The vessel is normal in caliber throughout the neck  RIGHT EXTRACRANIAL CAROTID SEGMENT:  Mild atherosclerotic disease of the distal common carotid artery and proximal cervical internal carotid artery without significant stenosis compared to the more distal ICA  Less than 30% stenosis at the ICA origin  LEFT EXTRACRANIAL CAROTID SEGMENT:  Normal caliber common carotid artery  Normal bifurcation and cervical internal carotid artery  No stenosis or dissection  NASCET criteria was used to determine the degree of internal carotid artery diameter stenosis  INTRACRANIAL VASCULATURE INTERNAL CAROTID ARTERIES:  Normal enhancement of the intracranial portions of the internal carotid arteries  Normal ophthalmic artery origins  Normal ICA terminus  ANTERIOR CIRCULATION:  Symmetric A1 segments and anterior cerebral arteries with normal enhancement  Normal anterior communicating artery  MIDDLE CEREBRAL ARTERY CIRCULATION:  M1 segment and middle cerebral artery branches demonstrate normal enhancement bilaterally  DISTAL VERTEBRAL ARTERIES:  Normal distal vertebral arteries  Posterior inferior cerebellar artery origins are normal  Normal vertebral basilar junction  BASILAR ARTERY:  Basilar artery is patent  The basilar artery does not abut the lesion  The right superior cerebellar artery is is coursing along the dorsal aspect of the right destiny clinoid ligament meningioma and portions of the vessel are difficult to visualize  The left superior cerebellar arteries unremarkable  POSTERIOR CEREBRAL ARTERIES: Both posterior cerebral arteries arises from the basilar tip  Both posterior cerebral arteries demonstrate normal enhancement     The right posterior communicating artery is noted along the medial border of the lesion  The left posterior communicating artery is small but patent  VENOUS STRUCTURES:  Normal  NON VASCULAR ANATOMY BONY STRUCTURES:  No acute osseous abnormality  Degenerative changes from C4 to C7 characterized by Disc space narrowing and endplate osteophytosis with scattered ossification of the posterior longitudinal ligament  SOFT TISSUES OF THE NECK:  Normal  THORACIC INLET:  Unremarkable  Impression: No acute intracranial abnormality  Mild cerebral chronic microangiopathy disease  Artifact obscures evaluation of the left temporal lobe  Right petroclinoid meningioma  The right superior cerebellar artery and posterior communicating artery branches are intimately associated along the dorsal and medial borders of the lesion  Follow-up MRI imaging with and without contrast and consultation with the neurosurgical service is recommended  No cervical or intracranial large vessel occlusion  Workstation performed: [id number]       EKG, Pathology, and Other Studies Reviewed on Admission:   EKG  Result Date: 01/30/22  Impression:  Normal sinus rhythm, normal axis, normal QRS and ST segment, no QT prolongation      Entire H&P was discussed with Dr Yousif Rogers who agreed to what is noted above    Nagi Mukherjee MD  01/30/22  1:03 AM

## 2022-01-30 NOTE — PLAN OF CARE
Problem: Potential for Falls  Goal: Patient will remain free of falls  Description: INTERVENTIONS:  - Educate patient/family on patient safety including physical limitations  - Instruct patient to call for assistance with activity   - Consult OT/PT to assist with strengthening/mobility   - Keep Call bell within reach  - Keep bed low and locked with side rails adjusted as appropriate  - Keep care items and personal belongings within reach  - Initiate and maintain comfort rounds  - Make Fall Risk Sign visible to staff  - Apply yellow socks and bracelet for high fall risk patients  - Consider moving patient to room near nurses station  Outcome: Progressing     Problem: MOBILITY - ADULT  Goal: Maintain or return to baseline ADL function  Description: INTERVENTIONS:  -  Assess patient's ability to carry out ADLs; assess patient's baseline for ADL function and identify physical deficits which impact ability to perform ADLs (bathing, care of mouth/teeth, toileting, grooming, dressing, etc )  - Assess/evaluate cause of self-care deficits   - Assess range of motion  - Assess patient's mobility; develop plan if impaired  - Assess patient's need for assistive devices and provide as appropriate  - Encourage maximum independence but intervene and supervise when necessary  - Involve family in performance of ADLs  - Assess for home care needs following discharge   - Consider OT consult to assist with ADL evaluation and planning for discharge  - Provide patient education as appropriate  Outcome: Progressing     Problem: MOBILITY - ADULT  Goal: Maintains/Returns to pre admission functional level  Description: INTERVENTIONS:  - Perform BMAT or MOVE assessment daily    - Set and communicate daily mobility goal to care team and patient/family/caregiver     - Collaborate with rehabilitation services on mobility goals if consulted  - Out of bed for toileting  - Record patient progress and toleration of activity level   Outcome: Progressing     Problem: NEUROSENSORY - ADULT  Goal: Achieves stable or improved neurological status  Description: INTERVENTIONS  - Monitor and report changes in neurological status  - Monitor vital signs such as temperature, blood pressure, glucose, and any other labs ordered   - Initiate measures to prevent increased intracranial pressure  - Monitor for seizure activity and implement precautions if appropriate      Outcome: Progressing     Problem: NEUROSENSORY - ADULT  Goal: Achieves maximal functionality and self care  Description: INTERVENTIONS  - Monitor swallowing and airway patency with patient fatigue and changes in neurological status  - Encourage and assist patient to increase activity and self care  - Encourage visually impaired, hearing impaired and aphasic patients to use assistive/communication devices  Outcome: Progressing     Problem: DISCHARGE PLANNING  Goal: Discharge to home or other facility with appropriate resources  Description: INTERVENTIONS:  - Identify barriers to discharge w/patient and caregiver  - Arrange for needed discharge resources and transportation as appropriate  - Identify discharge learning needs (meds, wound care, etc )  - Arrange for interpretive services to assist at discharge as needed  - Refer to Case Management Department for coordinating discharge planning if the patient needs post-hospital services based on physician/advanced practitioner order or complex needs related to functional status, cognitive ability, or social support system  Outcome: Progressing     Problem: Knowledge Deficit  Goal: Patient/family/caregiver demonstrates understanding of disease process, treatment plan, medications, and discharge instructions  Description: Complete learning assessment and assess knowledge base    Interventions:  - Provide teaching at level of understanding  - Provide teaching via preferred learning methods  Outcome: Progressing

## 2022-01-30 NOTE — DISCHARGE SUMMARY
Discharge Summary - Dionna Greene    Patient Information: Sathish Springer 49 y o  female MRN: [id number]  Unit/Bed#: 7T [facility] 715-02 Encounter: [id number]    Discharging Physician / Practitioner: Dr Placido Guthrie  PCP: Melody Serrano MD  Admission Date:   Admission Orders (From admission, onward)     Ordered        01/29/22 2302  Place in Observation  Once            01/29/22 2124  Place in Observation  Once,   Status:  Canceled                      Discharge Date: 01/30/22    Reason for Admission: Persistent Dizziness and Right sided weakness secondary to Right Meningioma noted on CT    Discharge Diagnoses:     Principal Problem:    Meningioma [facility])  Active Problems:    Essential hypertension    Hypothyroidism    Gastroesophageal reflux disease without esophagitis    Weakness of right upper extremity  Resolved Problems:    Ambulatory dysfunction      Consultations During Hospital Stay:  · Neurosurgery    Procedures Performed:   · MRI Brain w wo Contrast    Significant Findings / Test Results:   CTA H&N: IMPRESSION:   No acute intracranial abnormality  Mild cerebral chronic microangiopathy disease  Artifact obscures evaluation of the left temporal lobe  Right petroclinoid meningioma  The right superior cerebellar artery and posterior communicating artery branches are intimately associated along the dorsal and medial borders of the lesion  Follow-up MRI imaging with and without contrast and   consultation with the neurosurgical service is recommended  No cervical or intracranial large vessel occlusion  MRI Brain w wo contrast  Briskly enhancing extra-axial mass in the right prepontine cistern measures 1 8 x 0 6 x 1 8 cm  This is retrospectively visible on the prior MRI of January 6, 2015 on which it measured 1 1 x 1 1 x 0 5 cm  Imaging characteristics are typical for   meningioma with slight interval growth from the prior study  IMPRESSION:  No acute intracranial abnormality    Slight interval growth of meningioma in the right prepontine cistern  This mass measures 1 8 x 0 6 x 1 8 cm compared to 1 1 x 1 1 x 0 5 cm on the prior study  No adjacent parenchymal edema identified  Minimal mass   effect and flattening the right ventral rex  Incidental Findings:   · None     Test Results Pending at Discharge (will require follow up): · None     Outpatient Tests Requested:  · None    Outpatient follow-up Requested:   Neurosurgery within 2 weeks of discharge   PCP   PT/OT    Complications:  None    Hospital Course:     Gianna Cuevas is a 49 y o  female patient who originally presented to the hospital on 1/29/2022 due to chronic dizziness that has been present since November 28, 2021  She has PMH of hypertension, GERD, and hypothyroidism  Dizziness is associated with increasing total-body instability, bilateral leg weakness, and fatigue  Patient also feels pressure-like pain and increasing blurry vision in right eye  Patient notes decreased sensation and increasing weakness of right side of body specifically  She has fallen 3 times with last occurring on morning of presentation to hospital  Denied any history of head trauma with falls  Denied chest pain, SOB, headache, palpitations, abdominal pain, and N/V/D  In the ED, she was given 1 L normal saline, 4 mg of Zofran IV, and Valium 2 5 mg IV which alleviated presenting symptoms to some degree  CTA demonstrated Right petroclinoid meningioma   The right superior cerebellar artery and posterior communicating artery branches are intimately associated along the dorsal and medial borders of the lesion  Neurosurgery was consulted and recommendation for MRI made which was done on 1/30/22       Per chart review, patient has history of headaches dating beyond 2015 and work up with MRI did not reveal anything at the time however review of MRI during this admission by radiology demonstrated meningioma which was noted on CT on this admission and reports of slight increase in size on repeat MRI as noted above  Patient remained stable throughout admission and in fact had decrease of her dizziness and weakness after assessment in the morning  She reports still some blurriness to her vision but neurological exam was significant for right eye temporal decrease of vision field, weakness to right upper extremity 4-/5 and decrease sensation slightliy to right UE and face  Patient was tearful on discussion of condition this morning on assessment but expressed happiness to finally have an answer for her complaints and wanted support to discuss the findings with her  which was provded on his arrival  Further review of imaging with Neurosurgery lead to recommendation for no emergency procedures at this time and for patient to follow up as an outpatient within 2 weeks - Neurosurgery AP will message front staff to contact patient to set appointment  Message sent to our front staff and referral team as well to ensure follow up with Neurosurgery and also connection with PT/OT for neurological symptoms present on admission highly likely related to brain mass          Condition at Discharge: good     Discharge Day Visit / Exam:     Vitals: Blood Pressure: 148/99 (01/30/22 0752)  Pulse: 65 (01/30/22 0752)  Temperature: (!) 96 8 °F (36 °C) (01/30/22 0752)  Temp Source: Temporal (01/30/22 0752)  Respirations: 18 (01/30/22 0752)  Height: 5' 8" (172 7 cm) (01/30/22 0145)  Weight - Scale: 83 1 kg (183 lb 3 2 oz) (01/30/22 0145)  SpO2: 97 % (01/30/22 0752)  Exam:   Physical Exam  Vitals reviewed  Constitutional:       General: She is not in acute distress  Appearance: She is well-developed and normal weight  HENT:      Head: Normocephalic  Nose: Nose normal       Mouth/Throat:      Mouth: Mucous membranes are moist       Pharynx: Oropharynx is clear  Eyes:      General: Visual field deficit present  Extraocular Movements: Extraocular movements intact  Conjunctiva/sclera: Conjunctivae normal       Pupils: Pupils are equal, round, and reactive to light  Comments: Limitation to visual field in right temporal upper range  Neck:      Thyroid: No thyromegaly  Cardiovascular:      Rate and Rhythm: Normal rate and regular rhythm  Pulses: Normal pulses  Heart sounds: Normal heart sounds  No murmur heard  No friction rub  No gallop  Pulmonary:      Effort: Pulmonary effort is normal  No respiratory distress  Breath sounds: Normal breath sounds  No wheezing, rhonchi or rales  Abdominal:      General: Abdomen is flat  Bowel sounds are normal  There is no distension  Palpations: Abdomen is soft  There is no mass  Tenderness: There is no abdominal tenderness  Musculoskeletal:         General: Normal range of motion  Cervical back: Normal range of motion  No rigidity  Right lower leg: No edema  Left lower leg: No edema  Skin:     Findings: No rash  Neurological:      Mental Status: She is alert  GCS: GCS eye subscore is 4  GCS verbal subscore is 5  GCS motor subscore is 6  Sensory: Sensory deficit present  Motor: Weakness present  Coordination: Coordination is intact  Gait: Gait is intact  Deep Tendon Reflexes:      Reflex Scores:       Bicep reflexes are 2+ on the right side and 0 on the left side  Brachioradialis reflexes are 0 on the right side and 0 on the left side  Patellar reflexes are 0 on the right side and 0 on the left side  Comments: Power 4/5 on RUE and RLE; otherwise 5/5  Touch Sensation mildly decreased to right side of face and RUE       Discussion with Family: Support provided to patient as she detailed condition to her    Family updated on results of MRI and need for follow up with specialist for continued management    Discharge instructions/Information to patient and family:   See after visit summary for information provided to patient and family  Discharge Medications:  Acetaminophen 650 mg Oral Every 6 hours PRN  Dicyclomine HCl 20 mg Oral Every 6 hours  Famotidine 20 mg Oral 2 times daily  Levothyroxine Sodium 112 mcg Oral Daily  Lisinopril 40 mg Oral Daily  Meclizine HCl 12 5 mg Oral Every 8 hours PRN  Meloxicam 7 5 mg Oral Daily  Omeprazole 40 mg Oral 2 times daily  Ondansetron 4 mg Oral Every 6 hours PRN  Sucralfate 1 g Oral 4 times daily    Provisions for Follow-Up Care:  See after visit summary for information related to follow-up care and any pertinent home health orders  Disposition:     Home    For Discharges to [facility] SNF:   · Not Applicable to this Patient - Not Applicable to this Patient    Planned Readmission: None     Discharge Statement:  I spent 20 minutes discharging the patient  This time was spent on the day of discharge  I had direct contact with the patient on the day of discharge  Greater than 50% of the total time was spent examining patient, answering all patient questions, arranging and discussing plan of care with patient as well as directly providing post-discharge instructions  Additional time then spent on discharge activities        Lexis Bentley MD  01/30/22  3:06 PM

## 2022-01-30 NOTE — UTILIZATION REVIEW
Initial Clinical Review    Admission: Date/Time/Statement:   Admission Orders (From admission, onward)     Ordered        01/29/22 2302  Place in Observation  Once            01/29/22 2124  Place in Observation  Once,   Status:  Canceled                      Orders Placed This Encounter   Procedures    Place in Observation     Standing Status:   Standing     Number of Occurrences:   1     Order Specific Question:   Level of Care     Answer:   Med Surg [16]     ED Arrival Information     Expected Arrival Acuity    - 1/29/2022 17:55 Urgent         Means of arrival Escorted by Service Admission type    Walk-In Self General Medicine Urgent         Arrival complaint    Adry Imperial in upper mouth, Eyes and ears hurts, Body weakness        Chief Complaint   Patient presents with    Dizziness     states she has been dizzy every day since November 28th  generalized weakness as well    Dental Pain     top of mouth  states she feels 2 balls there       Initial Presentation:    50 Yo female,  To ER from home,   Admitted OBS status/ MS Level of care   for workup of Persistent dizziness and fatigue in setting of  Brain Mass  (discovered w/CTA performed today in ER)       Pt reports dizziness present every day for past 2 mo:  amb dysfunction w/3 recent falls  Has been taking meclizine w/o relief  IN ER,  Pt demonstrated decrease in sensation, motor weakness, and visual blurriness of right-sided body on physical examination  CTA demonstrated Right petroclinoid meningioma   The right superior cerebellar artery and posterior communicating artery branches are intimately associated along the dorsal and medial borders of the lesion      Neurosurgery was consulted,  Recommend stat MRI,  admit for serial neuromonitoring,  Zofran and valium      Date:   1/30     Day 2:      ED Triage Vitals   Temperature Pulse Respirations Blood Pressure SpO2   01/29/22 1808 01/29/22 1806 01/29/22 1806 01/29/22 1808 01/29/22 1806   98 4 °F (36 9 °C) 90 16 (!) 158/108 100 %      Temp Source Heart Rate Source Patient Position - Orthostatic VS BP Location FiO2 (%)   01/29/22 1806 01/29/22 1806 01/29/22 1806 01/29/22 1806 --   Oral Monitor Sitting Left arm       Pain Score       01/30/22 0159       3          Wt Readings from Last 1 Encounters:   01/30/22 83 1 kg (183 lb 3 2 oz)     Additional Vital Signs:     01/29/22 1830 -- 77 19 143/87 105 100 %     01/30/22 0030 -- 71 15 162/97 118 98 % None (Room air)   01/29/22 2330 -- 71 21 172/84  113 98 %      01/30/22 0752 96 8 °F  65 18 148/99 120 97 %       Pertinent Labs/Diagnostic Test Results:   ekg none     1/29  CTA Head and neck w/wo    Right petroclinoid meningioma  The right superior cerebellar artery and posterior communicating artery branches are intimately associated along the dorsal and medial borders of the lesion     Mild cerebral chronic microangiopathy disease    Follow-up MRI imaging  and   consultation with the neurosurgical service is recommended      No cervical or intracranial large vessel occlusion        Results from last 7 days   Lab Units 01/29/22  2356   SARS-COV-2  Negative     Results from last 7 days   Lab Units 01/29/22  1845   WBC Thousand/uL 7 40   HEMOGLOBIN g/dL 12 9   HEMATOCRIT % 37 9   PLATELETS Thousands/uL 255   NEUTROS ABS Thousands/µL 4 80         Results from last 7 days   Lab Units 01/29/22  1845   SODIUM mmol/L 135*   POTASSIUM mmol/L 3 8   CHLORIDE mmol/L 102   CO2 mmol/L 30   ANION GAP mmol/L 3*   BUN mg/dL 13   CREATININE mg/dL 0 67   EGFR ml/min/1 73sq m 99   CALCIUM mg/dL 8 5   MAGNESIUM mg/dL 2 0     Results from last 7 days   Lab Units 01/29/22  1845   AST U/L 26   ALT U/L 33   ALK PHOS U/L 82   TOTAL PROTEIN g/dL 6 9   ALBUMIN g/dL 3 6   TOTAL BILIRUBIN mg/dL 0 45         Results from last 7 days   Lab Units 01/29/22  1845   GLUCOSE RANDOM mg/dL 123*     Results from last 7 days   Lab Units 01/29/22  2250 01/29/22  2053 01/29/22  1845   HS TNI 0HR ng/L  -- --  6   HS TNI 2HR ng/L  --  6  --    HSTNI D2 ng/L  --  0  --    HS TNI 4HR ng/L 6  --   --    HSTNI D4 ng/L 0  --   --          Results from last 7 days   Lab Units 01/29/22  1845   PROTIME seconds 12 0   INR  0 92   PTT seconds 28     Results from last 7 days   Lab Units 01/29/22  1845   TSH 3RD GENERATON uIU/mL 0 577     Results from last 7 days   Lab Units 01/29/22  2356   INFLUENZA A PCR  Negative   INFLUENZA B PCR  Negative   RSV PCR  Negative     ED Treatment:   Medication Administration from 01/29/2022 1754 to 01/30/2022 0140       Date/Time Order Dose Route Action     01/29/2022 1838 sodium chloride 0 9 % bolus 1,000 mL 1,000 mL Intravenous New Bag     01/29/2022 1841 ondansetron (ZOFRAN) injection 4 mg 4 mg Intravenous Given     01/29/2022 1847 diazepam (VALIUM) injection 2 5 mg 2 5 mg Intravenous Given        Past Medical History:   Diagnosis Date    Chronic pain     GERD (gastroesophageal reflux disease)     Hypertension     Hyperthyroiditis     HYPERTHYROID; POST IODINE THERAPY     Present on Admission:   Essential hypertension   Hypothyroidism   Gastroesophageal reflux disease without esophagitis   Ambulatory dysfunction      Admitting Diagnosis: Dizziness [R42]  Abnormal CT scan [R93 89]  Chronic dental pain [K08 9, G89 29]  Age/Sex: 49 y o  female  Admission Orders:    MRI Brain;  Neuro cks q 4 hr;  Routine vs;  Up w/assist; fall precautions  Scheduled Medications:  enoxaparin, 40 mg, Subcutaneous, Daily  famotidine, 20 mg, Oral, BID  levothyroxine, 112 mcg, Oral, Early Morning  lisinopril, 40 mg, Oral, Daily  pantoprazole, 40 mg, Oral, Early Morning  sucralfate, 1 g, Oral, 4x Daily      Continuous IV Infusions:     PRN Meds:  meclizine, 12 5 mg, Oral, Q8H PRN    1/30 @ 0600  ondansetron, 4 mg, Oral, Q6H PRN      Network Utilization Review Department  ATTENTION: Please call with any questions or concerns to [phone] and carefully listen to the prompts so that you are directed to the right person  All voicemails are confidential   Bud Cuello all requests for admission clinical reviews, approved or denied determinations and any other requests to dedicated fax number below belonging to the campus where the patient is receiving treatment   List of dedicated fax numbers for the Facilities:  [address] DENIALS (Administrative/Medical Necessity) [phone]   [address] (Maternity/NICU/Pediatrics) [phone]   [address]  [address] [facility] [address] [address] [place] [address] [address] [facility] [place] [address] [phone]

## 2022-01-30 NOTE — DISCHARGE INSTRUCTIONS
Mareos   LO QUE NECESITA SABER:   El mareo es la sensación de falta de equilibrio o inestabilidad  Las causas comunes del mareo son un desequilibrio del líquido del oído interno o la falta de oxígeno en land norma  [employer] ser [employer] (durar 3 días o menos) o crónicos (durar más de 3 días)  Usted puede llegar a tener episodios de [employer] que schwab de segundos a unas horas  INSTRUCCIONES SOBRE EL JUEN HOSPITALARIA:   Regrese a la ovi de emergencias si:  · Usted tiene dolor de [place] y rigidez en el hosea  · Usted tiene escalofríos con temblores y [place]  · Usted vomita nicole y [place] vez sin [place]  · Land vómito o deposiciones están butts o negras  · Usted tiene dolor en el pecho, espalda o abdomen  · Usted tiene adormecimiento, sobre todo en la primitivo, brazos o piernas  · Usted tiene dificultad para  los brazos o las piernas  · Usted está confundido  Comuníquese con land médico si:  · Tiene fiebre  · Shan síntomas no mejoran con el tratamiento  · Usted tiene preguntas o inquietudes acerca de land condición o cuidado  El manejo de shan síntomas:  · No maneje u opere maquinaria pesada cuando esté mareado  · [place] lentamente cuando esté sentado o acostado  · Pace suficiente líquidos  Los líquidos ayudan a evitar la deshidratación  Pregunte cuánto líquido debe edith cada día y cuáles líquidos son los más adecuados para usted  Acuda a la consulta de control con land médico según las indicaciones: Anote shan preguntas para que se acuerde de hacerlas seun shan visitas  © [employer] 2021 Information is for End User's use only and may not be sold, redistributed or otherwise used for commercial purposes  All illustrations and images included in CareNotes® are the copyrighted property of A D A M  Inc  or [address] [place] [address] es sólo para uso en educación  Land intención no es darle un consejo médico sobre enfermedades o tratamientos   Colsulte con land Ozie Sharp farmacéutico antes de seguir cualquier régimen médico para saber si es seguro y efectivo para usted  Meningioma   LO QUE NECESITA SABER:   Un meningioma es un tumor que comienza en las meninges del cerebro y la médula [place] meninges son los tejidos que cubren el cerebro y la médula gallegos  Estos evitan que los gérmenes y otras sustancias entren al cerebro y a la [place] de los meningiomas crecen lentamente y son benignos (no cancerosos)  INSTRUCCIONES SOBRE EL JUNE HOSPITALARIA:   Medicamentos:  · Los medicamentos Podrían darle medicamentospara eliminar las células tumorales y disminuir el tamaño del meningioma  · [place] shan medicamentos josé antonio se le haya indicado  Consulte con torres médico si usted kanika que torers medicamento no le está ayudando o si presenta efectos secundarios  Infórmele si es alérgico a cualquier medicamento  Mantenga nicole lista actualizada de los [place], las vitaminas y los productos herbales que sina  Incluya los siguientes datos de los medicamentos: cantidad, frecuencia y motivo de administración  Traiga con usted la lista o los envases de las píldoras a shan citas de seguimiento  Lleve la lista de los medicamentos con usted en cathy de nicole emergencia  Programe nicole dread con torres médico o cirujano josé antonio se le indique: Anote shan preguntas para que se acuerde de hacerlas seun shan visitas  Cuidados personales:  · [place] líquidos josé antonio se le haya indicado  Pregunte cuánto líquido debe edith cada día y cuáles líquidos son los más adecuados para usted  Si tiene náuseas o diarrea debido al tratamiento, es posible que torres riesgo de deshidratarse disminuya si sina más líquido  · Consuma alimentos saludables  Los alimentos saludables incluyen frutas, verduras, pan integral, productos lácteos bajos en grasa, frijoles, oneil magras y pescado  [employer] podría ayudarlo a sentirse mejor seun el tratamiento y a disminuir los efectos secundarios   Es posible que necesite cambiar torres dieta seun el tratamiento  No consuma alimentos ni bebidas que le produzcan gases, josé antonio el repollo, los fríjoles, las cebollas o las gaseosas  Un dietista podría ayudarlo a planear las mejores comidas y bocadillos para usted  · Ejercicio  Pida más información acerca de un plan de ejercicio adecuado para usted  Hacer ejercicio podría mejorar torres nivel de energía y el apetito  Comuníquese con torres médico si:  · Tiene fiebre  · Usted vomita repetidamente y no puede retener alimentos o líquidos en el estómago  · Usted tiene un dolor de santiago severo o se siente mareado  · Usted tiene preguntas o inquietudes acerca de torres condición o cuidado  Busque atención médica de inmediato o llame al 911 si:  · Usted tiene alguno de los siguientes signos de derrame cerebral:      ? Adormecimiento o caída de un lado de torres primitivo    ? Debilidad en un [place] o nicole pierna    ? Confusión o debilidad para hablar    ? Mareos o dolor de santiago intenso, o pérdida de la visión  © Copyright [employer] 2021 Information is for End User's use only and may not be sold, redistributed or otherwise used for commercial purposes  All illustrations and images included in CareNotes® are the copyrighted property of A D A haku  or [address] [place] [address] es sólo para uso en educación  Torres intención no es darle un consejo médico sobre enfermedades o tratamientos  Colsulte con torres Sai Victor Hugo farmacéutico antes de seguir cualquier régimen médico para saber si es seguro y efectivo para usted

## 2022-01-30 NOTE — QUICK NOTE
Contacted  By Dr Ino Quezada, for a patient  who presents with intermittent dizziness and room spinning around since November 2021, Has been visiting ER and also saw her PCP  Patient was given Meclizine but wo improvement  No other symptoms and exam non focal     CTA Head and neck w/wo on 1/29/22 demonstrates No acute intracranial abnormality      Mild cerebral chronic microangiopathy disease  Artifact obscures evaluation of the left temporal lobe      Right petroclinoid meningioma  The right superior cerebellar artery and posterior communicating artery branches are intimately associated along the dorsal and medial borders of the lesion  Follow-up MRI imaging with and without contrast and   consultation with the neurosurgical service is recommended        No cervical or intracranial large vessel occlusion  Recommendations:    1  Patient has been repeatedly visiting Hospital for dizziness, consider stat MRI w/wo, to assess the vasculature surroundings the mass and also to study the nature of the suspected mass/meningioma  2  Close neuromonitoring  3  Patient on Zofran and Valium  4  Will review the images once it is done  5   Call with question or concern

## 2022-01-30 NOTE — ED NOTES
Received report on pt  Pt resting quietly  Awaiting admission bed  Offers no complaints at this time        Tacos Guerra RN  01/30/22 7221

## 2022-01-31 ENCOUNTER — TRANSITIONAL CARE MANAGEMENT (OUTPATIENT)
Dept: FAMILY MEDICINE CLINIC | Facility: CLINIC | Age: 57
End: 2022-01-31

## 2022-01-31 ENCOUNTER — TELEPHONE (OUTPATIENT)
Dept: NEUROSURGERY | Facility: CLINIC | Age: 57
End: 2022-01-31

## 2022-01-31 NOTE — TELEPHONE ENCOUNTER
2/1/22- VANESA CALLED AND CONFIRMED 2/16/22 F/U APT   PT IS AWARE    1/31/22- PT Tenzin Benson F/U SCHEDULED 2/16/22  *[facility]    1/30/22- ([facility]) DKO REVIEWED IMAGING AND RECOMMENDS OUTPT F/U  PLEASE CALL PT AND SET UP 2WKS F/U APPT WITH DR Tali Rendon

## 2022-02-08 ENCOUNTER — EVALUATION (OUTPATIENT)
Dept: OCCUPATIONAL THERAPY | Facility: CLINIC | Age: 57
End: 2022-02-08
Payer: COMMERCIAL

## 2022-02-08 ENCOUNTER — EVALUATION (OUTPATIENT)
Dept: PHYSICAL THERAPY | Facility: CLINIC | Age: 57
End: 2022-02-08
Payer: COMMERCIAL

## 2022-02-08 DIAGNOSIS — R26.2 AMBULATORY DYSFUNCTION: ICD-10-CM

## 2022-02-08 DIAGNOSIS — R29.898 WEAKNESS OF RIGHT UPPER EXTREMITY: Primary | Chronic | ICD-10-CM

## 2022-02-08 DIAGNOSIS — R42 DIZZINESS: ICD-10-CM

## 2022-02-08 DIAGNOSIS — D32.9 MENINGIOMA (HCC): ICD-10-CM

## 2022-02-08 DIAGNOSIS — R29.898 WEAKNESS OF RIGHT UPPER EXTREMITY: Chronic | ICD-10-CM

## 2022-02-08 DIAGNOSIS — H53.40 VISUAL FIELD DEFECTS: ICD-10-CM

## 2022-02-08 DIAGNOSIS — R42 DIZZINESS: Primary | ICD-10-CM

## 2022-02-08 PROCEDURE — 97530 THERAPEUTIC ACTIVITIES: CPT

## 2022-02-08 PROCEDURE — 97163 PT EVAL HIGH COMPLEX 45 MIN: CPT

## 2022-02-08 PROCEDURE — 97166 OT EVAL MOD COMPLEX 45 MIN: CPT

## 2022-02-08 PROCEDURE — 97112 NEUROMUSCULAR REEDUCATION: CPT

## 2022-02-08 NOTE — PROGRESS NOTES
OCCUPATIONAL THERAPY INITIAL EVALUATION:    2/8/2022  Kendra Cox  1965  [id number]  ZavalaCharlieCesar Perez An*   Diagnosis ICD-10-CM Associated Orders   1  Weakness of right upper extremity  R29 898 Ambulatory referral to Occupational Therapy   2  Dizziness  R42 Ambulatory referral to Occupational Therapy   3  Meningioma ([address] )  D32 9 Ambulatory referral to Occupational Therapy   4  Ambulatory dysfunction  R26 2 Ambulatory referral to Occupational Therapy   5  Visual field defects  H53 40 Ambulatory referral to Occupational Therapy         Assessment/Plan    Skilled Analysis:  Kendra Cox is a 49 y o  female referred to Occupational Therapy s/p Weakness of right upper extremity [R29 898]  Pt participated in skilled OT evaluation and following formalized testing as well as clinical observation, Pt presents with the following areas of deficit:  Pain in RUE (cervical to [facility] joint) with IR/ER motions, increased paresthesia's in RUE (increased with reverse phalen's test), decreased strength in RUE > LUE, decreased endurance and activity tolerance, decreased FMS and manipulation skills in B/L hands  Marina Cole also participated in beginning of vision screen, demonstrating challenges with saccades in far R visual field  Pt also presenting with decreased habits and routines including sleep habits impacting fatigue levels during the day  Pt educated on assessment results throughout IE  Pt will benefit from skilled Occupational Therapy services 2x/week for 12 weeks with focus on NMRE, Therapeutic exercise, therapeutic activity, Manual Trx, Self-care to improve QOL with ADL's and IADL's  Kendra Cox is in agreement with POC      Motor Short Term Goals (4-6) WKS    STRENGTH/MOTOR  ·  Pt will increase R UE proximal shoulder (flexion/extension) strength to 4-/5  through the use of strengthening exercises and HEP for improving performance during OH reaching for hair washing, grabbing items from kitchen cabinets 4 wks · Pt will increase R UE distal wrist strength to 4-/5  through the use of strengthening exercises and HEP for improved leisure performance and tolerance during planting tasks 4 wks  · Pt will demo with G tolerance to seated and in stance exercise x 20-30 minutes with minimal rest breaks required for increased engagement in life roles and weekly exercise regimen 4 wks  · Pt will demo with F carryover of Home Exercise Program to improve functional progression towards goals in Plan of care and for improved functional use of RUE 4 wks    PAIN/FUNCTION  · Pt will report a decrease in RUE pain to 4/10 through the use of modalities (TENS/hot packs) and HEP for improved performance during IADL's, cooking tasks, and plant management 4 wks    FMC/GMC  · Pt will increase RUE rate of manipulation by 3-5 seconds for all FM tests (9 hold and fnxl dex) for improved functional performance with salient tasks 4 wks  · Pt will increase RUE Reaction time  to < 3 seconds with hand to target timed trials for improved reaction time and automaticity of coordination for improved functional performance with ADLs/IADLs and salient tasks 4 wks  · Pt will increase RUE in hand manipulation skills (rotation/translation) with < 15% objects dropped to improve performance for money mgmt 4 wks    MODALITIES  · Pt will tolerate NMES/IASTM for improved motor and sensory performance for overall improved strength and sensation to inc safety and engagement with ADL/IADL fxn 4 wks      VISION (MORE GOALS TO BE ADDED AS NEEDED NEXT SESSION)  · Pt will increase oculomotor control for improved saccades tasks for improved reading, near to far accuracy, and screen time x 80% accuracy with minimal increase in symptoms (eye pain) 4 wks        Motor Long Term Goals    STRENGTH/MOTOR  ·  Pt will increase R UE proximal shoulder (flexion/extension) strength to 4+/5  through the use of strengthening exercises and HEP for improving performance during OH reaching for hair washing, grabbing items from kitchen cabinets   · Pt will increase R UE distal wrist strength to 4+/5  through the use of strengthening exercises and HEP for improved leisure performance and tolerance during planting tasks  · Pt will demo with G tolerance to seated and in stance exercise x 50-60 minutes with minimal rest breaks required for increased engagement in life roles and weekly exercise regimen   · Pt will demo with G carryover of Home Exercise Program to improve functional progression towards goals in Plan of care and for improved functional use of RUE    PAIN/FUNCTION  · Pt will report a decrease in RUE pain to < 3/10 through the use of modalities (TENS/hot packs) and HEP for improved performance during IADL's, cooking tasks, and plant management    FMC/GMC  · Pt will increase RUE rate of manipulation by 5-7 seconds for all FM tests (9 hold and fnxl dex) for improved functional performance with salient tasks  · Pt will increase RUE Reaction time  to < 2 seconds with hand to target timed trials for improved reaction time and automaticity of coordination for improved functional performance with ADLs/IADLs and salient tasks  · Pt will increase RUE in hand manipulation skills (rotation/translation) with < 5% objects dropped to improve performance for money mgmt     MODALITIES  · Pt will tolerate NMES/IASTM/TENS for improved motor and sensory performance for overall improved strength and sensation to inc safety and engagement with ADL/IADL fxn      VISION (MORE GOALS TO BE ADDED AS NEEDED NEXT SESSION)  · Pt will increase oculomotor control for improved saccades tasks for improved reading, near to far accuracy, and screen time > 95% accuracy with minimal increase in symptoms (eye pain)          Subjective    SUBJECTIVE: "I want the dizziness to go away  It is killing me "    PATIENT GOAL: "I am so tired, I want to feel better!  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   The right superior cerebellar artery and posterior communicating artery branches are intimately associated along the dorsal and medial borders of the lesion   Per chart review, patient has history of headaches dating beyond 2015 and work up with MRI did not reveal anything at the time however review of MRI during this admission by radiology demonstrated meningioma which was noted on CT on this admission and reports of slight increase in size on repeat MRI as noted above  Neurosurgery recommended for no emergency procedures, pt has a f/u on 2/9/22  Patient has PMH of hypertension, GERD, and hypothyroidism, chronic pain  Patient presents today with reports of dizziness when lying in bed and then transitioning to sitting upright, has fallen when moving to quick from EOB  Pt also reports blurriness in Left eye and reports pressure-like pain, wears glasses for reading  Pt denies headaches  Pt reports weakness in RUE > LUE, altered sensations in RUE and RLE and report of tingling in R hand D3-D5 and R foot 3-5 as well, says that it will wake her up and last several hours  Pt also says she has been feeling very fatigued, reporting poor sleep habits (last night reports that she barely slept) which is impacting her work role  Pt is feeling emotional and somewhat depressed reporting that she is crying a lot at home  Kaci Grady resides at home with her daughter and her mother, in a 3 story home  Pt is completing all ADL's and IADL's (cooking/cleaning/managing medications) independently with increased time  Pt is also driving, does not report any challenges with this  Pt is a supervisor at [employer]  Pt likes to go to the gym, last time she was there was in Dec 2021  Pt also has decreased laptop / screen time and would like to engage in this more  Pt likes to plant in her free time         PMH:   Past Medical History:   Diagnosis Date    Chronic pain     GERD (gastroesophageal reflux disease)     Hypertension     Hyperthyroiditis     HYPERTHYROID; POST IODINE THERAPY       Pain Levels   Restin    With Activity:  6    Objective    Impairment Observations:    ANTONY Lopez           UPPER EXTREMITY FUNCTION   Impaired Intact Dominant Hand: RIGHT     /PINCH STRENGTH             Dynamometer    - Gross Grasp 35 lbs 64 lbs abnormal   Pinch Meter     - Pincer 3 5 lbs 9 lbs abnormal    - Tripod 5 lbs 14 lbs abnormal    - Lateral 4 5 lbs  15 lbs abnormal     AROM (Seated)             Elevation WNL for all movements WNL for all movements *Pain reported in [facility], [place], operated by [facility] joint / cervical) during shoulder external and internal rotation only   Shoulder FF      Shoulder Ext      Shoulder Abd      Shoulder Add      Horizontal Abd      Horizontal Add      Elbow Flex      Elbow Ext      Pronation      Supination      Wrist Flex      Wrist Ext      Digit Flex      Digit Ex      Composite Grasp      Hook Grasp      Subluxation         MMT             Shoulder FF 3+/5  *Sharp Shooting Pain 4/5    Shoulder Ext 3+/5  *Sharp Shooting Pain 4-/5    Shoulder Abduction 3+/5  *A little pain 4-/5    Shoulder Adduction 3+/5 4-/5    Elbow Flex 4-/5 4+/5    Elbow Ext 4-/5 4-/5    Wrist Flex 3+/5 4+/5    Wrist Ext 3+/5 4+/5    Gross Grasp 3+/5 4/5      SENSATION      Monofilament Testing     2 83 mm  (slow to ID where)    3 61 mm ulnar distal forearm 2 83 mm  Normal: 2 83 mm    Diminished light touch: 3 61 mm   Bahi Gala / Tiffanie Crum Intact Intact    Proprioception Intact   Intact    Hot/Cold Temp Intact Intact    Stereognosis  Intact Intact      COORDINATION      Opposition Impaired    *To lateral D5 and decreased speed Intact    Finger to Nose Impaired    *With vision occluded Intact    Rapid Alternating Movement Intact Intact Delayed speed   9 Hole Peg Test 28 76 seconds 20 92 seconds low in R    Fxnl Dexterity Test 25 56 seconds 25 94 seconds low   Decreased manipulation/rotational skills    Stanford Hand Function Test Unable to test today        - Handwriting         - Card Turning         - Small Item p/u         - Simulated Feeding         - Stacking Checkers         - Moving Light Objects         - Moving Heavy Objects      Concentric Circles Test (tremors)            REVERSE PHALENS TEST: Positive, increased tingling in RUE, unable to tolerate full minute   Indicative of median nerve sensitivity / injury*      Vision Screen    *Reading glasses  *Eye exam 1 yr ago   2/8/22  (WILL COMPLETE NEXT SESSION)     Visual acuity R 20/, L 20/    Binocularity, far     Binocularity, near     Accommodation     Red/Green Glasses     Convergence No diplopia reported,   G convergence      reported L eye pain*    Pepito string       Pursuits Intact       Saccades Slightly Jerky, shifting in vertical and diagonal planes (extreme Right side)    ROM Intact      Visual perceptual midline test       2-person confrontation test           OTHER PLANNED THERAPY INTERVENTIONS:   Supine, seated, and in stance neuro re-ed  Task-Oriented Training  NMES/FES  Hot Packs for pain RUE  TENS for pain RUE  FMC/prehension  GMC  Proximal to distal teaming  Timed Trials / Reaction Time  Manual tx  IASTM RUE  Hand to target  Sensory re-ed (Cutaneous/Proprioceptive)  Seated functional reach: crossing midline  WBearing strategies   Closed chain activities  Open chain activities  HEP: Median nerve glides*  Orthotic Development (possibly for night time sleep / tingling)  Sleep strategies to prevent tingling    INTERVENTION COMMENTS:  Diagnosis: Weakness of right upper extremity [R29 192]  Precautions: RUE pain/tingling, Chronic pain hx, Dizziness*  FOTO: Completed*  Insurance: Payor: Edie Human / Plan: Edie Human / Product Type: Medicaid HMO /   1 of 24 visits, PN due 3/10/22

## 2022-02-08 NOTE — PROGRESS NOTES
PT Evaluation     Today's date: 2022  Patient name: Jada Oden  : 1965  MRN: [id number]  Referring provider: Manjinder Vital  Dx:   Encounter Diagnosis     ICD-10-CM    1  Dizziness  R42 Ambulatory referral to Physical Therapy   2  Meningioma ([address] )  D32 9 Ambulatory referral to Physical Therapy   3  Ambulatory dysfunction  R26 2 Ambulatory referral to Physical Therapy   4  Weakness of right upper extremity  R29 898 Ambulatory referral to Physical Therapy       Start Time: 0900  Stop Time: 1000  Total time in clinic (min): 60 minutes    Assessment  Assessment details: Patient presents to outpatient physical therapy with recently diagnosed cerebellar meningioma and dizziness  Patient presents with central signs of hypometric saccades horizontally and vertically, impaired smooth pursuit to R side, and impaired head thrust and VOR to R side  Positional testing for BPPV not performed during initial evaluation due to limited time, to be performed during follow up session  Patient presents with decreased balance and increased fall risk based on decreased gait speed, FGA score of 18/30, and significant difficulty with performing MCTSIB during eyes closed on foam task  She also presents with impaired coordination on R side, as well as decreased LE strength and endurance from 5xSTS time  Patient would benefit from skilled physical therapy to improve LE strength and endurance, improve balance and decrease fall risk, improve functional mobility and decrease dizziness, and improve QOL  Impairments: abnormal coordination, impaired balance, impaired physical strength and lacks appropriate home exercise program  Understanding of Dx/Px/POC: good   Prognosis: good    Goals  ST  Pt will improve gait speed to at least 1 1 m/s  within 4 weeks needed to improve safety with ambulation  2  Pt will improve FGA score by at least 3 points within 4 weeks needed to reduce fall risk    3  Pt will improve 5xSTS score by at least 3 seconds within 4 weeks needed to reduce fall risk  4  Pt will have negative positional testing for BPPV within 4 weeks  5  Pt will demonstrate independence with HEP within 4 weeks  LT  Pt will improve gait speed to at least 1 22 m/s with least restrictive device within 8 weeks needed to improve safety with ambulation  2  Pt will improve FGA score to at least 25/30 within 8 weeks needed to reduce fall risk  3  Pt will improve MCTSIB condition of eyes closed on foam to 30 seconds to improve balance  4  Pt will demonstrate independence with HEP within 8 weeks  Plan  Patient would benefit from: skilled physical therapy  Planned therapy interventions: manual therapy, balance, balance/weight bearing training, neuromuscular re-education, canalith repositioning, patient education, postural training, coordination, strengthening, stretching, therapeutic activities, therapeutic exercise, gait training and home exercise program  Frequency: 2x week  Duration in weeks: 8  Plan of Care beginning date: 2022  Plan of Care expiration date: 2022  Treatment plan discussed with: patient        Subjective Evaluation    History of Present Illness  Mechanism of injury: Patient reports she has had dizziness starting in November, stating she gets it when she moves her head fast  She states it started when she got the 3rd dose of the COVID vaccine, the next day  Reports the dizziness is worse when she lays down  States that it feels that the room is spinning, lasts for about 1 minute and then goes away  States she gets dizziness when she rolls onto either side, worse on the R side  She states that she gets dizzy when she bends forward to put on her shoes  Sometimes gets dizzy with head turns  States that she gets some eye pain on the R side  She using eye drops on the R eye starting yesterday  Patient reports she had a scan of her head 22, meningioma on R side  Per medical chart in cerebellum  Neurologist appointment on 22  Patient reports she has weakness on the R side, with some pain and tingling on the 3 ulnar side fingers and 3 lateral toes  Reports that the tingling started in the summer  Neck pain across the top of her shoulders  States she has been taking meclizine 3x/day since November  Pain  Current pain ratin  At best pain ratin  At worst pain ratin  Location: R arm  Quality: needle-like  Relieving factors: medications    Social Support  Steps to enter house: yes  2  Stairs in house: yes   24  Lives in: multiple-level home  Lives with: parents and adult children (daughter and mother)    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[employer])  Patient Goals  Patient goal: decrease dizziness        Objective     Concurrent Complaints  Positive for nausea/motion sickness, tinnitus, hearing loss and aural fullness (L>R, and sensitivity)  Negative for headachesVisual change: pain in L eye  Active Range of Motion   Cervical/Thoracic Spine       Cervical    Left rotation: Neck active rotation left: slight decreased rotation bilaterally  Neuro Exam:     Dizziness  Positive for disequilibrium and vertigo  Negative for oscillopsia, motion sickness and diplopia  Exacerbating factors  Positive for bending over, rolling in bed, looking up (somtimes), turning head and supine to/from sitting  Negative for optokinetic movement       Symptoms   Intensity at best: 0/10  Intensity at worst: 10/10  Average intensity: 10/10    Headaches   Patient reports headaches: No      Cervical exam   Modified VBI   Left: asymptomatic  Right: asymptomatic  Seated posture: forward head posture and internally rotated shoulders    Oculomotor exam   Oculomotor ROM: WNL  Gaze holding nystagmus: not present left  and not present right  Smooth pursuits: saccadic smooth pursuit and moving to R side  Vertical saccades: hypometric  Horizontal saccades: hypometric   Convergence: normal  Head thrust: left normal (dizziness)  Head thrust: right abnormal         Balance Test IE   6 Minute Walk Test (ft): NT   FGA 18/30   Gait Speed (ft/s): 0 98m/s   5x Sit To Stand (s): 16 41sec without UE's   TU 13sec          MCTSIB Number of Seconds   Feet Together, Eyes Open 30   Feet Together, Eyes Closed 30, increased sway   Feet Together, Eyes Open Foam 30   Feet Together, Eyes Closed Foam 5 sec       Flowsheet Rows      Most Recent Value   Functional Gait Assessment    Gait Level Surface  2   Change in GaiT Speed 3   Gait with horizontal head turns 2   Gait with vertical head turns 1   Gait and pivot tuen  3   Step over obstacle 1   Gait with narrow base of supprt 1   Gait with eyes closed 2   Ambulating backwards 2   Steps 1   Total score  18              Coordination Left Right   Finger To Nose Impaired intact   Rapid Alternating Movement     UE impaired intact   LE impaired impaired       Sensation Left Right   Kinesthesia NT NT   Light Touch impaired intact   Sharp/Dull NT NT   2 Point Discrimination NT NT       Manual Muscle Testing - Hip Left Right   Flexion 5 4   Abduction 4+ 4+   Adduction 4+ 4+     Manual Muscle Testing - Knee Left Right   Flexion 5 4+   Extension 5 4+     Manual Muscle Testing - Ankle Left Right   Doriflexion 5 4+          Gait Assessment: Patient ambulates with slightly increased KRISTAL, decreased speed  Some path deviation with head turns and nods and decreased gait speed           Precautions: HTN, meningioma      Manuals 2/8                                                                Neuro Re-Ed             tandem             Foam EC             Foam HT/HN             VOR x 1             hurdles             backwards             Step up/down             HKM                          Canal positioning testing  NV                                                                                          Ther Activity                                       Gait Training Modalities              edu on results of testing, balance strategies, disease specific information and dizziness

## 2022-02-09 ENCOUNTER — OFFICE VISIT (OUTPATIENT)
Dept: FAMILY MEDICINE CLINIC | Facility: CLINIC | Age: 57
End: 2022-02-09

## 2022-02-09 VITALS
RESPIRATION RATE: 17 BRPM | BODY MASS INDEX: 26.91 KG/M2 | WEIGHT: 177 LBS | DIASTOLIC BLOOD PRESSURE: 60 MMHG | TEMPERATURE: 97.7 F | OXYGEN SATURATION: 98 % | HEART RATE: 110 BPM | SYSTOLIC BLOOD PRESSURE: 102 MMHG

## 2022-02-09 DIAGNOSIS — K21.9 GASTROESOPHAGEAL REFLUX DISEASE WITHOUT ESOPHAGITIS: ICD-10-CM

## 2022-02-09 DIAGNOSIS — R42 DIZZINESS: ICD-10-CM

## 2022-02-09 DIAGNOSIS — E03.9 HYPOTHYROIDISM, UNSPECIFIED TYPE: ICD-10-CM

## 2022-02-09 DIAGNOSIS — I10 ESSENTIAL HYPERTENSION: Primary | ICD-10-CM

## 2022-02-09 DIAGNOSIS — Z23 FLU VACCINE NEED: ICD-10-CM

## 2022-02-09 DIAGNOSIS — D32.9 MENINGIOMA (HCC): ICD-10-CM

## 2022-02-09 PROCEDURE — 3074F SYST BP LT 130 MM HG: CPT | Performed by: FAMILY MEDICINE

## 2022-02-09 PROCEDURE — 90471 IMMUNIZATION ADMIN: CPT | Performed by: FAMILY MEDICINE

## 2022-02-09 PROCEDURE — 90682 RIV4 VACC RECOMBINANT DNA IM: CPT | Performed by: FAMILY MEDICINE

## 2022-02-09 PROCEDURE — 3078F DIAST BP <80 MM HG: CPT | Performed by: FAMILY MEDICINE

## 2022-02-09 PROCEDURE — 99213 OFFICE O/P EST LOW 20 MIN: CPT | Performed by: FAMILY MEDICINE

## 2022-02-09 RX ORDER — MECLIZINE HCL 12.5 MG/1
12.5 TABLET ORAL EVERY 8 HOURS PRN
Qty: 30 TABLET | Refills: 0 | Status: SHIPPED | OUTPATIENT
Start: 2022-02-09

## 2022-02-09 RX ORDER — LEVOTHYROXINE SODIUM 112 UG/1
112 TABLET ORAL DAILY
Qty: 30 TABLET | Refills: 2 | Status: SHIPPED | OUTPATIENT
Start: 2022-02-09 | End: 2022-05-19

## 2022-02-09 RX ORDER — SUCRALFATE 1 G/1
1 TABLET ORAL 4 TIMES DAILY
Qty: 120 TABLET | Refills: 0 | Status: SHIPPED | OUTPATIENT
Start: 2022-02-09 | End: 2022-03-10

## 2022-02-09 RX ORDER — OMEPRAZOLE 20 MG/1
40 CAPSULE, DELAYED RELEASE ORAL 2 TIMES DAILY
Qty: 60 CAPSULE | Refills: 5 | Status: SHIPPED | OUTPATIENT
Start: 2022-02-09 | End: 2022-03-08

## 2022-02-09 NOTE — ASSESSMENT & PLAN NOTE
Patient recently presented to ED in late January with dizziness and fatigue  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  The right superior cerebellar artery and posterior communicating artery branches are intimately associated along the dorsal and medial borders of the lesion  MRI Brain: mass measuring 1 8 x 0 6 x 1 8 cm (compared to 1 1 x 1 1 x 0 5 cm on the prior study)  No adjacent parenchymal edema identified  Minimal mass effect and flattening the right ventral rex  Patient has appointment with Neurosurgery on 2/16/2022, and has started PT/OT as well       - Continue following with Neurosurgery and continue to monitor

## 2022-02-09 NOTE — ASSESSMENT & PLAN NOTE
TSH from 1/2022 normal at 0 577     - Continue Levothyroxine 112 mcg daily  - Continue to monitor    - Refills sent

## 2022-02-09 NOTE — PROGRESS NOTES
Assessment/Plan:    Essential hypertension  BP Today: 102/60  At goal per JNC-8 guidelines  - Continue Lisinopril 40 mg daily  Hypothyroidism  TSH from 1/2022 normal at 0 577     - Continue Levothyroxine 112 mcg daily  - Continue to monitor    - Refills sent  Meningioma [facility])  Patient recently presented to ED in late January with dizziness and fatigue  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  The right superior cerebellar artery and posterior communicating artery branches are intimately associated along the dorsal and medial borders of the lesion  MRI Brain: mass measuring 1 8 x 0 6 x 1 8 cm (compared to 1 1 x 1 1 x 0 5 cm on the prior study)  No adjacent parenchymal edema identified  Minimal mass effect and flattening the right ventral rex  Patient has appointment with Neurosurgery on 2/16/2022, and has started PT/OT as well       - Continue following with Neurosurgery and continue to monitor  Diagnoses and all orders for this visit:    Essential hypertension    Gastroesophageal reflux disease without esophagitis  -     omeprazole (PriLOSEC) 20 mg delayed release capsule; Take 2 capsules (40 mg total) by mouth 2 (two) times a day  -     sucralfate (CARAFATE) 1 g tablet; Take 1 tablet (1 g total) by mouth 4 (four) times a day    Hypothyroidism, unspecified type  -     levothyroxine 112 mcg tablet; Take 1 tablet (112 mcg total) by mouth daily    Dizziness  -     meclizine (ANTIVERT) 12 5 MG tablet; Take 1 tablet (12 5 mg total) by mouth every 8 (eight) hours as needed for dizziness    Flu vaccine need  -     influenza vaccine, quadrivalent, recombinant, PF, 0 5 mL, for patients 18 yr+ (FLUBLOK)    Meningioma (HCC)          Subjective:      Patient ID: Kota Reyna is a 49 y o  female      Kota Reyna is a pleasant 44-year-old female with PMH of chronic pain syndrome, hypothyroidism, and hypertension who presents to the clinic today for a follow up on her chronic conditions  She saw Neurosurgery in 5/2021, who recommended no surgical intervention; has another appt with them later this month  She will be receiving the flu shot today         The following portions of the patient's history were reviewed and updated as appropriate: allergies, current medications, past family history, past medical history, past social history, past surgical history and problem list     Review of Systems   Constitutional: Negative for activity change, appetite change, chills and fever  HENT: Negative for sore throat  Respiratory: Negative for cough and shortness of breath  Cardiovascular: Negative for chest pain, palpitations and leg swelling  Gastrointestinal: Negative for abdominal pain, constipation, diarrhea, nausea and vomiting  Musculoskeletal: Negative for back pain and gait problem  Neurological: Positive for dizziness  Negative for seizures, weakness and headaches  Objective:    /60 (BP Location: Left arm, Patient Position: Sitting, Cuff Size: Adult)   Pulse (!) 110   Temp 97 7 °F (36 5 °C) (Temporal)   Resp 17   Wt 80 3 kg (177 lb)   LMP 01/30/2022   SpO2 98%   BMI 26 91 kg/m²        Physical Exam  Vitals reviewed  Constitutional:       General: She is not in acute distress  Appearance: She is well-developed  She is not diaphoretic  HENT:      Head: Normocephalic and atraumatic  Mouth/Throat:      Mouth: Mucous membranes are moist       Pharynx: Oropharynx is clear  Eyes:      Extraocular Movements: Extraocular movements intact  Conjunctiva/sclera: Conjunctivae normal       Pupils: Pupils are equal, round, and reactive to light  Comments: Left eye mildly watering   Cardiovascular:      Rate and Rhythm: Normal rate and regular rhythm  Heart sounds: Normal heart sounds  No murmur heard  No friction rub  No gallop  Pulmonary:      Effort: Pulmonary effort is normal  No respiratory distress        Breath sounds: Normal breath sounds  No wheezing or rales  Abdominal:      General: Bowel sounds are normal  There is no distension  Palpations: Abdomen is soft  There is no mass  Tenderness: There is no abdominal tenderness  There is no guarding  Musculoskeletal:         General: No tenderness or deformity  Normal range of motion  Cervical back: Normal range of motion  Right lower leg: No edema  Left lower leg: No edema  Skin:     General: Skin is warm and dry  Neurological:      General: No focal deficit present  Mental Status: She is alert and oriented to person, place, and time             Marcus Meneses MD  2/9/2022

## 2022-02-10 ENCOUNTER — OFFICE VISIT (OUTPATIENT)
Dept: OCCUPATIONAL THERAPY | Facility: CLINIC | Age: 57
End: 2022-02-10
Payer: COMMERCIAL

## 2022-02-10 ENCOUNTER — OFFICE VISIT (OUTPATIENT)
Dept: PHYSICAL THERAPY | Facility: CLINIC | Age: 57
End: 2022-02-10
Payer: COMMERCIAL

## 2022-02-10 ENCOUNTER — TELEPHONE (OUTPATIENT)
Dept: GASTROENTEROLOGY | Facility: MEDICAL CENTER | Age: 57
End: 2022-02-10

## 2022-02-10 DIAGNOSIS — D32.9 MENINGIOMA (HCC): ICD-10-CM

## 2022-02-10 DIAGNOSIS — R42 DIZZINESS: Primary | ICD-10-CM

## 2022-02-10 DIAGNOSIS — R29.898 WEAKNESS OF RIGHT UPPER EXTREMITY: ICD-10-CM

## 2022-02-10 DIAGNOSIS — H53.40 VISUAL FIELD DEFECTS: Primary | ICD-10-CM

## 2022-02-10 DIAGNOSIS — R26.2 AMBULATORY DYSFUNCTION: ICD-10-CM

## 2022-02-10 PROCEDURE — 97110 THERAPEUTIC EXERCISES: CPT

## 2022-02-10 PROCEDURE — 97140 MANUAL THERAPY 1/> REGIONS: CPT

## 2022-02-10 PROCEDURE — 97112 NEUROMUSCULAR REEDUCATION: CPT

## 2022-02-10 NOTE — PROGRESS NOTES
OCCUPATIONAL THERAPY INITIAL EVALUATION:    2/8/2022  Caroline Zuniga  1965  [id number]  Shiela Cesar An*   Diagnosis ICD-10-CM Associated Orders   1  Visual field defects  H53 40          Assessment/Plan    Skilled Analysis:  Caroline Zuniga is a 49 y o  female referred to Occupational Therapy s/p Visual field defects [H53 40]  Pt participated in skilled OT evaluation and following formalized testing as well as clinical observation, Pt presents with the following areas of deficit:  Pain in RUE (cervical to [address] joint) with IR/ER motions, increased paresthesia's in RUE (increased with reverse phalen's test), decreased strength in RUE > LUE, decreased endurance and activity tolerance, decreased FMS and manipulation skills in B/L hands  Chas Menchaca also participated in beginning of vision screen, demonstrating challenges with saccades in far R visual field  Pt also presenting with decreased habits and routines including sleep habits impacting fatigue levels during the day  Pt educated on assessment results throughout IE  Pt will benefit from skilled Occupational Therapy services 2x/week for 12 weeks with focus on NMRE, Therapeutic exercise, therapeutic activity, Manual Trx, Self-care to improve QOL with ADL's and IADL's  Caroline Zuniga is in agreement with POC      Motor Short Term Goals (4-6) WKS    STRENGTH/MOTOR  ·  Pt will increase R UE proximal shoulder (flexion/extension) strength to 4-/5  through the use of strengthening exercises and HEP for improving performance during OH reaching for hair washing, grabbing items from kitchen cabinets 4 wks   · Pt will increase R UE distal wrist strength to 4-/5  through the use of strengthening exercises and HEP for improved leisure performance and tolerance during planting tasks 4 wks  · Pt will demo with G tolerance to seated and in stance exercise x 20-30 minutes with minimal rest breaks required for increased engagement in life roles and weekly exercise regimen 4 wks  · Pt will demo with F carryover of Home Exercise Program to improve functional progression towards goals in Plan of care and for improved functional use of RUE 4 wks    PAIN/FUNCTION  · Pt will report a decrease in RUE pain to 4/10 through the use of modalities (TENS/hot packs) and HEP for improved performance during IADL's, cooking tasks, and plant management 4 wks    FMC/GMC  · Pt will increase RUE rate of manipulation by 3-5 seconds for all FM tests (9 hold and fnxl dex) for improved functional performance with salient tasks 4 wks  · Pt will increase RUE Reaction time  to < 3 seconds with hand to target timed trials for improved reaction time and automaticity of coordination for improved functional performance with ADLs/IADLs and salient tasks 4 wks  · Pt will increase RUE in hand manipulation skills (rotation/translation) with < 15% objects dropped to improve performance for money mgmt 4 wks    MODALITIES  · Pt will tolerate NMES/IASTM for improved motor and sensory performance for overall improved strength and sensation to inc safety and engagement with ADL/IADL fxn 4 wks      VISION (MORE GOALS TO BE ADDED AS NEEDED NEXT SESSION)  · Pt will increase oculomotor control for improved saccades tasks for improved reading, near to far accuracy, and screen time x 80% accuracy with minimal increase in symptoms (eye pain) 4 wks        Motor Long Term Goals    STRENGTH/MOTOR  ·  Pt will increase R UE proximal shoulder (flexion/extension) strength to 4+/5  through the use of strengthening exercises and HEP for improving performance during OH reaching for hair washing, grabbing items from kitchen cabinets   · Pt will increase R UE distal wrist strength to 4+/5  through the use of strengthening exercises and HEP for improved leisure performance and tolerance during planting tasks  · Pt will demo with G tolerance to seated and in stance exercise x 50-60 minutes with minimal rest breaks required for increased engagement in life roles and weekly exercise regimen   · Pt will demo with G carryover of Home Exercise Program to improve functional progression towards goals in Plan of care and for improved functional use of RUE    PAIN/FUNCTION  · Pt will report a decrease in RUE pain to < 3/10 through the use of modalities (TENS/hot packs) and HEP for improved performance during IADL's, cooking tasks, and plant management    FMC/GMC  · Pt will increase RUE rate of manipulation by 5-7 seconds for all FM tests (9 hold and fnxl dex) for improved functional performance with salient tasks  · Pt will increase RUE Reaction time  to < 2 seconds with hand to target timed trials for improved reaction time and automaticity of coordination for improved functional performance with ADLs/IADLs and salient tasks  · Pt will increase RUE in hand manipulation skills (rotation/translation) with < 5% objects dropped to improve performance for money mgmt     MODALITIES  · Pt will tolerate NMES/IASTM/TENS for improved motor and sensory performance for overall improved strength and sensation to inc safety and engagement with ADL/IADL fxn      VISION (MORE GOALS TO BE ADDED AS NEEDED NEXT SESSION)  · Pt will increase oculomotor control for improved saccades tasks for improved reading, near to far accuracy, and screen time > 95% accuracy with minimal increase in symptoms (eye pain)          Subjective    SUBJECTIVE: "I want the dizziness to go away  It is killing me "    PATIENT GOAL: "I am so tired, I want to feel better! I don't understand why I am so tired "    Patient-Specific Functional Scale   Task is scored 0 (unable to perform activity) to 10 (ability to perform activity independently)    Activity Date:  2/8/22 Date:   1  Sleep Habits 5    2  Opening jars 7    3  Using laptop/screen and visual time 6-7    4              HISTORY OF PRESENT ILLNESS:     Pt is a 49 y o  female who was referred to Occupational Therapy s/p  Visual field defects [H53 40]  Pt had recent hosptialization 1/29/22 d/t chronic dizziness (since November 28, 2021)  Pt underwent CTA and revealed a Right petroclinoid meningioma   The right superior cerebellar artery and posterior communicating artery branches are intimately associated along the dorsal and medial borders of the lesion   Per chart review, patient has history of headaches dating beyond 2015 and work up with MRI did not reveal anything at the time however review of MRI during this admission by radiology demonstrated meningioma which was noted on CT on this admission and reports of slight increase in size on repeat MRI as noted above  Neurosurgery recommended for no emergency procedures, pt has a f/u on 2/9/22  Patient has PMH of hypertension, GERD, and hypothyroidism, chronic pain  Patient presents today with reports of dizziness when lying in bed and then transitioning to sitting upright, has fallen when moving to quick from EOB  Pt also reports blurriness in Left eye and reports pressure-like pain, wears glasses for reading  Pt denies headaches  Pt reports weakness in RUE > LUE, altered sensations in RUE and RLE and report of tingling in R hand D3-D5 and R foot 3-5 as well, says that it will wake her up and last several hours  Pt also says she has been feeling very fatigued, reporting poor sleep habits (last night reports that she barely slept) which is impacting her work role  Pt is feeling emotional and somewhat depressed reporting that she is crying a lot at home  Erick Gentle resides at home with her daughter and her mother, in a 3 story home  Pt is completing all ADL's and IADL's (cooking/cleaning/managing medications) independently with increased time  Pt is also driving, does not report any challenges with this  Pt is a supervisor at [employer]  Pt likes to go to the gym, last time she was there was in Dec 2021  Pt also has decreased laptop / screen time and would like to engage in this more   Pt likes to plant in her free time         PMH:   Past Medical History:   Diagnosis Date    Chronic pain     GERD (gastroesophageal reflux disease)     Hypertension     Hyperthyroiditis     HYPERTHYROID; POST IODINE THERAPY       Pain Levels   Restin    With Activity:  6    Objective    Impairment Observations:    ANTONY Lopez           UPPER EXTREMITY FUNCTION   Impaired Intact Dominant Hand: RIGHT     /PINCH STRENGTH             Dynamometer    - Gross Grasp 35 lbs 64 lbs abnormal   Pinch Meter     - Pincer 3 5 lbs 9 lbs abnormal    - Tripod 5 lbs 14 lbs abnormal    - Lateral 4 5 lbs  15 lbs abnormal     AROM (Seated)             Elevation WNL for all movements WNL for all movements *Pain reported in [facility] joint / cervical) during shoulder external and internal rotation only   Shoulder FF      Shoulder Ext      Shoulder Abd      Shoulder Add      Horizontal Abd      Horizontal Add      Elbow Flex      Elbow Ext      Pronation      Supination      Wrist Flex      Wrist Ext      Digit Flex      Digit Ex      Composite Grasp      Hook Grasp      Subluxation         MMT             Shoulder FF 3+/5  *Sharp Shooting Pain 4/5    Shoulder Ext 3+/5  *Sharp Shooting Pain 4-/5    Shoulder Abduction 3+/5  *A little pain 4-/5    Shoulder Adduction 3+/5 4-/5    Elbow Flex 4-/5 4+/5    Elbow Ext 4-/5 4-/5    Wrist Flex 3+/5 4+/5    Wrist Ext 3+/5 4+/5    Gross Grasp 3+/5 4/5      SENSATION      Monofilament Testing     2 83 mm  (slow to ID where)    3 61 mm ulnar distal forearm 2 83 mm  Normal: 2 83 mm    Diminished light touch: 3 61 mm   Sharp / Dull  Intact Intact    Proprioception Intact   Intact    Hot/Cold Temp Intact Intact    Stereognosis  Intact Intact      COORDINATION      Opposition Impaired    *To lateral D5 and decreased speed Intact    Finger to Nose Impaired    *With vision occluded Intact    Rapid Alternating Movement Intact Intact Delayed speed   9 Hole Peg Test 28 76 seconds 20 92 seconds low in R    Fxnl Dexterity Test 25 56 seconds 25 94 seconds low   Decreased manipulation/rotational skills    Stanford Hand Function Test Unable to test today        - Handwriting         - Card Turning         - Small Item p/u         - Simulated Feeding         - Stacking Checkers         - Moving Light Objects         - Moving Heavy Objects      Concentric Circles Test (tremors)            REVERSE PHALENS TEST: Positive, increased tingling in RUE, unable to tolerate full minute   Indicative of median nerve sensitivity / injury*      Vision Screen    *Reading glasses  *Eye exam 1 yr ago   22  (WILL COMPLETE NEXT SESSION)     Visual acuity R 20/, L 20/    Binocularity, far     Binocularity, near     Accommodation     Red/Green Glasses     Convergence No diplopia reported,   G convergence      reported L eye pain*    Pepito string       Pursuits Intact       Saccades Slightly Jerky, shifting in vertical and diagonal planes (extreme Right side)    ROM Intact      Visual perceptual midline test       2-person confrontation test           OTHER PLANNED THERAPY INTERVENTIONS:   Supine, seated, and in stance neuro re-ed  Task-Oriented Training  NMES/FES  Hot Packs for pain RUE  TENS for pain RUE  FMC/prehension  GMC  Proximal to distal teaming  Timed Trials / Reaction Time  Manual tx  IASTM RUE  Hand to target  Sensory re-ed (Cutaneous/Proprioceptive)  Seated functional reach: crossing midline  WBearing strategies   Closed chain activities  Open chain activities  HEP: Median nerve glides*  Orthotic Development (possibly for night time sleep / tingling)  Sleep strategies to prevent tingling    INTERVENTION COMMENTS:  Diagnosis: Visual field defects [H53 40]  Precautions: RUE pain/tingling, Chronic pain hx, Dizziness*  FOTO: Completed*  Insurance: Payor: Celia Dundee / Plan: Celia Dundee / Product Type: Medicaid HMO /   1 of 24 visits, PN due 3/10/22Daily Note     Today's date: 2/10/2022  Patient name: Stephen Khalil  : 1965  MRN: [id number]  Referring provider: Kaley Vidales*  Dx:   Encounter Diagnosis   Name Primary?  Visual field defects Yes       Start Time: 0800  Stop Time: 0900  Total time in clinic (min): 60 minutes    Subjective: ***      Objective: See treatment diary below    Pt participated in skilled OT focusing on***      Assessment: Tolerated treatment {Tolerated treatment :2100001012}   Patient {assessment:2100001011}      Plan: Continued skilled OT per POC with focus on ***    INTERVENTION COMMENTS:  Diagnosis: Weakness of right upper extremity [R29 898]  Precautions: RUE pain/tingling, Chronic pain hx, Dizziness*  FOTO: Completed*  Insurance: Payor: Manasa Paul / Plan: Manasa Paul / Product Type: Medicaid HMO /   1 of 24 visits, PN due 3/10/22

## 2022-02-10 NOTE — PROGRESS NOTES
Daily Note     Today's date: 2/10/2022  Patient name: Rufina Marie  : 1965  MRN: [id number]  Referring provider: Nora Vidales*  Dx:   Encounter Diagnosis   Name Primary?  Visual field defects Yes       Start Time: 08  Stop Time: 09  Total time in clinic (min): 60 minutes    Subjective: "I can tell you, this feel better already"  Objective: See treatment diary below    Pt participated in skilled OT focusing on NMRE, pain reduction, proximal strength/stabilization and mixed prehension patterns improving motor skills to increase indep in ADL/IADL's    Tens with pad placement to supraspinatus, upper traps and triceps followed by IASTM to same focusing on pain reduction resulting in increased mobility to improve ROM/flexibility achieving increase engagement in fxl reach and FM/GM activities and as preparatory to fxl task during therapy session  K tape applied to upper traps and R lateral epicondyle for joint stabilization and pain mngt  Education provided to pt regarding s/s impeding skin integrity and safe tape removal   Recommending pt remove tape after 2 days for skin check  UEB for 5 min prograde/5 min retrograde w/o resistance to address proximal strength, gross grasp and endurance  Assessment: Tolerated treatment well  Pt arrived to OT session reporting pain to RUE and traps  Pt noted no pain in lateral epicondyl region or shoulders post session  Pt demo-G understanding of tape precautions and safe removal   Patient would benefit from continued PT    Plan: Continued skilled OT per POC focusing on pain reduction and RUE strengthening       INTERVENTION COMMENTS:  Diagnosis: Weakness of right upper extremity [R29 898]  Precautions: RUE pain/tingling, Chronic pain hx, Dizziness*  FOTO: Completed*  Insurance: Payor: [address] [place] [address] / Plan: [address] [place] [address] / Product Type: Medicaid HMO /   2 of 24 visits, PN due 3/10/22

## 2022-02-10 NOTE — PROGRESS NOTES
Daily Note     Today's date: 2/10/2022  Patient name: Jeremy Nevarez  : 1965  MRN: [id number]  Referring provider: Arias Vidales*  Dx:   Encounter Diagnosis     ICD-10-CM    1  Dizziness  R42    2  Meningioma ([address] )  D32 9    3  Ambulatory dysfunction  R26 2    4  Weakness of right upper extremity  R29 898        Start Time: 0900  Stop Time: 0950  Total time in clinic (min): 50 minutes    Subjective: Patient continues to have dizziness with laying down  She states that she had a little nausea this morning when she got out of bed  Objective: See treatment diary below  1:1 with PT from [id number]  Self-directed exercise from [id number]    Assessment: Tolerated treatment fair  Patient demonstrated fatigue post treatment, exhibited good technique with therapeutic exercises and would benefit from continued PT  Patient with dizziness with positional testing, however did not see any nystagmus during phuc hallpike or roll testing  Patient had significant dizziness with testing, however difficulty keeping eyes open and fixated, was looking frequently around room  Challenged with balance activities, especially with SLS on R LE, and required significant cuing for proper technique with VOR x 1 for maintaining eyes on target  Patient very fatigued at end of session, ended session early  Continue to progress as tolerated  Plan: Continue per plan of care        Precautions: HTN, meningioma      Manuals 2/8 2/10                                                          Neuro Re-Ed            tandem  X 4 laps // bars no UE's          Foam EC            Foam HT/HN            VOR x 1  30" x 3 plain each    saccades horizontal 30" x 3          hurdles  6-inch, forward and lateral x 4 laps          backwards            Step up/down            HKM  X 4 laps no UE's                      Canal positioning testing  NV Negative for nystagmus Ther Activity                                    Gait Training                                    Modalities             edu on results of testing, balance strategies, disease specific information and dizziness

## 2022-02-11 ENCOUNTER — DOCUMENTATION (OUTPATIENT)
Dept: FAMILY MEDICINE CLINIC | Facility: CLINIC | Age: 57
End: 2022-02-11

## 2022-02-11 ENCOUNTER — TELEPHONE (OUTPATIENT)
Dept: GASTROENTEROLOGY | Facility: MEDICAL CENTER | Age: 57
End: 2022-02-11

## 2022-02-11 ENCOUNTER — PREP FOR PROCEDURE (OUTPATIENT)
Dept: GASTROENTEROLOGY | Facility: MEDICAL CENTER | Age: 57
End: 2022-02-11

## 2022-02-11 DIAGNOSIS — K21.9 GASTROESOPHAGEAL REFLUX DISEASE WITHOUT ESOPHAGITIS: Primary | ICD-10-CM

## 2022-02-11 DIAGNOSIS — K57.92 DIVERTICULITIS: ICD-10-CM

## 2022-02-11 DIAGNOSIS — R10.10 PAIN OF UPPER ABDOMEN: ICD-10-CM

## 2022-02-11 DIAGNOSIS — R11.10 INTRACTABLE VOMITING, PRESENCE OF NAUSEA NOT SPECIFIED, UNSPECIFIED VOMITING TYPE: ICD-10-CM

## 2022-02-11 NOTE — TELEPHONE ENCOUNTER
Patient called to reschedule procedure, scheduled for 4/8 with Dr Sandra Henao at [facility]   Patient aware she will receive a call the day prior with arrival time, telephone number confirmed

## 2022-02-14 ENCOUNTER — TELEPHONE (OUTPATIENT)
Dept: FAMILY MEDICINE CLINIC | Facility: CLINIC | Age: 57
End: 2022-02-14

## 2022-02-14 NOTE — TELEPHONE ENCOUNTER
Quantity/Daily Dose/Duration Limits Prior Authorization Form & Prior Authorization for Omeprazole 20mg forms Fax to [phone], Confirmation received

## 2022-02-15 DIAGNOSIS — K21.9 GASTROESOPHAGEAL REFLUX DISEASE WITHOUT ESOPHAGITIS: ICD-10-CM

## 2022-02-15 RX ORDER — OMEPRAZOLE 40 MG/1
40 CAPSULE, DELAYED RELEASE ORAL DAILY
Qty: 90 CAPSULE | Refills: 0 | Status: CANCELLED | OUTPATIENT
Start: 2022-02-15

## 2022-02-15 NOTE — TELEPHONE ENCOUNTER
----- Message from Lillie Mahoney sent at 2/15/2022 10:22 AM EST -----  Regarding: FW: Follow up appointments    ----- Message -----  From: Ayesha Ott MD  Sent: 1/30/2022   3:05 PM EST  To: [address], [address]  Subject: Follow up appointments                           Good Day Team!    Please call this patient to schedule for TCM visit and also assist to Neurosurgery follow up within 2 weeks  Dr Kirstin Yousif and his team are aware of the patient and state that their office will call but I just want to make sure on our end that this is followed up and done  Thank you!

## 2022-02-15 NOTE — TELEPHONE ENCOUNTER
[facility] [employer] form received by fax on 02/15/2022  to be completed by PCP  Copy made and placed in PCP folder  Forms to be delivered to PCP mailbox at assigned time      (Omeprazole 20mg)

## 2022-02-15 NOTE — TELEPHONE ENCOUNTER
The omperazole is most likely stating it requires a PA because of the way it was RX   I would rx the 40mg

## 2022-02-15 NOTE — TELEPHONE ENCOUNTER
Pt is well out of TCM window and we did make 2 unsuccessful attempts to reach her post d/c    Please try to schedule follow up   If unable after 1 attempt, please send letter

## 2022-02-15 NOTE — TELEPHONE ENCOUNTER
----- Message from Nelly Jasso MD sent at 1/30/2022  3:02 PM EST -----  Regarding: Follow up appointments  Good Day Team!    Please call this patient to schedule for TCM visit and also assist to Neurosurgery follow up within 2 weeks  Dr Rashad Orellana and his team are aware of the patient and state that their office will call but I just want to make sure on our end that this is followed up and done  Thank you!

## 2022-02-15 NOTE — TELEPHONE ENCOUNTER
Called the Neurosurgery office left them a message regarding an appointment the TCM appointment send message to clinical

## 2022-02-15 NOTE — TELEPHONE ENCOUNTER
partient called asking if a prior-auth can be done for script: omeprazole (PriLOSEC) 20 mg delayed release capsule

## 2022-02-16 DIAGNOSIS — K21.9 GASTROESOPHAGEAL REFLUX DISEASE WITHOUT ESOPHAGITIS: Primary | ICD-10-CM

## 2022-02-16 RX ORDER — OMEPRAZOLE 40 MG/1
40 CAPSULE, DELAYED RELEASE ORAL DAILY
Qty: 30 CAPSULE | Refills: 5 | Status: SHIPPED | OUTPATIENT
Start: 2022-02-16 | End: 2022-04-08

## 2022-02-17 ENCOUNTER — OFFICE VISIT (OUTPATIENT)
Dept: PHYSICAL THERAPY | Facility: CLINIC | Age: 57
End: 2022-02-17
Payer: COMMERCIAL

## 2022-02-17 ENCOUNTER — OFFICE VISIT (OUTPATIENT)
Dept: OCCUPATIONAL THERAPY | Facility: CLINIC | Age: 57
End: 2022-02-17
Payer: COMMERCIAL

## 2022-02-17 DIAGNOSIS — R26.2 AMBULATORY DYSFUNCTION: ICD-10-CM

## 2022-02-17 DIAGNOSIS — D32.9 MENINGIOMA (HCC): ICD-10-CM

## 2022-02-17 DIAGNOSIS — R29.898 WEAKNESS OF RIGHT UPPER EXTREMITY: Primary | ICD-10-CM

## 2022-02-17 DIAGNOSIS — R29.898 WEAKNESS OF RIGHT UPPER EXTREMITY: ICD-10-CM

## 2022-02-17 DIAGNOSIS — R42 DIZZINESS: Primary | ICD-10-CM

## 2022-02-17 PROCEDURE — 97112 NEUROMUSCULAR REEDUCATION: CPT

## 2022-02-17 PROCEDURE — 97110 THERAPEUTIC EXERCISES: CPT

## 2022-02-17 PROCEDURE — 97140 MANUAL THERAPY 1/> REGIONS: CPT

## 2022-02-17 NOTE — PROGRESS NOTES
Daily Note     Today's date: 2022  Patient name: Dilan Vazquez  : 1965  MRN: [id number]  Referring provider: Ita Vidales*  Dx:   Encounter Diagnosis   Name Primary?  Weakness of right upper extremity Yes       Start Time: 0900  Stop Time: 1000  Total time in clinic (min): 60 minutes    Subjective: "My shoulder felt good until I took the tape off then it hurt "    Objective: See treatment diary below    Pt participated in skilled OT focusing on NMRE, tone reduction, proximal strength/stabilization and mixed prehension patterns improving motor skills to increase indep in ADL/IADL's    Pt engaged in therex utilizing 2# tbar in supine focusing on proximal strength/endurance to improved quality of movement and increased AROM needed for fxl reach activities to increase indep engaging in home mngt activities  Pt completed FF w/5sec hold, scaption in diagonal plane , fwd/bwd rows, 5 reps x achieving near full range w/minimal report of pain while at same time donning FES to middle/any delt and upper traps and infrspinatus for muscle facilitating focusing on proximal strength and tone/pain reduction  Ktape to shoulder for stabilization  No skin issues reported from last sessions taping  Theraputty Exercises   Patient educated and instructed on HEP for FMS/FMC with use of green resist theraputty to inc indep with ADL fxn including container management, fastener management, and item retrieval  Pt provided with demonstration and verbal instruction and brigida YU understanding of task   Exercises instructed as followed:  · Putty Squeezes - 10 reps - 3 sets - 1x daily - 3x weekly  · Tip Pinch with Putty - 10 reps - 3 sets - 1x daily - 3x weekly  · Finger Lumbricals with Putty - 10 reps - 3 sets - 1x daily - 3x weekly  · 3-Point Pinch with Putty - 10 reps - 3 sets - 1x daily - 3x weekly  · Finger Key  with Putty - 10 reps - 3 sets - 1x daily - 3x weekly  · Thumb Opposition with Putty - 10 reps - 3 sets - 1x daily - 3x weekly    Assessment: Tolerated treatment well  Therex graded down from 10 reps to 5 reps x 3 sets 2* to pt fatigue providing min tactile cues for accurate UE positioning through movement  Pt completed 5 reps of each exercises w/recommendation to complete exercises on B hands 5 reps x 3 increasing as able  Pt reported improvement in pain and mobility post session  Patient would benefit from continued OT    Plan: Continued skilled OT per POC focusing on UE HEP w/tband      INTERVENTION COMMENTS:  Diagnosis: Weakness of right upper extremity [R29 898]  Precautions: RUE pain/tingling, Chronic pain hx, Dizziness*  FOTO: Completed*  Insurance: Payor: [employer] / Plan: Medication Review [employer] / Product Type: Medicaid HMO /   3 of 24 visits, PN due 3/10/22

## 2022-02-17 NOTE — PROGRESS NOTES
Daily Note     Today's date: 2022  Patient name: J Carlos Schmitt  : 1965  MRN: [id number]  Referring provider: Gurdeep Vital  Dx:   Encounter Diagnosis     ICD-10-CM    1  Dizziness  R42    2  Meningioma ([address] )  D32 9    3  Ambulatory dysfunction  R26 2    4  Weakness of right upper extremity  R29 898                   Subjective: Patient reports she is feeling a little better  She states that she still has dizziness when she lays down and gets back up  She states that she felt tired after last session, having less pain in her arm  Objective: See treatment diary below      Assessment: Tolerated treatment well  Patient demonstrated fatigue post treatment, exhibited good technique with therapeutic exercises and would benefit from continued PT  Patient with some dizziness with HN on foam, continues to require cues for correct technique for VOR x 1 exercises  Fatigue post session, especially in R hip and leg  Continue to progress as tolerated  Plan: Continue per plan of care        Precautions: HTN, meningioma      Manuals 2/8 2/10 2/17                                                         Neuro Re-Ed            tandem  X 4 laps // bars no UE's X 3 laps // bars (long) no UE's         Foam EC   30" x 3         Foam HT/HN   30" x 3 each         VOR x 1  30" x 3 plain each    saccades horizontal 30" x 3 30" x 3 each, plain         hurdles  6-inch, forward and lateral x 4 laps 6-inch, forward x 4 and lateral x3 laps, looking down and up between hurdles         backwards   X 4 laps, HT with 2 laps         Step up/down            HKM  X 4 laps no UE's X 4 laps no UE's                     Canal positioning testing  NV Negative for nystagmus          Foam beams   Lateral x 4 laps, forward x 4 laps                                                         Recumbent bike   L2 for 10 minutes         Ther Activity                                    Gait Training Modalities            Education edu on results of testing, balance strategies, disease specific information and dizziness           Sarah leon

## 2022-02-18 ENCOUNTER — APPOINTMENT (OUTPATIENT)
Dept: PHYSICAL THERAPY | Facility: CLINIC | Age: 57
End: 2022-02-18
Payer: COMMERCIAL

## 2022-02-18 ENCOUNTER — APPOINTMENT (OUTPATIENT)
Dept: OCCUPATIONAL THERAPY | Facility: CLINIC | Age: 57
End: 2022-02-18
Payer: COMMERCIAL

## 2022-02-23 ENCOUNTER — APPOINTMENT (OUTPATIENT)
Dept: OCCUPATIONAL THERAPY | Facility: CLINIC | Age: 57
End: 2022-02-23
Payer: COMMERCIAL

## 2022-02-23 ENCOUNTER — APPOINTMENT (OUTPATIENT)
Dept: PHYSICAL THERAPY | Facility: CLINIC | Age: 57
End: 2022-02-23
Payer: COMMERCIAL

## 2022-02-24 ENCOUNTER — APPOINTMENT (OUTPATIENT)
Dept: PHYSICAL THERAPY | Facility: CLINIC | Age: 57
End: 2022-02-24
Payer: COMMERCIAL

## 2022-02-25 ENCOUNTER — APPOINTMENT (OUTPATIENT)
Dept: PHYSICAL THERAPY | Facility: CLINIC | Age: 57
End: 2022-02-25
Payer: COMMERCIAL

## 2022-02-25 ENCOUNTER — APPOINTMENT (OUTPATIENT)
Dept: OCCUPATIONAL THERAPY | Facility: CLINIC | Age: 57
End: 2022-02-25
Payer: COMMERCIAL

## 2022-03-02 ENCOUNTER — HOSPITAL ENCOUNTER (OUTPATIENT)
Dept: MAMMOGRAPHY | Facility: CLINIC | Age: 57
Discharge: HOME | End: 2022-03-02
Payer: COMMERCIAL

## 2022-03-02 VITALS — BODY MASS INDEX: 26.83 KG/M2 | WEIGHT: 177 LBS | HEIGHT: 68 IN

## 2022-03-02 DIAGNOSIS — I10 ESSENTIAL HYPERTENSION: ICD-10-CM

## 2022-03-02 DIAGNOSIS — Z12.31 ENCOUNTER FOR SCREENING MAMMOGRAM FOR MALIGNANT NEOPLASM OF BREAST: ICD-10-CM

## 2022-03-02 PROCEDURE — 77067 SCR MAMMO BI INCL CAD: CPT

## 2022-03-02 PROCEDURE — 77063 BREAST TOMOSYNTHESIS BI: CPT

## 2022-03-02 RX ORDER — LISINOPRIL 40 MG/1
TABLET ORAL
Qty: 30 TABLET | Refills: 2 | Status: SHIPPED | OUTPATIENT
Start: 2022-03-02 | End: 2022-07-18 | Stop reason: SDUPTHER

## 2022-03-07 ENCOUNTER — OFFICE VISIT (OUTPATIENT)
Dept: GASTROENTEROLOGY | Facility: MEDICAL CENTER | Age: 57
End: 2022-03-07
Payer: COMMERCIAL

## 2022-03-07 VITALS
BODY MASS INDEX: 27.25 KG/M2 | HEART RATE: 80 BPM | WEIGHT: 179.2 LBS | SYSTOLIC BLOOD PRESSURE: 112 MMHG | DIASTOLIC BLOOD PRESSURE: 68 MMHG

## 2022-03-07 DIAGNOSIS — K21.00 GASTROESOPHAGEAL REFLUX DISEASE WITH ESOPHAGITIS WITHOUT HEMORRHAGE: Primary | ICD-10-CM

## 2022-03-07 DIAGNOSIS — R10.10 PAIN OF UPPER ABDOMEN: ICD-10-CM

## 2022-03-07 PROCEDURE — 99214 OFFICE O/P EST MOD 30 MIN: CPT | Performed by: PHYSICIAN ASSISTANT

## 2022-03-07 RX ORDER — PANTOPRAZOLE SODIUM 40 MG/1
40 TABLET, DELAYED RELEASE ORAL 2 TIMES DAILY
Qty: 60 TABLET | Refills: 5 | Status: SHIPPED | OUTPATIENT
Start: 2022-03-07

## 2022-03-07 NOTE — PROGRESS NOTES
Assessment/Plan:     Diagnoses and all orders for this visit:    Gastroesophageal reflux disease with esophagitis without hemorrhage  Pain of upper abdomen  Patient continues to complain of heartburn and epigastric abdominal pain which radiates into the right upper quadrant  She was previously found to have inflammation at the GE junction  She is on omeprazole 40 mg b i d  and Carafate q i d  via but continues to be symptomatic  She has never tried another PPI, will switch to pantoprazole at this point to see if there is any improvement  She is scheduled for repeat colonoscopy in April to ensure healing  Courage her to get her HIDA scan done to rule out biliary pathology  Further recommendations after her next endoscopy  -     pantoprazole (PROTONIX) 40 mg tablet; Take 1 tablet (40 mg total) by mouth 2 (two) times a day              Subjective:      Patient ID: Jada Oden is a 49 y o  female  HPI     This is a follow-up for GERD and abdominal pain  She has a history of GERD for greater than 5 years, currently on omeprazole 40 mg b i d  and Carafate q i d  but unfortunately continues to complain of epigastric and right upper quadrant abdominal pain and burning  She did undergo endoscopy in October of 2020 which showed severe erythematous and scarred mucosa with erosion in the GE junction  She states she does follow a reflux diet  She denies any further nausea or vomiting  She underwent CT scan in December which showed no acute abnormality  She was previously ordered HIDA scan however this is yet to be performed  She states she moves her bowels on a regular basis  She did undergo colonoscopy in October of 2020 which showed multiple diverticula and large hemorrhoids  She did previously have a diagnosis of diverticulitis in September of last year      Patient Active Problem List   Diagnosis    Essential hypertension    Pain of upper abdomen    Hypothyroidism    Hemorrhoids    Gastroesophageal reflux disease with esophagitis without hemorrhage    Chronic bilateral low back pain with bilateral sciatica    Encounter for screening colonoscopy    Sciatica of right side    Screening mammogram, encounter for    Bunion of great toe of right foot    Heel pain, chronic, right    Slow transit constipation    Vertigo    Migraine without aura and without status migrainosus, not intractable    Dizziness    Intractable vomiting    Gastroesophageal reflux disease without esophagitis    Diverticulitis    Meningioma (HCC)    Weakness of right upper extremity    Visual field defects     No Known Allergies  Current Outpatient Medications on File Prior to Visit   Medication Sig    acetaminophen (TYLENOL) 325 mg tablet Take 2 tablets (650 mg total) by mouth every 6 (six) hours as needed for mild pain, headaches or fever    dicyclomine (BENTYL) 20 mg tablet Take 1 tablet (20 mg total) by mouth every 6 (six) hours    levothyroxine 112 mcg tablet Take 1 tablet (112 mcg total) by mouth daily    lisinopril (ZESTRIL) 40 mg tablet take 1 tablet by mouth once daily    meclizine (ANTIVERT) 12 5 MG tablet Take 1 tablet (12 5 mg total) by mouth every 8 (eight) hours as needed for dizziness    meloxicam (MOBIC) 7 5 mg tablet Take 1 tablet (7 5 mg total) by mouth daily    omeprazole (PriLOSEC) 20 mg delayed release capsule Take 2 capsules (40 mg total) by mouth 2 (two) times a day    omeprazole (PriLOSEC) 40 MG capsule Take 1 capsule (40 mg total) by mouth daily    ondansetron (ZOFRAN-ODT) 4 mg disintegrating tablet Take 1 tablet (4 mg total) by mouth every 6 (six) hours as needed for nausea or vomiting    sucralfate (CARAFATE) 1 g tablet Take 1 tablet (1 g total) by mouth 4 (four) times a day    [DISCONTINUED] famotidine (PEPCID) 20 mg tablet Take 1 tablet (20 mg total) by mouth 2 (two) times a day for 14 days     No current facility-administered medications on file prior to visit       Family History   Problem Relation Age of Onset    Endometrial cancer Family     Ovarian cancer Mother 79    Heart defect Mother     Heart attack Father     Stroke Brother     Ovarian cancer Sister 52    No Known Problems Maternal Grandmother     Breast cancer Paternal Grandmother 61    No Known Problems Sister     No Known Problems Sister     No Known Problems Sister     No Known Problems Sister     No Known Problems Daughter     No Known Problems Maternal Grandfather     No Known Problems Paternal Grandfather     No Known Problems Daughter     No Known Problems Son      Past Medical History:   Diagnosis Date    Chronic pain     GERD (gastroesophageal reflux disease)     Hypertension     Hyperthyroiditis     HYPERTHYROID; POST IODINE THERAPY     Social History     Socioeconomic History    Marital status: Single     Spouse name: None    Number of children: None    Years of education: None    Highest education level: None   Occupational History    None   Tobacco Use    Smoking status: Never Smoker    Smokeless tobacco: Never Used   Vaping Use    Vaping Use: Never used   Substance and Sexual Activity    Alcohol use: Yes     Comment: occasional     Drug use: No    Sexual activity: None   Other Topics Concern    None   Social History Narrative    AS OF 9/22/15 IN N[employer]:        CONSUMES CAFFEINE    CAFFEINE: COFFEE     Social Determinants of Health     Financial Resource Strain: Not on file   Food Insecurity: Not on file   Transportation Needs: Not on file   Physical Activity: Not on file   Stress: Not on file   Social Connections: Not on file   Intimate Partner Violence: Not on file   Housing Stability: Not on file     Past Surgical History:   Procedure Laterality Date    APPENDECTOMY       SECTION      HERNIA REPAIR           Review of Systems   All other systems reviewed and are negative          Objective:      /68   Pulse 80   Wt 81 3 kg (179 lb 3 2 oz)   LMP 2022 (Exact Date) BMI 27 25 kg/m²          Physical Exam  Constitutional:       Appearance: Normal appearance  She is well-developed  HENT:      Head: Normocephalic and atraumatic  Eyes:      Conjunctiva/sclera: Conjunctivae normal    Cardiovascular:      Rate and Rhythm: Normal rate and regular rhythm  Pulmonary:      Effort: Pulmonary effort is normal       Breath sounds: Normal breath sounds  Abdominal:      General: Bowel sounds are normal  There is no distension  Palpations: Abdomen is soft  Tenderness: There is abdominal tenderness (Epigastric)  Musculoskeletal:         General: Normal range of motion  Cervical back: Normal range of motion  Skin:     General: Skin is warm and dry  Neurological:      Mental Status: She is alert and oriented to person, place, and time     Psychiatric:         Mood and Affect: Mood normal          Behavior: Behavior normal

## 2022-03-08 ENCOUNTER — TELEPHONE (OUTPATIENT)
Dept: FAMILY MEDICINE CLINIC | Facility: CLINIC | Age: 57
End: 2022-03-08

## 2022-03-08 ENCOUNTER — OFFICE VISIT (OUTPATIENT)
Dept: NEUROSURGERY | Facility: CLINIC | Age: 57
End: 2022-03-08
Payer: COMMERCIAL

## 2022-03-08 VITALS
TEMPERATURE: 98 F | SYSTOLIC BLOOD PRESSURE: 140 MMHG | BODY MASS INDEX: 27.13 KG/M2 | HEIGHT: 68 IN | RESPIRATION RATE: 16 BRPM | WEIGHT: 179 LBS | DIASTOLIC BLOOD PRESSURE: 85 MMHG | HEART RATE: 75 BPM

## 2022-03-08 DIAGNOSIS — R42 DIZZINESS: ICD-10-CM

## 2022-03-08 DIAGNOSIS — D32.9 MENINGIOMA (HCC): ICD-10-CM

## 2022-03-08 DIAGNOSIS — R42 VERTIGO: Primary | ICD-10-CM

## 2022-03-08 PROCEDURE — 99243 OFF/OP CNSLTJ NEW/EST LOW 30: CPT | Performed by: NEUROLOGICAL SURGERY

## 2022-03-08 NOTE — PROGRESS NOTES
DISCUSSION SUMMARY  This is a 49 y o  female with an incidentally noted mass near the posterior clinoid which is producing some diplopia  This is since resolved  I have recommended that she have stereotactic radio surgery for this  She asked informed questions reflecting her understanding wishes to proceed  No follow-ups on file  Diagnosis ICD-10-CM Associated Orders   1  Vertigo  R42 Ambulatory Referral to Neurosurgery   2  Dizziness  R42 Ambulatory Referral to Neurosurgery   3  Meningioma [facility])  D32 9 Ambulatory Referral to Neurosurgery          Chief Complaint   Patient presents with   [address] F/u (last seen by [facility]) for Meningioma; MRI Brain 1/30/22       HPI this is a 74-year-old female who complains of left-sided neck pain, right-sided headache, and some photophobia with tinnitus  She believes that the symptoms began following a a COVID vaccine dose delivered on November of 2021  His periods of severe dizziness which cause her to lose her balance  She does have some nausea which is managed with medications  The symptoms interrupt her ability to sleep well  All these symptoms are intermittent in nature and have been worsening in severity  Review of Systems   Constitutional: Positive for fatigue  HENT: Positive for tinnitus (b/l )  Negative for hearing loss  Eyes: Positive for photophobia (espically at night)  Negative for visual disturbance  Respiratory: Negative  Cardiovascular: Negative  Gastrointestinal: Negative  Negative for nausea (managed with medication) and vomiting  Endocrine: Negative  Genitourinary: Negative  Negative for frequency and urgency  Musculoskeletal: Positive for neck pain (left sided)  Negative for myalgias and neck stiffness  Allergic/Immunologic: Negative  Neurological: Positive for dizziness and headaches (mild right sided headache)  Negative for speech difficulty, weakness and numbness          Symptoms started after last dose of COVID vaccine 2021  Last fall was 2021 due to dizziness and landed on hands  H/o OT - very helpful  No previous Sx     Hematological: Negative  Does not bruise/bleed easily  Psychiatric/Behavioral: Positive for sleep disturbance (occasional due to dizziness)  Negative for confusion and decreased concentration     I reviewed the ROS      Vitals:    /85 (BP Location: Right arm, Patient Position: Sitting, Cuff Size: Standard)   Pulse 75   Temp 98 °F (36 7 °C) (Probe)   Resp 16   Ht 5' 8" (1 727 m)   Wt 81 2 kg (179 lb)   LMP 2022 (Exact Date)   BMI 27 22 kg/m²       MEDICAL HISTORY  Past Medical History:   Diagnosis Date    Chronic pain     GERD (gastroesophageal reflux disease)     Hypertension     Hyperthyroiditis     HYPERTHYROID; POST IODINE THERAPY    Meningioma (HCC)      Past Surgical History:   Procedure Laterality Date    APPENDECTOMY       SECTION      HERNIA REPAIR       Social History     Tobacco Use    Smoking status: Never Smoker    Smokeless tobacco: Never Used   Vaping Use    Vaping Use: Never used   Substance Use Topics    Alcohol use: Yes     Comment: occasional     Drug use: No        Current Outpatient Medications:     levothyroxine 112 mcg tablet, Take 1 tablet (112 mcg total) by mouth daily, Disp: 30 tablet, Rfl: 2    lisinopril (ZESTRIL) 40 mg tablet, take 1 tablet by mouth once daily, Disp: 30 tablet, Rfl: 2    meclizine (ANTIVERT) 12 5 MG tablet, Take 1 tablet (12 5 mg total) by mouth every 8 (eight) hours as needed for dizziness, Disp: 30 tablet, Rfl: 0    meloxicam (MOBIC) 7 5 mg tablet, Take 1 tablet (7 5 mg total) by mouth daily, Disp: 30 tablet, Rfl: 0    omeprazole (PriLOSEC) 40 MG capsule, Take 1 capsule (40 mg total) by mouth daily, Disp: 30 capsule, Rfl: 5    ondansetron (ZOFRAN-ODT) 4 mg disintegrating tablet, Take 1 tablet (4 mg total) by mouth every 6 (six) hours as needed for nausea or vomiting, Disp: 20 tablet, Rfl: 0    pantoprazole (PROTONIX) 40 mg tablet, Take 1 tablet (40 mg total) by mouth 2 (two) times a day, Disp: 60 tablet, Rfl: 5    acetaminophen (TYLENOL) 325 mg tablet, Take 2 tablets (650 mg total) by mouth every 6 (six) hours as needed for mild pain, headaches or fever (Patient not taking: Reported on 3/8/2022 ), Disp: 30 tablet, Rfl: 0    dexamethasone (DECADRON) 2 mg tablet, Take 1 tablet (2 mg total) by mouth 2 (two) times a day with meals, Disp: 30 tablet, Rfl: 1    dicyclomine (BENTYL) 20 mg tablet, Take 1 tablet (20 mg total) by mouth every 6 (six) hours, Disp: 120 tablet, Rfl: 5    sucralfate (CARAFATE) 1 g tablet, take 1 tablet by mouth four times a day, Disp: 120 tablet, Rfl: 0   No Known Allergies     The following portions of the patient's history were updated by MA and reviewed by MD: allergies, current medications, past family history, past medical history, past social history, past surgical history and problem list       Physical Exam  Vitals and nursing note reviewed  Constitutional:       General: She is not in acute distress  Appearance: Normal appearance  She is not ill-appearing, toxic-appearing or diaphoretic  HENT:      Head: Normocephalic  Nose: Nose normal    Eyes:      Extraocular Movements: Extraocular movements intact  Pupils: Pupils are equal, round, and reactive to light  Musculoskeletal:         General: No swelling, tenderness, deformity or signs of injury  Normal range of motion  Cervical back: Normal range of motion and neck supple  No rigidity  Right lower leg: No edema  Left lower leg: No edema  Lymphadenopathy:      Cervical: No cervical adenopathy  Skin:     General: Skin is warm and dry  Coloration: Skin is not jaundiced  Findings: No erythema or rash  Neurological:      General: No focal deficit present  Mental Status: She is alert and oriented to person, place, and time  Cranial Nerves: No cranial nerve deficit  Sensory: No sensory deficit  Motor: No weakness  Coordination: Coordination normal       Gait: Gait normal       Deep Tendon Reflexes: Reflexes normal    Psychiatric:         Mood and Affect: Mood normal          Behavior: Behavior normal          Thought Content: Thought content normal          Judgment: Judgment normal            RESULTS/DATA  I have personally reviewed pertinent films in PACS   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

## 2022-03-08 NOTE — TELEPHONE ENCOUNTER
[employer] form received by fax on 03/08/2022  to be completed by PCP  Copy made and placed in PCP folder  Forms to be delivered to PCP mailbox at assigned time      (Omeprazole 20mg)

## 2022-03-08 NOTE — TELEPHONE ENCOUNTER
Hello,    This patient was just started on Pantoprazole by GI, so this form does not need to be filled out   Thank you    Abbie

## 2022-03-10 DIAGNOSIS — K21.9 GASTROESOPHAGEAL REFLUX DISEASE WITHOUT ESOPHAGITIS: ICD-10-CM

## 2022-03-10 RX ORDER — SUCRALFATE 1 G/1
TABLET ORAL
Qty: 120 TABLET | Refills: 0 | Status: SHIPPED | OUTPATIENT
Start: 2022-03-10 | End: 2022-04-14

## 2022-03-16 ENCOUNTER — CLINICAL SUPPORT (OUTPATIENT)
Dept: RADIATION ONCOLOGY | Facility: HOSPITAL | Age: 57
End: 2022-03-16
Attending: RADIOLOGY
Payer: COMMERCIAL

## 2022-03-16 ENCOUNTER — OFFICE VISIT (OUTPATIENT)
Dept: NEUROSURGERY | Facility: CLINIC | Age: 57
End: 2022-03-16
Payer: COMMERCIAL

## 2022-03-16 VITALS
OXYGEN SATURATION: 99 % | DIASTOLIC BLOOD PRESSURE: 90 MMHG | HEIGHT: 68 IN | SYSTOLIC BLOOD PRESSURE: 142 MMHG | TEMPERATURE: 97.6 F | WEIGHT: 176 LBS | BODY MASS INDEX: 26.67 KG/M2 | HEART RATE: 66 BPM | RESPIRATION RATE: 16 BRPM

## 2022-03-16 VITALS
RESPIRATION RATE: 16 BRPM | TEMPERATURE: 97.6 F | BODY MASS INDEX: 26.67 KG/M2 | DIASTOLIC BLOOD PRESSURE: 90 MMHG | OXYGEN SATURATION: 99 % | HEART RATE: 66 BPM | WEIGHT: 176 LBS | HEIGHT: 68 IN | SYSTOLIC BLOOD PRESSURE: 142 MMHG

## 2022-03-16 DIAGNOSIS — D32.9 MENINGIOMA (HCC): Primary | ICD-10-CM

## 2022-03-16 DIAGNOSIS — R42 VERTIGO: ICD-10-CM

## 2022-03-16 DIAGNOSIS — D32.9 MENINGIOMA (HCC): ICD-10-CM

## 2022-03-16 DIAGNOSIS — R42 DIZZINESS: ICD-10-CM

## 2022-03-16 PROCEDURE — 99244 OFF/OP CNSLTJ NEW/EST MOD 40: CPT | Performed by: RADIOLOGY

## 2022-03-16 PROCEDURE — 99211 OFF/OP EST MAY X REQ PHY/QHP: CPT | Performed by: RADIOLOGY

## 2022-03-16 PROCEDURE — 77263 THER RADIOLOGY TX PLNG CPLX: CPT | Performed by: RADIOLOGY

## 2022-03-16 PROCEDURE — 99214 OFFICE O/P EST MOD 30 MIN: CPT | Performed by: NEUROLOGICAL SURGERY

## 2022-03-16 NOTE — PROGRESS NOTES
Sandra Torres 1965 is a 49 y o  female     Patient is a 49 y o female with newly diagnosed meningioma  She presents to discuss SRS  She presented to the hospital 1/29/22 with chronic dizziness which had been present since November  This was associated with bilateral leg weakness, fatigue, blurry vision in the right eye  Imaging revealed a right petroclinoid meningioma  She was discharged 1/30/22 with PT, OT, and neurosurgery follow-up    1/29/22 CTA head and neck-  -No acute intracranial abnormality   -Mild cerebral chronic microangiopathy disease  Artifact obscures evaluation of the left temporal lobe  -Right petroclinoid meningioma  The right superior cerebellar artery and posterior communicating artery branches are intimately associated along the dorsal and medial borders of the lesion  Follow-up MRI imaging with and without contrast and consultation with the neurosurgical service is recommended     -No cervical or intracranial large vessel occlusion  1/30/22 MRI brain- No acute intracranial abnormality  Slight interval growth of meningioma in the right prepontine cistern  This mass measures 1 8 x 0 6 x 1 8 cm compared to 1 1 x 1 1 x 0 5 cm on the prior study  No adjacent parenchymal edema identified  Minimal mass effect and flattening the right ventral rex  3/8/22 Dr Kirstin Yousif note incomplete      Patient is accompanied by her daughter, Augustus Ballesteros for today's visit  She assisted with tranlation as needed  Oncology History   Meningioma ([address] )   1/29/2022 Initial Diagnosis    Meningioma ([address] )         Review of Systems:  Review of Systems   Constitutional: Positive for fatigue  HENT: Positive for ear pain (intermittent pressure feeling) and tinnitus (intermittent, both ears)  Negative for sore throat and trouble swallowing      Eyes: Positive for photophobia (sometimes), pain (occasional right eye pain), discharge (watering of right eye) and visual disturbance (sometimes has blurry vision)  Respiratory: Negative  Negative for shortness of breath  Cardiovascular: Negative  Negative for chest pain  Gastrointestinal: Positive for nausea (occasional, after forcing self to eat) and vomiting (occasional)  Hx feflux   Endocrine: Negative  Genitourinary: Negative  LMP 2/16/22   Musculoskeletal: Positive for back pain (occasional, right sided lower back)  Negative for gait problem  Intermittent right hip pain   Skin: Negative  Allergic/Immunologic: Negative  Neurological: Positive for dizziness (when laying down or turning head) and headaches (right sided headaches, denies today)  Negative for tremors, seizures, speech difficulty, weakness and numbness  Hematological: Negative  Psychiatric/Behavioral: Positive for dysphoric mood (moderate) and sleep disturbance  Negative for confusion and suicidal ideas  The patient is not nervous/anxious  Clinical Trial: no    OB/GYN History:  Patient's last menstrual period was 02/16/2022 (exact date)  Pregnancy test needed:  Yes (blood test script given to patient)    ONCOTYPE/MAMMOPRINT results: n/a    PFT: n/a    Prior Radiation: no    Teaching: NCI RT packet given, RT to brain reviewed with patient and daughter, Sarah Bean    MST: completed    Implantable Devices (Port, Pacemaker, pain stimulator): no    Hip Replacement: n/a    Covid Vaccine Status:   Moderna x3      Health Maintenance   Topic Date Due    Annual Physical  Never done    DTaP,Tdap,and Td Vaccines (1 - Tdap) Never done    BMI: Followup Plan  09/03/2021    COVID-19 Vaccine (3 - Booster for Moderna series) 10/26/2021    PT PLAN OF CARE  03/10/2022    OT PLAN OF CARE  03/10/2022    Depression Screening  06/07/2022    Breast Cancer Screening: Mammogram  03/02/2023    BMI: Adult  03/08/2023    Cervical Cancer Screening  06/14/2026    Colorectal Cancer Screening  10/09/2030    HIV Screening  Completed    Hepatitis C Screening  Completed    Influenza Vaccine  Completed    Pneumococcal Vaccine: Pediatrics (0 to 5 Years) and At-Risk Patients (6 to 59 Years)  Aged Out    HIB Vaccine  Aged Out    Hepatitis B Vaccine  Aged Out    IPV Vaccine  Aged Out    Hepatitis A Vaccine  Aged Out    Meningococcal ACWY Vaccine  Aged Out    HPV Vaccine  Aged Out     Past Medical History:   Diagnosis Date    Chronic pain     GERD (gastroesophageal reflux disease)     Hypertension     Hyperthyroiditis     HYPERTHYROID; POST IODINE THERAPY     Past Surgical History:   Procedure Laterality Date    APPENDECTOMY       SECTION      HERNIA REPAIR       Family History   Problem Relation Age of Onset    Endometrial cancer Family     Ovarian cancer Mother 79    Heart defect Mother     Heart attack Father     Stroke Brother     Ovarian cancer Sister 52    No Known Problems Maternal Grandmother     Breast cancer Paternal Grandmother 61    No Known Problems Sister     No Known Problems Sister     No Known Problems Sister     No Known Problems Sister     No Known Problems Daughter     No Known Problems Maternal Grandfather     No Known Problems Paternal Grandfather     No Known Problems Daughter     No Known Problems Son      Social History     Tobacco Use    Smoking status: Never Smoker    Smokeless tobacco: Never Used   Vaping Use    Vaping Use: Never used   Substance Use Topics    Alcohol use: Yes     Comment: occasional     Drug use: No        Current Outpatient Medications:     acetaminophen (TYLENOL) 325 mg tablet, Take 2 tablets (650 mg total) by mouth every 6 (six) hours as needed for mild pain, headaches or fever (Patient not taking: Reported on 3/8/2022 ), Disp: 30 tablet, Rfl: 0    dicyclomine (BENTYL) 20 mg tablet, Take 1 tablet (20 mg total) by mouth every 6 (six) hours (Patient not taking: Reported on 3/8/2022 ), Disp: 120 tablet, Rfl: 5    levothyroxine 112 mcg tablet, Take 1 tablet (112 mcg total) by mouth daily, Disp: 30 tablet, Rfl: 2    lisinopril (ZESTRIL) 40 mg tablet, take 1 tablet by mouth once daily, Disp: 30 tablet, Rfl: 2    meclizine (ANTIVERT) 12 5 MG tablet, Take 1 tablet (12 5 mg total) by mouth every 8 (eight) hours as needed for dizziness, Disp: 30 tablet, Rfl: 0    meloxicam (MOBIC) 7 5 mg tablet, Take 1 tablet (7 5 mg total) by mouth daily, Disp: 30 tablet, Rfl: 0    omeprazole (PriLOSEC) 40 MG capsule, Take 1 capsule (40 mg total) by mouth daily, Disp: 30 capsule, Rfl: 5    ondansetron (ZOFRAN-ODT) 4 mg disintegrating tablet, Take 1 tablet (4 mg total) by mouth every 6 (six) hours as needed for nausea or vomiting, Disp: 20 tablet, Rfl: 0    pantoprazole (PROTONIX) 40 mg tablet, Take 1 tablet (40 mg total) by mouth 2 (two) times a day, Disp: 60 tablet, Rfl: 5    sucralfate (CARAFATE) 1 g tablet, take 1 tablet by mouth four times a day, Disp: 120 tablet, Rfl: 0  No Known Allergies   There were no vitals filed for this visit

## 2022-03-16 NOTE — LETTER
2022     [address]  [address] [address]    Patient: Shady Garcia   YOB: 1965   Date of Visit: 3/16/2022       Dear Dr Jana Liu: Thank you for referring Shady Garcia to me for evaluation  Below are my notes for this consultation  If you have questions, please do not hesitate to call me  I look forward to following your patient along with you  Sincerely,        Amandeep Costello MD        CC: No Recipients  Amandeep Costello MD  3/16/2022  9:42 AM  Signed  Consultation - Radiation Oncology      BBJ:[id number] : 1965  Encounter: [id number]  Patient Information: [address]  Chief Complaint   Patient presents with    Consult     Cancer Staging  No matching staging information was found for the patient  History of Present Illness     Patient is a 49 y o female with newly diagnosed meningioma  She presents to discuss SRS  She presented to the hospital 22 with chronic dizziness which had been present since November  This was associated with bilateral leg weakness, fatigue, blurry vision in the right eye  Imaging revealed a right petroclinoid meningioma  She was discharged 22 with PT, OT, and neurosurgery follow-up   22 CTA head and neck-   -No acute intracranial abnormality    -Mild cerebral chronic microangiopathy disease  Artifact obscures evaluation of the left temporal lobe  -Right petroclinoid meningioma  The right superior cerebellar artery and posterior communicating artery branches are intimately associated along the dorsal and medial borders of the lesion  Follow-up MRI imaging with and without contrast and consultation with the neurosurgical service is recommended    -No cervical or intracranial large vessel occlusion  22 MRI brain- No acute intracranial abnormality  Slight interval growth of meningioma in the right prepontine cistern   This mass measures 1 8 x 0 6 x 1 8 cm compared to 1 1 x 1 1 x 0 5 cm on the prior study  No adjacent parenchymal edema identified  Minimal mass effect and flattening the right ventral rex  3/8/22 Dr Aleksandra Ayala note incomplete   Patient is accompanied by her daughter, Muriel Lima for today's visit  She assisted with tranlation as needed           Historical Information   Oncology History   Mening[address] ([facility] [address] )   2022 Initial Diagnosis    Meningioma ([facility])           Past Medical History:   Diagnosis Date    Chronic pain     GERD (gastroesophageal reflux disease)     Hypertension     Hyperthyroiditis     HYPERTHYROID; POST IODINE THERAPY    Meningioma (HCC)      Past Surgical History:   Procedure Laterality Date    APPENDECTOMY       SECTION      HERNIA REPAIR         Family History   Problem Relation Age of Onset    Endometrial cancer Family     Ovarian cancer Mother 79    Heart defect Mother     Heart attack Father     Stroke Brother     Ovarian cancer Sister 52    No Known Problems Maternal Grandmother     Breast cancer Paternal Grandmother 61    No Known Problems Sister     No Known Problems Sister     No Known Problems Sister     No Known Problems Sister     No Known Problems Daughter     No Known Problems Maternal Grandfather     No Known Problems Paternal Grandfather     No Known Problems Daughter     No Known Problems Son        Social History   Social History     Substance and Sexual Activity   Alcohol Use Yes    Comment: occasional      Social History     Substance and Sexual Activity   Drug Use No     Social History     Tobacco Use   Smoking Status Never Smoker   Smokeless Tobacco Never Used         Meds/Allergies     Current Outpatient Medications:     dicyclomine (BENTYL) 20 mg tablet, Take 1 tablet (20 mg total) by mouth every 6 (six) hours, Disp: 120 tablet, Rfl: 5    levothyroxine 112 mcg tablet, Take 1 tablet (112 mcg total) by mouth daily, Disp: 30 tablet, Rfl: 2    lisinopril (ZESTRIL) 40 mg tablet, take 1 tablet by mouth once daily, Disp: 30 tablet, Rfl: 2    meclizine (ANTIVERT) 12 5 MG tablet, Take 1 tablet (12 5 mg total) by mouth every 8 (eight) hours as needed for dizziness, Disp: 30 tablet, Rfl: 0    meloxicam (MOBIC) 7 5 mg tablet, Take 1 tablet (7 5 mg total) by mouth daily, Disp: 30 tablet, Rfl: 0    omeprazole (PriLOSEC) 40 MG capsule, Take 1 capsule (40 mg total) by mouth daily, Disp: 30 capsule, Rfl: 5    ondansetron (ZOFRAN-ODT) 4 mg disintegrating tablet, Take 1 tablet (4 mg total) by mouth every 6 (six) hours as needed for nausea or vomiting, Disp: 20 tablet, Rfl: 0    pantoprazole (PROTONIX) 40 mg tablet, Take 1 tablet (40 mg total) by mouth 2 (two) times a day, Disp: 60 tablet, Rfl: 5    sucralfate (CARAFATE) 1 g tablet, take 1 tablet by mouth four times a day, Disp: 120 tablet, Rfl: 0    acetaminophen (TYLENOL) 325 mg tablet, Take 2 tablets (650 mg total) by mouth every 6 (six) hours as needed for mild pain, headaches or fever (Patient not taking: Reported on 3/8/2022 ), Disp: 30 tablet, Rfl: 0  No Known Allergies      Review of Systems   Constitutional: Positive for fatigue  HENT: Positive for ear pain (intermittent pressure feeling) and tinnitus (intermittent, both ears)  Negative for sore throat and trouble swallowing  Eyes: Positive for photophobia (sometimes), pain (occasional right eye pain), discharge (watering of right eye) and visual disturbance (sometimes has blurry vision)  Respiratory: Negative  Negative for shortness of breath  Cardiovascular: Negative  Negative for chest pain  Gastrointestinal: Positive for nausea (occasional, after forcing self to eat) and vomiting (occasional)  Hx feflux   Endocrine: Negative  Genitourinary: Negative  LMP 2/16/22   Musculoskeletal: Positive for back pain (occasional, right sided lower back)  Negative for gait problem  Intermittent right hip pain   Skin: Negative  Allergic/Immunologic: Negative  Neurological: Positive for dizziness (when laying down or turning head) and headaches (right sided headaches, denies today)  Negative for tremors, seizures, speech difficulty, weakness and numbness  Hematological: Negative  Psychiatric/Behavioral: Positive for dysphoric mood (moderate) and sleep disturbance  Negative for confusion and suicidal ideas  The patient is not nervous/anxious  OBJECTIVE:   /90   Pulse 66   Temp 97 6 °F (36 4 °C) (Temporal)   Resp 16   Ht 5' 8" (1 727 m)   Wt 79 8 kg (176 lb)   LMP 02/16/2022 (Exact Date)   SpO2 99%   BMI 26 76 kg/m²   Pain Assessment:  0  Performance Status: ECOG/Zubrod/WHO: 1 - Symptomatic but completely ambulatory    Physical Exam   She is conversing appropriately  Her breathing is unlabored  Ambulating independently  No focal neurologic deficits      RESULTS  Lab Results    Chemistry        Component Value Date/Time    K 3 8 01/29/2022 1845     01/29/2022 1845    CO2 30 01/29/2022 1845    BUN 13 01/29/2022 1845    CREATININE 0 67 01/29/2022 1845        Component Value Date/Time    CALCIUM 8 5 01/29/2022 1845    ALKPHOS 82 01/29/2022 1845    AST 26 01/29/2022 1845    ALT 33 01/29/2022 1845            Lab Results   Component Value Date    WBC 7 40 01/29/2022    HGB 12 9 01/29/2022    HCT 37 9 01/29/2022    MCV 98 01/29/2022     01/29/2022         Imaging Studies  Mammo screening bilateral w 3d & cad    Result Date: 3/3/2022  Narrative: DIAGNOSIS: Encounter for screening mammogram for malignant neoplasm of breast TECHNIQUE: Digital screening mammography was performed  Computer Aided Detection (CAD) analyzed all applicable images  COMPARISONS: Prior breast imaging dated: 11/16/2020, 02/24/2015, and 05/02/2013 RELEVANT HISTORY: Family Breast Cancer History: History of breast cancer in Paternal Grandmother   Family Medical History: Family medical history includes breast cancer in paternal grandmother, endometrial cancer in family, and ovarian cancer in 2 relatives (mother, sister)  Personal History: No known relevant hormone history  No known relevant surgical history  No known relevant medical history  The patient is scheduled in a reminder system for screening mammography  8-10% of cancers will be missed on mammography  Management of a palpable abnormality must be based on clinical grounds  Patients will be notified of their results via letter from our facility  Accredited by [employer] of Rad[employer] and FDA  RISK ASSESSMENT: 5 Year Tyrer-Cuzick: 0 78 % 10 Year Tyrer-Cuzick: 1 68 % Lifetime Tyrer-Cuzick: 5 55 % TISSUE DENSITY: There are scattered areas of fibroglandular density  INDICATION: Fredi Cross is a 49 y o  female presenting for screening mammography  FINDINGS: There are no suspicious masses, grouped microcalcifications or areas of architectural distortion  The skin and nipple areolar complex are unremarkable  Impression: No mammographic evidence of malignancy  ASSESSMENT/BI-RADS CATEGORY: Left: 1 - Negative Right: 1 - Negative Overall: 1 - Negative RECOMMENDATION:      - Routine screening mammogram in 1 year for both breasts  Workstation ID: [id number]                 ASSESSMENT  1  Meningioma [facility])       Cancer Staging  No matching staging information was found for the patient  PLAN/DISCUSSION  No orders of the defined types were placed in this encounter  Fredi Cross is a 49y o  year old female with meningioma noted in the right prepontine cistern  Her case was reviewed in Neuro-Oncology Clinic today and there is been interval slight growth from prior imaging 6-7 years ago  We discussed with her and her daughter options which would include continued observation with serial imaging, radiation therapy, surgery  She was seen jointly with Dr Devin Peres and felt that surgery in this location would come with potential side effects  We reviewed radiation therapy utilizing a stereotactic approach    The procedure involved with CT simulation was reviewed with her  We discussed the goal of treatment is to halt further progression and on subsequent imaging the lesion likely to be present and without significant change  Possible side effects both acute and long-term reviewed with her  This can include but not limited to fatigue, swelling, radionecrosis, injury to surrounding normal tissue, low risk of secondary malignancy  She is opting for treatment with radiation  She will undergo pregnancy test prior to treatment  Plan would be for her to undergo CT simulation in the next few days and treatment on 3/23/2022  She is agreeable to the above outlined plan          Anna Marinelli MD  3/16/2022,8:57 AM      Portions of the record may have been created with voice recognition software  Occasional wrong word or "sound a like" substitutions may have occurred due to the inherent limitations of voice recognition software  Read the chart carefully and recognize, using context, where substitutions have occurred

## 2022-03-16 NOTE — PROGRESS NOTES
Consultation - Radiation Oncology      JUL:[id number] : 1965  Encounter: [id number]  Patient Information: Katelin Viveros  Chief Complaint   Patient presents with    Consult     Cancer Staging  No matching staging information was found for the patient  History of Present Illness     Patient is a 49 y o female with newly diagnosed meningioma  She presents to discuss SRS  She presented to the hospital 22 with chronic dizziness which had been present since November  This was associated with bilateral leg weakness, fatigue, blurry vision in the right eye  Imaging revealed a right petroclinoid meningioma  She was discharged 22 with PT, OT, and neurosurgery follow-up   22 CTA head and neck-   -No acute intracranial abnormality    -Mild cerebral chronic microangiopathy disease  Artifact obscures evaluation of the left temporal lobe  -Right petroclinoid meningioma  The right superior cerebellar artery and posterior communicating artery branches are intimately associated along the dorsal and medial borders of the lesion  Follow-up MRI imaging with and without contrast and consultation with the neurosurgical service is recommended    -No cervical or intracranial large vessel occlusion  22 MRI brain- No acute intracranial abnormality  Slight interval growth of meningioma in the right prepontine cistern  This mass measures 1 8 x 0 6 x 1 8 cm compared to 1 1 x 1 1 x 0 5 cm on the prior study  No adjacent parenchymal edema identified  Minimal mass effect and flattening the right ventral rex  3/8/22 Dr Emma Gallegos note incomplete   Patient is accompanied by her daughter, Dario Stevenson for today's visit  She assisted with tranlation as needed           Historical Information   Oncology History   Meningioma ([address] )   2022 Initial Diagnosis    Meningioma [facility])           Past Medical History:   Diagnosis Date    Chronic pain     GERD (gastroesophageal reflux disease)     Hypertension     Hyperthyroiditis     HYPERTHYROID; POST IODINE THERAPY    Meningioma (HCC)      Past Surgical History:   Procedure Laterality Date    APPENDECTOMY       SECTION      HERNIA REPAIR         Family History   Problem Relation Age of Onset    Endometrial cancer Family     Ovarian cancer Mother 79    Heart defect Mother     Heart attack Father     Stroke Brother     Ovarian cancer Sister 52    No Known Problems Maternal Grandmother     Breast cancer Paternal Grandmother 61    No Known Problems Sister     No Known Problems Sister     No Known Problems Sister     No Known Problems Sister     No Known Problems Daughter     No Known Problems Maternal Grandfather     No Known Problems Paternal Grandfather     No Known Problems Daughter     No Known Problems Son        Social History   Social History     Substance and Sexual Activity   Alcohol Use Yes    Comment: occasional      Social History     Substance and Sexual Activity   Drug Use No     Social History     Tobacco Use   Smoking Status Never Smoker   Smokeless Tobacco Never Used         Meds/Allergies     Current Outpatient Medications:     dicyclomine (BENTYL) 20 mg tablet, Take 1 tablet (20 mg total) by mouth every 6 (six) hours, Disp: 120 tablet, Rfl: 5    levothyroxine 112 mcg tablet, Take 1 tablet (112 mcg total) by mouth daily, Disp: 30 tablet, Rfl: 2    lisinopril (ZESTRIL) 40 mg tablet, take 1 tablet by mouth once daily, Disp: 30 tablet, Rfl: 2    meclizine (ANTIVERT) 12 5 MG tablet, Take 1 tablet (12 5 mg total) by mouth every 8 (eight) hours as needed for dizziness, Disp: 30 tablet, Rfl: 0    meloxicam (MOBIC) 7 5 mg tablet, Take 1 tablet (7 5 mg total) by mouth daily, Disp: 30 tablet, Rfl: 0    omeprazole (PriLOSEC) 40 MG capsule, Take 1 capsule (40 mg total) by mouth daily, Disp: 30 capsule, Rfl: 5    ondansetron (ZOFRAN-ODT) 4 mg disintegrating tablet, Take 1 tablet (4 mg total) by mouth every 6 (six) hours as needed for nausea or vomiting, Disp: 20 tablet, Rfl: 0    pantoprazole (PROTONIX) 40 mg tablet, Take 1 tablet (40 mg total) by mouth 2 (two) times a day, Disp: 60 tablet, Rfl: 5    sucralfate (CARAFATE) 1 g tablet, take 1 tablet by mouth four times a day, Disp: 120 tablet, Rfl: 0    acetaminophen (TYLENOL) 325 mg tablet, Take 2 tablets (650 mg total) by mouth every 6 (six) hours as needed for mild pain, headaches or fever (Patient not taking: Reported on 3/8/2022 ), Disp: 30 tablet, Rfl: 0  No Known Allergies      Review of Systems   Constitutional: Positive for fatigue  HENT: Positive for ear pain (intermittent pressure feeling) and tinnitus (intermittent, both ears)  Negative for sore throat and trouble swallowing  Eyes: Positive for photophobia (sometimes), pain (occasional right eye pain), discharge (watering of right eye) and visual disturbance (sometimes has blurry vision)  Respiratory: Negative  Negative for shortness of breath  Cardiovascular: Negative  Negative for chest pain  Gastrointestinal: Positive for nausea (occasional, after forcing self to eat) and vomiting (occasional)  Hx feflux   Endocrine: Negative  Genitourinary: Negative  LMP 2/16/22   Musculoskeletal: Positive for back pain (occasional, right sided lower back)  Negative for gait problem  Intermittent right hip pain   Skin: Negative  Allergic/Immunologic: Negative  Neurological: Positive for dizziness (when laying down or turning head) and headaches (right sided headaches, denies today)  Negative for tremors, seizures, speech difficulty, weakness and numbness  Hematological: Negative  Psychiatric/Behavioral: Positive for dysphoric mood (moderate) and sleep disturbance  Negative for confusion and suicidal ideas  The patient is not nervous/anxious          OBJECTIVE:   /90   Pulse 66   Temp 97 6 °F (36 4 °C) (Temporal)   Resp 16   Ht 5' 8" (1 727 m)   Wt 79 8 kg (176 lb) LMP 02/16/2022 (Exact Date)   SpO2 99%   BMI 26 76 kg/m²   Pain Assessment:  0  Performance Status: ECOG/Zubrod/WHO: 1 - Symptomatic but completely ambulatory    Physical Exam   She is conversing appropriately  Her breathing is unlabored  Ambulating independently  No focal neurologic deficits      RESULTS  Lab Results    Chemistry        Component Value Date/Time    K 3 8 01/29/2022 1845     01/29/2022 1845    CO2 30 01/29/2022 1845    BUN 13 01/29/2022 1845    CREATININE 0 67 01/29/2022 1845        Component Value Date/Time    CALCIUM 8 5 01/29/2022 1845    ALKPHOS 82 01/29/2022 1845    AST 26 01/29/2022 1845    ALT 33 01/29/2022 1845            Lab Results   Component Value Date    WBC 7 40 01/29/2022    HGB 12 9 01/29/2022    HCT 37 9 01/29/2022    MCV 98 01/29/2022     01/29/2022         Imaging Studies  Mammo screening bilateral w 3d & cad    Result Date: 3/3/2022  Narrative: DIAGNOSIS: Encounter for screening mammogram for malignant neoplasm of breast TECHNIQUE: Digital screening mammography was performed  Computer Aided Detection (CAD) analyzed all applicable images  COMPARISONS: Prior breast imaging dated: 11/16/2020, 02/24/2015, and 05/02/2013 RELEVANT HISTORY: Family Breast Cancer History: History of breast cancer in Paternal Grandmother  Family Medical History: Family medical history includes breast cancer in paternal grandmother, endometrial cancer in family, and ovarian cancer in 2 relatives (mother, sister)  Personal History: No known relevant hormone history  No known relevant surgical history  No known relevant medical history  The patient is scheduled in a reminder system for screening mammography  8-10% of cancers will be missed on mammography  Management of a palpable abnormality must be based on clinical grounds  Patients will be notified of their results via letter from our facility  Accredited by [address] [place] [address] of Radiology and FDA   RISK ASSESSMENT: 5 Year Keely: 0 78 % 10 Year Tyrer-Cuzick: 1 68 % Lifetime Tyrer-Cuzick: 5 55 % TISSUE DENSITY: There are scattered areas of fibroglandular density  INDICATION: Candelario Pina is a 49 y o  female presenting for screening mammography  FINDINGS: There are no suspicious masses, grouped microcalcifications or areas of architectural distortion  The skin and nipple areolar complex are unremarkable  Impression: No mammographic evidence of malignancy  ASSESSMENT/BI-RADS CATEGORY: Left: 1 - Negative Right: 1 - Negative Overall: 1 - Negative RECOMMENDATION:      - Routine screening mammogram in 1 year for both breasts  Workstation ID: [id number]                 ASSESSMENT  1  Meningioma [facility])       Cancer Staging  No matching staging information was found for the patient  PLAN/DISCUSSION  No orders of the defined types were placed in this encounter  Candelario Pina is a 49y o  year old female with meningioma noted in the right prepontine cistern  Her case was reviewed in Neuro-Oncology Clinic today and there is been interval slight growth from prior imaging 6-7 years ago  We discussed with her and her daughter options which would include continued observation with serial imaging, radiation therapy, surgery  She was seen jointly with Dr Rosemary Pepe and felt that surgery in this location would come with potential side effects  We reviewed radiation therapy utilizing a stereotactic approach  The procedure involved with CT simulation was reviewed with her  We discussed the goal of treatment is to halt further progression and on subsequent imaging the lesion likely to be present and without significant change  Possible side effects both acute and long-term reviewed with her  This can include but not limited to fatigue, swelling, radionecrosis, injury to surrounding normal tissue, low risk of secondary malignancy  She is opting for treatment with radiation  She will undergo pregnancy test prior to treatment    Plan would be for her to undergo CT simulation in the next few days and treatment on 3/23/2022  She is agreeable to the above outlined plan          Iram Villegas MD  3/16/2022,8:57 AM      Portions of the record may have been created with voice recognition software  Occasional wrong word or "sound a like" substitutions may have occurred due to the inherent limitations of voice recognition software  Read the chart carefully and recognize, using context, where substitutions have occurred

## 2022-03-16 NOTE — PROGRESS NOTES
Neurosurgery Office Note  Candelario Pina 49 y o  female MRN: [id number]      Assessment/Plan      Diagnoses and all orders for this visit:    Meningioma [facility])        Discussion:    Pain Score: 0-No pain (denies headache today)    30-year-old Macedonian speaker seen recently by Dr Liban Vallejo  Known prepontine right-sided meningioma, seen on scan from 2015  This is increased marginally since then on her most recent scan from 01/2022  Referred by Dr Liban Vallejo for SRS/S RT  Case reviewed at [facility]  Patient seen in conjunction with Dr Libra Weinstein  Risks, benefits, and alternatives of various treatment options were personally reviewed with the patient  Options discussed included but were not limited to continued observation, surgery, radiation therapy including variations thereof, such as SRS/SRT, etc  Risks specific to [facility] were reviewed, including but not limited to radiation necrosis  The patient wished to proceed with SRS/SRT  Written consent was obtained  03/16/22 Metrics: [id number] [id number]; VAS 50; ECOG 2; KPS 80; MOCA 21/30 (language barrier likely impacted score)  CHIEF COMPLAINT    Chief Complaint   Patient presents with    Follow-up     DISCUSS SRS FOR MENINGIOMA PER DKO       HISTORY    History of Present Illness     49y o  year old female     HPI    See Discussion    REVIEW OF SYSTEMS    Review of Systems   Constitutional: Positive for appetite change (decrease in the last month, sometimes only 1 meal per day) and fatigue  HENT: Positive for tinnitus (bilateral ears)  Negative for ear pain (more pressure feeling then pain, comes and goes) and hearing loss  Eyes: Positive for photophobia (especially at night), pain (right eye) and visual disturbance (blurry vision, comes and goes)  Negative for discharge (Right eye, watery)  Respiratory: Negative  Cardiovascular: Negative      Gastrointestinal: Negative for nausea (comes and goes and only with food intake, managed with medication) and vomiting (comes and goes and only with food intake)  Acid reflux   Endocrine: Negative  Genitourinary: Negative  Musculoskeletal: Positive for back pain (across lower back pain, right hip, comes and goes) and neck pain (left sided)  Negative for gait problem  Skin: Negative  Allergic/Immunologic: Negative  Neurological: Positive for dizziness (only when laying down and with quick movement, ROM) and headaches (denies today, mild right sided headache)  Negative for tremors, seizures, syncope, speech difficulty, weakness and numbness  Symptoms started after last dose of COVID vaccine 11/2021  Last fall was 12/2021 due to dizziness and landed on hands  H/o OT - very helpful  No previous Sx     Hematological: Negative  Does not bruise/bleed easily  Psychiatric/Behavioral: Positive for dysphoric mood and sleep disturbance (occasional due to dizziness/depression)  Negative for confusion and decreased concentration           Meds/Allergies     Current Outpatient Medications   Medication Sig Dispense Refill    dicyclomine (BENTYL) 20 mg tablet Take 1 tablet (20 mg total) by mouth every 6 (six) hours 120 tablet 5    levothyroxine 112 mcg tablet Take 1 tablet (112 mcg total) by mouth daily 30 tablet 2    lisinopril (ZESTRIL) 40 mg tablet take 1 tablet by mouth once daily 30 tablet 2    meclizine (ANTIVERT) 12 5 MG tablet Take 1 tablet (12 5 mg total) by mouth every 8 (eight) hours as needed for dizziness 30 tablet 0    meloxicam (MOBIC) 7 5 mg tablet Take 1 tablet (7 5 mg total) by mouth daily 30 tablet 0    omeprazole (PriLOSEC) 40 MG capsule Take 1 capsule (40 mg total) by mouth daily 30 capsule 5    ondansetron (ZOFRAN-ODT) 4 mg disintegrating tablet Take 1 tablet (4 mg total) by mouth every 6 (six) hours as needed for nausea or vomiting 20 tablet 0    pantoprazole (PROTONIX) 40 mg tablet Take 1 tablet (40 mg total) by mouth 2 (two) times a day 60 tablet 5    sucralfate (CARAFATE) 1 g tablet take 1 tablet by mouth four times a day 120 tablet 0    acetaminophen (TYLENOL) 325 mg tablet Take 2 tablets (650 mg total) by mouth every 6 (six) hours as needed for mild pain, headaches or fever (Patient not taking: Reported on 3/8/2022 ) 30 tablet 0     No current facility-administered medications for this visit  No Known Allergies    PAST HISTORY    Past Medical History:   Diagnosis Date    Chronic pain     GERD (gastroesophageal reflux disease)     Hypertension     Hyperthyroiditis     HYPERTHYROID; POST IODINE THERAPY    Meningioma (HCC)        Past Surgical History:   Procedure Laterality Date    APPENDECTOMY       SECTION      HERNIA REPAIR         Social History     Tobacco Use    Smoking status: Never Smoker    Smokeless tobacco: Never Used   Vaping Use    Vaping Use: Never used   Substance Use Topics    Alcohol use: Yes     Comment: occasional     Drug use: No       Family History   Problem Relation Age of Onset    Endometrial cancer Family     Ovarian cancer Mother 79    Heart defect Mother     Heart attack Father     Stroke Brother     Ovarian cancer Sister 52    No Known Problems Maternal Grandmother     Breast cancer Paternal Grandmother 61    No Known Problems Sister     No Known Problems Sister     No Known Problems Sister     No Known Problems Sister     No Known Problems Daughter     No Known Problems Maternal Grandfather     No Known Problems Paternal Grandfather     No Known Problems Daughter     No Known Problems Son          The following portions of the patient's history were reviewed in this encounter and updated as appropriate: Past medical, surgical, family, and social history, as well as medications, allergies, and review of systems  EXAM    Vitals:Blood pressure 142/90, pulse 66, temperature 97 6 °F (36 4 °C), resp   rate 16, height 5' 8" (1 727 m), weight 79 8 kg (176 lb), last menstrual period 2022, SpO2 99 %, not currently breastfeeding  ,Body mass index is 26 76 kg/m²  Physical Exam  Vitals reviewed  Constitutional:       Appearance: Normal appearance  She is well-developed  HENT:      Head: Normocephalic  Eyes:      General: No scleral icterus  Cardiovascular:      Rate and Rhythm: Normal rate  Pulmonary:      Effort: Pulmonary effort is normal    Abdominal:      Palpations: Abdomen is soft  Musculoskeletal:      Cervical back: Neck supple  Skin:     General: Skin is warm and dry  Neurological:      Mental Status: She is alert and oriented to person, place, and time  GCS: GCS eye subscore is 4  GCS verbal subscore is 5  GCS motor subscore is 6  Psychiatric:         Speech: Speech normal          Behavior: Behavior normal          Neurologic Exam     Mental Status   Oriented to person, place, and time  Attention: normal    Speech: speech is normal   Level of consciousness: alert    Cranial Nerves     CN VII   Facial expression full, symmetric  Motor Exam   Muscle bulk: normal  Overall muscle tone: normal  Moves all extremities, grossly normal     Gait, Coordination, and Reflexes     Tremor   Resting tremor: absent  Intention tremor: absent  Action tremor: absent  No aids  MEDICAL DECISION MAKING    Imaging Studies:     MRI brain 1/30/22: IMPRESSION:     No acute intracranial abnormality  Slight interval growth of meningioma in the right prepontine cistern  This mass measures 1 8 x 0 6 x 1 8 cm compared to 1 1 x 1 1 x 0 5 cm on the prior study  No adjacent parenchymal edema identified  Minimal mass   effect and flattening the right ventral rex  I have personally reviewed pertinent reports  and I have personally reviewed pertinent films in PACS with a Radiologist  at [facility]  PLEASE NOTE:  This encounter may have been completed utilizing the M- Modal/Fluency Direct Speech Voice Recognition Software   Grammatical errors, random word insertions, pronoun errors and incomplete sentences are occasional consequences of the system due to software limitations, ambient noise and hardware issues  These may be missed by proof reading prior to affixing electronic signature  Any questions or concerns about the content, text or information contained within the body of this dictation should be directly addressed to the advanced practitioner or physician for clarification

## 2022-03-17 ENCOUNTER — RADIATION THERAPY TREATMENT (OUTPATIENT)
Dept: RADIATION ONCOLOGY | Facility: HOSPITAL | Age: 57
End: 2022-03-17
Payer: COMMERCIAL

## 2022-03-17 PROCEDURE — 77399 UNLISTED PX MED RADJ PHYSICS: CPT | Performed by: RADIOLOGY

## 2022-03-17 PROCEDURE — 77334 RADIATION TREATMENT AID(S): CPT | Performed by: RADIOLOGY

## 2022-03-22 PROCEDURE — 77301 RADIOTHERAPY DOSE PLAN IMRT: CPT | Performed by: RADIOLOGY

## 2022-03-22 PROCEDURE — 77300 RADIATION THERAPY DOSE PLAN: CPT | Performed by: RADIOLOGY

## 2022-03-22 PROCEDURE — 77338 DESIGN MLC DEVICE FOR IMRT: CPT | Performed by: RADIOLOGY

## 2022-03-22 NOTE — PROGRESS NOTES
Patient with poor attendance, was told to call to schedule more appointments, did not follow up   D/C at this time

## 2022-03-23 ENCOUNTER — PROCEDURE VISIT (OUTPATIENT)
Dept: NEUROSURGERY | Facility: CLINIC | Age: 57
End: 2022-03-23
Payer: COMMERCIAL

## 2022-03-23 ENCOUNTER — APPOINTMENT (OUTPATIENT)
Dept: LAB | Facility: HOSPITAL | Age: 57
End: 2022-03-23
Payer: COMMERCIAL

## 2022-03-23 DIAGNOSIS — D32.9 MENINGIOMA (HCC): Primary | ICD-10-CM

## 2022-03-23 DIAGNOSIS — D32.9 MENINGIOMA (HCC): ICD-10-CM

## 2022-03-23 LAB — HCG SERPL QL: NEGATIVE

## 2022-03-23 PROCEDURE — 84703 CHORIONIC GONADOTROPIN ASSAY: CPT

## 2022-03-23 PROCEDURE — 77373 STRTCTC BDY RAD THER TX DLVR: CPT | Performed by: RADIOLOGY

## 2022-03-23 PROCEDURE — 77435 SBRT MANAGEMENT: CPT | Performed by: RADIOLOGY

## 2022-03-23 PROCEDURE — 61798 SRS CRANIAL LESION COMPLEX: CPT | Performed by: NEUROLOGICAL SURGERY

## 2022-03-23 PROCEDURE — 36415 COLL VENOUS BLD VENIPUNCTURE: CPT

## 2022-03-23 PROCEDURE — 99024 POSTOP FOLLOW-UP VISIT: CPT | Performed by: NEUROLOGICAL SURGERY

## 2022-03-23 NOTE — PROGRESS NOTES
PATIENT NAME: Vince James  : 1965  MRN: [id number]  PROCEDURE DATE: 3/23/2022    Stereotactic Radiotherapy (SRT) Operative Note    Preop Diagnosis:  Prepontine meningioma    Postop Diagnosis:  Same    Procedure Details: Frameless Stereotactic Radiotherapy for meningioma    Surgeon: Dawson Bhatia MD, PhD     Assistants: none    No qualified resident was available to assist with this case  Radiation Oncologist(s): Dr Thu Pichardo    Estimated Blood Loss:  None           Specimens: None    Drains: None           Total IV Fluids: None              Findings: As above  Complications:  None    Anesthesia: None      INDICATIONS    Prepontine meningioma found on imaging some years ago  More recent imaging showed progression  DETAILS OF PROCEDURE    The patient presented to the outpatient area of the Department of Radiation Oncology where an open faced immobilization mask was created  The patient then underwent a stereotactic head CT while immobilized in the mask  The patient was then released from the Department  The patients stereotactic CT and previous stereotactic MRI scans were fused in the  [facility], which was used to develop the SRT plan  The SRT prescription called for a dose of 25 Gray to be delivered to the PTV in 5 fractions  The PTV was created by expanding the GTV, as contoured by myself and the Radiation Oncologist  The SRT plan utilized the mini-multileaf columnator on the TrueBeam machine at the [employer]  When the final treatment plan had been developed and approved by myself, the radiation oncologist and physicist, the patient returned to the Department for their first frameless SRT treatment  The patient was positioned on the treatment couch  The Optic Surface Monitoring System (OSMS) was used initially to align the patient  The patient was immobilized in their open faced mask  kV and then cone beam CT imaging was used to align the patient   Once the radiation oncologist, physicist and I agreed the patient was in correct position, the fields were treated sequentially without complications  The OSMS was used during the treatment to assure correct positioning of the patient, and if it detected patient motion, interrupted the treatment beam until the patient was back in alignment  When the first SRT treatment had been delivered, the patient was recovered from the treatment room, scheduled for their remaining SRT treatments, and was discharged from the department  There was no blood loss and no specimen        SIGNATURE: Larissa Glass MD, PhD  DATE: 3/23/2022   TIME: 10:54 AM

## 2022-03-25 ENCOUNTER — APPOINTMENT (OUTPATIENT)
Dept: RADIATION ONCOLOGY | Facility: HOSPITAL | Age: 57
End: 2022-03-25
Payer: COMMERCIAL

## 2022-03-25 PROCEDURE — 77373 STRTCTC BDY RAD THER TX DLVR: CPT | Performed by: RADIOLOGY

## 2022-03-28 PROCEDURE — 77373 STRTCTC BDY RAD THER TX DLVR: CPT | Performed by: STUDENT IN AN ORGANIZED HEALTH CARE EDUCATION/TRAINING PROGRAM

## 2022-03-30 ENCOUNTER — APPOINTMENT (OUTPATIENT)
Dept: RADIATION ONCOLOGY | Facility: HOSPITAL | Age: 57
End: 2022-03-30
Payer: COMMERCIAL

## 2022-03-30 DIAGNOSIS — D32.9 MENINGIOMA (HCC): Primary | ICD-10-CM

## 2022-03-30 PROCEDURE — 77373 STRTCTC BDY RAD THER TX DLVR: CPT | Performed by: RADIOLOGY

## 2022-03-30 RX ORDER — DEXAMETHASONE 2 MG/1
2 TABLET ORAL 2 TIMES DAILY WITH MEALS
Qty: 30 TABLET | Refills: 1 | Status: SHIPPED | OUTPATIENT
Start: 2022-03-30

## 2022-04-01 ENCOUNTER — APPOINTMENT (OUTPATIENT)
Dept: RADIATION ONCOLOGY | Facility: HOSPITAL | Age: 57
End: 2022-04-01
Attending: RADIOLOGY
Payer: COMMERCIAL

## 2022-04-01 PROCEDURE — 77336 RADIATION PHYSICS CONSULT: CPT | Performed by: STUDENT IN AN ORGANIZED HEALTH CARE EDUCATION/TRAINING PROGRAM

## 2022-04-01 PROCEDURE — 77373 STRTCTC BDY RAD THER TX DLVR: CPT | Performed by: STUDENT IN AN ORGANIZED HEALTH CARE EDUCATION/TRAINING PROGRAM

## 2022-04-06 ENCOUNTER — TELEPHONE (OUTPATIENT)
Dept: RADIATION ONCOLOGY | Facility: HOSPITAL | Age: 57
End: 2022-04-06

## 2022-04-07 ENCOUNTER — ANESTHESIA EVENT (OUTPATIENT)
Dept: ANESTHESIOLOGY | Facility: HOSPITAL | Age: 57
End: 2022-04-07

## 2022-04-07 ENCOUNTER — ANESTHESIA (OUTPATIENT)
Dept: ANESTHESIOLOGY | Facility: HOSPITAL | Age: 57
End: 2022-04-07

## 2022-04-07 ENCOUNTER — TELEPHONE (OUTPATIENT)
Dept: RADIATION ONCOLOGY | Facility: HOSPITAL | Age: 57
End: 2022-04-07

## 2022-04-07 NOTE — TELEPHONE ENCOUNTER
0855 LM on patient's mobile to call back with update on status  9012 LM on daughter's mobile regarding unable to reach patient for update on status x2 days  Request call back to verify patient's phone number

## 2022-04-07 NOTE — ANESTHESIA PREPROCEDURE EVALUATION
Procedure:  PRE-OP ONLY    Relevant Problems   ANESTHESIA (within normal limits)      CARDIO   (+) Essential hypertension   (+) Migraine without aura and without status migrainosus, not intractable      ENDO   (+) Hypothyroidism      GI/HEPATIC   (+) Gastroesophageal reflux disease with esophagitis without hemorrhage   (+) Gastroesophageal reflux disease without esophagitis      /RENAL (within normal limits)      GYN (within normal limits)      HEMATOLOGY (within normal limits)      MUSCULOSKELETAL   (+) Chronic bilateral low back pain with bilateral sciatica   (+) Sciatica of right side      NEURO/PSYCH   (+) Chronic bilateral low back pain with bilateral sciatica   (+) Heel pain, chronic, right   (+) Migraine without aura and without status migrainosus, not intractable      PULMONARY (within normal limits)             Anesthesia Plan  ASA Score- 2     Anesthesia Type- IV sedation with anesthesia with ASA Monitors  Additional Monitors:   Airway Plan:           Plan Factors-Exercise tolerance (METS): >4 METS  Chart reviewed  Patient is not a current smoker  Induction- intravenous  Postoperative Plan-     Informed Consent- Anesthetic plan and risks discussed with patient

## 2022-04-08 ENCOUNTER — HOSPITAL ENCOUNTER (OUTPATIENT)
Dept: GASTROENTEROLOGY | Facility: MEDICAL CENTER | Age: 57
Setting detail: OUTPATIENT SURGERY
Discharge: HOME | End: 2022-04-08
Admitting: INTERNAL MEDICINE
Payer: COMMERCIAL

## 2022-04-08 ENCOUNTER — ANESTHESIA EVENT (OUTPATIENT)
Dept: GASTROENTEROLOGY | Facility: MEDICAL CENTER | Age: 57
End: 2022-04-08

## 2022-04-08 ENCOUNTER — ANESTHESIA (OUTPATIENT)
Dept: GASTROENTEROLOGY | Facility: MEDICAL CENTER | Age: 57
End: 2022-04-08

## 2022-04-08 ENCOUNTER — TELEPHONE (OUTPATIENT)
Dept: RADIATION ONCOLOGY | Facility: CLINIC | Age: 57
End: 2022-04-08

## 2022-04-08 VITALS
BODY MASS INDEX: 28.09 KG/M2 | SYSTOLIC BLOOD PRESSURE: 125 MMHG | RESPIRATION RATE: 17 BRPM | OXYGEN SATURATION: 99 % | WEIGHT: 179 LBS | DIASTOLIC BLOOD PRESSURE: 74 MMHG | HEIGHT: 67 IN | TEMPERATURE: 97.2 F | HEART RATE: 69 BPM

## 2022-04-08 DIAGNOSIS — K57.92 DIVERTICULITIS: ICD-10-CM

## 2022-04-08 DIAGNOSIS — R10.10 PAIN OF UPPER ABDOMEN: ICD-10-CM

## 2022-04-08 DIAGNOSIS — K21.9 GASTROESOPHAGEAL REFLUX DISEASE WITHOUT ESOPHAGITIS: ICD-10-CM

## 2022-04-08 DIAGNOSIS — R11.10 INTRACTABLE VOMITING: ICD-10-CM

## 2022-04-08 LAB
EXT PREGNANCY TEST URINE: NEGATIVE
EXT. CONTROL: NORMAL

## 2022-04-08 PROCEDURE — 88305 TISSUE EXAM BY PATHOLOGIST: CPT | Performed by: PATHOLOGY

## 2022-04-08 PROCEDURE — 81025 URINE PREGNANCY TEST: CPT | Performed by: ANESTHESIOLOGY

## 2022-04-08 PROCEDURE — 43239 EGD BIOPSY SINGLE/MULTIPLE: CPT | Performed by: INTERNAL MEDICINE

## 2022-04-08 RX ORDER — SODIUM CHLORIDE 9 MG/ML
125 INJECTION, SOLUTION INTRAVENOUS CONTINUOUS
Status: DISCONTINUED | OUTPATIENT
Start: 2022-04-08 | End: 2022-04-12 | Stop reason: HOSPADM

## 2022-04-08 RX ORDER — PROPOFOL 10 MG/ML
INJECTION, EMULSION INTRAVENOUS AS NEEDED
Status: DISCONTINUED | OUTPATIENT
Start: 2022-04-08 | End: 2022-04-08

## 2022-04-08 RX ORDER — LIDOCAINE HYDROCHLORIDE 20 MG/ML
INJECTION, SOLUTION EPIDURAL; INFILTRATION; INTRACAUDAL; PERINEURAL AS NEEDED
Status: DISCONTINUED | OUTPATIENT
Start: 2022-04-08 | End: 2022-04-08

## 2022-04-08 RX ADMIN — LIDOCAINE HYDROCHLORIDE 100 MG: 20 INJECTION, SOLUTION EPIDURAL; INFILTRATION; INTRACAUDAL; PERINEURAL at 10:20

## 2022-04-08 RX ADMIN — PROPOFOL 100 MG: 10 INJECTION, EMULSION INTRAVENOUS at 10:20

## 2022-04-08 RX ADMIN — PROPOFOL 50 MG: 10 INJECTION, EMULSION INTRAVENOUS at 10:21

## 2022-04-08 RX ADMIN — SODIUM CHLORIDE 125 ML/HR: 0.9 INJECTION, SOLUTION INTRAVENOUS at 09:13

## 2022-04-08 NOTE — ANESTHESIA POSTPROCEDURE EVALUATION
Post-Op Assessment Note    CV Status:  Stable    Pain management: adequate     Mental Status:  Alert and awake   Hydration Status:  Euvolemic   PONV Controlled:  Controlled   Airway Patency:  Patent      Post Op Vitals Reviewed: Yes      Staff: Anesthesiologist         No complications documented      /74 (04/08/22 1052)    Temp     Pulse 69 (04/08/22 1052)   Resp 17 (04/08/22 1052)    SpO2 99 % (04/08/22 1052)

## 2022-04-08 NOTE — TELEPHONE ENCOUNTER
Daughter informed of multiple phone messages left for patient x2 days regarding obtaining update on patient status  Also informed that a message was left on Dilan Lawrence's phone 4/7/22  Requested Savanna soares to ask patient to call radiation oncology  Phone number provided

## 2022-04-08 NOTE — ANESTHESIA PREPROCEDURE EVALUATION
Procedure:  EGD    Relevant Problems   ANESTHESIA (within normal limits)      CARDIO   (+) Essential hypertension   (+) Migraine without aura and without status migrainosus, not intractable      ENDO   (+) Hypothyroidism      GI/HEPATIC   (+) Gastroesophageal reflux disease with esophagitis without hemorrhage   (+) Gastroesophageal reflux disease without esophagitis      /RENAL (within normal limits)      GYN (within normal limits)      HEMATOLOGY (within normal limits)      MUSCULOSKELETAL   (+) Chronic bilateral low back pain with bilateral sciatica   (+) Sciatica of right side      NEURO/PSYCH   (+) Chronic bilateral low back pain with bilateral sciatica   (+) Heel pain, chronic, right   (+) Migraine without aura and without status migrainosus, not intractable      PULMONARY (within normal limits)        Physical Exam    Airway    Mallampati score: I  TM Distance: >3 FB  Neck ROM: full     Dental   No notable dental hx     Cardiovascular  Rhythm: regular, Rate: normal, Cardiovascular exam normal    Pulmonary  Pulmonary exam normal Breath sounds clear to auscultation,     Other Findings        Anesthesia Plan  ASA Score- 2     Anesthesia Type- IV sedation with anesthesia with ASA Monitors  Additional Monitors:   Airway Plan:           Plan Factors-Exercise tolerance (METS): >4 METS  Chart reviewed  Patient is not a current smoker  Induction- intravenous  Postoperative Plan-     Informed Consent- Anesthetic plan and risks discussed with patient

## 2022-04-08 NOTE — H&P
History and Physical -  [facility]  Jovanni Madison 49 y o  female MRN: [id number]                  HPI: Jovanni Madison is a 49y o  year old female who presents for refractory GERD  REVIEW OF SYSTEMS: Per the HPI, and otherwise unremarkable  Historical Information   Past Medical History:   Diagnosis Date    Chronic pain     Disease of thyroid gland     hypothyroid    GERD (gastroesophageal reflux disease)     Hypertension     Hyperthyroiditis     HYPERTHYROID; POST IODINE THERAPY    Meningioma ([address] )      Past Surgical History:   Procedure Laterality Date    APPENDECTOMY       SECTION      HERNIA REPAIR       Social History   Social History     Substance and Sexual Activity   Alcohol Use Yes    Comment: occasional      Social History     Substance and Sexual Activity   Drug Use No     Social History     Tobacco Use   Smoking Status Never Smoker   Smokeless Tobacco Never Used     Family History   Problem Relation Age of Onset    Endometrial cancer Family     Ovarian cancer Mother 79    Heart defect Mother     Heart attack Father     Stroke Brother     Ovarian cancer Sister 52    No Known Problems Maternal Grandmother     Breast cancer Paternal Grandmother 61    No Known Problems Sister     No Known Problems Sister     No Known Problems Sister     No Known Problems Sister     No Known Problems Daughter     No Known Problems Maternal Grandfather     No Known Problems Paternal Grandfather     No Known Problems Daughter     No Known Problems Son        Meds/Allergies     (Not in a hospital admission)      No Known Allergies    Objective     Blood pressure 148/82, pulse 64, temperature (!) 97 2 °F (36 2 °C), temperature source Temporal, resp  rate 20, height 5' 7" (1 702 m), weight 81 2 kg (179 lb), SpO2 98 %, not currently breastfeeding        PHYSICAL EXAMINATION:    General Appearance:   Alert, cooperative, no distress   HEENT:  Normocephalic, atraumatic, anicteric  Neck supple, symmetrical, trachea midline  Lungs:   Equal chest rise and unlabored breathing, normal effort, no coughing  Cardiovascular:   No visualized JVD  Abdomen:   No abdominal distension  Skin:   No jaundice, rashes, or lesions  Musculoskeletal:   Normal range of motion visualized  Psych:  Normal affect and normal insight  Neuro:  Alert and appropriate  ASSESSMENT/PLAN:  This is a 49y o  year old female here for EGD, and she is stable and optimized for her procedure

## 2022-04-08 NOTE — DISCHARGE INSTRUCTIONS
Esófago de Lawson   LO QUE NECESITA SABER:   El esófago de Lawson es nicole condición que también se llama metaplasia intestinal  Las células que recubren el esófago se transforman en células que son Margaret Bake del intestino  El cambio aumenta el riesgo de cáncer de esófago  INSTRUCCIONES SOBRE EL JUNE HOSPITALARIA:   Llame al [employer] de emergencias local (911 en los [place]) si:  · Usted tiene dolor severo en el pecho y le falta el aire  Busque atención médica de inmediato si:  · Usted tiene evacuaciones intestinales negras, sangrientas o alquitranadas  · Land vómito parece josé antonio café molido o contiene norma  Llame a land médico o gastroenterólogo si:  · Shan síntomas no mejoran con el tratamiento  · Usted tiene preguntas o inquietudes acerca de land condición o cuidado  Medicamentos:  · Medicamentos contra el reflujo ayudan a disminuir el ácido estomacal que puede irritar el esófago y BJURHOLM  Estos medicamentos podrían incluir inhibidores de bomba de protones (IBP) y bloqueadores de los receptores tipo 2 de histamina (bloqueadores H2)  También se le podría leroy medicación para que deje de vomitar  · [place] shan medicamentos josé antonio se le haya indicado  Consulte con land médico si usted kanika que land medicamento no le está ayudando o si presenta efectos secundarios  Infórmele si es alérgico a cualquier medicamento  Mantenga nicole lista actualizada de los [place], las vitaminas y los productos herbales que sina  Incluya los siguientes datos de los medicamentos: cantidad, frecuencia y motivo de administración  Traiga con usted la lista o los envases de las píldoras a shan citas de seguimiento  Lleve la lista de los medicamentos con usted en cathy de nicole emergencia  Nutrición: No coma alimentos que empeoran shan síntomas  Vilinda Garcia son chocolate, ajo, cebolla, alimentos grasosos o picantes, frutas cítricas (naranjas), y alimentos a base de tomate (la salsa de espagueti)   No juan r alcohol, bebidas que contienen cafeína o bebidas [employer] refrescos  Pregúntele a torres médico sobre otros alimentos y bebidas que usted no debería edith  Mantenga un peso saludable: La pérdida de peso podría aliviar shan síntomas si usted tiene un exceso de [place]  Pregúntele a torres médico maneras sanas para perder peso  No fume: Si usted fuma, nunca es demasiado tarde para dejar de hacerlo  Si fuma, el reflujo de ácido podría empeorar  Solicite información a torres médico si usted necesita ayuda para dejar de fumar  Para apoyo y [place] información:  · [address]  [address]  [place] , [address]  Phone: [phone]  Web Address: [email or web address]    Acuda a shan consultas de control con torres médico o gastroenterólogo según le indicaron: Podría ser necesario repetir la endoscopía y biopsia  Estos exámenes ayudan a detectar los primeros signos de cáncer del esófago  Anote shan preguntas para que se acuerde de hacerlas seun shan visitas  © Copyright [employer] 2022 Information is for End User's use only and may not be sold, redistributed or otherwise used for commercial purposes  All illustrations and images included in CareNotes® are the copyrighted property of A D A M , Inc  or [address] [place] [address] es sólo para uso en educación  Torres intención no es darle un consejo médico sobre enfermedades o tratamientos  Colsulte con torres Ronalee Mon farmacéutico antes de seguir cualquier régimen médico para saber si es seguro y efectivo para usted  Hernia hiatal   LO QUE NECESITA SABER:   Nicole hernia hiatal es nicole condición que provoca que parte de torres estómago se abulte a través del hiato (apertura pequeña) en torres diafragma  La parte del estómago podría moverse hacia arriba y [place], o podría quedarse atrapado en el diafragma         INSTRUCCIONES SOBRE EL JUNE HOSPITALARIA:   Llame al número de emergencias local (911 en los [place]) si:  · Usted siente dolor kike en el pecho y dificultad repentina para respirar  Busque atención médica de inmediato si:  · Usted tiene dolor abdominal intenso  · Usted trata de vomitar leif no sale nada  · Shan heces son negras o tienen norma  · Torres vómito parece josé antonio café molido o contiene norma  Llame a torres médico si:  · Shan síntomas están empeorando  · Usted tiene náusea y está vomitando  · Usted pierde peso sin proponérselo  · Usted tiene preguntas o inquietudes acerca de torres condición o cuidado  Medicamentos:  · Los medicamentos podrían administrase para [employer] síntomas de la [place]  Estos medicamentos ayudan a disminuir u obstruir el ácido estomacal  Es posible que también le den medicamentos ayudan a hacer más estrecho el esfínter esofágico    · [place] shan medicamentos josé antonio se le haya indicado  Consulte con torres médico si usted kanika que torres medicamento no le está ayudando o si presenta efectos secundarios  Infórmele si es alérgico a cualquier medicamento  Mantenga nicole lista actualizada de los [place], las vitaminas y los productos herbales que sina  Incluya los siguientes datos de los medicamentos: cantidad, frecuencia y motivo de administración  Traiga con usted la lista o los envases de las píldoras a shan citas de seguimiento  Lleve la lista de los medicamentos con usted en cathy de nicole emergencia  Cuidado personal: La siguiente nutrición y cambios en el estilo de jannette podrían se recomendados para aliviar shan síntomas de acidez:     · Evite los alimentos que empeoran shan síntomas  Estos podrían [employer], jugos de fruta, alcohol, cafeína, chocolate y [place]  · Coma porciones pequeñas seun el día  Las porciones [employer] dan a torres estómago menos alimentos que digerir  · Evite acostarse e inclinarse después de comer  No consuma alimentos entre 2 y 3 horas antes de [place] disminuye torres riesgo de reflujo  · Mantenga un peso saludable   Si usted tiene sobrepeso, la pérdida de peso podría ayudarle a [employer]  · Duerma con torres santiago elevada al menos 6 pulgadas  · No fume  El fumar puede aumentar ju síntomas de [place]  Acuda a la consulta de control con torres médico según las indicaciones: Anote ju preguntas para que se acuerde de hacerlas seun ju visitas  © [employer] 2022 Information is for End User's use only and may not be sold, redistributed or otherwise used for commercial purposes  All illustrations and images included in CareNotes® are the copyrighted property of [employer]  or [address] [place] [address] es sólo para uso en educación  Torres intención no es darle un consejo médico sobre enfermedades o tratamientos  Colsulte con torres Jenn Danita farmacéutico antes de seguir cualquier régimen médico para saber si es seguro y efectivo para usted  Endoscopia superior   LO QUE NECESITA SABER:   Sherry endoscopia superior también se conoce josé antonio endoscopia gastrointestinal (GI) superior o esofagogastroduodenoscopia (EGD)  Usted podría sentirse inflamado, con gases o sentir molestia abdominal después de torres procedimiento  Torres garganta podría estar adolorida por 24 a 36 horas  Usted podría eructar o expulsar gas debido al aire que todavía está en torres cuerpo  INSTRUCCIONES SOBRE EL JUNE HOSPITALARIA:   Llame al 911 si:  · Tiene dolor en el pecho o dificultad para respirar de forma repentina  Busque atención médica de inmediato si:  · Está mareado o siente que se va a desmayar  · Usted tiene dificultad para tragar  · Usted tiene dolor de garganta intenso  · Ju evacuaciones intestinales son Glor[place]ta Guy o negras  · Torres abdomen está alexandrea y firme y usted siente dolor intenso  · Usted vomita norma  Comuníquese con torres médico si:  · Usted se siente lleno o hinchado y no puede eructar o expulsar gas  · Usted no ha tenido sherry evacuación intestinal después de 3 días de torres procedimiento      · Usted tiene dolor de hosea  · Usted tiene fiebre o escalofríos  · Tiene náuseas o está vomitando  · Usted tiene salpullido o urticaria  · Usted tiene preguntas o inquietudes acerca de torres endoscopia  Alivie el dolor de garganta: Chupe pastillas para la garganta o hielo triturado  Kee gárgaras con nicole pequeña cantidad de agua tibia con sal  Mezcle 1 cucharadita de sal y 1 taza de agua tibia para hacer agua salada  Alivie el gas y la molestia de la inflamación: Acuéstese sobre torres costado derecho con nicole almohada térmica sobre torres abdomen  Camine un poco para ayudar a expulsar el gas  Coma comidas pequeñas hasta que se alivie de la inflamación  Descanse después de torres procedimiento: No maneje o tome decisiones importantes hasta el día siguiente de torres procedimiento  Regrese a shan actividades normales según le indiquen  Usted generalmente puede regresar al Greyson Lush al día siguiente de torres procedimiento  Acuda a shan consultas de control con torres médico según le indicaron: Anote shan preguntas para que se acuerde de hacerlas seun shan visitas  © Copyright [employer] 2022 Information is for End User's use only and may not be sold, redistributed or otherwise used for commercial purposes  All illustrations and images included in CareNotes® are the copyrighted property of [employer] A Har[employer]  or [address] [place] [address] es sólo para uso en educación  Torres intención no es darle un consejo médico sobre enfermedades o tratamientos  Colsulte con torres Dotty Lint farmacéutico antes de seguir cualquier régimen médico para saber si es seguro y efectivo para usted

## 2022-04-12 ENCOUNTER — TELEPHONE (OUTPATIENT)
Dept: RADIATION ONCOLOGY | Facility: CLINIC | Age: 57
End: 2022-04-12

## 2022-04-12 NOTE — TELEPHONE ENCOUNTER
S/p SRT to meningioma, completed 4/1/22  Patient continues to report dizziness, taking meclizine q8h  She reports nausea is better  Denies headaches, no other neurologic symptoms reported  She reports that she is taking dexamethasone 2 mg twice a day  On 4/1/2022 she was instructed to decrease dexamethasone to once a day starting on 4/4/2022 per Dr Dalton's instructions  Patient again instructed to decrease dexamethasone to once a day starting today   Patient notified that our office will call her again later this week to get update on status  No radiation follow up noted, patient informed that she will get a call today to schedule 2 month follow up with radiation/neurosurgery at [facility] (brain/spine tumor clinic) at the [facility]  Patient verbalized understanding of above

## 2022-04-14 DIAGNOSIS — K21.9 GASTROESOPHAGEAL REFLUX DISEASE WITHOUT ESOPHAGITIS: ICD-10-CM

## 2022-04-14 RX ORDER — SUCRALFATE 1 G/1
TABLET ORAL
Qty: 120 TABLET | Refills: 0 | Status: SHIPPED | OUTPATIENT
Start: 2022-04-14 | End: 2022-05-19

## 2022-04-15 ENCOUNTER — TELEPHONE (OUTPATIENT)
Dept: RADIATION ONCOLOGY | Facility: HOSPITAL | Age: 57
End: 2022-04-15

## 2022-04-15 ENCOUNTER — OFFICE VISIT (OUTPATIENT)
Dept: FAMILY MEDICINE CLINIC | Facility: CLINIC | Age: 57
End: 2022-04-15

## 2022-04-15 VITALS
DIASTOLIC BLOOD PRESSURE: 82 MMHG | TEMPERATURE: 97.4 F | HEART RATE: 77 BPM | WEIGHT: 176 LBS | HEIGHT: 67 IN | OXYGEN SATURATION: 99 % | SYSTOLIC BLOOD PRESSURE: 128 MMHG | RESPIRATION RATE: 18 BRPM | BODY MASS INDEX: 27.62 KG/M2

## 2022-04-15 DIAGNOSIS — K21.00 GASTROESOPHAGEAL REFLUX DISEASE WITH ESOPHAGITIS WITHOUT HEMORRHAGE: Primary | ICD-10-CM

## 2022-04-15 DIAGNOSIS — D32.9 MENINGIOMA (HCC): ICD-10-CM

## 2022-04-15 DIAGNOSIS — E03.9 HYPOTHYROIDISM, UNSPECIFIED TYPE: ICD-10-CM

## 2022-04-15 DIAGNOSIS — K21.9 GASTROESOPHAGEAL REFLUX DISEASE WITHOUT ESOPHAGITIS: ICD-10-CM

## 2022-04-15 DIAGNOSIS — F33.0 MILD EPISODE OF RECURRENT MAJOR DEPRESSIVE DISORDER (HCC): ICD-10-CM

## 2022-04-15 DIAGNOSIS — I10 ESSENTIAL HYPERTENSION: ICD-10-CM

## 2022-04-15 PROBLEM — K57.92 DIVERTICULITIS: Status: RESOLVED | Noted: 2021-12-01 | Resolved: 2022-04-15

## 2022-04-15 PROBLEM — Z12.31 SCREENING MAMMOGRAM, ENCOUNTER FOR: Status: RESOLVED | Noted: 2020-09-03 | Resolved: 2022-04-15

## 2022-04-15 PROBLEM — Z12.11 ENCOUNTER FOR SCREENING COLONOSCOPY: Status: RESOLVED | Noted: 2020-08-17 | Resolved: 2022-04-15

## 2022-04-15 PROBLEM — R11.10 INTRACTABLE VOMITING: Status: RESOLVED | Noted: 2021-12-01 | Resolved: 2022-04-15

## 2022-04-15 PROCEDURE — 99213 OFFICE O/P EST LOW 20 MIN: CPT | Performed by: FAMILY MEDICINE

## 2022-04-15 PROCEDURE — 3079F DIAST BP 80-89 MM HG: CPT | Performed by: FAMILY MEDICINE

## 2022-04-15 PROCEDURE — 3074F SYST BP LT 130 MM HG: CPT | Performed by: FAMILY MEDICINE

## 2022-04-15 NOTE — PROGRESS NOTES
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  The right superior cerebellar artery and posterior communicating artery branches are intimately associated along the dorsal and medial borders of the lesion  MRI Brain: mass measuring 1 8 x 0 6 x 1 8 cm (compared to 1 1 x 1 1 x 0 5 cm on the prior study)  No adjacent parenchymal edema identified  Minimal mass effect and flattening the right ventral rex  Patient completed frameless stereotactic radiotherapy for meningioma at the beginning of this month  Patient scheduled a 2 month follow up with radiation/neurosurgery at Brain/Spine/Tumor clinic at the [address] [place] [address] in January     - Continue following with Neurosurgery and continue to monitor  Essential hypertension  BP Today: 128/82  At goal per JNC-8 guidelines  - Continue Lisinopril 40 mg daily  Gastroesophageal reflux disease without esophagitis  Patient had repeat EGD done by GI on 4/8/2022 which showed a 3 cm hiatal hernia as well as short-segment Lawson's esophagus, which was biopsied  Stomach and duodenum appeared normal and esophagitis was noted to be mostly resolved  She will be undergoing a pH study  - Continue Sucralfate 1 tablet 4x/day, Bentyl 20 mg Q6H, and Protonix 40 mg BID    - Continue following with GI; appreciate their recommendations  Mild episode of recurrent major depressive disorder (HCC)  PHQ-9 Today: 8, indicating mild depression  Patient is tearful during visit today regarding her meningioma diagnosis and states it has been difficult  We talked about this for a while today  Patient is not interested in therapy or medication at this time, so we will continue to monitor   No suicidal or homicidal ideations  Diagnoses and all orders for this visit:    Gastroesophageal reflux disease with esophagitis without hemorrhage    Hypothyroidism, unspecified type    Gastroesophageal reflux disease without esophagitis    Essential hypertension    Meningioma (HCC)    Mild episode of recurrent major depressive disorder (HCC)          Subjective:      Patient ID: Judy Arzola is a 49 y o  female  Judy Arzola is a pleasant 53-year-old female with PMH of chronic pain syndrome, hypothyroidism, meningioma s/p SRT and hypertension who presents to the clinic today for a follow up on her chronic conditions  She has no concerns other than what is noted in ROS  The following portions of the patient's history were reviewed and updated as appropriate: allergies, current medications, past family history, past medical history, past social history, past surgical history and problem list     Review of Systems   Constitutional: Negative for activity change, appetite change, chills and fever  HENT: Negative for sore throat  Respiratory: Negative for cough and shortness of breath  Cardiovascular: Negative for chest pain, palpitations and leg swelling  Gastrointestinal: Negative for abdominal pain, constipation, diarrhea, nausea and vomiting  Musculoskeletal: Negative for back pain and gait problem  Neurological: Positive for dizziness and numbness  Negative for tremors, seizures, syncope, facial asymmetry, weakness and headaches  Objective:      /82 (BP Location: Left arm, Patient Position: Sitting, Cuff Size: Adult)   Pulse 77   Temp (!) 97 4 °F (36 3 °C) (Temporal)   Resp 18   Ht 5' 7" (1 702 m)   Wt 79 8 kg (176 lb)   LMP 04/08/2022   SpO2 99%   BMI 27 57 kg/m²          Physical Exam  Vitals reviewed  Constitutional:       General: She is not in acute distress  Appearance: She is well-developed  She is not diaphoretic     HENT:      Head: Normocephalic and atraumatic  Eyes:      Conjunctiva/sclera: Conjunctivae normal    Cardiovascular:      Rate and Rhythm: Normal rate and regular rhythm  Heart sounds: Normal heart sounds  No murmur heard  No friction rub  No gallop  Pulmonary:      Effort: Pulmonary effort is normal  No respiratory distress  Breath sounds: Normal breath sounds  No wheezing or rales  Abdominal:      General: Bowel sounds are normal  There is no distension  Palpations: Abdomen is soft  There is no mass  Tenderness: There is no abdominal tenderness  There is no guarding  Musculoskeletal:         General: No tenderness or deformity  Normal range of motion  Cervical back: Normal range of motion  Right lower leg: No edema  Left lower leg: No edema  Skin:     General: Skin is warm and dry  Neurological:      General: No focal deficit present  Mental Status: She is alert and oriented to person, place, and time  GCS: GCS eye subscore is 4  GCS verbal subscore is 5  GCS motor subscore is 6  Cranial Nerves: No dysarthria or facial asymmetry  Coordination: Finger-Nose-Finger Test and Heel to [place] jeet Test normal       Gait: Gait normal       Comments: Strength 5/5 in both UL and LL bilaterally  Sensations intact in both UL and LL bilaterally  Gait intact              Kathia Schmidt MD  4/15/2022

## 2022-04-15 NOTE — ASSESSMENT & PLAN NOTE
Patient recently presented to ED in late January with dizziness and fatigue  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  The right superior cerebellar artery and posterior communicating artery branches are intimately associated along the dorsal and medial borders of the lesion  MRI Brain: mass measuring 1 8 x 0 6 x 1 8 cm (compared to 1 1 x 1 1 x 0 5 cm on the prior study)  No adjacent parenchymal edema identified  Minimal mass effect and flattening the right ventral rex  Patient completed frameless stereotactic radiotherapy for meningioma at the beginning of this month  Patient scheduled a 2 month follow up with radiation/neurosurgery at Brain/Spine/Tumor clinic at the [facility] in January     - Continue following with Neurosurgery and continue to monitor

## 2022-04-15 NOTE — ASSESSMENT & PLAN NOTE
PHQ-9 Today: 8, indicating mild depression  Patient is tearful during visit today regarding her meningioma diagnosis and states it has been difficult  We talked about this for a while today  Patient is not interested in therapy or medication at this time, so we will continue to monitor  No suicidal or homicidal ideations

## 2022-04-15 NOTE — TELEPHONE ENCOUNTER
Patient reports increase in headaches for the last 3 days since decreasing dexamethasone 2 mg twice daily to once a day on 4/12/22  She continues with dizziness, taking meclizine three times a day  Denies N/V, no seizures/tremors, no weakness of extremities  Patient informed that I will review above with radiation physician, Dr Sharon Eden and call her back with further instructions  Patient agreeable

## 2022-04-15 NOTE — TELEPHONE ENCOUNTER
The patient was contacted regarding a headache present for the past 3 days  This is described as right sided and 8/10  She has some associated visual disturbances, but denies visual field cut  She says things sometimes look "grey "  She denies associated numbness/weakness or tingling  She has been taking a medication which she says was previously prescribed for migraines  She recently received 2500 cGy in 5 fractions to a right prepontine cistern meningioma on 4/1/2022  She had been tapering dexamethasone as an outpatient, and decreased to 2 mg daily  I advised her to resume dexamethasone 2 mg BID (Rx provided, additional taper instructions per Dr Adrian Donaldson upon her return) and to report to the ED for a complete neurologic evaluation/ assessment for intracranial edema  She voiced understanding  Our staff will contact her next week for a status update  She was in agreement with this plan

## 2022-04-19 ENCOUNTER — TELEPHONE (OUTPATIENT)
Dept: RADIATION ONCOLOGY | Facility: CLINIC | Age: 57
End: 2022-04-19

## 2022-04-19 NOTE — TELEPHONE ENCOUNTER
Patient reports that she's feeling "very bad today", she reports nausea and feeling very tired  No ED visit noted in [address], as per Dr Kurtis Morillo conversation with the patient on 4/15/22, she was advised to go to the ED for further evaluation  Patient states that she did not go because she doesn't like to go there  On 4/15/22, patient was instructed to increase dexamethasone 2 mg from once a day to twice a day, but she relays that she has only been taking the steroid medication once a day at 9:00 am  She reports being in bed all day Sunday, 4/14 due to tiredness and headache  Last headache was 4/14  She reports nausea today, no vomiting  She denies visual changes  No seizures/tremors reported  She also c/o posterior neck pain greater than one week  She denies injury to neck  Patient advised to go to ED for evaluation

## 2022-04-20 ENCOUNTER — TELEPHONE (OUTPATIENT)
Dept: RADIATION ONCOLOGY | Facility: HOSPITAL | Age: 57
End: 2022-04-20

## 2022-04-21 ENCOUNTER — TELEPHONE (OUTPATIENT)
Dept: RADIATION ONCOLOGY | Facility: HOSPITAL | Age: 57
End: 2022-04-21

## 2022-04-21 NOTE — TELEPHONE ENCOUNTER
Patient reports feeling better yesterday and today after increasing the dexamethasone from 2 mg once a day to 2 mg twice a day on 4/19/22  She denies headache, nausea/vomiting x2 days  No vision changes or other new neurologic changes  She reports that she continues to feels tired and continues with the posterior neck pain and dizziness  Dizziness is very bad when laying down or if she moves her head too fast  Patient informed that Dr Desiree Patterson does not feel the her dizziness and neck pain are related to her meningioma or the recent radiation therapy  She was instructed to continue dexamethasone 2 mg BID  Patient informed that per Dr Desiree Patterson, if dizziness continues, ENT referral can be placed  Patient notified that this office will call her next week to check status  [place] called for patient and dexamethasone refill requested  Patient aware  Patient verbalized above instructions

## 2022-04-25 NOTE — RESULT ENCOUNTER NOTE
Bx showed Lawson's  We would like her to get pH & manometry testing as well as a HIDA scan  All orders placed  Please contact pt  Ideally we would like her off PPI for 5 days prior to pH testing however if she is unable to stop she can continue on the medication    Rosa Wilson

## 2022-04-27 ENCOUNTER — TELEPHONE (OUTPATIENT)
Dept: RADIATION ONCOLOGY | Facility: HOSPITAL | Age: 57
End: 2022-04-27

## 2022-04-27 DIAGNOSIS — D32.9 MENINGIOMA (HCC): ICD-10-CM

## 2022-04-27 DIAGNOSIS — R42 DIZZINESS: Primary | ICD-10-CM

## 2022-04-27 NOTE — TELEPHONE ENCOUNTER
Patient called and instructed to decrease dexamethasone to 2 mg once a day starting today  Patient instructed to call with any worsening of symptoms  Patient also informed that ENT referral will be placed today  Patient informed that this office will call her later this week to check status  Patient verbalized understanding of instructions given

## 2022-04-27 NOTE — TELEPHONE ENCOUNTER
Thank you, I will call her to let her know  Just an FYI, she did not go to the ED after you spoke with her on 4/15/22

## 2022-04-27 NOTE — TELEPHONE ENCOUNTER
Patient taking dexamethasone 2 mg twice a day since 4/19/22  She denies headaches, she reports feeling less tired  She reports one episode of nausea on Saturday, no vomiting, she feels this may have been related to something she ate  She does continue to report having very bad dizziness  She is agreeable to ENT referral for further evaluation of her dizziness as discussed/recommended by Dr Maxine Venegas on 4/21/22  She is s/p SRT to meningioma, completed 4/1/22  Patient notified this office will call her back regarding further steroid instructions  Patient verbalized understanding

## 2022-04-27 NOTE — TELEPHONE ENCOUNTER
Can try to decrease to 2 mg once a day and monitor for worsening symptoms    Re-evaluate at the end of the week

## 2022-04-29 ENCOUNTER — TELEPHONE (OUTPATIENT)
Dept: RADIATION ONCOLOGY | Facility: CLINIC | Age: 57
End: 2022-04-29

## 2022-04-29 NOTE — TELEPHONE ENCOUNTER
s/p SRT to meningioma, completed 4/1/22  Patient has been on steroid taper, dexamethasone 2 mg twice a day decreased to 2 mg once a day on 4/27/22  Patient reports fatigue continues to improve, feeling less tired  Denies nausea/vomiting  She denies any new neurologic changes  She states that yesterday and today she had very brief, sharp shooting pain through her head, lasting only a few seconds, otherwise no headaches  She did not take any pain medication for this  She continues to report frequent dizziness  Patient reports that she was not been contacted by ENT office to scheduled an appointment  Referral was placed 4/27/22  Patient informed that I will have our  staff reach out to the [facility] regarding referral  Patient appreciative of call  Patient informed that this office will call her back with further steroid instructions from provider  Patient verbalized understanding of instructions

## 2022-04-29 NOTE — TELEPHONE ENCOUNTER
Patient called, instructed to continue dexamethasone 2 mg once a day  Patient informed that per Dr Heather Kwok, plan is to discontinue steroid taper later next week if symptoms remain improved  Patient verbalized understanding of instructions

## 2022-04-29 NOTE — TELEPHONE ENCOUNTER
Call [facility] ENT at [phone] and M to get patient in for referral that Dr Chas Nair put in  Also called the [facility] office as well but had to leave message there also

## 2022-04-29 NOTE — TELEPHONE ENCOUNTER
Thanks Louis Henry, she can probably discontinue taper by the end of next week if symptoms remain improved by Monday

## 2022-05-02 ENCOUNTER — TELEPHONE (OUTPATIENT)
Dept: GASTROENTEROLOGY | Facility: HOSPITAL | Age: 57
End: 2022-05-02

## 2022-05-02 ENCOUNTER — TELEPHONE (OUTPATIENT)
Dept: RADIATION ONCOLOGY | Facility: CLINIC | Age: 57
End: 2022-05-02

## 2022-05-02 NOTE — TELEPHONE ENCOUNTER
s/p SRT to meningioma, completed 4/1/22      Patient has been on steroid taper, she has been taking dexamethasone 2 mg once a day since 4/27/22  Patient reports fatigue continues to improve, but feeling a little more tired today due to being very busy over the weekend  She denies nausea/vomiting  She denies any new neurologic changes  She states that yesterday her head hurt for a very brief time, 30-60 seconds, otherwise no other episodes of headaches over the weekend or today  She continues to report frequent dizziness  Patient informed that this office will call her back with further steroid instructions from provider  Patient verbalized understanding of instructions

## 2022-05-02 NOTE — TELEPHONE ENCOUNTER
Per Dr Carol Ann Evans, patient instructed to decrease dexamethasone to 1 mg (1/2 tab) daily in the morning starting tomorrow  Patient instructed to take last dose of dexamethasone 1 mg on Friday, 5/6/22  Patient instructed to call if she has any worsening of symptoms  Patient verbalized understanding of instructions given

## 2022-05-07 ENCOUNTER — HOSPITAL ENCOUNTER (EMERGENCY)
Facility: HOSPITAL | Age: 57
Discharge: HOME | End: 2022-05-07
Attending: EMERGENCY MEDICINE | Admitting: EMERGENCY MEDICINE
Payer: COMMERCIAL

## 2022-05-07 ENCOUNTER — APPOINTMENT (EMERGENCY)
Dept: CT IMAGING | Facility: HOSPITAL | Age: 57
End: 2022-05-07
Payer: COMMERCIAL

## 2022-05-07 VITALS
HEART RATE: 61 BPM | WEIGHT: 180 LBS | SYSTOLIC BLOOD PRESSURE: 177 MMHG | OXYGEN SATURATION: 100 % | BODY MASS INDEX: 28.19 KG/M2 | RESPIRATION RATE: 16 BRPM | DIASTOLIC BLOOD PRESSURE: 100 MMHG | TEMPERATURE: 97.8 F

## 2022-05-07 DIAGNOSIS — R42 DIZZINESS: Primary | ICD-10-CM

## 2022-05-07 LAB
ALBUMIN SERPL BCP-MCNC: 4.4 G/DL (ref 3–5.2)
ALP SERPL-CCNC: 59 U/L (ref 43–122)
ALT SERPL W P-5'-P-CCNC: 23 U/L
AMPHETAMINES SERPL QL SCN: NEGATIVE
ANION GAP SERPL CALCULATED.3IONS-SCNC: 7 MMOL/L (ref 5–14)
AST SERPL W P-5'-P-CCNC: 32 U/L (ref 14–36)
BARBITURATES UR QL: NEGATIVE
BASOPHILS # BLD AUTO: 0.05 THOUSANDS/ΜL (ref 0–0.1)
BASOPHILS NFR BLD AUTO: 1 % (ref 0–1)
BENZODIAZ UR QL: NEGATIVE
BILIRUB SERPL-MCNC: 0.8 MG/DL
BILIRUB UR QL STRIP: NEGATIVE
BUN SERPL-MCNC: 9 MG/DL (ref 5–25)
CALCIUM SERPL-MCNC: 8.9 MG/DL (ref 8.4–10.2)
CARDIAC TROPONIN I PNL SERPL HS: <2 NG/L
CHLORIDE SERPL-SCNC: 105 MMOL/L (ref 97–108)
CLARITY UR: CLEAR
CO2 SERPL-SCNC: 26 MMOL/L (ref 22–30)
COCAINE UR QL: NEGATIVE
COLOR UR: NORMAL
CREAT SERPL-MCNC: 0.66 MG/DL (ref 0.6–1.2)
EOSINOPHIL # BLD AUTO: 0.1 THOUSAND/ΜL (ref 0–0.61)
EOSINOPHIL NFR BLD AUTO: 2 % (ref 0–6)
ERYTHROCYTE [DISTWIDTH] IN BLOOD BY AUTOMATED COUNT: 13.6 % (ref 11.6–15.1)
EXT PREG TEST URINE: NEGATIVE
EXT. CONTROL ED NAV: NORMAL
FLUAV RNA RESP QL NAA+PROBE: NEGATIVE
FLUBV RNA RESP QL NAA+PROBE: NEGATIVE
GFR SERPL CREATININE-BSD FRML MDRD: 100 ML/MIN/1.73SQ M
GLUCOSE SERPL-MCNC: 74 MG/DL (ref 70–99)
GLUCOSE UR STRIP-MCNC: NEGATIVE MG/DL
HCT VFR BLD AUTO: 44 % (ref 34.8–46.1)
HGB BLD-MCNC: 14 G/DL (ref 11.5–15.4)
HGB UR QL STRIP.AUTO: NEGATIVE
IMM GRANULOCYTES # BLD AUTO: 0.01 THOUSAND/UL (ref 0–0.2)
IMM GRANULOCYTES NFR BLD AUTO: 0 % (ref 0–2)
KETONES UR STRIP-MCNC: NEGATIVE MG/DL
LEUKOCYTE ESTERASE UR QL STRIP: NEGATIVE
LIPASE SERPL-CCNC: 95 U/L (ref 23–300)
LYMPHOCYTES # BLD AUTO: 2.43 THOUSANDS/ΜL (ref 0.6–4.47)
LYMPHOCYTES NFR BLD AUTO: 42 % (ref 14–44)
MCH RBC QN AUTO: 31 PG (ref 26.8–34.3)
MCHC RBC AUTO-ENTMCNC: 31.8 G/DL (ref 31.4–37.4)
MCV RBC AUTO: 98 FL (ref 82–98)
METHADONE UR QL: NEGATIVE
MONOCYTES # BLD AUTO: 0.6 THOUSAND/ΜL (ref 0.17–1.22)
MONOCYTES NFR BLD AUTO: 10 % (ref 4–12)
NEUTROPHILS # BLD AUTO: 2.65 THOUSANDS/ΜL (ref 1.85–7.62)
NEUTS SEG NFR BLD AUTO: 45 % (ref 43–75)
NITRITE UR QL STRIP: NEGATIVE
NRBC BLD AUTO-RTO: 0 /100 WBCS
OPIATES UR QL SCN: NEGATIVE
OXYCODONE+OXYMORPHONE UR QL SCN: NEGATIVE
PCP UR QL: NEGATIVE
PH UR STRIP.AUTO: 7 [PH]
PLATELET # BLD AUTO: 329 THOUSANDS/UL (ref 149–390)
PMV BLD AUTO: 8.9 FL (ref 8.9–12.7)
POTASSIUM SERPL-SCNC: 3.9 MMOL/L (ref 3.6–5)
PROT SERPL-MCNC: 8 G/DL (ref 5.9–8.4)
PROT UR STRIP-MCNC: NEGATIVE MG/DL
RBC # BLD AUTO: 4.51 MILLION/UL (ref 3.81–5.12)
RSV RNA RESP QL NAA+PROBE: NEGATIVE
SARS-COV-2 RNA RESP QL NAA+PROBE: NEGATIVE
SODIUM SERPL-SCNC: 138 MMOL/L (ref 137–147)
SP GR UR STRIP.AUTO: 1.01 (ref 1–1.04)
THC UR QL: NEGATIVE
UROBILINOGEN UA: NEGATIVE MG/DL
WBC # BLD AUTO: 5.84 THOUSAND/UL (ref 4.31–10.16)

## 2022-05-07 PROCEDURE — 81003 URINALYSIS AUTO W/O SCOPE: CPT | Performed by: PHYSICIAN ASSISTANT

## 2022-05-07 PROCEDURE — 84484 ASSAY OF TROPONIN QUANT: CPT | Performed by: PHYSICIAN ASSISTANT

## 2022-05-07 PROCEDURE — 0241U HB NFCT DS VIR RESP RNA 4 TRGT: CPT | Performed by: PHYSICIAN ASSISTANT

## 2022-05-07 PROCEDURE — 70450 CT HEAD/BRAIN W/O DYE: CPT

## 2022-05-07 PROCEDURE — 99282 EMERGENCY DEPT VISIT SF MDM: CPT | Performed by: PHYSICIAN ASSISTANT

## 2022-05-07 PROCEDURE — 96360 HYDRATION IV INFUSION INIT: CPT

## 2022-05-07 PROCEDURE — 80307 DRUG TEST PRSMV CHEM ANLYZR: CPT | Performed by: PHYSICIAN ASSISTANT

## 2022-05-07 PROCEDURE — 81025 URINE PREGNANCY TEST: CPT | Performed by: PHYSICIAN ASSISTANT

## 2022-05-07 PROCEDURE — 36415 COLL VENOUS BLD VENIPUNCTURE: CPT | Performed by: PHYSICIAN ASSISTANT

## 2022-05-07 PROCEDURE — 99284 EMERGENCY DEPT VISIT MOD MDM: CPT

## 2022-05-07 PROCEDURE — 83690 ASSAY OF LIPASE: CPT | Performed by: PHYSICIAN ASSISTANT

## 2022-05-07 PROCEDURE — 85025 COMPLETE CBC W/AUTO DIFF WBC: CPT | Performed by: PHYSICIAN ASSISTANT

## 2022-05-07 PROCEDURE — 93005 ELECTROCARDIOGRAM TRACING: CPT

## 2022-05-07 PROCEDURE — 96361 HYDRATE IV INFUSION ADD-ON: CPT

## 2022-05-07 PROCEDURE — G1004 CDSM NDSC: HCPCS

## 2022-05-07 PROCEDURE — 80053 COMPREHEN METABOLIC PANEL: CPT | Performed by: PHYSICIAN ASSISTANT

## 2022-05-07 RX ORDER — MECLIZINE HCL 12.5 MG/1
50 TABLET ORAL ONCE
Status: COMPLETED | OUTPATIENT
Start: 2022-05-07 | End: 2022-05-07

## 2022-05-07 RX ORDER — MECLIZINE HYDROCHLORIDE 50 MG/1
50 TABLET ORAL EVERY 12 HOURS PRN
Qty: 20 TABLET | Refills: 0 | Status: SHIPPED | OUTPATIENT
Start: 2022-05-07

## 2022-05-07 RX ORDER — SODIUM CHLORIDE 9 MG/ML
250 INJECTION, SOLUTION INTRAVENOUS CONTINUOUS
Status: DISCONTINUED | OUTPATIENT
Start: 2022-05-07 | End: 2022-05-07 | Stop reason: HOSPADM

## 2022-05-07 RX ADMIN — SODIUM CHLORIDE 250 ML/HR: 0.9 INJECTION, SOLUTION INTRAVENOUS at 13:58

## 2022-05-07 RX ADMIN — MECLIZINE 50 MG: 12.5 TABLET ORAL at 14:36

## 2022-05-07 NOTE — ED PROVIDER NOTES
History  Chief Complaint   Patient presents with    Dizziness     dizziness as though she will lose her balance for a few months, worse with head movement     Pt with continued dizziness for months, much worse today,  Has been told by specialist dizziness is not from meningioma , pt admits worse dizziness, denies any trauma       Dizziness  Quality:  Vertigo and head spinning  Severity:  Mild  Onset quality:  Gradual  Duration:  5 months  Timing:  Constant  Progression:  Worsening  Chronicity:  Chronic  Context: head movement    Relieved by:  Nothing  Worsened by:  Nothing  Ineffective treatments:  None tried  Associated symptoms: no chest pain    Risk factors: no anemia        Prior to Admission Medications   Prescriptions Last Dose Informant Patient Reported?  Taking?   acetaminophen (TYLENOL) 325 mg tablet  Self No No   Sig: Take 2 tablets (650 mg total) by mouth every 6 (six) hours as needed for mild pain, headaches or fever   Patient not taking: Reported on 3/8/2022    dexamethasone (DECADRON) 2 mg tablet   No No   Sig: Take 1 tablet (2 mg total) by mouth 2 (two) times a day with meals   dicyclomine (BENTYL) 20 mg tablet  Self No No   Sig: Take 1 tablet (20 mg total) by mouth every 6 (six) hours   levothyroxine 112 mcg tablet  Self No No   Sig: Take 1 tablet (112 mcg total) by mouth daily   lisinopril (ZESTRIL) 40 mg tablet  Self No No   Sig: take 1 tablet by mouth once daily   meclizine (ANTIVERT) 12 5 MG tablet  Self No No   Sig: Take 1 tablet (12 5 mg total) by mouth every 8 (eight) hours as needed for dizziness   meloxicam (MOBIC) 7 5 mg tablet  Self No No   Sig: Take 1 tablet (7 5 mg total) by mouth daily   ondansetron (ZOFRAN-ODT) 4 mg disintegrating tablet  Self No No   Sig: Take 1 tablet (4 mg total) by mouth every 6 (six) hours as needed for nausea or vomiting   pantoprazole (PROTONIX) 40 mg tablet  Self No No   Sig: Take 1 tablet (40 mg total) by mouth 2 (two) times a day   sucralfate (CARAFATE) 1 g tablet   No No   Sig: take 1 tablet by mouth four times a day      Facility-Administered Medications: None       Past Medical History:   Diagnosis Date    Chronic pain     Disease of thyroid gland     hypothyroid    Diverticulitis 2021    GERD (gastroesophageal reflux disease)     Hypertension     Hyperthyroiditis     HYPERTHYROID; POST IODINE THERAPY    Intractable vomiting 2021    [facility])        Past Surgical History:   Procedure Laterality Date    APPENDECTOMY       SECTION      HERNIA REPAIR         Family History   Problem Relation Age of Onset    Endometrial cancer Family     Ovarian cancer Mother 79    Heart defect Mother     Heart attack Father     Stroke Brother     Ovarian cancer Sister 52    No Known Problems Maternal Grandmother     Breast cancer Paternal Grandmother 61    No Known Problems Sister     No Known Problems Sister     No Known Problems Sister     No Known Problems Sister     No Known Problems Daughter     No Known Problems Maternal Grandfather     No Known Problems Paternal Grandfather     No Known Problems Daughter     No Known Problems Son      I have reviewed and agree with the history as documented  E-Cigarette/Vaping    E-Cigarette Use Never User      E-Cigarette/Vaping Substances    Nicotine No     THC No     CBD No     Flavoring No     Other No     Unknown No      Social History     Tobacco Use    Smoking status: Never Smoker    Smokeless tobacco: Never Used   Vaping Use    Vaping Use: Never used   Substance Use Topics    Alcohol use: Yes     Comment: occasional     Drug use: No       Review of Systems   Constitutional: Negative  HENT: Negative  Eyes: Negative  Respiratory: Negative  Cardiovascular: Negative  Negative for chest pain  Gastrointestinal: Negative  Endocrine: Negative  Genitourinary: Negative  Musculoskeletal: Negative  Skin: Negative  Allergic/Immunologic: Negative  Neurological: Positive for dizziness  Hematological: Negative  Psychiatric/Behavioral: Negative  All other systems reviewed and are negative  Physical Exam  Physical Exam  Vitals and nursing note reviewed  Constitutional:       Appearance: Normal appearance  She is normal weight  Comments: Dizziness worse with sitting up and with head position turning     350pm  Pt is dizzy free  Feels much better  Able to sit up and turn head with out problems    HENT:      Head: Normocephalic and atraumatic  Right Ear: Tympanic membrane, ear canal and external ear normal       Left Ear: Tympanic membrane, ear canal and external ear normal       Nose: Nose normal       Mouth/Throat:      Mouth: Mucous membranes are moist       Pharynx: Oropharynx is clear  Eyes:      Extraocular Movements: Extraocular movements intact  Conjunctiva/sclera: Conjunctivae normal       Pupils: Pupils are equal, round, and reactive to light  Cardiovascular:      Rate and Rhythm: Normal rate and regular rhythm  Pulses: Normal pulses  Heart sounds: Normal heart sounds  Pulmonary:      Effort: Pulmonary effort is normal       Breath sounds: Normal breath sounds  Abdominal:      General: Bowel sounds are normal       Palpations: Abdomen is soft  Musculoskeletal:         General: Normal range of motion  Cervical back: Normal range of motion and neck supple  Skin:     General: Skin is warm  Capillary Refill: Capillary refill takes less than 2 seconds  Neurological:      General: No focal deficit present  Mental Status: She is alert and oriented to person, place, and time     Psychiatric:         Mood and Affect: Mood normal          Behavior: Behavior normal          Vital Signs  ED Triage Vitals [05/07/22 1301]   Temperature Pulse Respirations Blood Pressure SpO2   97 8 °F (36 6 °C) 90 22 (!) 204/111 100 %      Temp Source Heart Rate Source Patient Position - Orthostatic VS BP Location FiO2 (%)   Tympanic Monitor Sitting Left arm --      Pain Score       --           Vitals:    05/07/22 1301 05/07/22 1459   BP: (!) 204/111 (!) 177/100   Pulse: 90 61   Patient Position - Orthostatic VS: Sitting Lying         Visual Acuity  Visual Acuity      Most Recent Value   L Pupil Size (mm) 5   R Pupil Size (mm) 5          ED Medications  Medications   meclizine (ANTIVERT) tablet 50 mg (50 mg Oral Given 5/7/22 1436)       Diagnostic Studies  Results Reviewed     Procedure Component Value Units Date/Time    Comprehensive metabolic panel [[id number]] Collected: 05/07/22 1358    Lab Status: Final result Specimen: Blood from Arm, Right Updated: 05/07/22 1549     Sodium 138 mmol/L      Potassium 3 9 mmol/L      Chloride 105 mmol/L      CO2 26 mmol/L      ANION GAP 7 mmol/L      BUN 9 mg/dL      Creatinine 0 66 mg/dL      Glucose 74 mg/dL      Calcium 8 9 mg/dL      AST 32 U/L      ALT 23 U/L      Alkaline Phosphatase 59 U/L      Total Protein 8 0 g/dL      Albumin 4 4 g/dL      Total Bilirubin 0 80 mg/dL      eGFR 100 ml/min/1 73sq m     Narrative:      Holly guidelines for Chronic Kidney Disease (CKD):     Stage 1 with normal or high GFR (GFR > 90 mL/min/1 73 square meters)    Stage 2 Mild CKD (GFR = 60-89 mL/min/1 73 square meters)    Stage 3A Moderate CKD (GFR = 45-59 mL/min/1 73 square meters)    Stage 3B Moderate CKD (GFR = 30-44 mL/min/1 73 square meters)    Stage 4 Severe CKD (GFR = 15-29 mL/min/1 73 square meters)    Stage 5 End Stage CKD (GFR <15 mL/min/1 73 square meters)  Note: GFR calculation is accurate only with a steady state creatinine    Lipase [[id number]]  (Normal) Collected: 05/07/22 1358    Lab Status: Final result Specimen: Blood from Arm, Right Updated: 05/07/22 1549     Lipase 95 u/L     COVID/FLU/RSV [[id number]]  (Normal) Collected: 05/07/22 1437    Lab Status: Final result Specimen: Nares from Nasopharyngeal Swab Updated: 05/07/22 1524     SARS-CoV-2 Negative INFLUENZA A PCR Negative     INFLUENZA B PCR Negative     RSV PCR Negative    Narrative:      FOR PEDIATRIC PATIENTS - copy/paste COVID Guidelines URL to browser: [email or web address]    SARS-CoV-2 assay is a Nucleic Acid Amplification assay intended for the  qualitative detection of nucleic acid from SARS-CoV-2 in nasopharyngeal  swabs  Results are for the presumptive identification of SARS-CoV-2 RNA  Positive results are indicative of infection with SARS-CoV-2, the virus  causing COVID-19, but do not rule out bacterial infection or co-infection  with other viruses  Laboratories within the [place] and its  territories are required to report all positive results to the appropriate  public health authorities  Negative results do not preclude SARS-CoV-2  infection and should not be used as the sole basis for treatment or other  patient management decisions  Negative results must be combined with  clinical observations, patient history, and epidemiological information  This test has not been FDA cleared or approved  This test has been authorized by FDA under an Emergency Use Authorization  (EUA)  This test is only authorized for the duration of time the  declaration that circumstances exist justifying the authorization of the  emergency use of an in vitro diagnostic tests for detection of SARS-CoV-2  virus and/or diagnosis of COVID-19 infection under section 564(b)(1) of  the Act, 21 U  S C  778MJX-1(I)(2), unless the authorization is terminated  or revoked sooner  The test has been validated but independent review by FDA  and CLIA is pending  Test performed using [employer] GeneXpert: This RT-PCR assay targets N2,  a region unique to SARS-CoV-2  A conserved region in the E-gene was chosen  for pan-Sarbecovirus detection which includes SARS-CoV-2      Rapid drug screen, urine [[id number]]  (Normal) Collected: 05/07/22 2766    Lab Status: Final result Specimen: Urine, Clean Catch Updated: 05/07/22 1430     Amph/Meth UR Negative     Barbiturate Ur Negative     Benzodiazepine Urine Negative     Cocaine Urine Negative     Methadone Urine Negative     Opiate Urine Negative     PCP Ur Negative     THC Urine Negative     Oxycodone Urine Negative    Narrative:      FOR MEDICAL PURPOSES ONLY  IF CONFIRMATION NEEDED PLEASE CONTACT THE LAB WITHIN 5 DAYS      Drug Screen Cutoff Levels:  AMPHETAMINE/METHAMPHETAMINES  1000 ng/mL  BARBITURATES     200 ng/mL  BENZODIAZEPINES     200 ng/mL  COCAINE      300 ng/mL  METHADONE      300 ng/mL  OPIATES      300 ng/mL  PHENCYCLIDINE     25 ng/mL  THC       50 ng/mL  OXYCODONE      100 ng/mL    HS Troponin 0hr (reflex protocol) [[id number]]  (Normal) Collected: 05/07/22 1358    Lab Status: Final result Specimen: Blood from Arm, Right Updated: 05/07/22 1429     hs TnI 0hr <2 ng/L     UA w Reflex to Microscopic w Reflex to Culture [[id number]]  (Normal) Collected: 05/07/22 1359    Lab Status: Final result Specimen: Urine, Clean Catch Updated: 05/07/22 1410     Color, UA Straw     Clarity, UA Clear     Specific Gravity, UA 1 010     pH, UA 7 0     Leukocytes, UA Negative     Nitrite, UA Negative     Protein, UA Negative mg/dl      Glucose, UA Negative mg/dl      Ketones, UA Negative mg/dl      Bilirubin, UA Negative     Blood, UA Negative     UROBILINOGEN UA Negative mg/dL     CBC and differential [[id number]] Collected: 05/07/22 1358    Lab Status: Final result Specimen: Blood from Arm, Right Updated: 05/07/22 1406     WBC 5 84 Thousand/uL      RBC 4 51 Million/uL      Hemoglobin 14 0 g/dL      Hematocrit 44 0 %      MCV 98 fL      MCH 31 0 pg      MCHC 31 8 g/dL      RDW 13 6 %      MPV 8 9 fL      Platelets 781 Thousands/uL      nRBC 0 /100 WBCs      Neutrophils Relative 45 %      Immat GRANS % 0 %      Lymphocytes Relative 42 %      Monocytes Relative 10 %      Eosinophils Relative 2 %      Basophils Relative 1 %      Neutrophils Absolute 2 65 Thousands/µL      Immature Grans Absolute 0 01 Thousand/uL      Lymphocytes Absolute 2 43 Thousands/µL      Monocytes Absolute 0 60 Thousand/µL      Eosinophils Absolute 0 10 Thousand/µL      Basophils Absolute 0 05 Thousands/µL     POCT pregnancy, urine [[id number]]  (Normal) Resulted: 05/07/22 1354    Lab Status: Final result Updated: 05/07/22 1354     EXT PREG TEST UR (Ref: Negative) Negative     Control Valid                 CT head without contrast   Final Result by Jeannie Yates MD (05/07 1420)      No acute intracranial abnormality  Chronic, nonemergent findings above  Workstation performed: [id number]                    Procedures  Procedures         ED Course                                             MDM    Disposition  Final diagnoses:   Dizziness     Time reflects when diagnosis was documented in both MDM as applicable and the Disposition within this note     Time User Action Codes Description Comment    5/7/2022  4:10 PM Jesus Manuel Bustamante  Add [R42] Dizziness       ED Disposition     ED Disposition Condition Date/Time Comment    Discharge Stable Sat May 7, 2022  4:10  [address] discharge to home/self care  Follow-up Information     Follow up With Specialties Details Why Contact Info    Alysia Morris[address]  [phone]            Discharge Medication List as of 5/7/2022  4:12 PM      START taking these medications    Details   !! meclizine (ANTIVERT) 50 MG tablet Take 1 tablet (50 mg total) by mouth every 12 (twelve) hours as needed for dizziness, Starting Sat 5/7/2022, Print       !! - Potential duplicate medications found  Please discuss with provider        CONTINUE these medications which have NOT CHANGED    Details   acetaminophen (TYLENOL) 325 mg tablet Take 2 tablets (650 mg total) by mouth every 6 (six) hours as needed for mild pain, headaches or fever, Starting Tue 1/26/2021, Normal dexamethasone (DECADRON) 2 mg tablet Take 1 tablet (2 mg total) by mouth 2 (two) times a day with meals, Starting Wed 3/30/2022, Normal      dicyclomine (BENTYL) 20 mg tablet Take 1 tablet (20 mg total) by mouth every 6 (six) hours, Starting Wed 12/1/2021, Normal      levothyroxine 112 mcg tablet Take 1 tablet (112 mcg total) by mouth daily, Starting Wed 2/9/2022, Normal      lisinopril (ZESTRIL) 40 mg tablet take 1 tablet by mouth once daily, Normal      !! meclizine (ANTIVERT) 12 5 MG tablet Take 1 tablet (12 5 mg total) by mouth every 8 (eight) hours as needed for dizziness, Starting Wed 2/9/2022, Normal      meloxicam (MOBIC) 7 5 mg tablet Take 1 tablet (7 5 mg total) by mouth daily, Starting Thu 9/9/2021, Normal      ondansetron (ZOFRAN-ODT) 4 mg disintegrating tablet Take 1 tablet (4 mg total) by mouth every 6 (six) hours as needed for nausea or vomiting, Starting Wed 9/22/2021, Normal      pantoprazole (PROTONIX) 40 mg tablet Take 1 tablet (40 mg total) by mouth 2 (two) times a day, Starting Mon 3/7/2022, Normal      sucralfate (CARAFATE) 1 g tablet take 1 tablet by mouth four times a day, Normal       !! - Potential duplicate medications found  Please discuss with provider  No discharge procedures on file      PDMP Review     None          ED Provider  Electronically Signed by           Valentine Lopez PA-C  05/07/22 1928

## 2022-05-09 LAB
ATRIAL RATE: 81 BPM
P AXIS: 38 DEGREES
PR INTERVAL: 144 MS
QRS AXIS: -14 DEGREES
QRSD INTERVAL: 88 MS
QT INTERVAL: 394 MS
QTC INTERVAL: 457 MS
T WAVE AXIS: 50 DEGREES
VENTRICULAR RATE: 81 BPM

## 2022-05-09 PROCEDURE — 93010 ELECTROCARDIOGRAM REPORT: CPT | Performed by: INTERNAL MEDICINE

## 2022-05-12 ENCOUNTER — HOSPITAL ENCOUNTER (OUTPATIENT)
Dept: GASTROENTEROLOGY | Facility: HOSPITAL | Age: 57
Discharge: HOME | End: 2022-05-12
Payer: COMMERCIAL

## 2022-05-12 VITALS
TEMPERATURE: 97.8 F | SYSTOLIC BLOOD PRESSURE: 162 MMHG | RESPIRATION RATE: 16 BRPM | DIASTOLIC BLOOD PRESSURE: 95 MMHG | HEART RATE: 69 BPM | OXYGEN SATURATION: 98 %

## 2022-05-12 DIAGNOSIS — K21.9 GASTROESOPHAGEAL REFLUX DISEASE WITHOUT ESOPHAGITIS: ICD-10-CM

## 2022-05-12 PROCEDURE — 91020 GASTRIC MOTILITY STUDIES: CPT

## 2022-05-12 PROCEDURE — 91038 ESOPH IMPED FUNCT TEST > 1HR: CPT

## 2022-05-12 NOTE — PERIOPERATIVE NURSING NOTE
Patient brought in the room and educated on procedure  Lidocaine 2% topical solution inserted via nostrils  Manometry catheter inserted via right nostril and secured  10 liquid swallows, 10 viscous swallow and 1 rapid swallow and 1 rapid drink challenge with 200 cc of water performed  Patient Tolerated procedure  Catheter removed intact   PH probe inserted via right nostril and secured  Zephr recorder teachback performed and patient verbalized understanding  Patient instructed to return next day to have probe remove  Discharge instructions given and patient ambulated out of room in stable condition

## 2022-05-16 ENCOUNTER — TELEPHONE (OUTPATIENT)
Dept: GASTROENTEROLOGY | Facility: MEDICAL CENTER | Age: 57
End: 2022-05-16

## 2022-05-16 PROCEDURE — 91010 ESOPHAGUS MOTILITY STUDY: CPT | Performed by: INTERNAL MEDICINE

## 2022-05-16 NOTE — TELEPHONE ENCOUNTER
----- Message from Alfredo Farley PA-C sent at 5/16/2022  2:30 PM EDT -----  Pt's pH study showed significant reflux but poor symptom correlation  We can discuss this further at her office visit in [place]

## 2022-05-16 NOTE — RESULT ENCOUNTER NOTE
Pt's pH study showed significant reflux but poor symptom correlation  We can discuss this further at her office visit in [place]

## 2022-05-17 ENCOUNTER — TELEPHONE (OUTPATIENT)
Dept: GASTROENTEROLOGY | Facility: MEDICAL CENTER | Age: 57
End: 2022-05-17

## 2022-05-17 NOTE — TELEPHONE ENCOUNTER
----- Message from Heike Valladares PA-C sent at 5/16/2022  2:30 PM EDT -----  Pt's pH study showed significant reflux but poor symptom correlation  We can discuss this further at her office visit in June    Isaiah Sutton

## 2022-05-17 NOTE — TELEPHONE ENCOUNTER
Attempted to call pt via  to the numbers on file but no answer, left VM on all lines       Will send letter

## 2022-05-19 DIAGNOSIS — K21.9 GASTROESOPHAGEAL REFLUX DISEASE WITHOUT ESOPHAGITIS: ICD-10-CM

## 2022-05-19 DIAGNOSIS — E03.9 HYPOTHYROIDISM, UNSPECIFIED TYPE: ICD-10-CM

## 2022-05-19 RX ORDER — SUCRALFATE 1 G/1
TABLET ORAL
Qty: 120 TABLET | Refills: 0 | Status: SHIPPED | OUTPATIENT
Start: 2022-05-19 | End: 2022-06-14 | Stop reason: SDUPTHER

## 2022-05-19 RX ORDER — LEVOTHYROXINE SODIUM 112 UG/1
TABLET ORAL
Qty: 30 TABLET | Refills: 2 | Status: SHIPPED | OUTPATIENT
Start: 2022-05-19 | End: 2022-07-18 | Stop reason: SDUPTHER

## 2022-06-10 ENCOUNTER — HOSPITAL ENCOUNTER (OUTPATIENT)
Dept: NUCLEAR MEDICINE | Facility: HOSPITAL | Age: 57
Discharge: HOME | End: 2022-06-10
Payer: COMMERCIAL

## 2022-06-10 DIAGNOSIS — R10.10 PAIN OF UPPER ABDOMEN: ICD-10-CM

## 2022-06-10 PROCEDURE — G1004 CDSM NDSC: HCPCS

## 2022-06-10 PROCEDURE — A9537 TC99M MEBROFENIN: HCPCS

## 2022-06-10 PROCEDURE — 78227 HEPATOBIL SYST IMAGE W/DRUG: CPT

## 2022-06-10 RX ADMIN — SINCALIDE 1.6 MCG: 5 INJECTION, POWDER, LYOPHILIZED, FOR SOLUTION INTRAVENOUS at 08:22

## 2022-06-14 ENCOUNTER — OFFICE VISIT (OUTPATIENT)
Dept: GASTROENTEROLOGY | Facility: MEDICAL CENTER | Age: 57
End: 2022-06-14
Payer: COMMERCIAL

## 2022-06-14 VITALS
BODY MASS INDEX: 28.29 KG/M2 | HEART RATE: 62 BPM | WEIGHT: 180.6 LBS | SYSTOLIC BLOOD PRESSURE: 121 MMHG | TEMPERATURE: 96 F | DIASTOLIC BLOOD PRESSURE: 79 MMHG

## 2022-06-14 DIAGNOSIS — K21.9 GASTROESOPHAGEAL REFLUX DISEASE WITHOUT ESOPHAGITIS: ICD-10-CM

## 2022-06-14 DIAGNOSIS — R10.10 PAIN OF UPPER ABDOMEN: ICD-10-CM

## 2022-06-14 DIAGNOSIS — K22.70 BARRETT'S ESOPHAGUS WITHOUT DYSPLASIA: Primary | ICD-10-CM

## 2022-06-14 PROCEDURE — 99214 OFFICE O/P EST MOD 30 MIN: CPT | Performed by: PHYSICIAN ASSISTANT

## 2022-06-14 RX ORDER — DICYCLOMINE HCL 20 MG
20 TABLET ORAL EVERY 6 HOURS
Qty: 120 TABLET | Refills: 5 | Status: SHIPPED | OUTPATIENT
Start: 2022-06-14

## 2022-06-14 RX ORDER — SUCRALFATE 1 G/1
1 TABLET ORAL 4 TIMES DAILY
Qty: 120 TABLET | Refills: 5 | Status: SHIPPED | OUTPATIENT
Start: 2022-06-14

## 2022-06-14 NOTE — PROGRESS NOTES
Assessment/Plan:     Diagnoses and all orders for this visit:    Lawson's esophagus without dysplasia  Gastroesophageal reflux disease without esophagitis  Pain of upper abdomen    Patient has been seen for GERD epigastric abdominal pain, previously on omeprazole 40 mg b i d  which was switched at her last visit to pantoprazole 40 mg b i d  She has noticed significant improvement in her symptoms but continues with pain  She states this typically 1st thing in the morning before she takes her medications and resolves after taking her medications  She was found to have Barretts on endoscopy as well as a 3 cm hiatal hernia  PH testing showed significant reflux but poor symptom correlation  We did briefly discuss surgical options however she states considering she is feeling better she will hold off at this time  CT scan and HIDA scan were both within normal limits  Recommend continuing with medications as previously prescribed  -     sucralfate (CARAFATE) 1 g tablet; Take 1 tablet (1 g total) by mouth 4 (four) times a day  -     dicyclomine (BENTYL) 20 mg tablet; Take 1 tablet (20 mg total) by mouth every 6 (six) hours    Will see her back in 4 months or sooner if necessary  Subjective:      Patient ID: Alfredo Swanson is a 49 y o  female  HPI     This is a follow-up for GERD and epigastric abdominal pain  She has a history of GERD for more than 5 years  She was previously on omeprazole 40 mg b i d  and Carafate q i d  but continued to complain of epigastric pain and burning  At her last visit she was switched to pantoprazole 40 mg b i d  She states her symptoms have improved considerably however she does continue to have epigastric abdominal pain  She states she typically has this 1st thing in the morning but then resolves after she takes the medication  Previous workup included CT scan in December which was within normal limits, HIDA scan in June also within normal limits    EGD in April which showed Barretts, 3 cm hiatal hernia and resolving esophagitis  She then had pH manometry testing which showed significant reflux but poor symptom correlation  She continues to take Bentyl as well  She denies any difficulty with her bowels  Her last colonoscopy was in October of 2020 which showed moderate diverticula causing luminal narrowing, large hemorrhoids      Patient Active Problem List   Diagnosis    Essential hypertension    Pain of upper abdomen    Hypothyroidism    Hemorrhoids    Chronic bilateral low back pain with bilateral sciatica    Sciatica of right side    Heel pain, chronic, right    Slow transit constipation    Vertigo    Migraine without aura and without status migrainosus, not intractable    Gastroesophageal reflux disease without esophagitis    Meningioma (HCC)    Weakness of right upper extremity    Visual field defects    Mild episode of recurrent major depressive disorder ([facility] [address] )     No Known Allergies  Current Outpatient Medications on File Prior to Visit   Medication Sig    dexamethasone (DECADRON) 2 mg tablet Take 1 tablet (2 mg total) by mouth 2 (two) times a day with meals    levothyroxine 112 mcg tablet take 1 tablet by mouth once daily    lisinopril (ZESTRIL) 40 mg tablet take 1 tablet by mouth once daily    meclizine (ANTIVERT) 12 5 MG tablet Take 1 tablet (12 5 mg total) by mouth every 8 (eight) hours as needed for dizziness    meclizine (ANTIVERT) 50 MG tablet Take 1 tablet (50 mg total) by mouth every 12 (twelve) hours as needed for dizziness    meloxicam (MOBIC) 7 5 mg tablet Take 1 tablet (7 5 mg total) by mouth daily    pantoprazole (PROTONIX) 40 mg tablet Take 1 tablet (40 mg total) by mouth 2 (two) times a day    [DISCONTINUED] dicyclomine (BENTYL) 20 mg tablet Take 1 tablet (20 mg total) by mouth every 6 (six) hours    [DISCONTINUED] ondansetron (ZOFRAN-ODT) 4 mg disintegrating tablet Take 1 tablet (4 mg total) by mouth every 6 (six) hours as needed for nausea or vomiting    [DISCONTINUED] sucralfate (CARAFATE) 1 g tablet take 1 tablet by mouth four times a day    acetaminophen (TYLENOL) 325 mg tablet Take 2 tablets (650 mg total) by mouth every 6 (six) hours as needed for mild pain, headaches or fever (Patient not taking: No sig reported)     No current facility-administered medications on file prior to visit       Family History   Problem Relation Age of Onset    Endometrial cancer Family     Ovarian cancer Mother 79    Heart defect Mother     Heart attack Father     Stroke Brother     Ovarian cancer Sister 52    No Known Problems Maternal Grandmother     Breast cancer Paternal Grandmother 61    No Known Problems Sister     No Known Problems Sister     No Known Problems Sister     No Known Problems Sister     No Known Problems Daughter     No Known Problems Maternal Grandfather     No Known Problems Paternal Grandfather     No Known Problems Daughter     No Known Problems Son      Past Medical History:   Diagnosis Date    Chronic pain     Disease of thyroid gland     hypothyroid    Diverticulitis 12/1/2021    GERD (gastroesophageal reflux disease)     Hypertension     Hyperthyroiditis     HYPERTHYROID; POST IODINE THERAPY    Intractable vomiting 12/1/2021    Meningioma ([address] )      Social History     Socioeconomic History    Marital status: Single     Spouse name: None    Number of children: None    Years of education: None    Highest education level: None   Occupational History    None   Tobacco Use    Smoking status: Never Smoker    Smokeless tobacco: Never Used   Vaping Use    Vaping Use: Never used   Substance and Sexual Activity    Alcohol use: Yes     Comment: occasional     Drug use: No    Sexual activity: None   Other Topics Concern    None   Social History Narrative    AS OF 9/22/15 IN NEXTGEN:        CONSUMES CAFFEINE    CAFFEINE: COFFEE     Social Determinants of Health     Financial Resource Strain: Not on file   Food Insecurity: Not on file   Transportation Needs: Not on file   Physical Activity: Not on file   Stress: Not on file   Social Connections: Not on file   Intimate Partner Violence: Not on file   Housing Stability: Not on file     Past Surgical History:   Procedure Laterality Date    APPENDECTOMY      [address]           Review of Systems   All other systems reviewed and are negative  Objective:      /79 (BP Location: Right arm, Patient Position: Sitting, Cuff Size: Adult)   Pulse 62   Temp (!) 96 °F (35 6 °C) (Probe)   Wt 81 9 kg (180 lb 9 6 oz)   BMI 28 29 kg/m²          Physical Exam  Constitutional:       Appearance: Normal appearance  She is well-developed  HENT:      Head: Normocephalic and atraumatic  Eyes:      Conjunctiva/sclera: Conjunctivae normal    Cardiovascular:      Rate and Rhythm: Normal rate and regular rhythm  Pulmonary:      Effort: Pulmonary effort is normal       Breath sounds: Normal breath sounds  Abdominal:      General: Bowel sounds are normal  There is no distension  Palpations: Abdomen is soft  Tenderness: There is abdominal tenderness (Mild epigastric tenderness)  Musculoskeletal:         General: Normal range of motion  Cervical back: Normal range of motion  Skin:     General: Skin is warm and dry  Neurological:      Mental Status: She is alert and oriented to person, place, and time     Psychiatric:         Mood and Affect: Mood normal          Behavior: Behavior normal

## 2022-06-15 ENCOUNTER — RADIATION ONCOLOGY FOLLOW-UP (OUTPATIENT)
Dept: RADIATION ONCOLOGY | Facility: HOSPITAL | Age: 57
End: 2022-06-15
Attending: RADIOLOGY
Payer: COMMERCIAL

## 2022-06-15 ENCOUNTER — OFFICE VISIT (OUTPATIENT)
Dept: NEUROSURGERY | Facility: CLINIC | Age: 57
End: 2022-06-15

## 2022-06-15 VITALS
DIASTOLIC BLOOD PRESSURE: 82 MMHG | BODY MASS INDEX: 28.5 KG/M2 | TEMPERATURE: 97.6 F | OXYGEN SATURATION: 99 % | HEIGHT: 67 IN | SYSTOLIC BLOOD PRESSURE: 124 MMHG | HEART RATE: 80 BPM | RESPIRATION RATE: 16 BRPM | WEIGHT: 181.6 LBS

## 2022-06-15 VITALS
HEART RATE: 80 BPM | HEIGHT: 67 IN | BODY MASS INDEX: 28.51 KG/M2 | OXYGEN SATURATION: 99 % | WEIGHT: 181.66 LBS | DIASTOLIC BLOOD PRESSURE: 82 MMHG | TEMPERATURE: 97.6 F | RESPIRATION RATE: 16 BRPM | SYSTOLIC BLOOD PRESSURE: 124 MMHG

## 2022-06-15 DIAGNOSIS — D32.9 MENINGIOMA (HCC): Primary | ICD-10-CM

## 2022-06-15 DIAGNOSIS — Z92.3 STATUS POST STEREOTACTIC RADIOSURGERY: ICD-10-CM

## 2022-06-15 PROCEDURE — 99024 POSTOP FOLLOW-UP VISIT: CPT | Performed by: RADIOLOGY

## 2022-06-15 PROCEDURE — 99024 POSTOP FOLLOW-UP VISIT: CPT | Performed by: NEUROLOGICAL SURGERY

## 2022-06-15 PROCEDURE — 99211 OFF/OP EST MAY X REQ PHY/QHP: CPT | Performed by: RADIOLOGY

## 2022-06-15 NOTE — PROGRESS NOTES
Jovanni Madison 1965 is a 49 y o  female     Follow up visit     Jovanni Madison is a 49year old female who presented to the hospital 1/29/22 with chronic dizziness which had been present since November  This was associated with bilateral leg weakness, fatigue, blurry vision in the right eye  Imaging revealed a right petroclinoid meningioma  She was seen at neuro-oncology clinic on 3/16/22 to discuss treatment options  She was seen jointly with Dr Justine Hernandez, it was felt that surgery in this location would come with potential side effects  She opted to pursue treatment with radiation therapy  She completed a course of SRT to the meningioma in the pre pontine region on 4/1/2022  She had symptoms of headache and dizziness with slight nausea  She took Zofran for nausea  She states this was present pre treatment however slightly worse  She was placed on low-dose Decadron and we will taper her post treatment  Decadron was discontinued on 5/6/22  No new imaging  6/15/22 ENT, Dr Dina Koehler          Oncology History   Meningioma ([facility] [address] )   1/29/2022 Initial Diagnosis    Meningioma ([facility] [address] )     3/23/2022 - 4/1/2022 Radiation    Plan ID Energy Fractions Dose per Fraction (cGy) Dose Correction (cGy) Total Dose Delivered (cGy) Elapsed Days   SRTPrepontcis 6X 5 / 5 500 0 2,500 9      Treatment Dates:  3/23/2022 - 4/1/2022  Dr Carlos Palomares         Review of Systems:  Review of Systems   Constitutional: Positive for fatigue (ALL THE TIME)  Negative for appetite change and unexpected weight change  HENT: Positive for ear pain (intermittent pressure feeling) and tinnitus (intermittent, both ears)  Negative for sore throat and trouble swallowing  Eyes: Positive for pain (intermittent right upper lid discomfort, "sharp pinching", #7/10) and discharge (watering of right eye)  Negative for photophobia and visual disturbance  Respiratory: Negative  Negative for shortness of breath  Cardiovascular: Negative    Negative for chest pain  Gastrointestinal: Negative for nausea (resolved with taking protonix) and vomiting  Hx reflux   Endocrine: Negative  Genitourinary: Negative  LMP 6/2022   Musculoskeletal: Positive for back pain (occasional, right sided lower, shooting pain that goes down right leg)  Negative for gait problem (trouble walking if sitting for 30 minutes or longer)  C/p pain from waist to feet   Skin: Negative  Allergic/Immunologic: Negative  Neurological: Positive for dizziness (when laying down or turning head, can't look at something high up) and headaches (no right sided headaches)  Negative for tremors, seizures, speech difficulty, weakness and numbness  C/o sharp "pinching" pain in left posterior head, #7/10   Hematological: Negative  Psychiatric/Behavioral: Positive for dysphoric mood (occasional, mild) and sleep disturbance  Negative for confusion and suicidal ideas  The patient is not nervous/anxious           Some forgetfulness if she gets too much information       Clinical Trial: no    Teaching    Covid Vaccine Status: UP TO DATE    Health Maintenance   Topic Date Due    Pneumococcal Vaccine: Pediatrics (0 to 5 Years) and At-Risk Patients (6 to 59 Years) (1 - PCV) Never done    Annual Physical  Never done    DTaP,Tdap,and Td Vaccines (1 - Tdap) Never done    COVID-19 Vaccine (3 - Moderna risk series) 06/23/2021    BMI: Followup Plan  09/03/2021    OT PLAN OF CARE  03/10/2022    Breast Cancer Screening: Mammogram  03/02/2023    Depression Remission PHQ  04/15/2023    BMI: Adult  06/14/2023    Cervical Cancer Screening  06/14/2026    Colorectal Cancer Screening  10/09/2030    HIV Screening  Completed    Hepatitis C Screening  Completed    Influenza Vaccine  Completed    HIB Vaccine  Aged Out    Hepatitis B Vaccine  Aged Out    IPV Vaccine  Aged Out    Hepatitis A Vaccine  Aged Out    Meningococcal ACWY Vaccine  Aged Out    HPV Vaccine  Aged Out Patient Active Problem List   Diagnosis    Essential hypertension    Pain of upper abdomen    Hypothyroidism    Hemorrhoids    Chronic bilateral low back pain with bilateral sciatica    Sciatica of right side    Heel pain, chronic, right    Slow transit constipation    Vertigo    Migraine without aura and without status migrainosus, not intractable    Gastroesophageal reflux disease without esophagitis    Meningioma (HCC)    Weakness of right upper extremity    Visual field defects    Mild episode of recurrent major depressive disorder ([address] )    Lawson's esophagus without dysplasia     Past Medical History:   Diagnosis Date    Chronic pain     Disease of thyroid gland     hypothyroid    Diverticulitis 2021    GERD (gastroesophageal reflux disease)     Hypertension     Hyperthyroiditis     HYPERTHYROID; POST IODINE THERAPY    Intractable vomiting 2021    Meningioma [facility])      Past Surgical History:   Procedure Laterality Date    APPENDECTOMY       SECTION      HERNIA REPAIR       Family History   Problem Relation Age of Onset    Endometrial cancer Family     Ovarian cancer Mother 79    Heart defect Mother     Heart attack Father     Stroke Brother     Ovarian cancer Sister 52    No Known Problems Maternal Grandmother     Breast cancer Paternal Grandmother 61    No Known Problems Sister     No Known Problems Sister     No Known Problems Sister     No Known Problems Sister     No Known Problems Daughter     No Known Problems Maternal Grandfather     No Known Problems Paternal Grandfather     No Known Problems Daughter     No Known Problems Son      Social History     Socioeconomic History    Marital status: Single     Spouse name: Not on file    Number of children: Not on file    Years of education: Not on file    Highest education level: Not on file   Occupational History    Not on file   Tobacco Use    Smoking status: Never Smoker    Smokeless tobacco: Never Used   Vaping Use    Vaping Use: Never used   Substance and Sexual Activity    Alcohol use: Yes     Comment: occasional     Drug use: No    Sexual activity: Not on file   Other Topics Concern    Not on file   Social History Narrative    AS OF 9/22/15 IN NEXTGEN:        CONSUMES CAFFEINE    CAFFEINE: COFFEE     Social Determinants of Health     Financial Resource Strain: Not on file   Food Insecurity: Not on file   Transportation Needs: Not on file   Physical Activity: Not on file   Stress: Not on file   Social Connections: Not on file   Intimate Partner Violence: Not on file   Housing Stability: Not on file       Current Outpatient Medications:     acetaminophen (TYLENOL) 325 mg tablet, Take 2 tablets (650 mg total) by mouth every 6 (six) hours as needed for mild pain, headaches or fever (Patient not taking: No sig reported), Disp: 30 tablet, Rfl: 0    dexamethasone (DECADRON) 2 mg tablet, Take 1 tablet (2 mg total) by mouth 2 (two) times a day with meals, Disp: 30 tablet, Rfl: 1    dicyclomine (BENTYL) 20 mg tablet, Take 1 tablet (20 mg total) by mouth every 6 (six) hours, Disp: 120 tablet, Rfl: 5    levothyroxine 112 mcg tablet, take 1 tablet by mouth once daily, Disp: 30 tablet, Rfl: 2    lisinopril (ZESTRIL) 40 mg tablet, take 1 tablet by mouth once daily, Disp: 30 tablet, Rfl: 2    meclizine (ANTIVERT) 12 5 MG tablet, Take 1 tablet (12 5 mg total) by mouth every 8 (eight) hours as needed for dizziness, Disp: 30 tablet, Rfl: 0    meclizine (ANTIVERT) 50 MG tablet, Take 1 tablet (50 mg total) by mouth every 12 (twelve) hours as needed for dizziness, Disp: 20 tablet, Rfl: 0    meloxicam (MOBIC) 7 5 mg tablet, Take 1 tablet (7 5 mg total) by mouth daily, Disp: 30 tablet, Rfl: 0    pantoprazole (PROTONIX) 40 mg tablet, Take 1 tablet (40 mg total) by mouth 2 (two) times a day, Disp: 60 tablet, Rfl: 5    sucralfate (CARAFATE) 1 g tablet, Take 1 tablet (1 g total) by mouth 4 (four) times a day, Disp: 120 tablet, Rfl: 5  No Known Allergies  There were no vitals filed for this visit

## 2022-06-15 NOTE — PROGRESS NOTES
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 3/23/22  Meningioma had increased in size by 2022, after initially seen on scan 2015  Has had headaches intermittently since before treatment  Patient seen in conjunction with Dr Noa Gandara  We have suggested a f/u, surveillance MRI brain in 4 months, which would be 6 months from her treatment  Dr Noa Gandara is ordering that study  03/16/22 Metrics: EQ5D5L 06661=1 833; VAS 50; ECOG 2; KPS 80; MOCA 21/30 (language barrier likely impacted score)  06/15/22 Metrics: EQ5D5L 67791=1 441; VAS 70; ECOG 1; KPS 80; MOCA ND 2/2 lang barrier          CHIEF COMPLAINT    Chief Complaint   Patient presents with    Post-op     2 MO POST SRT NO NEW IMAGING        HISTORY    History of Present Illness     49y o  year old female     HPI    See Discussion    REVIEW OF SYSTEMS    Review of Systems   Constitutional: Positive for appetite change (decrease in the last month, sometimes only 1 meal per day) and fatigue  HENT: Positive for tinnitus (bilateral ears)  Negative for ear pain (more pressure feeling then pain, comes and goes) and hearing loss  Eyes: Positive for pain (right eye) and visual disturbance (blurry vision, comes and goes)  Negative for photophobia (denies today, especially at night) and discharge (Right eye, watery)  Respiratory: Negative  Cardiovascular: Negative  Gastrointestinal: Negative for nausea (comes and goes and only with food intake, managed with medication) and vomiting (comes and goes and only with food intake)  Acid reflux   Endocrine: Negative  Genitourinary: Negative      Musculoskeletal: Positive for back pain (currently right side lower back radiates into right buttock down right leg and bilateral knees, stated at last visit, across lower back pain, right hip, comes and goes) and neck pain (left sided)  Negative for gait problem  Skin: Negative  Allergic/Immunologic: Negative  Neurological: Positive for dizziness (only when laying down and with quick movement, ROM)  Negative for tremors, seizures, syncope, speech difficulty, weakness, numbness and headaches (improved since last visit, currently just pain left side of head behind left ear, at previous visit stated mild right sided headache)  Symptoms started after last dose of COVID vaccine 11/2021  Last fall was 12/2021 due to dizziness and landed on hands  H/o OT - very helpful  No previous Sx     Hematological: Negative  Psychiatric/Behavioral: Positive for dysphoric mood and sleep disturbance (occasional due to dizziness/depression)  Negative for confusion and decreased concentration           Meds/Allergies     Current Outpatient Medications   Medication Sig Dispense Refill    dicyclomine (BENTYL) 20 mg tablet Take 1 tablet (20 mg total) by mouth every 6 (six) hours 120 tablet 5    levothyroxine 112 mcg tablet take 1 tablet by mouth once daily 30 tablet 2    lisinopril (ZESTRIL) 40 mg tablet take 1 tablet by mouth once daily 30 tablet 2    meloxicam (MOBIC) 7 5 mg tablet Take 1 tablet (7 5 mg total) by mouth daily 30 tablet 0    pantoprazole (PROTONIX) 40 mg tablet Take 1 tablet (40 mg total) by mouth 2 (two) times a day 60 tablet 5    sucralfate (CARAFATE) 1 g tablet Take 1 tablet (1 g total) by mouth 4 (four) times a day 120 tablet 5    acetaminophen (TYLENOL) 325 mg tablet Take 2 tablets (650 mg total) by mouth every 6 (six) hours as needed for mild pain, headaches or fever (Patient not taking: No sig reported) 30 tablet 0    dexamethasone (DECADRON) 2 mg tablet Take 1 tablet (2 mg total) by mouth 2 (two) times a day with meals (Patient not taking: No sig reported) 30 tablet 1    meclizine (ANTIVERT) 12 5 MG tablet Take 1 tablet (12 5 mg total) by mouth every 8 (eight) hours as needed for dizziness (Patient not taking: No sig reported) 30 tablet 0    meclizine (ANTIVERT) 50 MG tablet Take 1 tablet (50 mg total) by mouth every 12 (twelve) hours as needed for dizziness (Patient not taking: No sig reported) 20 tablet 0     No current facility-administered medications for this visit         No Known Allergies    PAST HISTORY    Past Medical History:   Diagnosis Date    Chronic pain     Disease of thyroid gland     hypothyroid    Diverticulitis 2021    GERD (gastroesophageal reflux disease)     Hypertension     Hyperthyroiditis     HYPERTHYROID; POST IODINE THERAPY    Intractable vomiting 2021    Meningioma [facility])        Past Surgical History:   Procedure Laterality Date    APPENDECTOMY       SECTION      HERNIA REPAIR         Social History     Tobacco Use    Smoking status: Never Smoker    Smokeless tobacco: Never Used   Vaping Use    Vaping Use: Never used   Substance Use Topics    Alcohol use: Yes     Comment: occasional     Drug use: No       Family History   Problem Relation Age of Onset    Endometrial cancer Family     Ovarian cancer Mother 79    Heart defect Mother     Heart attack Father     Stroke Brother     Ovarian cancer Sister 52    No Known Problems Maternal Grandmother     Breast cancer Paternal Grandmother 61    No Known Problems Sister     No Known Problems Sister     No Known Problems Sister     No Known Problems Sister     No Known Problems Daughter     No Known Problems Maternal Grandfather     No Known Problems Paternal Grandfather     No Known Problems Daughter     No Known Problems Son          The following portions of the patient's history were reviewed in this encounter and updated as appropriate: Past medical, surgical, family, and social history, as well as medications, allergies, and review of systems  EXAM    Vitals:Blood pressure 124/82, pulse 80, temperature 97 6 °F (36 4 °C), resp  rate 16, height 5' 7" (1 702 m), weight 82 4 kg (181 lb 10 5 oz), SpO2 99 %, not currently breastfeeding  ,Body mass index is 28 45 kg/m²  Physical Exam    Neurologic Exam            PLEASE NOTE:  This encounter may have been completed utilizing the [employer] Direct Speech Voice Recognition Software  Grammatical errors, random word insertions, pronoun errors and incomplete sentences are occasional consequences of the system due to software limitations, ambient noise and hardware issues  These may be missed by proof reading prior to affixing electronic signature  Any questions or concerns about the content, text or information contained within the body of this dictation should be directly addressed to the advanced practitioner or physician for clarification

## 2022-06-15 NOTE — PROGRESS NOTES
Follow-up - Radiation Oncology   Tae Irvin 1965 49 y o  female [id number]      History of Present Illness   Cancer Staging  No matching staging information was found for the patient  Interval History:     Sai Christianson is a 49year old female who presented to the hospital 22 with chronic dizziness which had been present since November  This was associated with bilateral leg weakness, fatigue, blurry vision in the right eye  Imaging revealed a right petroclinoid meningioma  She was seen at neuro-oncology clinic on 3/16/22 to discuss treatment options  She was seen jointly with Dr Camelia Severion, it was felt that surgery in this location would come with potential side effects  She opted to pursue treatment with radiation therapy       She completed a course of SRT to the meningioma in the pre pontine region on 2022  She had symptoms of headache and dizziness with slight nausea  She took Zofran for nausea  She states this was present pre treatment however slightly worse  She was placed on low-dose Decadron and we will taper her post treatment  Decadron was discontinued on 22      No new imaging         6/15/22 ENT, Dr Selvin Owens   Oncology History   Meningioma [facility])   2022 Initial Diagnosis    Meningioma ([facility] [address] )     3/23/2022 - 2022 Radiation    Plan ID Energy Fractions Dose per Fraction (cGy) Dose Correction (cGy) Total Dose Delivered (cGy) Elapsed Days   SRTPrepontcis 6X 5  500 0 2,500 9      Treatment Dates:  3/23/2022 - 2022     Dr Desiree Patterson         Past Medical History:   Diagnosis Date    Chronic pain     Disease of thyroid gland     hypothyroid    Diverticulitis 2021    GERD (gastroesophageal reflux disease)     Hypertension     Hyperthyroiditis     HYPERTHYROID; POST IODINE THERAPY    Intractable vomiting 2021    Meningioma [facility])      Past Surgical History:   Procedure Laterality Date    APPENDECTOMY       SECTION      HERNIA REPAIR         Social History   Social History     Substance and Sexual Activity   Alcohol Use Yes    Comment: occasional      Social History     Substance and Sexual Activity   Drug Use No     Social History     Tobacco Use   Smoking Status Never Smoker   Smokeless Tobacco Never Used         Meds/Allergies     Current Outpatient Medications:     dicyclomine (BENTYL) 20 mg tablet, Take 1 tablet (20 mg total) by mouth every 6 (six) hours, Disp: 120 tablet, Rfl: 5    levothyroxine 112 mcg tablet, take 1 tablet by mouth once daily, Disp: 30 tablet, Rfl: 2    lisinopril (ZESTRIL) 40 mg tablet, take 1 tablet by mouth once daily, Disp: 30 tablet, Rfl: 2    meloxicam (MOBIC) 7 5 mg tablet, Take 1 tablet (7 5 mg total) by mouth daily, Disp: 30 tablet, Rfl: 0    pantoprazole (PROTONIX) 40 mg tablet, Take 1 tablet (40 mg total) by mouth 2 (two) times a day, Disp: 60 tablet, Rfl: 5    sucralfate (CARAFATE) 1 g tablet, Take 1 tablet (1 g total) by mouth 4 (four) times a day, Disp: 120 tablet, Rfl: 5    acetaminophen (TYLENOL) 325 mg tablet, Take 2 tablets (650 mg total) by mouth every 6 (six) hours as needed for mild pain, headaches or fever (Patient not taking: No sig reported), Disp: 30 tablet, Rfl: 0    dexamethasone (DECADRON) 2 mg tablet, Take 1 tablet (2 mg total) by mouth 2 (two) times a day with meals (Patient not taking: No sig reported), Disp: 30 tablet, Rfl: 1    meclizine (ANTIVERT) 12 5 MG tablet, Take 1 tablet (12 5 mg total) by mouth every 8 (eight) hours as needed for dizziness (Patient not taking: No sig reported), Disp: 30 tablet, Rfl: 0    meclizine (ANTIVERT) 50 MG tablet, Take 1 tablet (50 mg total) by mouth every 12 (twelve) hours as needed for dizziness (Patient not taking: No sig reported), Disp: 20 tablet, Rfl: 0  No Known Allergies      Review of Systems   Constitutional: Positive for fatigue (ALL THE TIME)  Negative for appetite change and unexpected weight change     HENT: Positive for ear pain (intermittent pressure feeling) and tinnitus (intermittent, both ears)  Negative for sore throat and trouble swallowing  Eyes: Positive for pain (intermittent right upper lid discomfort, "sharp pinching", #7/10) and discharge (watering of right eye)  Negative for photophobia and visual disturbance  Respiratory: Negative  Negative for shortness of breath  Cardiovascular: Negative  Negative for chest pain  Gastrointestinal: Negative for nausea (resolved with taking protonix) and vomiting  Hx reflux   Endocrine: Negative  Genitourinary: Negative  LMP 6/2022   Musculoskeletal: Positive for back pain (occasional, right sided lower, shooting pain that goes down right leg)  Negative for gait problem (trouble walking if sitting for 30 minutes or longer)  C/p pain from waist to feet   Skin: Negative  Allergic/Immunologic: Negative  Neurological: Positive for dizziness (when laying down or turning head, can't look at something high up) and headaches (no right sided headaches)  Negative for tremors, seizures, speech difficulty, weakness and numbness  C/o sharp "pinching" pain in left posterior head, #7/10   Hematological: Negative  Psychiatric/Behavioral: Positive for dysphoric mood (occasional, mild) and sleep disturbance  Negative for confusion and suicidal ideas  The patient is not nervous/anxious  Some forgetfulness if she gets too much information          OBJECTIVE:   /82 (BP Location: Left arm, Patient Position: Sitting)   Pulse 80   Temp 97 6 °F (36 4 °C) (Temporal)   Resp 16   Ht 5' 7" (1 702 m)   Wt 82 4 kg (181 lb 9 6 oz)   SpO2 99%   BMI 28 44 kg/m²   Pain Assessment:  0  ECOG/Zubrod/WHO: 1 - Symptomatic but completely ambulatory    Physical Exam   She is conversing appropriately  Breathing unlabored  Ambulating independently          RESULTS    Lab Results: No results found for this or any previous visit (from the past 672 hour(s))  Imaging Studies:NM hepatobiliary w rx    Result Date: 6/10/2022  Narrative: HEPATOBILIARY SCAN WITH CHOLECYSTOKININ ADMINISTRATION INDICATION: R10 10: Upper abdominal pain, unspecified COMPARISON:  Comparison is made to the gallbladder on coronal CT of abdomen dated 12/13/2021 TECHNIQUE:   Following the intravenous administration of 5 0 mCi Tc-99m labeled mebrofenin, dynamic abdominal images were obtained up to a 60 minute time period  Images were performed in AP projection  FINDINGS: There is prompt, uniform accumulation with normal clearance of the radiopharmaceutical by the liver  There is normal filling of the intrahepatic ducts, common bile duct and gallbladder with normal excretion of the radiopharmaceutical into the duodenum  In order to evaluate the contractile response of the gallbladder to  cholecystokinin stimulation, 1 6 mcg sincalide (0 02 mcg/kg) was mixed with saline for a total of 60 cc and administered by slow intravenous infusion up to 60 minutes  These images demonstrate normal contraction of the gallbladder  The calculated gallbladder ejection fraction is 95 % (N = >38%)  The patient experienced no symptoms after CCK administration  Impression: 1  Normal hepatobiliary study  2  Normal contractile response of the gallbladder to cholecystokinin infusion   (95%) Workstation performed: [id number]           Assessment/Plan:  No orders of the defined types were placed in this encounter  Natanael Fallon is a 49y o  year old female who is 2 months post  SRT for prepontine meningioma  Overall she feels improved  In particular her nausea has resolved since being placed on omeprazole for GERD  Her dizziness also has improved  She is tolerated her treatment well  I have ordered follow-up MRI of the brain in 4 months and she will return to Neuro-Oncology clinic thereafter        Reji Garcia MD  2/88/8831,5:19 PM    Portions of the record may have been created with voice recognition software   Occasional wrong word or "sound a like" substitutions may have occurred due to the inherent limitations of voice recognition software   Read the chart carefully and recognize, using context, where substitutions have occurred

## 2022-06-16 ENCOUNTER — OFFICE VISIT (OUTPATIENT)
Dept: OTOLARYNGOLOGY | Facility: CLINIC | Age: 57
End: 2022-06-16
Payer: COMMERCIAL

## 2022-06-16 ENCOUNTER — OFFICE VISIT (OUTPATIENT)
Dept: AUDIOLOGY | Facility: CLINIC | Age: 57
End: 2022-06-16
Payer: COMMERCIAL

## 2022-06-16 VITALS
TEMPERATURE: 97.3 F | HEART RATE: 65 BPM | OXYGEN SATURATION: 97 % | DIASTOLIC BLOOD PRESSURE: 78 MMHG | SYSTOLIC BLOOD PRESSURE: 126 MMHG | BODY MASS INDEX: 28.41 KG/M2 | HEIGHT: 67 IN | WEIGHT: 181 LBS

## 2022-06-16 DIAGNOSIS — J34.2 DEVIATED NASAL SEPTUM: ICD-10-CM

## 2022-06-16 DIAGNOSIS — J01.00 ACUTE NON-RECURRENT MAXILLARY SINUSITIS: ICD-10-CM

## 2022-06-16 DIAGNOSIS — H90.3 SENSORINEURAL HEARING LOSS (SNHL) OF BOTH EARS: Primary | ICD-10-CM

## 2022-06-16 DIAGNOSIS — H90.3 SENSORINEURAL HEARING LOSS OF BOTH EARS: ICD-10-CM

## 2022-06-16 DIAGNOSIS — H81.11 BPPV (BENIGN PAROXYSMAL POSITIONAL VERTIGO), RIGHT: Primary | ICD-10-CM

## 2022-06-16 PROCEDURE — 99204 OFFICE O/P NEW MOD 45 MIN: CPT | Performed by: OTOLARYNGOLOGY

## 2022-06-16 PROCEDURE — 92567 TYMPANOMETRY: CPT | Performed by: AUDIOLOGIST

## 2022-06-16 PROCEDURE — 31231 NASAL ENDOSCOPY DX: CPT | Performed by: OTOLARYNGOLOGY

## 2022-06-16 PROCEDURE — 92557 COMPREHENSIVE HEARING TEST: CPT | Performed by: AUDIOLOGIST

## 2022-06-16 RX ORDER — FLUTICASONE PROPIONATE 50 MCG
2 SPRAY, SUSPENSION (ML) NASAL DAILY
Qty: 16 G | Refills: 2 | Status: SHIPPED | OUTPATIENT
Start: 2022-06-16

## 2022-06-16 NOTE — PROGRESS NOTES
[facility]  Veena Venegas 49 y o  female MRN: [id number]  Encounter: [id number]  Luis Rick MD  Office : [phone]  Also available on Tiger Text    Thank you for referring Veena Venegas for an evaluation  My recommendations are included  Please do not hesitate to contact me with any questions you may have  ASSESSMENT AND PLAN:      1  BPPV (benign paroxysmal positional vertigo), right  Ambulatory Referral to Physical Therapy   2  Sensorineural hearing loss of both ears     3  Acute non-recurrent maxillary sinusitis  fluticasone (FLONASE) 50 mcg/act nasal spray   4  Deviated nasal septum  fluticasone (FLONASE) 50 mcg/act nasal spray     No active sinusitis, will give Flonase, follow-up with imaging that has been scheduled (MRI by Neurosurgery)  BPPV, will send for Epley maneuver  Bilateral sensorineural hearing loss with pure tone averages at 23 and 27 decibels, relatively symmetric, very minimal noise induced hearing loss component  Type a tympanograms bilaterally, excellent word discrimination scores  ______________________________________________________________________    Reason for consultation :  Sinusitis  HPI: Veena Venegas is a 49 y o  female with history significant for meningioma on posterior fossa compressing the brainstem  She did have some increase in size and underwent steretaxic radiotherapy from April all through mid May  She reports some visual symptoms, also has had episodes of vertigo that apparently are rotary nature and lasts only 30 seconds  These episodes are usually precipitated by changes in position of the head or quick motion of the head  Denies any subjective hearing loss  In no associated tinnitus or subjective fluctuation of hearing level  She reports nausea but has not had any vomiting  In addition there was some findings on the sinuses on one of the imaging studies    When interrogated she reports no rhinorrhea, no nasal congestion  Does have history of gastroesophageal reflux, also hyperthyroidism, status post radioactive iodine approximately 3 or 4 years ago  On levothyroxine 112 mcg, last TSH in 2022 was normal     PRIOR VISIT, ENDOSCOPIC EVALUATION :     REVIEW OF SYSTEMS:    Review of systems:  10 Point ROS was performed and negative except as above or otherwise noted in the medical record      Historical Information   Past Medical History:   Diagnosis Date    Chronic pain     Disease of thyroid gland     hypothyroid    Diverticulitis 2021    GERD (gastroesophageal reflux disease)     Hypertension     Hyperthyroiditis     HYPERTHYROID; POST IODINE THERAPY    Intractable vomiting 2021    Meningioma [facility])      Past Surgical History:   Procedure Laterality Date    APPENDECTOMY       SECTION      HERNIA REPAIR       Social History   Social History     Substance and Sexual Activity   Alcohol Use Yes    Comment: occasional      Social History     Substance and Sexual Activity   Drug Use No     Social History     Tobacco Use   Smoking Status Never Smoker   Smokeless Tobacco Never Used     Family History   Problem Relation Age of Onset    Endometrial cancer Family     Ovarian cancer Mother 79    Heart defect Mother     Heart attack Father     Stroke Brother     Ovarian cancer Sister 52    No Known Problems Maternal Grandmother     Breast cancer Paternal Grandmother 61    No Known Problems Sister     No Known Problems Sister     No Known Problems Sister     No Known Problems Sister     No Known Problems Daughter     No Known Problems Maternal Grandfather     No Known Problems Paternal Grandfather     No Known Problems Daughter     No Known Problems Son        Meds/Allergies       Current Outpatient Medications:     dicyclomine (BENTYL) 20 mg tablet    fluticasone (FLONASE) 50 mcg/act nasal spray    levothyroxine 112 mcg tablet    lisinopril (ZESTRIL) 40 mg tablet    meloxicam (MOBIC) 7 5 mg tablet    pantoprazole (PROTONIX) 40 mg tablet    sucralfate (CARAFATE) 1 g tablet    acetaminophen (TYLENOL) 325 mg tablet    dexamethasone (DECADRON) 2 mg tablet    meclizine (ANTIVERT) 12 5 MG tablet    meclizine (ANTIVERT) 50 MG tablet    No Known Allergies      PHYSICAL EXAM:    Blood pressure 126/78, pulse 65, temperature (!) 97 3 °F (36 3 °C), temperature source Temporal, height 5' 7" (1 702 m), weight 82 1 kg (181 lb), SpO2 97 %, not currently breastfeeding  Body mass index is 28 35 kg/m²  Constitutional: Oriented to person, place, and time  Well-developed and well-nourished, no apparent distress, non-toxic appearance  Cooperative, able to hear and answer questions without difficulty  Voice: Normal voice quality  Head: Normocephalic, atraumatic  No scars, masses or lesions  Face: Symmetric, no edema, no sinus tenderness  Eyes: Vision grossly intact, extra-ocular movement intact  Right Ear: External ear normal   Auditory canal clear  Tympanic membrane well-appearing, without retraction or scarring  No fluid present  No post-auricular erythema or tenderness  Left Ear: External ear normal   Auditory canal clear  Tympanic membrane well-appearing, without retraction or scarring  No fluid present  No post-auricular erythema or tenderness  Nose: Septum with right deviation, nares clear  Mucosa moist, turbinates well appearing  No crusting, polyps or discharge evident  Oral cavity: Dentition intact, molars on mandible missing, 1 more wet a cavity  Mucosa moist, lips normal   Tongue mobile, floor of mouth normal   Hard palate unremarkable  No masses or lesions  Oropharynx: Uvula is midline, soft palate normal   Unremarkable oropharyngeal inlet  Tonsils 1+  unremarkable  Posterior pharyngeal wall clear  No masses or lesions  Salivary glands:  Parotid glands and submandibular glands symmetric, no enlargement or tenderness  Neck: Normal laryngeal elevation with swallow  Trachea midline  No masses or lesions  No palpable adenopathy  Thyroid: normal in size, unremarkable without tenderness or palpable nodules  Pulmonary/Chest: Normal effort and rate  No respiratory distress  Musculoskeletal: Normal range of motion  VESTIBULAR EXAM: No gross motor deficiency, CN II-VII and IX - XII grossly intact, no spontaneous nystagmus, no gaze nystagmus, Head Shaking test negative, Leland-Hallpike positive on right lateral decubitus with fatigue and extinction  Fukuda test no rotation  Skin: Skin is warm and dry  Psychiatric: Normal mood and affect  Nasal endoscopy:     Verbal informed consent obtained  Topical anesthesia and vasoconstriction was applied via nasal spray bilaterally  Once anesthesia vasoconstriction had taken effect a flexible endoscope was advanced on both nasal cavities to the level of the nasopharynx  With the following findings:   Nasal endoscopy nasal cavities patent, there is a right septal deviation with a spur into the middle meatus in contacting the middle turbinates on the right side  No polyps on the nasal cavities, middle meatus clear no discharge or polyps on either side  Sphenoid ethmoid recess and superior meatus are clear bilaterally on endoscopy, no discharge or polyps  No edema of turbinates  The endoscope was then removed without complication patient tolerates procedure  Imaging Studies: I have personally reviewed images on the PACS system and :  MRI January with mucosal thickening and bubbles on right maxillary sinus  There is also and a noticed cell that appears to be partially opacified on the right side  CT scan head from  5/7/22 with clear sinuses, maxillary sinuses only partially visualized, but no mucosal thickening or evidence of any fluid

## 2022-06-16 NOTE — PROGRESS NOTES
AUDIOLOGY AUDIOMETRIC EVALUATION      Name:  Sav Arechiga  :  1965  Age:  49 y o  Date of Evaluation: 2022    History:  Reason for visit:  Ms Chantel Hubbard is seen today at the request of Dr Rupal Trinidad for an evaluation of hearing  EVALUATION     Audiogram Results:     Right Left     Normal   Normal     Conductive   Conductive   X Sensorineural  X Sensorineural     Mixed  Mixed    Unspecified  Unspecified      Description: Audio shows a mild SNHL AU     Impedence Audiometry:     Tympanometry     Type Vol Press Comp   R A 1 1 ml -20 daPa 1 1 ml   L A 1 1 ml 0 daPa 1 8 ml      RESULTS:     Pure Tone Audiometry [facility]):       Hz 250  1500 2000 3000 4000 6000 8000   R  25 25   20   25  30 40 40   L 35 30   20   30  35 40 40      Speech Audiometry:           Right Ear:  SRT: 25dB                 WRS was excellent (100%) at 60dB presentation level          Left Ear:  SRT: 25dB                 WRS was excellent (100%) at 60dB presentation level     Test-Retest Reliability: Good  PT Stimulus: Puretone  Speech Stimulus: Recorded  Recognition Test: [place]ian List C/D  Response: Push Button  Transducer: Headphones     FOLLOW-UP:  Ms Chantel Hubbard is to see Dr Felx Zendejas afterwards for further testing and recommendations          Boogie Schultz    Licensed Audiologist

## 2022-07-18 ENCOUNTER — OFFICE VISIT (OUTPATIENT)
Dept: FAMILY MEDICINE CLINIC | Facility: CLINIC | Age: 57
End: 2022-07-18

## 2022-07-18 VITALS
RESPIRATION RATE: 19 BRPM | OXYGEN SATURATION: 97 % | DIASTOLIC BLOOD PRESSURE: 100 MMHG | WEIGHT: 180 LBS | BODY MASS INDEX: 28.25 KG/M2 | TEMPERATURE: 97.4 F | SYSTOLIC BLOOD PRESSURE: 152 MMHG | HEIGHT: 67 IN | HEART RATE: 73 BPM

## 2022-07-18 DIAGNOSIS — D32.9 MENINGIOMA (HCC): ICD-10-CM

## 2022-07-18 DIAGNOSIS — F33.0 MILD EPISODE OF RECURRENT MAJOR DEPRESSIVE DISORDER (HCC): ICD-10-CM

## 2022-07-18 DIAGNOSIS — I10 ESSENTIAL HYPERTENSION: ICD-10-CM

## 2022-07-18 DIAGNOSIS — K21.9 GASTROESOPHAGEAL REFLUX DISEASE WITHOUT ESOPHAGITIS: ICD-10-CM

## 2022-07-18 DIAGNOSIS — E03.9 HYPOTHYROIDISM, UNSPECIFIED TYPE: ICD-10-CM

## 2022-07-18 DIAGNOSIS — Z23 ENCOUNTER FOR IMMUNIZATION: Primary | ICD-10-CM

## 2022-07-18 PROBLEM — R29.898 WEAKNESS OF RIGHT UPPER EXTREMITY: Chronic | Status: RESOLVED | Noted: 2022-01-30 | Resolved: 2022-07-18

## 2022-07-18 PROBLEM — R10.10 PAIN OF UPPER ABDOMEN: Status: RESOLVED | Noted: 2020-06-05 | Resolved: 2022-07-18

## 2022-07-18 PROBLEM — G89.29 HEEL PAIN, CHRONIC, RIGHT: Status: RESOLVED | Noted: 2021-03-01 | Resolved: 2022-07-18

## 2022-07-18 PROBLEM — M79.671 HEEL PAIN, CHRONIC, RIGHT: Status: RESOLVED | Noted: 2021-03-01 | Resolved: 2022-07-18

## 2022-07-18 PROBLEM — M54.31 SCIATICA OF RIGHT SIDE: Status: RESOLVED | Noted: 2020-09-03 | Resolved: 2022-07-18

## 2022-07-18 PROBLEM — G43.009 MIGRAINE WITHOUT AURA AND WITHOUT STATUS MIGRAINOSUS, NOT INTRACTABLE: Status: RESOLVED | Noted: 2021-07-09 | Resolved: 2022-07-18

## 2022-07-18 PROCEDURE — 90715 TDAP VACCINE 7 YRS/> IM: CPT | Performed by: FAMILY MEDICINE

## 2022-07-18 PROCEDURE — 3077F SYST BP >= 140 MM HG: CPT | Performed by: FAMILY MEDICINE

## 2022-07-18 PROCEDURE — 90471 IMMUNIZATION ADMIN: CPT | Performed by: FAMILY MEDICINE

## 2022-07-18 PROCEDURE — 90677 PCV20 VACCINE IM: CPT | Performed by: FAMILY MEDICINE

## 2022-07-18 PROCEDURE — 3080F DIAST BP >= 90 MM HG: CPT | Performed by: FAMILY MEDICINE

## 2022-07-18 PROCEDURE — 90472 IMMUNIZATION ADMIN EACH ADD: CPT | Performed by: FAMILY MEDICINE

## 2022-07-18 PROCEDURE — 99213 OFFICE O/P EST LOW 20 MIN: CPT | Performed by: FAMILY MEDICINE

## 2022-07-18 RX ORDER — LISINOPRIL 40 MG/1
40 TABLET ORAL DAILY
Qty: 30 TABLET | Refills: 2 | Status: SHIPPED | OUTPATIENT
Start: 2022-07-18

## 2022-07-18 RX ORDER — LEVOTHYROXINE SODIUM 112 UG/1
112 TABLET ORAL DAILY
Qty: 30 TABLET | Refills: 2 | Status: SHIPPED | OUTPATIENT
Start: 2022-07-18

## 2022-07-18 NOTE — ASSESSMENT & PLAN NOTE
BP Today: 152/100  Not at goal per JNC-8 guidelines  Patient did not take her BP medication this morning because she forgot  Patient denies headache, blurry vision, chest pain, SOB, dizziness, lower limb edema  CMP from 5/2022 within normal limits  - Continue Lisinopril 40 mg daily   - Patient advised on importance of taking BP medication; patient is normally compliant    - Urine M/C ratio ordered  Dara Soulier

## 2022-07-18 NOTE — ASSESSMENT & PLAN NOTE
PHQ-9 Today: 2 (improved from 8 at our previous visit in 4/2022)  Patient was tearful last visit regarding her meningioma diagnosis; since then, has undergone treatment and is following regularly with Neurosurgery and is doing much better today  No suicidal or homicidal ideations  Patient has again stated she is not interested in therapy or medication      - Will continue to monitor

## 2022-07-18 NOTE — ASSESSMENT & PLAN NOTE
Patient recently presented to ED in late January with dizziness and fatigue  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  The right superior cerebellar artery and posterior communicating artery branches are intimately associated along the dorsal and medial borders of the lesion  MRI Brain (1/2022): mass measuring 1 8 x 0 6 x 1 8 cm (compared to 1 1 x 1 1 x 0 5 cm on the prior study)  No adjacent parenchymal edema identified  Minimal mass effect and flattening the right ventral rex  CT Head 5/2022: Similar 1 6 x 0 9 cm right petroclinoid mass compatible with known meningioma  Mild, similar mass effect on the right anterior rex  Patient completed frameless stereotactic radiotherapy for meningioma in 4/2022  Also completed Decadron taper  Patient scheduled for an MRI brain on 10/17/2022 at 8 AM; reminded of this appointment  - Continue following with Neurosurgery and continue to monitor

## 2022-07-18 NOTE — ASSESSMENT & PLAN NOTE
TSH from 1/2022 normal at 0 577     - Continue Levothyroxine 112 mcg daily  - Continue to monitor; will retest levels at next visit

## 2022-07-18 NOTE — ASSESSMENT & PLAN NOTE
Patient had EGD done by GI on 4/8/2022 which showed a 3 cm hiatal hernia as well as short-segment Lawson's esophagus, which was biopsied  Stomach and duodenum appeared normal and esophagitis was noted to be mostly resolved  PH study showed significant reflux; however, symptom correlation was not significant for reported symptoms of acid reflux and stomach ache  Surgical options were discussed with GI at their last appt in 6/2022, but patient stated she would like to hold off at this time as symptoms are well controlled  - Continue Sucralfate 1 tablet 4x/day, Bentyl 20 mg Q6H PRN, and Protonix 40 mg BID    - Continue following with GI; appreciate their recommendations

## 2022-07-18 NOTE — PROGRESS NOTES
Assessment/Plan:    Essential hypertension  BP Today: 152/100  Not at goal per JNC-8 guidelines  Patient did not take her BP medication this morning because she forgot  Patient denies headache, blurry vision, chest pain, SOB, dizziness, lower limb edema  CMP from 5/2022 within normal limits  - Continue Lisinopril 40 mg daily   - Patient advised on importance of taking BP medication; patient is normally compliant    - Urine M/C ratio ordered         [facility])  Patient recently presented to ED in late January with dizziness and fatigue  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  The right superior cerebellar artery and posterior communicating artery branches are intimately associated along the dorsal and medial borders of the lesion  MRI Brain (1/2022): mass measuring 1 8 x 0 6 x 1 8 cm (compared to 1 1 x 1 1 x 0 5 cm on the prior study)  No adjacent parenchymal edema identified  Minimal mass effect and flattening the right ventral rex  CT Head 5/2022: Similar 1 6 x 0 9 cm right petroclinoid mass compatible with known meningioma  Mild, similar mass effect on the right anterior rex  Patient completed frameless stereotactic radiotherapy for meningioma in 4/2022  Also completed Decadron taper  Patient scheduled for an MRI brain on 10/17/2022 at 8 AM; reminded of this appointment  - Continue following with Neurosurgery and continue to monitor  Hypothyroidism  TSH from 1/2022 normal at 0 577     - Continue Levothyroxine 112 mcg daily  - Continue to monitor; will retest levels at next visit  Gastroesophageal reflux disease without esophagitis  Patient had EGD done by GI on 4/8/2022 which showed a 3 cm hiatal hernia as well as short-segment Lawson's esophagus, which was biopsied  Stomach and duodenum appeared normal and esophagitis was noted to be mostly resolved     PH study showed significant reflux; however, symptom correlation was not significant for reported symptoms of acid reflux and stomach ache  Surgical options were discussed with GI at their last appt in 6/2022, but patient stated she would like to hold off at this time as symptoms are well controlled  - Continue Sucralfate 1 tablet 4x/day, Bentyl 20 mg Q6H PRN, and Protonix 40 mg BID    - Continue following with GI; appreciate their recommendations  Mild episode of recurrent major depressive disorder (HCC)  PHQ-9 Today: 2 (improved from 8 at our previous visit in 4/2022)  Patient was tearful last visit regarding her meningioma diagnosis; since then, has undergone treatment and is following regularly with Neurosurgery and is doing much better today  No suicidal or homicidal ideations  Patient has again stated she is not interested in therapy or medication      - Will continue to monitor  Health Maintenance:     - Prevnar and Tdap vaccines given during visit today  - Patient received 3rd COVID shot  Diagnoses and all orders for this visit:    Encounter for immunization  -     Pneumococcal Conjugate Vaccine 20-valent (PCV20)  -     TDAP VACCINE GREATER THAN OR EQUAL TO 8YO IM    Essential hypertension  -     lisinopril (ZESTRIL) 40 mg tablet; Take 1 tablet (40 mg total) by mouth daily  -     Microalbumin / creatinine urine ratio    Hypothyroidism, unspecified type  -     levothyroxine 112 mcg tablet; Take 1 tablet (112 mcg total) by mouth daily    Meningioma (HCC)    Gastroesophageal reflux disease without esophagitis          Subjective:      Patient ID: Adina Jauregui is a 49 y o  female  Adina Jauregui is a pleasant 79-year-old female with PMH of chronic pain syndrome, hypothyroidism, meningioma s/p SRT and hypertension who presents to the clinic today for a follow up on her chronic conditions  She has no concerns other than what is noted in ROS   [id number] used for this encounter         The following portions of the patient's history were reviewed and updated as appropriate: allergies, current medications, past family history, past medical history, past social history, past surgical history and problem list     Review of Systems   Constitutional: Negative for activity change, appetite change, chills and fever  HENT: Negative for sore throat  Respiratory: Negative for cough and shortness of breath  Cardiovascular: Negative for chest pain, palpitations and leg swelling  Gastrointestinal: Negative for abdominal pain, constipation, diarrhea, nausea and vomiting  Musculoskeletal: Negative for back pain and gait problem  Neurological: Positive for dizziness  Negative for seizures, weakness and headaches  Objective:      /100 (BP Location: Left arm, Patient Position: Sitting, Cuff Size: Adult)   Pulse 73   Temp (!) 97 4 °F (36 3 °C) (Temporal)   Resp 19   Ht 5' 7" (1 702 m)   Wt 81 6 kg (180 lb)   LMP 07/01/2022   SpO2 97%   BMI 28 19 kg/m²        Physical Exam  Vitals reviewed  Constitutional:       General: She is not in acute distress  Appearance: She is well-developed  She is not diaphoretic  HENT:      Head: Normocephalic and atraumatic  Eyes:      Conjunctiva/sclera: Conjunctivae normal    Cardiovascular:      Rate and Rhythm: Normal rate and regular rhythm  Heart sounds: Normal heart sounds  No murmur heard  No friction rub  No gallop  Pulmonary:      Effort: Pulmonary effort is normal  No respiratory distress  Breath sounds: Normal breath sounds  No wheezing or rales  Abdominal:      General: Bowel sounds are normal  There is no distension  Palpations: Abdomen is soft  There is no mass  Tenderness: There is no abdominal tenderness  There is no guarding  Musculoskeletal:         General: No tenderness or deformity  Normal range of motion  Cervical back: Normal range of motion  Right lower leg: No edema  Left lower leg: No edema     Skin:     General: Skin is warm and dry  Neurological:      General: No focal deficit present  Mental Status: She is alert and oriented to person, place, and time             Jc Saucedo MD  7/18/2022

## 2022-08-16 ENCOUNTER — OFFICE VISIT (OUTPATIENT)
Dept: FAMILY MEDICINE CLINIC | Facility: CLINIC | Age: 57
End: 2022-08-16

## 2022-08-16 VITALS
OXYGEN SATURATION: 98 % | HEIGHT: 67 IN | TEMPERATURE: 98.6 F | DIASTOLIC BLOOD PRESSURE: 84 MMHG | BODY MASS INDEX: 28.25 KG/M2 | HEART RATE: 88 BPM | SYSTOLIC BLOOD PRESSURE: 122 MMHG | RESPIRATION RATE: 18 BRPM | WEIGHT: 180 LBS

## 2022-08-16 DIAGNOSIS — G43.009 MIGRAINE WITHOUT AURA AND WITHOUT STATUS MIGRAINOSUS, NOT INTRACTABLE: ICD-10-CM

## 2022-08-16 DIAGNOSIS — R42 DIZZINESS: ICD-10-CM

## 2022-08-16 DIAGNOSIS — G43.109 MIGRAINE WITH AURA AND WITHOUT STATUS MIGRAINOSUS, NOT INTRACTABLE: Primary | ICD-10-CM

## 2022-08-16 PROCEDURE — 99214 OFFICE O/P EST MOD 30 MIN: CPT | Performed by: FAMILY MEDICINE

## 2022-08-16 PROCEDURE — 3074F SYST BP LT 130 MM HG: CPT | Performed by: FAMILY MEDICINE

## 2022-08-16 PROCEDURE — 3079F DIAST BP 80-89 MM HG: CPT | Performed by: FAMILY MEDICINE

## 2022-08-16 RX ORDER — CALCIUM CARBONATE/VITAMIN D3 500-10/5ML
400 LIQUID (ML) ORAL DAILY
Qty: 90 TABLET | Refills: 0 | Status: SHIPPED | OUTPATIENT
Start: 2022-08-16 | End: 2022-09-13

## 2022-08-16 RX ORDER — METHYLPREDNISOLONE 4 MG/1
TABLET ORAL
Qty: 21 EACH | Refills: 0 | Status: SHIPPED | OUTPATIENT
Start: 2022-08-16 | End: 2022-10-20 | Stop reason: ALTCHOICE

## 2022-08-16 RX ORDER — TOPIRAMATE 25 MG/1
TABLET ORAL
Qty: 141 TABLET | Refills: 0 | Status: SHIPPED | OUTPATIENT
Start: 2022-08-16 | End: 2022-10-20

## 2022-08-16 RX ORDER — ONDANSETRON 4 MG/1
4 TABLET, FILM COATED ORAL EVERY 8 HOURS PRN
Qty: 20 TABLET | Refills: 0 | Status: SHIPPED | OUTPATIENT
Start: 2022-08-16 | End: 2022-09-26

## 2022-08-16 RX ORDER — MECLIZINE HYDROCHLORIDE 50 MG/1
50 TABLET ORAL EVERY 12 HOURS PRN
Qty: 20 TABLET | Refills: 0 | Status: SHIPPED | OUTPATIENT
Start: 2022-08-16 | End: 2022-10-20 | Stop reason: SDUPTHER

## 2022-08-16 NOTE — PROGRESS NOTES
Assessment/Plan:    Migraine without aura and without status migrainosus, not intractable  -Current headache highly suggestive of migraine  -Will provide medro-dose pack which helped her headache in the past  -Will initiate Magnesium and Topomax for prophylaxis  -Patient does have a history of meniogioma and is following with neurovascular outpatient  -Stress management counseling provided  -Scheduled for repeat MRI in October to evaluate the meningioma  -May obtain repeat brain imaging sooner if symptoms are not improving at next visit  -ED precautions for worsening or persistent symptoms        Return in about 3 weeks (around 9/6/2022) for Next scheduled follow up headache  There are no Patient Instructions on file for this visit  Diagnoses and all orders for this visit:    Migraine with aura and without status migrainosus, not intractable  -     methylPREDNISolone 4 MG tablet therapy pack; Use as directed on package  -     ondansetron (ZOFRAN) 4 mg tablet; Take 1 tablet (4 mg total) by mouth every 8 (eight) hours as needed for nausea or vomiting  -     topiramate (Topamax) 25 mg tablet; Take 1 tablet (25 mg total) by mouth daily for 7 days, THEN 2 tablets (50 mg total) daily for 7 days, THEN 4 tablets (100 mg total) daily   -     Magnesium Oxide 400 MG CAPS; Take 1 tablet (400 mg total) by mouth in the morning    Dizziness  -     meclizine (ANTIVERT) 50 MG tablet;  Take 1 tablet (50 mg total) by mouth every 12 (twelve) hours as needed for dizziness    Migraine without aura and without status migrainosus, not intractable          Subjective:     Darinel Siddiqi is a 56 y o  female who  has a past medical history of Chronic pain, Disease of thyroid gland, Diverticulitis, GERD (gastroesophageal reflux disease), Heel pain, chronic, right, Hypertension, Hyperthyroiditis, Intractable vomiting, Meningioma (HCC), Migraine without aura and without status migrainosus, not intractable, Pain of upper abdomen, Sciatica of right side, and Weakness of right upper extremity   who presented to the office today for migraine headache for the past few weeks        -Her headache is associated with photophobia  -Has been getting progressively worse  -It feels like electricity shooting pain on right side of her head  -Associated with dizziness and intermittent nausea/vomiting  -The headache is on the right side of her head   -Has a history of migraine  -Has been sleeping poorly and under stress  -Her Excedrin/tylenol is not helping   -She rates headache 10/10 at worse   -Headache is happening everyday but before headache was once in a while  -Associated with Dizziness and Photophobia  -Headache Last a few hours  -She has No focal weakness  -She history of meningioma s/p radiotherapy  -She follows with neurovascular with plan for repeat MRI in October 2022      HPI      The following portions of the patient's history were reviewed and updated as appropriate: allergies, current medications, past family history, past medical history, past social history, past surgical history and problem list     Current Outpatient Medications on File Prior to Visit   Medication Sig Dispense Refill    acetaminophen (TYLENOL) 325 mg tablet Take 2 tablets (650 mg total) by mouth every 6 (six) hours as needed for mild pain, headaches or fever (Patient not taking: No sig reported) 30 tablet 0    dicyclomine (BENTYL) 20 mg tablet Take 1 tablet (20 mg total) by mouth every 6 (six) hours 120 tablet 5    fluticasone (FLONASE) 50 mcg/act nasal spray 2 sprays into each nostril daily 16 g 2    levothyroxine 112 mcg tablet Take 1 tablet (112 mcg total) by mouth daily 30 tablet 2    lisinopril (ZESTRIL) 40 mg tablet Take 1 tablet (40 mg total) by mouth daily 30 tablet 2    meloxicam (MOBIC) 7 5 mg tablet Take 1 tablet (7 5 mg total) by mouth daily 30 tablet 0    pantoprazole (PROTONIX) 40 mg tablet Take 1 tablet (40 mg total) by mouth 2 (two) times a day 60 tablet 5    sucralfate (CARAFATE) 1 g tablet Take 1 tablet (1 g total) by mouth 4 (four) times a day 120 tablet 5     No current facility-administered medications on file prior to visit  Review of Systems   Constitutional: Negative for chills, fatigue and fever  Eyes: Negative for visual disturbance  Respiratory: Negative for shortness of breath  Cardiovascular: Negative for chest pain, palpitations and leg swelling  Gastrointestinal: Positive for nausea and vomiting  Musculoskeletal: Negative for back pain  Skin: Negative for rash  Neurological: Positive for dizziness and headaches  Negative for tremors, seizures, facial asymmetry, speech difficulty, weakness and numbness  Objective:    /84 (BP Location: Left arm, Patient Position: Sitting, Cuff Size: Standard)   Pulse 88   Temp 98 6 °F (37 °C) (Temporal)   Resp 18   Ht 5' 7" (1 702 m)   Wt 81 6 kg (180 lb)   LMP 08/01/2022 (Exact Date)   SpO2 98%   BMI 28 19 kg/m²     Physical Exam  Constitutional:       General: She is not in acute distress  Appearance: Normal appearance  She is well-developed  She is not ill-appearing, toxic-appearing or diaphoretic  HENT:      Head: Normocephalic  Right Ear: External ear normal       Left Ear: External ear normal       Nose: Nose normal    Eyes:      General: No scleral icterus  Right eye: No discharge  Left eye: No discharge  Extraocular Movements: Extraocular movements intact  Neck:      Thyroid: No thyromegaly  Vascular: No JVD  Trachea: No tracheal deviation  Cardiovascular:      Rate and Rhythm: Normal rate and regular rhythm  Heart sounds: Normal heart sounds  No murmur heard  No friction rub  No gallop  Pulmonary:      Effort: Pulmonary effort is normal  No respiratory distress  Breath sounds: Normal breath sounds  No stridor  No wheezing  Abdominal:      General: Bowel sounds are normal  There is no distension  Palpations: Abdomen is soft  There is no mass  Tenderness: There is no abdominal tenderness  There is no guarding or rebound  Hernia: No hernia is present  Musculoskeletal:         General: Normal range of motion  Cervical back: Normal range of motion  Skin:     General: Skin is warm  Capillary Refill: Capillary refill takes less than 2 seconds  Findings: No rash  Neurological:      General: No focal deficit present  Mental Status: She is alert and oriented to person, place, and time  Mental status is at baseline  Cranial Nerves: No cranial nerve deficit  Sensory: No sensory deficit  Motor: No weakness or abnormal muscle tone        Coordination: Coordination normal       Gait: Gait normal       Deep Tendon Reflexes: Reflexes normal    Psychiatric:         Behavior: Behavior normal          Narcisa Osborne MD  08/18/22  12:58 PM

## 2022-08-18 NOTE — ASSESSMENT & PLAN NOTE
-Current headache highly suggestive of migraine  -Will provide medro-dose pack which helped her headache in the past  -Will initiate Magnesium and Topomax for prophylaxis  -Patient does have a history of meniogioma and is following with neurovascular outpatient  -Stress management counseling provided  -Scheduled for repeat MRI in October to evaluate the meningioma     -May obtain repeat brain imaging sooner if symptoms are not improving at next visit  -ED precautions for worsening or persistent symptoms

## 2022-09-13 DIAGNOSIS — G43.109 MIGRAINE WITH AURA AND WITHOUT STATUS MIGRAINOSUS, NOT INTRACTABLE: ICD-10-CM

## 2022-09-13 RX ORDER — LANOLIN ALCOHOL/MO/W.PET/CERES
CREAM (GRAM) TOPICAL
Qty: 90 TABLET | Refills: 0 | Status: SHIPPED | OUTPATIENT
Start: 2022-09-13

## 2022-09-26 ENCOUNTER — OFFICE VISIT (OUTPATIENT)
Dept: FAMILY MEDICINE CLINIC | Facility: CLINIC | Age: 57
End: 2022-09-26

## 2022-09-26 VITALS
SYSTOLIC BLOOD PRESSURE: 118 MMHG | HEART RATE: 71 BPM | OXYGEN SATURATION: 99 % | WEIGHT: 180 LBS | RESPIRATION RATE: 16 BRPM | HEIGHT: 67 IN | BODY MASS INDEX: 28.25 KG/M2 | TEMPERATURE: 98.7 F | DIASTOLIC BLOOD PRESSURE: 80 MMHG

## 2022-09-26 DIAGNOSIS — E03.9 HYPOTHYROIDISM, UNSPECIFIED TYPE: ICD-10-CM

## 2022-09-26 DIAGNOSIS — F33.0 MILD EPISODE OF RECURRENT MAJOR DEPRESSIVE DISORDER (HCC): ICD-10-CM

## 2022-09-26 DIAGNOSIS — D32.9 MENINGIOMA (HCC): ICD-10-CM

## 2022-09-26 DIAGNOSIS — I10 ESSENTIAL HYPERTENSION: Primary | ICD-10-CM

## 2022-09-26 PROCEDURE — 3074F SYST BP LT 130 MM HG: CPT | Performed by: INTERNAL MEDICINE

## 2022-09-26 PROCEDURE — 3079F DIAST BP 80-89 MM HG: CPT | Performed by: INTERNAL MEDICINE

## 2022-09-26 PROCEDURE — 99213 OFFICE O/P EST LOW 20 MIN: CPT | Performed by: INTERNAL MEDICINE

## 2022-09-26 NOTE — PROGRESS NOTES
Assessment/Plan:    Essential hypertension  BP Today: 118/80  At goal per JNC-8 guidelines  Patient denies headache, blurry vision, chest pain, SOB, dizziness, lower limb edema  CMP from 5/2022 within normal limits  - Continue Lisinopril 40 mg daily   - Urine M/C ratio ordered prior but not completed; patient advised to complete this         Mild episode of recurrent major depressive disorder (HCC)  PHQ-9 Today: 2  Patient was tearful at previous visit regarding her meningioma diagnosis; since then, has undergone treatment and is following regularly with Neurosurgery and is doing much better today  No suicidal or homicidal ideations  Patient has again stated she is not interested in therapy or medication      - Will continue to monitor  Meningioma [facility])  Patient presented to ED in late January with dizziness and fatigue  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  The right superior cerebellar artery and posterior communicating artery branches are intimately associated along the dorsal and medial borders of the lesion  MRI Brain (1/2022): mass measuring 1 8 x 0 6 x 1 8 cm (compared to 1 1 x 1 1 x 0 5 cm on the prior study)  No adjacent parenchymal edema identified  Minimal mass effect and flattening the right ventral rex  CT Head 5/2022: Similar 1 6 x 0 9 cm right petroclinoid mass compatible with known meningioma  Mild, similar mass effect on the right anterior rex  Patient completed frameless stereotactic radiotherapy for meningioma in 4/2022  Also completed Decadron taper  Patient scheduled for an MRI brain on 10/17/2022 at 8 AM; reminded of this appointment  - Continue following with Neurosurgery and continue to monitor  Hypothyroidism  TSH from 1/2022 normal at 0 577     - Continue Levothyroxine 112 mcg daily  - Continue to monitor; will retest levels at next visit       Gastroesophageal reflux disease without esophagitis  Patient had EGD done by GI on 4/8/2022 which showed a 3 cm hiatal hernia as well as short-segment Lawson's esophagus, which was biopsied  Stomach and duodenum appeared normal and esophagitis was noted to be mostly resolved  PH study showed significant reflux; however, symptom correlation was not significant for reported symptoms of acid reflux and stomach ache  Surgical options were discussed with GI at their last appt in 6/2022, but patient stated she would like to hold off at this time as symptoms are well controlled  - Continue Sucralfate 1 tablet 4x/day, Bentyl 20 mg Q6H PRN, and Protonix 40 mg BID    - Continue following with GI; appreciate their recommendations  Health Maintenance:     - Patient refused flu shot today  - Patient received 3rd COVID shot  Diagnoses and all orders for this visit:    Essential hypertension    Mild episode of recurrent major depressive disorder (HCC)    Meningioma (HCC)    Hypothyroidism, unspecified type          Subjective:      Patient ID: Kota Reyna is a 56 y o  female  Kota Reyna is a pleasant 53-year-old female with PMH of chronic pain syndrome, hypothyroidism, meningioma s/p SRT and hypertension who presents to the clinic today for a follow up on her chronic conditions  She has no concerns other than what is noted in ROS   [id number] used for this encounter  The following portions of the patient's history were reviewed and updated as appropriate: allergies, current medications, past family history, past medical history, past social history, past surgical history and problem list     Review of Systems   Constitutional: Negative for activity change, appetite change, chills and fever  HENT: Negative for sore throat  Respiratory: Negative for cough and shortness of breath  Cardiovascular: Negative for chest pain, palpitations and leg swelling     Gastrointestinal: Negative for abdominal pain, constipation, diarrhea, nausea and vomiting  Musculoskeletal: Negative for back pain and gait problem  Neurological: Negative for dizziness, seizures, weakness and headaches  Objective:      /80 (BP Location: Right arm, Patient Position: Sitting, Cuff Size: Standard)   Pulse 71   Temp 98 7 °F (37 1 °C)   Resp 16   Ht 5' 7" (1 702 m)   Wt 81 6 kg (180 lb)   LMP 09/22/2022 (Approximate)   SpO2 99%   Breastfeeding No   BMI 28 19 kg/m²        Physical Exam  Vitals reviewed  Constitutional:       General: She is not in acute distress  Appearance: She is well-developed  She is not diaphoretic  HENT:      Head: Normocephalic and atraumatic  Eyes:      Conjunctiva/sclera: Conjunctivae normal    Cardiovascular:      Rate and Rhythm: Normal rate and regular rhythm  Heart sounds: Normal heart sounds  No murmur heard  No friction rub  No gallop  Pulmonary:      Effort: Pulmonary effort is normal  No respiratory distress  Breath sounds: Normal breath sounds  No wheezing or rales  Abdominal:      General: Bowel sounds are normal  There is no distension  Palpations: Abdomen is soft  There is no mass  Tenderness: There is no abdominal tenderness  There is no guarding  Musculoskeletal:         General: No tenderness or deformity  Normal range of motion  Cervical back: Normal range of motion  Right lower leg: No edema  Left lower leg: No edema  Skin:     General: Skin is warm and dry  Neurological:      General: No focal deficit present  Mental Status: She is alert and oriented to person, place, and time             Joey Barragan MD  9/26/2022

## 2022-09-26 NOTE — ASSESSMENT & PLAN NOTE
BP Today: 118/80  At goal per JNC-8 guidelines  Patient denies headache, blurry vision, chest pain, SOB, dizziness, lower limb edema  CMP from 5/2022 within normal limits  - Continue Lisinopril 40 mg daily   - Urine M/C ratio ordered prior but not completed; patient advised to complete this  Kaiden Del Rio

## 2022-09-26 NOTE — ASSESSMENT & PLAN NOTE
PHQ-9 Today: 2  Patient was tearful at previous visit regarding her meningioma diagnosis; since then, has undergone treatment and is following regularly with Neurosurgery and is doing much better today  No suicidal or homicidal ideations  Patient has again stated she is not interested in therapy or medication      - Will continue to monitor

## 2022-09-26 NOTE — ASSESSMENT & PLAN NOTE
Patient presented to ED in late January with dizziness and fatigue  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  The right superior cerebellar artery and posterior communicating artery branches are intimately associated along the dorsal and medial borders of the lesion  MRI Brain (1/2022): mass measuring 1 8 x 0 6 x 1 8 cm (compared to 1 1 x 1 1 x 0 5 cm on the prior study)  No adjacent parenchymal edema identified  Minimal mass effect and flattening the right ventral rex  CT Head 5/2022: Similar 1 6 x 0 9 cm right petroclinoid mass compatible with known meningioma  Mild, similar mass effect on the right anterior rex  Patient completed frameless stereotactic radiotherapy for meningioma in 4/2022  Also completed Decadron taper  Patient scheduled for an MRI brain on 10/17/2022 at 8 AM; reminded of this appointment  - Continue following with Neurosurgery and continue to monitor

## 2022-10-14 ENCOUNTER — APPOINTMENT (OUTPATIENT)
Dept: LAB | Facility: HOSPITAL | Age: 57
End: 2022-10-14
Payer: COMMERCIAL

## 2022-10-14 ENCOUNTER — OFFICE VISIT (OUTPATIENT)
Dept: GASTROENTEROLOGY | Facility: MEDICAL CENTER | Age: 57
End: 2022-10-14
Payer: COMMERCIAL

## 2022-10-14 VITALS
BODY MASS INDEX: 28.29 KG/M2 | TEMPERATURE: 97.1 F | WEIGHT: 180.6 LBS | DIASTOLIC BLOOD PRESSURE: 79 MMHG | HEART RATE: 64 BPM | SYSTOLIC BLOOD PRESSURE: 129 MMHG

## 2022-10-14 DIAGNOSIS — K21.9 GASTROESOPHAGEAL REFLUX DISEASE WITHOUT ESOPHAGITIS: ICD-10-CM

## 2022-10-14 DIAGNOSIS — R10.10 PAIN OF UPPER ABDOMEN: Primary | ICD-10-CM

## 2022-10-14 DIAGNOSIS — D32.9 MENINGIOMA (HCC): ICD-10-CM

## 2022-10-14 DIAGNOSIS — K22.70 BARRETT'S ESOPHAGUS WITHOUT DYSPLASIA: ICD-10-CM

## 2022-10-14 LAB
BUN SERPL-MCNC: 17 MG/DL (ref 5–25)
CREAT SERPL-MCNC: 0.72 MG/DL (ref 0.6–1.2)
CREAT UR-MCNC: 59.7 MG/DL
GFR SERPL CREATININE-BSD FRML MDRD: 94 ML/MIN/1.73SQ M
MICROALBUMIN UR-MCNC: <5 MG/L (ref 0–20)
MICROALBUMIN/CREAT 24H UR: <8 MG/G CREATININE (ref 0–30)

## 2022-10-14 PROCEDURE — 84520 ASSAY OF UREA NITROGEN: CPT

## 2022-10-14 PROCEDURE — 99214 OFFICE O/P EST MOD 30 MIN: CPT | Performed by: PHYSICIAN ASSISTANT

## 2022-10-14 PROCEDURE — 82570 ASSAY OF URINE CREATININE: CPT | Performed by: STUDENT IN AN ORGANIZED HEALTH CARE EDUCATION/TRAINING PROGRAM

## 2022-10-14 PROCEDURE — 82565 ASSAY OF CREATININE: CPT

## 2022-10-14 PROCEDURE — 36415 COLL VENOUS BLD VENIPUNCTURE: CPT

## 2022-10-14 PROCEDURE — 82043 UR ALBUMIN QUANTITATIVE: CPT | Performed by: STUDENT IN AN ORGANIZED HEALTH CARE EDUCATION/TRAINING PROGRAM

## 2022-10-14 RX ORDER — AMITRIPTYLINE HYDROCHLORIDE 10 MG/1
TABLET, FILM COATED ORAL
Qty: 60 TABLET | Refills: 3 | Status: SHIPPED | OUTPATIENT
Start: 2022-10-14

## 2022-10-14 NOTE — PROGRESS NOTES
Assessment/Plan:     Diagnoses and all orders for this visit:    Pain of upper abdomen  She continues with upper abdominal pain  Has tried Bentyl without improvement  Underwent endoscopy, colonoscopy CT scan and HIDA scan without source  Also had pH testing which showed reflux but poor symptom correlation  She denies any difficulty with her bowels  This is likely functional in nature and would recommend a trial of amitriptyline  She did undergo EKG in May which was within normal limits  -     amitriptyline (ELAVIL) 10 mg tablet; 1 tab PO q HS x 1 week then increase to 2 tabs PO q HS    Lawson's esophagus without dysplasia  Gastroesophageal reflux disease without esophagitis  She has a long history of GERD  Her last endoscopy showed Lawson's as well as a 3 cm hiatal hernia  Previously discussed surgical options however she deferred  Would recommend continuing with pantoprazole and Carafate as they have been helping  Will see her back in 3 months or sooner if necessary  Subjective:      Patient ID: Karlie Leger is a 56 y o  female  HPI     This is a follow-up for GERD and epigastric abdominal pain  She has a history of GERD for more than 5 years  She is currently on Protonix 40mg BID & Carafate q i d  with good control of her heartburn symptoms  She does notice if she eats certain foods she will have breakthrough symptoms  She does continue to have upper abdominal pain  Previous workup included CT scan in December which was within normal limits, HIDA scan in June also within normal limits  EGD in April which showed Barretts, 3 cm hiatal hernia and resolving esophagitis  She then had pH manometry testing which showed significant reflux but poor symptom correlation  She was taking Bentyl but feels it does not help with her pain  She denies any difficulty with her bowels    Her last colonoscopy was in October of 2020 which showed moderate diverticula causing luminal narrowing, large hemorrhoids  Patient Active Problem List   Diagnosis   • Essential hypertension   • Hypothyroidism   • Hemorrhoids   • Chronic bilateral low back pain with bilateral sciatica   • Slow transit constipation   • Vertigo   • Migraine without aura and without status migrainosus, not intractable   • Gastroesophageal reflux disease without esophagitis   • Meningioma (HCC)   • Visual field defects   • Mild episode of recurrent major depressive disorder (HCC)   • Lawson's esophagus without dysplasia     No Known Allergies  Current Outpatient Medications on File Prior to Visit   Medication Sig   • dicyclomine (BENTYL) 20 mg tablet Take 1 tablet (20 mg total) by mouth every 6 (six) hours   • fluticasone (FLONASE) 50 mcg/act nasal spray 2 sprays into each nostril daily   • levothyroxine 112 mcg tablet Take 1 tablet (112 mcg total) by mouth daily   • lisinopril (ZESTRIL) 40 mg tablet Take 1 tablet (40 mg total) by mouth daily   • magnesium Oxide (MAG-OX) 400 mg TABS take 1 tablet by mouth once daily every morning   • meclizine (ANTIVERT) 50 MG tablet Take 1 tablet (50 mg total) by mouth every 12 (twelve) hours as needed for dizziness   • meloxicam (MOBIC) 7 5 mg tablet Take 1 tablet (7 5 mg total) by mouth daily   • methylPREDNISolone 4 MG tablet therapy pack Use as directed on package   • pantoprazole (PROTONIX) 40 mg tablet Take 1 tablet (40 mg total) by mouth 2 (two) times a day   • sucralfate (CARAFATE) 1 g tablet Take 1 tablet (1 g total) by mouth 4 (four) times a day   • acetaminophen (TYLENOL) 325 mg tablet Take 2 tablets (650 mg total) by mouth every 6 (six) hours as needed for mild pain, headaches or fever (Patient not taking: No sig reported)   • topiramate (Topamax) 25 mg tablet Take 1 tablet (25 mg total) by mouth daily for 7 days, THEN 2 tablets (50 mg total) daily for 7 days, THEN 4 tablets (100 mg total) daily  No current facility-administered medications on file prior to visit       Family History Problem Relation Age of Onset   • Endometrial cancer Family    • Ovarian cancer Mother 79   • Heart defect Mother    • Heart attack Father    • Stroke Brother    • Ovarian cancer Sister 52   • No Known Problems Maternal Grandmother    • Breast cancer Paternal Grandmother 61   • No Known Problems Sister    • No Known Problems Sister    • No Known Problems Sister    • No Known Problems Sister    • No Known Problems Daughter    • No Known Problems Maternal Grandfather    • No Known Problems Paternal Grandfather    • No Known Problems Daughter    • No Known Problems Son      Past Medical History:   Diagnosis Date   • Chronic pain    • Disease of thyroid gland     hypothyroid   • Diverticulitis 12/1/2021   • GERD (gastroesophageal reflux disease)    • Heel pain, chronic, right 3/1/2021   • Hypertension    • Hyperthyroiditis     HYPERTHYROID; POST IODINE THERAPY   • Intractable vomiting 12/1/2021   • Meningioma (HCC)    • Migraine without aura and without status migrainosus, not intractable 7/9/2021   • Pain of upper abdomen 6/5/2020   • Sciatica of right side 9/3/2020   • Weakness of right upper extremity 1/30/2022     Social History     Socioeconomic History   • Marital status: Single     Spouse name: None   • Number of children: None   • Years of education: None   • Highest education level: None   Occupational History   • None   Tobacco Use   • Smoking status: Never Smoker   • Smokeless tobacco: Never Used   Vaping Use   • Vaping Use: Never used   Substance and Sexual Activity   • Alcohol use: Yes     Comment: occasional    • Drug use: No   • Sexual activity: None   Other Topics Concern   • None   Social History Narrative    AS OF 9/22/15 IN [facility]:        CONSUMES CAFFEINE    CAFFEINE: COFFEE     Social Determinants of Health     Financial Resource Strain: Low Risk    • Difficulty of Paying Living Expenses: Not hard at all   Food Insecurity: No Food Insecurity   • Worried About Running Out of Food in the Last Year: Never true   • Ran Out of Food in the Last Year: Never true   Transportation Needs: No Transportation Needs   • Lack of Transportation (Medical): No   • Lack of Transportation (Non-Medical): No   Physical Activity: Not on file   Stress: Not on file   Social Connections: Not on file   Intimate Partner Violence: Not on file   Housing Stability: Not on file     Past Surgical History:   Procedure Laterality Date   • APPENDECTOMY     •  SECTION     • HERNIA REPAIR           Review of Systems   All other systems reviewed and are negative  Objective:      /79   Pulse 64   Temp (!) 97 1 °F (36 2 °C) (Tympanic)   Wt 81 9 kg (180 lb 9 6 oz)   LMP 2022 (Approximate)   BMI 28 29 kg/m²          Physical Exam  Constitutional:       Appearance: Normal appearance  She is well-developed  HENT:      Head: Normocephalic and atraumatic  Eyes:      Conjunctiva/sclera: Conjunctivae normal    Cardiovascular:      Rate and Rhythm: Normal rate and regular rhythm  Pulmonary:      Effort: Pulmonary effort is normal       Breath sounds: Normal breath sounds  Abdominal:      General: Bowel sounds are normal  There is no distension  Palpations: Abdomen is soft  Tenderness: There is no abdominal tenderness  Musculoskeletal:         General: Normal range of motion  Cervical back: Normal range of motion  Skin:     General: Skin is warm and dry  Neurological:      Mental Status: She is alert and oriented to person, place, and time     Psychiatric:         Mood and Affect: Mood normal          Behavior: Behavior normal

## 2022-10-17 ENCOUNTER — HOSPITAL ENCOUNTER (OUTPATIENT)
Dept: MRI IMAGING | Facility: HOSPITAL | Age: 57
Discharge: HOME | End: 2022-10-17
Payer: COMMERCIAL

## 2022-10-17 DIAGNOSIS — D32.9 MENINGIOMA (HCC): ICD-10-CM

## 2022-10-17 PROCEDURE — A9585 GADOBUTROL INJECTION: HCPCS | Performed by: RADIOLOGY

## 2022-10-17 PROCEDURE — G1004 CDSM NDSC: HCPCS

## 2022-10-17 PROCEDURE — 70553 MRI BRAIN STEM W/O & W/DYE: CPT

## 2022-10-17 RX ADMIN — GADOBUTROL 8 ML: 604.72 INJECTION INTRAVENOUS at 08:03

## 2022-10-18 ENCOUNTER — EVALUATION (OUTPATIENT)
Dept: PHYSICAL THERAPY | Facility: CLINIC | Age: 57
End: 2022-10-18
Payer: COMMERCIAL

## 2022-10-18 DIAGNOSIS — H81.11 BPPV (BENIGN PAROXYSMAL POSITIONAL VERTIGO), RIGHT: ICD-10-CM

## 2022-10-18 PROCEDURE — 97140 MANUAL THERAPY 1/> REGIONS: CPT | Performed by: PHYSICAL THERAPIST

## 2022-10-18 PROCEDURE — 97161 PT EVAL LOW COMPLEX 20 MIN: CPT | Performed by: PHYSICAL THERAPIST

## 2022-10-18 NOTE — PROGRESS NOTES
PT Evaluation     Today's date: 10/18/2022  Patient name: Vince James  : 1965  MRN: [id number]  Referring provider: Tom Landaverde MD  Dx:   Encounter Diagnosis     ICD-10-CM    1  BPPV (benign paroxysmal positional vertigo), right  H81 11 Ambulatory Referral to Physical Therapy                  Assessment  Assessment details: Pt is a 56y o  year old female presenting to physical therapy for BPPV (benign paroxysmal positional vertigo), right  She presents with the following impairments: dizziness with cervical extension and looking up, hypometric w vertical saccades, and (+) [place] Hallpike on the R affecting her function with getting in/out bed, bending forward, looking up, quick head movements, and working  Pt will benefit from skilled physical therapy to address functional limitations noted in evaluation and meet patient goals  Impairments: abnormal or restricted ROM, abnormal movement, activity intolerance, lacks appropriate home exercise program, pain with function, poor posture  and poor body mechanics    Symptom irritability: moderate  Goals  ST  Pt will have no dizziness with getting in/out of bed  2  Pt will have no symptoms and full AROM with cervical extension for improved ability with looking up  3  Pt will be I w HEP      Plan  Patient would benefit from: PT eval and skilled physical therapy  Planned modality interventions: biofeedback, cryotherapy, electrical stimulation/Russian stimulation, TENS, thermotherapy: hydrocollator packs and unattended electrical stimulation  Planned therapy interventions: abdominal trunk stabilization, joint mobilization, manual therapy, behavior modification, [place] taping, neuromuscular re-education, canalith repositioning, patient education, postural training, strengthening, stretching, therapeutic activities, therapeutic exercise, flexibility, functional ROM exercises and home exercise program  Frequency: 2x week  Duration in weeks: 4  Treatment plan discussed with: patient        Subjective Evaluation    History of Present Illness  Mechanism of injury: Pt reports dizziness on and off for about one year  She notices it is worse when under a lot of stress  She notices worsening symptoms when getting into and out of bed, as well as moving her head quickly  Symptoms occur daily and she reports room-spinning dizziness with getting out of bed  She reports more pressure on her L side and L ear  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  Recurrent probem    Pain  Current pain ratin          Objective     Concurrent Complaints  Positive for headaches  Active Range of Motion   Cervical/Thoracic Spine       Cervical    Flexion:  WFL  Extension:  Restriction level: moderate  Left lateral flexion:  WFL  Right lateral flexion:  WFL  Left rotation:  WF  Right rotation:  [facility]  Neuro Exam:     Dizziness  Positive for vertigo  Negative for diplopia  Exacerbating factors  Positive for bending over, rolling in bed, looking up and supine to/from sitting  Negative for walking and turning head  Symptoms   Average intensity: 6/10    Headaches   Patient reports headaches: Yes       Oculomotor exam   Oculomotor ROM: WNL  Resting nystagmus: not present   Gaze holding nystagmus: not present left  and not present right  Smooth pursuits: within normal limits  Vertical saccades: hypometric  Horizontal saccades: normal  Convergence: normal    Positional testing   [place]-Hallpike   Left posterior canal: symptomatic  Right posterior canal: torsional and upbeating             Precautions: + (R) Kavya Hinojosa    Date 10/18            Visit # 1            FOTO IE             Re-eval IE              Manuals 10/18            Epley's  2x R            Semont NV                                      Neuro Re-Ed             Vertical Saccades             Walking with head nods             VORx1              Tandem walking on foam Ther Ex                                                                                                                     Ther Activity                                       Gait Training                                       Modalities

## 2022-10-20 ENCOUNTER — TELEPHONE (OUTPATIENT)
Dept: FAMILY MEDICINE CLINIC | Facility: CLINIC | Age: 57
End: 2022-10-20

## 2022-10-20 ENCOUNTER — OFFICE VISIT (OUTPATIENT)
Dept: PHYSICAL THERAPY | Facility: CLINIC | Age: 57
End: 2022-10-20
Payer: COMMERCIAL

## 2022-10-20 ENCOUNTER — OFFICE VISIT (OUTPATIENT)
Dept: FAMILY MEDICINE CLINIC | Facility: CLINIC | Age: 57
End: 2022-10-20

## 2022-10-20 VITALS
HEIGHT: 67 IN | BODY MASS INDEX: 28.67 KG/M2 | SYSTOLIC BLOOD PRESSURE: 124 MMHG | DIASTOLIC BLOOD PRESSURE: 72 MMHG | RESPIRATION RATE: 16 BRPM | OXYGEN SATURATION: 97 % | WEIGHT: 182.7 LBS | TEMPERATURE: 97.8 F | HEART RATE: 81 BPM

## 2022-10-20 DIAGNOSIS — K22.70 BARRETT'S ESOPHAGUS WITHOUT DYSPLASIA: ICD-10-CM

## 2022-10-20 DIAGNOSIS — E03.8 OTHER SPECIFIED HYPOTHYROIDISM: ICD-10-CM

## 2022-10-20 DIAGNOSIS — K21.00 GASTROESOPHAGEAL REFLUX DISEASE WITH ESOPHAGITIS WITHOUT HEMORRHAGE: ICD-10-CM

## 2022-10-20 DIAGNOSIS — R42 VERTIGO: Primary | ICD-10-CM

## 2022-10-20 DIAGNOSIS — K21.9 GASTROESOPHAGEAL REFLUX DISEASE WITHOUT ESOPHAGITIS: ICD-10-CM

## 2022-10-20 DIAGNOSIS — Z13.820 SCREENING FOR OSTEOPOROSIS: ICD-10-CM

## 2022-10-20 DIAGNOSIS — H81.11 BPPV (BENIGN PAROXYSMAL POSITIONAL VERTIGO), RIGHT: Primary | ICD-10-CM

## 2022-10-20 PROCEDURE — 97140 MANUAL THERAPY 1/> REGIONS: CPT | Performed by: PHYSICAL THERAPIST

## 2022-10-20 PROCEDURE — 3074F SYST BP LT 130 MM HG: CPT | Performed by: FAMILY MEDICINE

## 2022-10-20 PROCEDURE — 3078F DIAST BP <80 MM HG: CPT | Performed by: FAMILY MEDICINE

## 2022-10-20 PROCEDURE — 97112 NEUROMUSCULAR REEDUCATION: CPT | Performed by: PHYSICAL THERAPIST

## 2022-10-20 PROCEDURE — 99214 OFFICE O/P EST MOD 30 MIN: CPT | Performed by: FAMILY MEDICINE

## 2022-10-20 RX ORDER — MECLIZINE HYDROCHLORIDE 50 MG/1
50 TABLET ORAL EVERY 12 HOURS PRN
Qty: 20 TABLET | Refills: 0 | Status: SHIPPED | OUTPATIENT
Start: 2022-10-20

## 2022-10-20 RX ORDER — PANTOPRAZOLE SODIUM 40 MG/1
40 TABLET, DELAYED RELEASE ORAL 2 TIMES DAILY
Qty: 60 TABLET | Refills: 5 | Status: SHIPPED | OUTPATIENT
Start: 2022-10-20

## 2022-10-20 NOTE — PROGRESS NOTES
Name: Kevon Alcantar      : 1965      MRN: [id number]  Encounter Provider: Garlan Babinski, DO  Encounter Date: 10/20/2022   Encounter department: [address] [place] [address]     1  Vertigo  Assessment & Plan:  Saw ENT, now going to PT  Requesting refill on meclizine - med filled    Orders:  -     meclizine (ANTIVERT) 50 MG tablet; Take 1 tablet (50 mg total) by mouth every 12 (twelve) hours as needed for dizziness    2  Gastroesophageal reflux disease with esophagitis without hemorrhage  -     pantoprazole (PROTONIX) 40 mg tablet; Take 1 tablet (40 mg total) by mouth 2 (two) times a day    3  Screening for osteoporosis  -     DXA bone density spine hip and pelvis; Future; Expected date: 10/20/2022    4  Other specified hypothyroidism  Assessment & Plan:  TSH 1 090, free T4 0 67 in 2021  Continue levothyroxine 112 mcg daily  Repeat TFTs before next office visit in 3 months    Orders:  -     TSH, 3rd generation with Free T4 reflex; Future  -     Lipid panel; Future  -     Comprehensive metabolic panel; Future    5  Lawson's esophagus without dysplasia  Assessment & Plan:  GI discussed surgical options but patient deferred  She is to continue with pantoprazole and Carafate for symptom relief          6  Gastroesophageal reflux disease without esophagitis  Assessment & Plan:  A/w pain in upper abdomen - tried Bentyl without improvement  Endoscopy, colonoscopy CT scan and HIDA scan showed no source  pH testing which showed reflux but poor symptom correlation  Per GI, this is likely functional in nature and would recommend a trial of amitriptyline which she has been taking with minor relief    -Strongly encouraged patient to contact GI if symptoms progress           Subjective      Had MRI brain three days ago  Has appointment with neurosurgeon on 10/26/22  Saw ENT in 2022  Prescribed her flonase   Found to have bilateral sensorineural hearing loss    Continues to have abdominal pain  No changes in bowel  Already saw GI  Taking meds recommended by them with mild relief  Review of Systems   Constitutional: Negative for chills, fatigue, fever and unexpected weight change  HENT: Negative for congestion, ear pain, rhinorrhea and sore throat  Eyes: Negative for pain and visual disturbance  Respiratory: Negative for cough, chest tightness and shortness of breath  Cardiovascular: Negative for chest pain, palpitations and leg swelling  Gastrointestinal: Positive for abdominal pain  Negative for constipation, diarrhea, nausea and vomiting  Genitourinary: Negative for difficulty urinating, dysuria and urgency  Musculoskeletal: Negative for arthralgias and back pain  Skin: Negative for rash  Neurological: Positive for dizziness  Negative for numbness and headaches  Psychiatric/Behavioral: Negative for behavioral problems and suicidal ideas  The patient is not nervous/anxious          Current Outpatient Medications on File Prior to Visit   Medication Sig   • acetaminophen (TYLENOL) 325 mg tablet Take 2 tablets (650 mg total) by mouth every 6 (six) hours as needed for mild pain, headaches or fever   • amitriptyline (ELAVIL) 10 mg tablet 1 tab PO q HS x 1 week then increase to 2 tabs PO q HS   • dicyclomine (BENTYL) 20 mg tablet Take 1 tablet (20 mg total) by mouth every 6 (six) hours   • fluticasone (FLONASE) 50 mcg/act nasal spray 2 sprays into each nostril daily   • levothyroxine 112 mcg tablet Take 1 tablet (112 mcg total) by mouth daily   • lisinopril (ZESTRIL) 40 mg tablet Take 1 tablet (40 mg total) by mouth daily   • meloxicam (MOBIC) 7 5 mg tablet Take 1 tablet (7 5 mg total) by mouth daily   • sucralfate (CARAFATE) 1 g tablet Take 1 tablet (1 g total) by mouth 4 (four) times a day   • topiramate (Topamax) 25 mg tablet Take 1 tablet (25 mg total) by mouth daily for 7 days, THEN 2 tablets (50 mg total) daily for 7 days, THEN 4 tablets (100 mg total) daily  • [DISCONTINUED] meclizine (ANTIVERT) 50 MG tablet Take 1 tablet (50 mg total) by mouth every 12 (twelve) hours as needed for dizziness   • [DISCONTINUED] pantoprazole (PROTONIX) 40 mg tablet Take 1 tablet (40 mg total) by mouth 2 (two) times a day   • magnesium Oxide (MAG-OX) 400 mg TABS take 1 tablet by mouth once daily every morning   • [DISCONTINUED] methylPREDNISolone 4 MG tablet therapy pack Use as directed on package       Objective     /72 (BP Location: Left arm, Patient Position: Sitting, Cuff Size: Adult)   Pulse 81   Temp 97 8 °F (36 6 °C) (Temporal)   Resp 16   Ht 5' 7" (1 702 m)   Wt 82 9 kg (182 lb 11 2 oz)   LMP 09/22/2022 (Approximate)   SpO2 97%   BMI 28 61 kg/m²     Physical Exam  Constitutional:       Appearance: She is well-developed  HENT:      Head: Normocephalic and atraumatic  Right Ear: External ear normal       Left Ear: External ear normal       Nose: Nose normal    Eyes:      Conjunctiva/sclera: Conjunctivae normal       Pupils: Pupils are equal, round, and reactive to light  Cardiovascular:      Rate and Rhythm: Normal rate and regular rhythm  Heart sounds: Normal heart sounds  Pulmonary:      Effort: Pulmonary effort is normal       Breath sounds: Normal breath sounds  Abdominal:      General: Bowel sounds are normal       Palpations: Abdomen is soft  Musculoskeletal:         General: Normal range of motion  Cervical back: Normal range of motion and neck supple  Skin:     General: Skin is warm  Neurological:      Mental Status: She is alert and oriented to person, place, and time     Psychiatric:         Behavior: Behavior normal        Rehab DO Renea

## 2022-10-20 NOTE — PROGRESS NOTES
Daily Note     Today's date: 10/20/2022  Patient name: Shady Garcia  : 1965  MRN: [id number]  Referring provider: Aden Torres MD  Dx:   Encounter Diagnosis     ICD-10-CM    1  BPPV (benign paroxysmal positional vertigo), right  H81 11                   Subjective: Pt reports increased dizziness after last session, but feels good today  Objective: See treatment diary below      Assessment: Pt has difficulty with keeping eyes open during phuc hallpike and roll test making it challenging to determine pathology  She has nausea and room spinning vertigo following the first Epleys, but has minimal sx with second repetition  She has minimal sx w trial of Semonts  She has some dizziness w trial of VORx1 and vertical saccades  Patient demonstrated fatigue post treatment and would benefit from continued PT  Plan: Continue per plan of care  Progress treatment as tolerated         Precautions: + (R) [employer]    Date 10/18 10/20           Visit # 1 2           FOTO IE             Re-eval IE              Manuals 10/18 10/20           Epley's  2x R 2x R           Semont NV 1x R                                     Neuro Re-Ed             Vertical Saccades  2x30"           Walking with head nods             VORx1   2x30" vert           Tandem walking on foam                                                    Ther Ex                                                                                                                     Ther Activity                                       Gait Training                                       Modalities

## 2022-10-23 NOTE — ASSESSMENT & PLAN NOTE
A/w pain in upper abdomen - tried Bentyl without improvement  Endoscopy, colonoscopy CT scan and HIDA scan showed no source  pH testing which showed reflux but poor symptom correlation      Per GI, this is likely functional in nature and would recommend a trial of amitriptyline which she has been taking with minor relief    -Strongly encouraged patient to contact GI if symptoms progress

## 2022-10-23 NOTE — ASSESSMENT & PLAN NOTE
GI discussed surgical options but patient deferred  She is to continue with pantoprazole and Carafate for symptom relief

## 2022-10-23 NOTE — ASSESSMENT & PLAN NOTE
TSH 1 090, free T4 0 67 in November 2021  Continue levothyroxine 112 mcg daily  Repeat TFTs before next office visit in 3 months

## 2022-10-25 ENCOUNTER — OFFICE VISIT (OUTPATIENT)
Dept: PHYSICAL THERAPY | Facility: CLINIC | Age: 57
End: 2022-10-25
Payer: COMMERCIAL

## 2022-10-25 DIAGNOSIS — H81.11 BPPV (BENIGN PAROXYSMAL POSITIONAL VERTIGO), RIGHT: Primary | ICD-10-CM

## 2022-10-25 PROCEDURE — 97112 NEUROMUSCULAR REEDUCATION: CPT | Performed by: PHYSICAL THERAPIST

## 2022-10-25 PROCEDURE — 97140 MANUAL THERAPY 1/> REGIONS: CPT | Performed by: PHYSICAL THERAPIST

## 2022-10-25 NOTE — PROGRESS NOTES
Daily Note     Today's date: 10/25/2022  Patient name: Bindu Bhatti  : 1965  MRN: [id number]  Referring provider: Bernardo Estrada MD  Dx:   Encounter Diagnosis     ICD-10-CM    1  BPPV (benign paroxysmal positional vertigo), right  H81 11                   Subjective: Pt reports improvement in sx but did have a small episode of vertigo this morning  Objective: (+) L phuc hallpike      Assessment: Pt presents w (+) L phuc hallpike today and completes 2x Epley and Semont of L ear  She has some dizziness present after completing repositioning techniques, but subside within 60secs  She has some dizziness w trial of walking with head nods, but is improved on last lap  Patient demonstrated fatigue post treatment and would benefit from continued PT  Plan: Continue per plan of care  Progress treatment as tolerated         Precautions: + (R) Sajan Rao    Date 10/18 10/20 10/25          Visit # 1 2 3          FOTO IE             Re-eval IE              Manuals 10/18 10/20 10/25          Epley's  2x R 2x R 2x L          Semont NV 1x R 1x L                                    Neuro Re-Ed   10/25          Vertical Saccades  2x30" 2x30"          Walking with head nods   3laps          VORx1   2x30" vert 2x30" ea          Tandem walking on foam                                                    Ther Ex                                                                                                                     Ther Activity                                       Gait Training                                       Modalities

## 2022-11-02 ENCOUNTER — CLINICAL SUPPORT (OUTPATIENT)
Dept: RADIATION ONCOLOGY | Facility: HOSPITAL | Age: 57
End: 2022-11-02
Attending: RADIOLOGY

## 2022-11-02 ENCOUNTER — RADIATION ONCOLOGY FOLLOW-UP (OUTPATIENT)
Dept: RADIATION ONCOLOGY | Facility: HOSPITAL | Age: 57
End: 2022-11-02
Attending: RADIOLOGY

## 2022-11-02 VITALS
BODY MASS INDEX: 28.41 KG/M2 | SYSTOLIC BLOOD PRESSURE: 130 MMHG | DIASTOLIC BLOOD PRESSURE: 90 MMHG | OXYGEN SATURATION: 99 % | RESPIRATION RATE: 18 BRPM | HEART RATE: 77 BPM | TEMPERATURE: 96.7 F | WEIGHT: 181.4 LBS

## 2022-11-02 DIAGNOSIS — D32.9 MENINGIOMA (HCC): Primary | ICD-10-CM

## 2022-11-02 RX ORDER — MECLIZINE HYDROCHLORIDE 25 MG/1
TABLET ORAL
COMMUNITY
Start: 2022-10-24

## 2022-11-02 NOTE — PROGRESS NOTES
Follow-up - Radiation Oncology   Rama Almanzar 1965 56 y o  female [id number]      History of Present Illness   Cancer Staging  No matching staging information was found for the patient  Interval History:  Rama Almanzar 1965 is a 56 y o  female with right petroclinoid meningioma  She completed a course of SRT to the meningioma in the pre pontine region on 4/1/2022  Last follow-up on 6/15/22  She returns today s/p 4 month repeat MRI    6/16/22 ENT, Dr Madie Murry   Referred to PT for episodes of vertigo lasting 30 seconds, precipitated by change in position of head  Audiology evaluation:   Bilateral sensorineural hearing loss with pure tone averages at 23 and 27 decibels, relatively symmetric, very minimal noise induced hearing loss component  Type a tympanograms bilaterally, excellent word discrimination scores  10/17/22 MRI brain w wo contrast   1  Compared to MRI 1/30/2022, no significant change in size of right petroclival meningioma measuring 1 6 x 0 5 x 1 7 cm    2  Stable nonspecific white matter change most compatible with microangiopathy  Historical Information   Oncology History   Meningioma ([address] )   1/29/2022 Initial Diagnosis    Meningioma ([address] )     3/23/2022 - 4/1/2022 Radiation    Plan ID Energy Fractions Dose per Fraction (cGy) Dose Correction (cGy) Total Dose Delivered (cGy) Elapsed Days   SRTPrepontcis 6X 5 / 5 500 0 2,500 9      Treatment Dates:  3/23/2022 - 4/1/2022     Dr Konstantin Vazquez         Past Medical History:   Diagnosis Date   • Chronic pain    • Disease of thyroid gland     hypothyroid   • Diverticulitis 12/1/2021   • GERD (gastroesophageal reflux disease)    • Heel pain, chronic, right 3/1/2021   • Hypertension    • Hyperthyroiditis     HYPERTHYROID; POST IODINE THERAPY   • Intractable vomiting 12/1/2021   • Meningioma ([address] )    • Migraine without aura and without status migrainosus, not intractable 7/9/2021   • Pain of upper abdomen 6/5/2020   • Sciatica of right side 9/3/2020   • Weakness of right upper extremity 2022     Past Surgical History:   Procedure Laterality Date   • APPENDECTOMY     •  SECTION     • HERNIA REPAIR         Social History   Social History     Substance and Sexual Activity   Alcohol Use Yes    Comment: occasional      Social History     Substance and Sexual Activity   Drug Use No     Social History     Tobacco Use   Smoking Status Never Smoker   Smokeless Tobacco Never Used         Meds/Allergies     Current Outpatient Medications:   •  amitriptyline (ELAVIL) 10 mg tablet, 1 tab PO q HS x 1 week then increase to 2 tabs PO q HS, Disp: 60 tablet, Rfl: 3  •  dicyclomine (BENTYL) 20 mg tablet, Take 1 tablet (20 mg total) by mouth every 6 (six) hours, Disp: 120 tablet, Rfl: 5  •  fluticasone (FLONASE) 50 mcg/act nasal spray, 2 sprays into each nostril daily, Disp: 16 g, Rfl: 2  •  levothyroxine 112 mcg tablet, Take 1 tablet (112 mcg total) by mouth daily, Disp: 30 tablet, Rfl: 2  •  lisinopril (ZESTRIL) 40 mg tablet, Take 1 tablet (40 mg total) by mouth daily, Disp: 30 tablet, Rfl: 2  •  magnesium Oxide (MAG-OX) 400 mg TABS, take 1 tablet by mouth once daily every morning, Disp: 90 tablet, Rfl: 0  •  meclizine (ANTIVERT) 50 MG tablet, Take 1 tablet (50 mg total) by mouth every 12 (twelve) hours as needed for dizziness, Disp: 20 tablet, Rfl: 0  •  pantoprazole (PROTONIX) 40 mg tablet, Take 1 tablet (40 mg total) by mouth 2 (two) times a day, Disp: 60 tablet, Rfl: 5  •  sucralfate (CARAFATE) 1 g tablet, Take 1 tablet (1 g total) by mouth 4 (four) times a day, Disp: 120 tablet, Rfl: 5  •  topiramate (Topamax) 25 mg tablet, Take 1 tablet (25 mg total) by mouth daily for 7 days, THEN 2 tablets (50 mg total) daily for 7 days, THEN 4 tablets (100 mg total) daily  , Disp: 141 tablet, Rfl: 0  •  acetaminophen (TYLENOL) 325 mg tablet, Take 2 tablets (650 mg total) by mouth every 6 (six) hours as needed for mild pain, headaches or fever (Patient not taking: Reported on 11/2/2022), Disp: 30 tablet, Rfl: 0  •  meclizine (ANTIVERT) 25 mg tablet, take 2 tablet by mouth every 12 hours if needed for dizziness (Patient not taking: Reported on 11/2/2022), Disp: , Rfl:   •  meloxicam (MOBIC) 7 5 mg tablet, Take 1 tablet (7 5 mg total) by mouth daily (Patient not taking: Reported on 11/2/2022), Disp: 30 tablet, Rfl: 0  No Known Allergies      Review of Systems   Constitutional: Positive for appetite change (eating one meal per day (no weight changes)) and fatigue (tired all the time, no energy)  HENT: Positive for ear pain (bilateral) and hearing loss (left sided)  Eyes: Positive for pain and visual disturbance (double vision at times when watching tv)  Respiratory: Negative  Cardiovascular: Negative  Gastrointestinal: Negative  Endocrine: Positive for cold intolerance  Genitourinary: Negative  Musculoskeletal: Positive for arthralgias  Skin: Negative  Allergic/Immunologic: Negative  Neurological: Positive for dizziness (taking meclizine (dizziness is positional)) and headaches ("pressure" headaches, tylenol does not help  Some relief with aleve)  Hematological: Negative  Psychiatric/Behavioral: Negative            OBJECTIVE:   /90 (BP Location: Left arm)   Pulse 77   Temp (!) 96 7 °F (35 9 °C) (Temporal)   Resp 18   Wt 82 3 kg (181 lb 6 4 oz)   SpO2 99%   BMI 28 41 kg/m²   Pain Assessment:  0  ECOG/Zubrod/WHO: 1    Physical Exam   She is conversing appropriately  She is breathing comfortably    Ambulating independently without any signs of imbalance        RESULTS    Lab Results:   Recent Results (from the past 672 hour(s))   Creatinine, serum    Collection Time: 10/14/22  8:37 AM   Result Value Ref Range    Creatinine 0 72 0 60 - 1 20 mg/dL    eGFR 94 ml/min/1 73sq m   BUN    Collection Time: 10/14/22  8:37 AM   Result Value Ref Range    BUN 17 5 - 25 mg/dL   Microalbumin / creatinine urine ratio    Collection Time: 10/14/22  8:52 AM   Result Value Ref Range    Creatinine, Ur 59 7 mg/dL    Microalbum  ,U,Random <5 0 0 0 - 20 0 mg/L    Microalb Creat Ratio <8 0 - 30 mg/g creatinine       Imaging Studies:MRI brain w wo contrast    Result Date: 10/20/2022  Narrative: MRI BRAIN WITH AND WITHOUT CONTRAST INDICATION: Follow-up meningioma status post SRT (4/1/2022)  COMPARISON:  Brain MRI 1/30/2022 TECHNIQUE: Sagittal T1, axial T2, axial FLAIR, axial T1, axial [place], axial diffusion  Sagittal, axial T1 postcontrast   Axial bravo postcontrast with coronal reconstructions  IV Contrast:  8 mL of Gadobutrol injection (SINGLE-DOSE)  IMAGE QUALITY:   Diagnostic  FINDINGS: BRAIN PARENCHYMA: Homogeneously enhancing right petroclival extra-axial lesion consistent with meningioma measuring 1 6 x 0 5 x 1 7 cm, not significantly changed from prior exam   It has enlarged from 1/20/2015 where it measured 1 x 0 3 x 1 2 cm  There is unchanged mild mass effect upon right ventral surface of the rex  There is no intracranial hemorrhage  Normal posterior fossa  Diffusion imaging is unremarkable  Significant change in foci of T2/FLAIR hyperintensities involving periventricular and subcortical white matter, most compatible with microangiopathic change  VENTRICLES:  Normal for the patient's age  SELLA AND PITUITARY GLAND:  Normal  ORBITS:  Normal  PARANASAL SINUSES:  Normal  VASCULATURE:  Evaluation of the major intracranial vasculature demonstrates appropriate flow voids  CALVARIUM AND SKULL BASE:  Normal  EXTRACRANIAL SOFT TISSUES:  Normal      Impression: 1  Compared to MRI 1/30/2022, no significant change in size of right petroclival meningioma measuring 1 6 x 0 5 x 1 7 cm  2   Stable nonspecific white matter change most compatible with microangiopathy   Workstation performed: [id number]           Assessment/Plan:  Orders Placed This Encounter   Procedures   • MRI brain w wo contrast        Eri Win is a 56y o  year old female who is 8 months status post SRT to a prepontine meningioma  I reviewed her MRI of the brain from 10/17/2022 which returned stable  She tolerated treatment well without any significant side effects  I have ordered follow-up MRI of the brain in 6 months and she will return to Neuro-Oncology clinic thereafter  Moody Fuller  11/2/2022,8:53 AM    Portions of the record may have been created with voice recognition software   Occasional wrong word or "sound a like" substitutions may have occurred due to the inherent limitations of voice recognition software   Read the chart carefully and recognize, using context, where substitutions have occurred

## 2022-11-02 NOTE — PROGRESS NOTES
Rod Washington 1965 is a 56 y o  female with right petroclinoid meningioma  She completed a course of SRT to the meningioma in the pre pontine region on 4/1/2022  Last follow-up on 6/15/22  She returns today s/p 4 month repeat MRI        6/16/22 ENT, Dr Elise Powers  Referred to PT for episodes of vertigo lasting 30 seconds, precipitated by change in position of head  Audiology evaluation:  Bilateral sensorineural hearing loss with pure tone averages at 23 and 27 decibels, relatively symmetric, very minimal noise induced hearing loss component  Type a tympanograms bilaterally, excellent word discrimination scores  10/17/22 MRI brain w wo contrast  1  Compared to MRI 1/30/2022, no significant change in size of right petroclival meningioma measuring 1 6 x 0 5 x 1 7 cm    2   Stable nonspecific white matter change most compatible with microangiopathy  Oncology History   M[address] [address] )   1/29/2022 Initial Diagnosis    Meningioma ([facility] [address] )     3/23/2022 - 4/1/2022 Radiation    Plan ID Energy Fractions Dose per Fraction (cGy) Dose Correction (cGy) Total Dose Delivered (cGy) Elapsed Days   SRTPrepontcis 6X 5 / 5 500 0 2,500 9      Treatment Dates:  3/23/2022 - 4/1/2022  Dr Alyce Schuler         Review of Systems:  Review of Systems   Constitutional: Positive for appetite change (eating one meal per day (no weight changes)) and fatigue (tired all the time, no energy)  HENT: Positive for ear pain (bilateral) and hearing loss (left sided)  Eyes: Positive for pain and visual disturbance (double vision at times when watching tv)  Respiratory: Negative  Cardiovascular: Negative  Gastrointestinal: Negative  Endocrine: Positive for cold intolerance  Genitourinary: Negative  Musculoskeletal: Positive for arthralgias  Skin: Negative  Allergic/Immunologic: Negative      Neurological: Positive for dizziness (taking meclizine (dizziness is positional)) and headaches ("pressure" headaches, tylenol does not help  Some relief with aleve)  Hematological: Negative  Psychiatric/Behavioral: Negative          Clinical Trial: no    Covid Vaccine Status: vaccinated     Health Maintenance   Topic Date Due   • Annual Physical  Never done   • Osteoporosis Screening  Never done   • COVID-19 Vaccine (3 - Moderna risk series) 06/23/2021   • BMI: Followup Plan  09/03/2021   • OT PLAN OF CARE  03/10/2022   • Influenza Vaccine (1) 09/01/2022   • PT PLAN OF CARE  11/17/2022   • Breast Cancer Screening: Mammogram  03/02/2023   • Depression Remission PHQ  06/15/2023   • BMI: Adult  11/02/2023   • Cervical Cancer Screening  06/14/2026   • Colorectal Cancer Screening  10/09/2030   • DTaP,Tdap,and Td Vaccines (2 - Td or Tdap) 07/18/2032   • HIV Screening  Completed   • Hepatitis C Screening  Completed   • Pneumococcal Vaccine: Pediatrics (0 to 5 Years) and At-Risk Patients (6 to 59 Years)  Completed   • HIB Vaccine  Aged Out   • Hepatitis B Vaccine  Aged Out   • IPV Vaccine  Aged Out   • Hepatitis A Vaccine  Aged Out   • Meningococcal ACWY Vaccine  Aged Out   • HPV Vaccine  Aged Out     Patient Active Problem List   Diagnosis   • Essential hypertension   • Hypothyroidism   • Hemorrhoids   • Chronic bilateral low back pain with bilateral sciatica   • Slow transit constipation   • Vertigo   • Migraine without aura and without status migrainosus, not intractable   • Gastroesophageal reflux disease without esophagitis   • Meningioma (HCC)   • Visual field defects   • Mild episode of recurrent major depressive disorder ([facility] [address] )   • Lawson's esophagus without dysplasia     Past Medical History:   Diagnosis Date   • Chronic pain    • Disease of thyroid gland     hypothyroid   • Diverticulitis 12/1/2021   • GERD (gastroesophageal reflux disease)    • Heel pain, chronic, right 3/1/2021   • Hypertension    • Hyperthyroiditis     HYPERTHYROID; POST IODINE THERAPY   • Intractable vomiting 12/1/2021   • Meningioma (HCC)    • Migraine without aura and without status migrainosus, not intractable 2021   • Pain of upper abdomen 2020   • Sciatica of right side 9/3/2020   • Weakness of right upper extremity 2022     Past Surgical History:   Procedure Laterality Date   • APPENDECTOMY     •  SECTION     • HERNIA REPAIR       Family History   Problem Relation Age of Onset   • Endometrial cancer Family    • Ovarian cancer Mother 79   • Heart defect Mother    • Heart attack Father    • Stroke Brother    • Ovarian cancer Sister 52   • No Known Problems Maternal Grandmother    • Breast cancer Paternal Grandmother 61   • No Known Problems Sister    • No Known Problems Sister    • No Known Problems Sister    • No Known Problems Sister    • No Known Problems Daughter    • No Known Problems Maternal Grandfather    • No Known Problems Paternal Grandfather    • No Known Problems Daughter    • No Known Problems Son      Social History     Socioeconomic History   • Marital status: Single     Spouse name: Not on file   • Number of children: Not on file   • Years of education: Not on file   • Highest education level: Not on file   Occupational History   • Not on file   Tobacco Use   • Smoking status: Never Smoker   • Smokeless tobacco: Never Used   Vaping Use   • Vaping Use: Never used   Substance and Sexual Activity   • Alcohol use: Yes     Comment: occasional    • Drug use: No   • Sexual activity: Not on file   Other Topics Concern   • Not on file   Social History Narrative    AS OF 9/22/15 IN [employer]:        CONSUMES CAFFEINE    CAFFEINE: COFFEE     Social Determinants of Health     Financial Resource Strain: Low Risk    • Difficulty of Paying Living Expenses: Not hard at all   Food Insecurity: No Food Insecurity   • Worried About Running Out of Food in the Last Year: Never true   • Ran Out of Food in the Last Year: Never true   Transportation Needs: No Transportation Needs   • Lack of Transportation (Medical):  No   • Lack of Transportation (Non-Medical): No   Physical Activity: Not on file   Stress: Not on file   Social Connections: Not on file   Intimate Partner Violence: Not on file   Housing Stability: Not on file       Current Outpatient Medications:   •  amitriptyline (ELAVIL) 10 mg tablet, 1 tab PO q HS x 1 week then increase to 2 tabs PO q HS, Disp: 60 tablet, Rfl: 3  •  dicyclomine (BENTYL) 20 mg tablet, Take 1 tablet (20 mg total) by mouth every 6 (six) hours, Disp: 120 tablet, Rfl: 5  •  fluticasone (FLONASE) 50 mcg/act nasal spray, 2 sprays into each nostril daily, Disp: 16 g, Rfl: 2  •  levothyroxine 112 mcg tablet, Take 1 tablet (112 mcg total) by mouth daily, Disp: 30 tablet, Rfl: 2  •  lisinopril (ZESTRIL) 40 mg tablet, Take 1 tablet (40 mg total) by mouth daily, Disp: 30 tablet, Rfl: 2  •  magnesium Oxide (MAG-OX) 400 mg TABS, take 1 tablet by mouth once daily every morning, Disp: 90 tablet, Rfl: 0  •  meclizine (ANTIVERT) 50 MG tablet, Take 1 tablet (50 mg total) by mouth every 12 (twelve) hours as needed for dizziness, Disp: 20 tablet, Rfl: 0  •  pantoprazole (PROTONIX) 40 mg tablet, Take 1 tablet (40 mg total) by mouth 2 (two) times a day, Disp: 60 tablet, Rfl: 5  •  sucralfate (CARAFATE) 1 g tablet, Take 1 tablet (1 g total) by mouth 4 (four) times a day, Disp: 120 tablet, Rfl: 5  •  topiramate (Topamax) 25 mg tablet, Take 1 tablet (25 mg total) by mouth daily for 7 days, THEN 2 tablets (50 mg total) daily for 7 days, THEN 4 tablets (100 mg total) daily  , Disp: 141 tablet, Rfl: 0  •  acetaminophen (TYLENOL) 325 mg tablet, Take 2 tablets (650 mg total) by mouth every 6 (six) hours as needed for mild pain, headaches or fever (Patient not taking: Reported on 11/2/2022), Disp: 30 tablet, Rfl: 0  •  meclizine (ANTIVERT) 25 mg tablet, take 2 tablet by mouth every 12 hours if needed for dizziness (Patient not taking: Reported on 11/2/2022), Disp: , Rfl:   •  meloxicam (MOBIC) 7 5 mg tablet, Take 1 tablet (7 5 mg total) by mouth daily (Patient not taking: Reported on 11/2/2022), Disp: 30 tablet, Rfl: 0  No Known Allergies  Vitals:    11/02/22 0804   BP: 130/90   BP Location: Left arm   Pulse: 77   Resp: 18   Temp: (!) 96 7 °F (35 9 °C)   TempSrc: Temporal   SpO2: 99%   Weight: 82 3 kg (181 lb 6 4 oz)

## 2022-12-02 ENCOUNTER — OFFICE VISIT (OUTPATIENT)
Dept: FAMILY MEDICINE CLINIC | Facility: CLINIC | Age: 57
End: 2022-12-02

## 2022-12-02 VITALS
SYSTOLIC BLOOD PRESSURE: 140 MMHG | OXYGEN SATURATION: 99 % | TEMPERATURE: 97.8 F | RESPIRATION RATE: 16 BRPM | WEIGHT: 183 LBS | HEART RATE: 79 BPM | BODY MASS INDEX: 28.66 KG/M2 | DIASTOLIC BLOOD PRESSURE: 86 MMHG

## 2022-12-02 DIAGNOSIS — R42 VERTIGO: ICD-10-CM

## 2022-12-02 DIAGNOSIS — D32.9 MENINGIOMA (HCC): ICD-10-CM

## 2022-12-02 DIAGNOSIS — K21.9 GASTROESOPHAGEAL REFLUX DISEASE WITHOUT ESOPHAGITIS: ICD-10-CM

## 2022-12-02 DIAGNOSIS — I10 ESSENTIAL HYPERTENSION: Primary | ICD-10-CM

## 2022-12-02 DIAGNOSIS — E03.9 HYPOTHYROIDISM, UNSPECIFIED TYPE: ICD-10-CM

## 2022-12-02 DIAGNOSIS — F33.0 MILD EPISODE OF RECURRENT MAJOR DEPRESSIVE DISORDER (HCC): ICD-10-CM

## 2022-12-02 RX ORDER — MECLIZINE HYDROCHLORIDE 50 MG/1
50 TABLET ORAL EVERY 12 HOURS PRN
Qty: 60 TABLET | Refills: 2 | Status: SHIPPED | OUTPATIENT
Start: 2022-12-02

## 2022-12-02 NOTE — ASSESSMENT & PLAN NOTE
Patient had EGD done by GI on 4/8/2022 which showed a 3 cm hiatal hernia as well as short-segment Lawson's esophagus, which was biopsied  Stomach and duodenum appeared normal and esophagitis was noted to be mostly resolved  PH study showed significant reflux; however, symptom correlation was not significant for reported symptoms of acid reflux and stomach ache  Seen by GI in 10/2022- the pH impedance study that was done should have been performed while patient was on a PPI to assess for GERD overlap with concurrent functional issues versus refractory GERD- advised patient to hold off on taking Elavil until this testing occurs (patient has been taking this and reports some mild improvement)    - Continue Sucralfate 1 tablet 4x/day, Bentyl 20 mg Q6H PRN, and Protonix 40 mg BID    - Continue following with GI; appreciate their recommendations

## 2022-12-02 NOTE — ASSESSMENT & PLAN NOTE
Patient was tearful at visit earlier in 2022 regarding her meningioma diagnosis; since then, has undergone treatment and is following regularly with Neurosurgery and is doing much better today  No suicidal or homicidal ideations  Patient has again stated she is not interested in therapy or medication      - Will continue to monitor

## 2022-12-02 NOTE — PROGRESS NOTES
Assessment/Plan:    Essential hypertension  BP Today: 140/86  At goal per JNC-8 guidelines  Patient denies headache, blurry vision, chest pain, SOB, dizziness, lower limb edema  CMP from 5/2022 and urine M/C from 10/2022 within normal limits  - Continue Lisinopril 40 mg daily   - Advised on low salt diet         Hypothyroidism  TSH from 1/2022 normal at 0 577     - Continue Levothyroxine 112 mcg daily  - Continue to monitor; will retest levels at next visit  Mild episode of recurrent major depressive disorder [facility])  Patient was tearful at visit earlier in 2022 regarding her meningioma diagnosis; since then, has undergone treatment and is following regularly with Neurosurgery and is doing much better today  No suicidal or homicidal ideations  Patient has again stated she is not interested in therapy or medication      - Will continue to monitor  Gastroesophageal reflux disease without esophagitis  Patient had EGD done by GI on 4/8/2022 which showed a 3 cm hiatal hernia as well as short-segment Lawson's esophagus, which was biopsied  Stomach and duodenum appeared normal and esophagitis was noted to be mostly resolved  PH study showed significant reflux; however, symptom correlation was not significant for reported symptoms of acid reflux and stomach ache  Seen by GI in 10/2022- the pH impedance study that was done should have been performed while patient was on a PPI to assess for GERD overlap with concurrent functional issues versus refractory GERD- advised patient to hold off on taking Elavil until this testing occurs (patient has been taking this and reports some mild improvement)    - Continue Sucralfate 1 tablet 4x/day, Bentyl 20 mg Q6H PRN, and Protonix 40 mg BID    - Continue following with GI; appreciate their recommendations  Vertigo  - Continue PT    - Continue Meclizine PRN  [facility])  Patient presented to ED in late January with dizziness and fatigue    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  The right superior cerebellar artery and posterior communicating artery branches are intimately associated along the dorsal and medial borders of the lesion  MRI Brain (1/2022): mass measuring 1 8 x 0 6 x 1 8 cm (compared to 1 1 x 1 1 x 0 5 cm on the prior study)  No adjacent parenchymal edema identified  Minimal mass effect and flattening the right ventral rex  CT Head 5/2022: Similar 1 6 x 0 9 cm right petroclinoid mass compatible with known meningioma  Mild, similar mass effect on the right anterior rex  Patient completed frameless stereotactic radiotherapy for meningioma in 4/2022  Also completed Decadron taper  MRI brain on 10/17/2022 showed no significant change in size of right petroclival meningioma measuring 1 6 x 0 5 x 1 7 cm     - Continue following with Neurosurgery and continue to monitor  Health Maintenance:     - Patient refused flu shot- states she will get it in January    - Patient received 3rd COVID shot  Diagnoses and all orders for this visit:    Essential hypertension    Hypothyroidism, unspecified type    Mild episode of recurrent major depressive disorder (HCC)    Vertigo  -     meclizine (ANTIVERT) 50 MG tablet; Take 1 tablet (50 mg total) by mouth every 12 (twelve) hours as needed for dizziness    Gastroesophageal reflux disease without esophagitis    Meningioma (HCC)          Subjective:      Patient ID: Crhistie Emmanuel is a 56 y o  female  Christie Emmanuel is a pleasant 14-year-old female with PMH of chronic pain syndrome, hypothyroidism, meningioma s/p SRT and hypertension who presents to the clinic today for a follow up on her chronic conditions  She has no concerns other than what is noted in ROS         The following portions of the patient's history were reviewed and updated as appropriate: allergies, current medications, past family history, past medical history, past social history, past surgical history and problem list     Review of Systems   Constitutional: Negative for activity change, appetite change, chills and fever  HENT: Negative for sore throat  Respiratory: Negative for cough and shortness of breath  Cardiovascular: Negative for chest pain, palpitations and leg swelling  Gastrointestinal: Negative for abdominal pain, constipation, diarrhea, nausea and vomiting  Musculoskeletal: Negative for back pain and gait problem  Neurological: Positive for dizziness  Negative for seizures, weakness and headaches  Objective:      /86 (BP Location: Left arm, Patient Position: Sitting, Cuff Size: Standard)   Pulse 79   Temp 97 8 °F (36 6 °C) (Temporal)   Resp 16   Wt 83 kg (183 lb)   LMP 11/25/2022 (Exact Date)   SpO2 99%   BMI 28 66 kg/m²        Physical Exam  Vitals reviewed  Constitutional:       General: She is not in acute distress  Appearance: She is well-developed  She is not diaphoretic  HENT:      Head: Normocephalic and atraumatic  Eyes:      Conjunctiva/sclera: Conjunctivae normal    Cardiovascular:      Rate and Rhythm: Normal rate and regular rhythm  Heart sounds: Normal heart sounds  No murmur heard  No friction rub  No gallop  Pulmonary:      Effort: Pulmonary effort is normal  No respiratory distress  Breath sounds: Normal breath sounds  No wheezing or rales  Abdominal:      General: Bowel sounds are normal  There is no distension  Palpations: Abdomen is soft  There is no mass  Tenderness: There is no abdominal tenderness  There is no guarding  Musculoskeletal:         General: No tenderness or deformity  Normal range of motion  Cervical back: Normal range of motion  Right lower leg: No edema  Left lower leg: No edema  Skin:     General: Skin is warm and dry  Neurological:      General: No focal deficit present  Mental Status: She is alert and oriented to person, place, and time  Mable Tse MD  12/2/2022

## 2022-12-02 NOTE — ASSESSMENT & PLAN NOTE
BP Today: 140/86  At goal per JNC-8 guidelines  Patient denies headache, blurry vision, chest pain, SOB, dizziness, lower limb edema  CMP from 5/2022 and urine M/C from 10/2022 within normal limits  - Continue Lisinopril 40 mg daily   - Advised on low salt diet  Pedro Levi

## 2022-12-02 NOTE — ASSESSMENT & PLAN NOTE
Patient presented to ED in late January with dizziness and fatigue  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  The right superior cerebellar artery and posterior communicating artery branches are intimately associated along the dorsal and medial borders of the lesion  MRI Brain (1/2022): mass measuring 1 8 x 0 6 x 1 8 cm (compared to 1 1 x 1 1 x 0 5 cm on the prior study)  No adjacent parenchymal edema identified  Minimal mass effect and flattening the right ventral rex  CT Head 5/2022: Similar 1 6 x 0 9 cm right petroclinoid mass compatible with known meningioma  Mild, similar mass effect on the right anterior rex  Patient completed frameless stereotactic radiotherapy for meningioma in 4/2022  Also completed Decadron taper  MRI brain on 10/17/2022 showed no significant change in size of right petroclival meningioma measuring 1 6 x 0 5 x 1 7 cm     - Continue following with Neurosurgery and continue to monitor

## 2022-12-23 ENCOUNTER — TELEPHONE (OUTPATIENT)
Dept: FAMILY MEDICINE CLINIC | Facility: CLINIC | Age: 57
End: 2022-12-23

## 2023-01-10 NOTE — ASSESSMENT & PLAN NOTE
Patient was tearful at visit earlier in 2022 regarding her meningioma diagnosis; since then, has undergone treatment and is following regularly with Neurosurgery and is doing much better today  No suicidal or homicidal ideations  Patient states she is doing better  We did discuss medication/therapy today and she said she will consider therapy  PHQ-9 Today: 4    - Referral to behavioral therapy placed  - Will continue to monitor

## 2023-01-10 NOTE — PROGRESS NOTES
Assessment/Plan:    Essential hypertension  BP Today:   At goal per JNC-8 guidelines  Patient denies headache, blurry vision, chest pain, SOB, dizziness, lower limb edema  CMP from 5/2022 and urine M/C from 10/2022 within normal limits  - Continue Lisinopril 40 mg daily   - Advised on low salt diet         Hypothyroidism  TSH from 1/2022 normal at 0 577     - Continue Levothyroxine 112 mcg daily    - Repeat TSH at next visit  Gastroesophageal reflux disease without esophagitis  Patient had EGD done by GI on 4/8/2022 which showed a 3 cm hiatal hernia as well as short-segment Lawson's esophagus, which was biopsied  Stomach and duodenum appeared normal and esophagitis was noted to be mostly resolved  pH study showed significant reflux; however, symptom correlation was not significant for reported symptoms of acid reflux and stomach ache  Seen by GI in 10/2022- the pH impedance study that was done should have been performed while patient was on a PPI to assess for GERD overlap with concurrent functional issues versus refractory GERD- advised patient to hold off on taking Elavil until this testing occurs (patient was taking this previously but states she stopped taking it around December)  - Continue Sucralfate 1 tablet 4x/day, Bentyl 20 mg Q6H PRN, and Protonix 40 mg BID    - Continue following with GI; appreciate their recommendations  Patient encouraged to call and get this testing repeated  - Counseled not to take Elavil until this testing is completed  Mild episode of recurrent major depressive disorder [facility])  Patient was tearful at visit earlier in 2022 regarding her meningioma diagnosis; since then, has undergone treatment and is following regularly with Neurosurgery and is doing much better today  No suicidal or homicidal ideations  Patient states she is doing better  We did discuss medication/therapy today and she said she will consider therapy    PHQ-9 Today: 4    - Referral to behavioral therapy placed  - Will continue to monitor  Vertigo  - Continue PT    - Continue Meclizine PRN  Meningioma [facility])  Patient presented to ED in late January 2022 with dizziness and fatigue  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  The right superior cerebellar artery and posterior communicating artery branches are intimately associated along the dorsal and medial borders of the lesion  MRI Brain (1/2022): mass measuring 1 8 x 0 6 x 1 8 cm (compared to 1 1 x 1 1 x 0 5 cm on the prior study)  No adjacent parenchymal edema identified  Minimal mass effect and flattening the right ventral rex  CT Head 5/2022: Similar 1 6 x 0 9 cm right petroclinoid mass compatible with known meningioma  Mild, similar mass effect on the right anterior rex  Patient completed frameless stereotactic radiotherapy for meningioma in 4/2022  Also completed Decadron taper  MRI brain on 10/17/2022 showed no significant change in size of right petroclival meningioma measuring 1 6 x 0 5 x 1 7 cm     - Continue following with Neurosurgery and continue to monitor  Migraine without aura and without status migrainosus, not intractable  Patient currently well controlled on Topamax 25 mg BID and has not required a dose increase      - Continue Topamax 25 mg daily for 1 week, then increase to 25 mg BID  - Let patient know they can use over the counter supplements for headache prevention: magnesium oxide 400 mg/day and/or vitamin B2 200 mg twice/day  - Educated patient about foods or beverages that can trigger a migraine attack  - Advised not to use abortive treatment >3 times per week to prevent medication overuse headache  - Advised to make a headache diary and bring it with them on next visit  Also informed patient on smart phone apps such as migraine shaista and headache tracker  - Counseled patient on smoking cessation, as this can trigger migraine attacks     - Advised patient to stay well hydrated (drink 64 ounces of water/day), get adequate sleep, and eat regularly with good intake of fruits and vegetables  Health Maintenance:     - UTD on vaccines  - Patient received 3rd COVID shot on 11/28/2021   - Got colonoscopy in 10/2020- had multiple, moderate extensive pancolonic diverticula causing moderate luminal narrowing and large, protruding external and internal hemorrhoids  Will require repeat in 10 years  Diagnoses and all orders for this visit:    Essential hypertension    Hypothyroidism, unspecified type  -     TSH, 3rd generation; Future    Gastroesophageal reflux disease without esophagitis    Mild episode of recurrent major depressive disorder ([facility] [address] )  -     Ambulatory Referral to [facility]; Future    Vertigo    Meningioma [facility])  -     Ambulatory Referral to [facility]; Future    Migraine with aura and without status migrainosus, not intractable  -     topiramate (Topamax) 25 mg tablet; Take 1 tablet (25 mg total) by mouth 2 (two) times a day    Encounter for screening mammogram for breast cancer  -     Mammo screening bilateral w 3d & cad; Future    Left foot pain  -     XR foot 3+ vw left; Future    Migraine without aura and without status migrainosus, not intractable          Subjective:      Patient ID: Svetlana Patton is a 56 y o  female  Svetlana Patton is a pleasant 68-year-old female with PMH of chronic pain syndrome, hypothyroidism, meningioma s/p SRT and hypertension who presents to the clinic today for a follow up on her chronic conditions  She has no concerns other than what is noted in ROS         The following portions of the patient's history were reviewed and updated as appropriate: allergies, current medications, past family history, past medical history, past social history, past surgical history and problem list     Review of Systems   Constitutional: Negative for activity change, appetite change, chills and fever    HENT: Negative for sore throat  Respiratory: Negative for cough and shortness of breath  Cardiovascular: Negative for chest pain, palpitations and leg swelling  Gastrointestinal: Negative for abdominal pain, constipation, diarrhea, nausea and vomiting  Musculoskeletal: Negative for back pain and gait problem  Neurological: Negative for dizziness, seizures, weakness and headaches  Objective:      /70 (BP Location: Right arm, Patient Position: Sitting, Cuff Size: Standard)   Pulse 76   Temp 97 8 °F (36 6 °C) (Temporal)   Resp 19   Ht 5' 7" (1 702 m)   Wt 86 2 kg (190 lb)   LMP 01/08/2023   SpO2 99%   BMI 29 76 kg/m²        Physical Exam  Vitals reviewed  Constitutional:       General: She is not in acute distress  Appearance: She is well-developed  She is not diaphoretic  HENT:      Head: Normocephalic and atraumatic  Eyes:      Conjunctiva/sclera: Conjunctivae normal    Cardiovascular:      Rate and Rhythm: Normal rate and regular rhythm  Heart sounds: Normal heart sounds  No murmur heard  No friction rub  No gallop  Pulmonary:      Effort: Pulmonary effort is normal  No respiratory distress  Breath sounds: Normal breath sounds  No wheezing or rales  Abdominal:      General: Bowel sounds are normal  There is no distension  Palpations: Abdomen is soft  There is no mass  Tenderness: There is no abdominal tenderness  There is no guarding  Musculoskeletal:         General: No tenderness or deformity  Normal range of motion  Cervical back: Normal range of motion  Right lower leg: No edema  Left lower leg: No edema  Skin:     General: Skin is warm and dry  Neurological:      General: No focal deficit present  Mental Status: She is alert and oriented to person, place, and time             Epifanio Sauceda MD  1/16/2022

## 2023-01-10 NOTE — ASSESSMENT & PLAN NOTE
TSH from 1/2022 normal at 0 577     - Continue Levothyroxine 112 mcg daily    - Repeat TSH at next visit

## 2023-01-10 NOTE — ASSESSMENT & PLAN NOTE
BP Today:   At goal per JNC-8 guidelines  Patient denies headache, blurry vision, chest pain, SOB, dizziness, lower limb edema  CMP from 5/2022 and urine M/C from 10/2022 within normal limits  - Continue Lisinopril 40 mg daily   - Advised on low salt diet  Ghanshyam Hale

## 2023-01-10 NOTE — ASSESSMENT & PLAN NOTE
Patient had EGD done by GI on 4/8/2022 which showed a 3 cm hiatal hernia as well as short-segment Lawson's esophagus, which was biopsied  Stomach and duodenum appeared normal and esophagitis was noted to be mostly resolved  pH study showed significant reflux; however, symptom correlation was not significant for reported symptoms of acid reflux and stomach ache  Seen by GI in 10/2022- the pH impedance study that was done should have been performed while patient was on a PPI to assess for GERD overlap with concurrent functional issues versus refractory GERD- advised patient to hold off on taking Elavil until this testing occurs (patient was taking this previously but states she stopped taking it around December)  - Continue Sucralfate 1 tablet 4x/day, Bentyl 20 mg Q6H PRN, and Protonix 40 mg BID    - Continue following with GI; appreciate their recommendations  Patient encouraged to call and get this testing repeated  - Counseled not to take Elavil until this testing is completed

## 2023-01-16 ENCOUNTER — OFFICE VISIT (OUTPATIENT)
Dept: FAMILY MEDICINE CLINIC | Facility: CLINIC | Age: 58
End: 2023-01-16

## 2023-01-16 VITALS
WEIGHT: 190 LBS | HEIGHT: 67 IN | BODY MASS INDEX: 29.82 KG/M2 | TEMPERATURE: 97.8 F | HEART RATE: 76 BPM | SYSTOLIC BLOOD PRESSURE: 118 MMHG | RESPIRATION RATE: 19 BRPM | OXYGEN SATURATION: 99 % | DIASTOLIC BLOOD PRESSURE: 70 MMHG

## 2023-01-16 DIAGNOSIS — M79.672 LEFT FOOT PAIN: ICD-10-CM

## 2023-01-16 DIAGNOSIS — G43.109 MIGRAINE WITH AURA AND WITHOUT STATUS MIGRAINOSUS, NOT INTRACTABLE: ICD-10-CM

## 2023-01-16 DIAGNOSIS — R42 VERTIGO: ICD-10-CM

## 2023-01-16 DIAGNOSIS — F33.0 MILD EPISODE OF RECURRENT MAJOR DEPRESSIVE DISORDER (HCC): ICD-10-CM

## 2023-01-16 DIAGNOSIS — D32.9 MENINGIOMA (HCC): ICD-10-CM

## 2023-01-16 DIAGNOSIS — E03.9 HYPOTHYROIDISM, UNSPECIFIED TYPE: ICD-10-CM

## 2023-01-16 DIAGNOSIS — K21.9 GASTROESOPHAGEAL REFLUX DISEASE WITHOUT ESOPHAGITIS: ICD-10-CM

## 2023-01-16 DIAGNOSIS — Z12.31 ENCOUNTER FOR SCREENING MAMMOGRAM FOR BREAST CANCER: ICD-10-CM

## 2023-01-16 DIAGNOSIS — I10 ESSENTIAL HYPERTENSION: Primary | ICD-10-CM

## 2023-01-16 DIAGNOSIS — G43.009 MIGRAINE WITHOUT AURA AND WITHOUT STATUS MIGRAINOSUS, NOT INTRACTABLE: ICD-10-CM

## 2023-01-16 RX ORDER — TOPIRAMATE 25 MG/1
25 TABLET ORAL 2 TIMES DAILY
Qty: 60 TABLET | Refills: 2 | Status: SHIPPED | OUTPATIENT
Start: 2023-01-16 | End: 2023-04-16

## 2023-01-16 NOTE — ASSESSMENT & PLAN NOTE
Patient currently well controlled on Topamax 25 mg BID and has not required a dose increase      - Continue Topamax 25 mg daily for 1 week, then increase to 25 mg BID  - Let patient know they can use over the counter supplements for headache prevention: magnesium oxide 400 mg/day and/or vitamin B2 200 mg twice/day  - Educated patient about foods or beverages that can trigger a migraine attack  - Advised not to use abortive treatment >3 times per week to prevent medication overuse headache  - Advised to make a headache diary and bring it with them on next visit  Also informed patient on smart phone apps such as migraine shaista and headache tracker  - Counseled patient on smoking cessation, as this can trigger migraine attacks  - Advised patient to stay well hydrated (drink 64 ounces of water/day), get adequate sleep, and eat regularly with good intake of fruits and vegetables

## 2023-01-16 NOTE — ASSESSMENT & PLAN NOTE
Patient presented to ED in late January 2022 with dizziness and fatigue  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  The right superior cerebellar artery and posterior communicating artery branches are intimately associated along the dorsal and medial borders of the lesion  MRI Brain (1/2022): mass measuring 1 8 x 0 6 x 1 8 cm (compared to 1 1 x 1 1 x 0 5 cm on the prior study)  No adjacent parenchymal edema identified  Minimal mass effect and flattening the right ventral rex  CT Head 5/2022: Similar 1 6 x 0 9 cm right petroclinoid mass compatible with known meningioma  Mild, similar mass effect on the right anterior rex  Patient completed frameless stereotactic radiotherapy for meningioma in 4/2022  Also completed Decadron taper  MRI brain on 10/17/2022 showed no significant change in size of right petroclival meningioma measuring 1 6 x 0 5 x 1 7 cm     - Continue following with Neurosurgery and continue to monitor

## 2023-01-30 ENCOUNTER — HOSPITAL ENCOUNTER (OUTPATIENT)
Dept: RADIOLOGY | Facility: HOSPITAL | Age: 58
Discharge: HOME | End: 2023-01-30

## 2023-01-30 ENCOUNTER — LAB (OUTPATIENT)
Dept: LAB | Facility: HOSPITAL | Age: 58
End: 2023-01-30
Attending: FAMILY MEDICINE

## 2023-01-30 DIAGNOSIS — E03.8 OTHER SPECIFIED HYPOTHYROIDISM: ICD-10-CM

## 2023-01-30 DIAGNOSIS — D32.9 MENINGIOMA (HCC): ICD-10-CM

## 2023-01-30 DIAGNOSIS — E03.9 HYPOTHYROIDISM, UNSPECIFIED TYPE: ICD-10-CM

## 2023-01-30 DIAGNOSIS — M79.672 LEFT FOOT PAIN: ICD-10-CM

## 2023-01-30 LAB
ALBUMIN SERPL BCP-MCNC: 4 G/DL (ref 3.5–5)
ALP SERPL-CCNC: 61 U/L (ref 43–122)
ALT SERPL W P-5'-P-CCNC: 14 U/L
ANION GAP SERPL CALCULATED.3IONS-SCNC: 7 MMOL/L (ref 5–14)
AST SERPL W P-5'-P-CCNC: 24 U/L (ref 14–36)
BILIRUB SERPL-MCNC: 0.67 MG/DL (ref 0.2–1)
BUN SERPL-MCNC: 11 MG/DL (ref 5–25)
CALCIUM SERPL-MCNC: 8.9 MG/DL (ref 8.4–10.2)
CHLORIDE SERPL-SCNC: 106 MMOL/L (ref 96–108)
CHOLEST SERPL-MCNC: 163 MG/DL
CO2 SERPL-SCNC: 26 MMOL/L (ref 21–32)
CREAT SERPL-MCNC: 0.76 MG/DL (ref 0.6–1.2)
GFR SERPL CREATININE-BSD FRML MDRD: 88 ML/MIN/1.73SQ M
GLUCOSE P FAST SERPL-MCNC: 80 MG/DL (ref 70–99)
HDLC SERPL-MCNC: 55 MG/DL
LDLC SERPL CALC-MCNC: 78 MG/DL
NONHDLC SERPL-MCNC: 108 MG/DL
POTASSIUM SERPL-SCNC: 3.9 MMOL/L (ref 3.5–5.3)
PROT SERPL-MCNC: 7.2 G/DL (ref 6.4–8.4)
SODIUM SERPL-SCNC: 139 MMOL/L (ref 135–147)
TRIGL SERPL-MCNC: 149 MG/DL
TSH SERPL DL<=0.05 MIU/L-ACNC: 2.1 UIU/ML (ref 0.45–4.5)

## 2023-02-03 NOTE — RESULT ENCOUNTER NOTE
Please call patient and inform her that she has a heel spur, which is basically a protrusion of her bone in the heel area  They may be caused by pressure on the heel or poorly fitted shoes  They usually do not cause symptoms on their own  Treatment can include stretching exercises and well fitting shoes  She may also try Tylenol or Aleve/Advil as needed  Most of these will resolve on their own and not require surgical treatment  Patient prefers Portuguese  Thank you!

## 2023-02-21 ENCOUNTER — OFFICE VISIT (OUTPATIENT)
Dept: GASTROENTEROLOGY | Facility: MEDICAL CENTER | Age: 58
End: 2023-02-21

## 2023-02-21 VITALS
BODY MASS INDEX: 29.63 KG/M2 | TEMPERATURE: 97.8 F | WEIGHT: 189.2 LBS | DIASTOLIC BLOOD PRESSURE: 98 MMHG | SYSTOLIC BLOOD PRESSURE: 155 MMHG | HEART RATE: 70 BPM

## 2023-02-21 DIAGNOSIS — K22.70 BARRETT'S ESOPHAGUS WITHOUT DYSPLASIA: ICD-10-CM

## 2023-02-21 DIAGNOSIS — K21.9 GASTROESOPHAGEAL REFLUX DISEASE WITHOUT ESOPHAGITIS: ICD-10-CM

## 2023-02-21 DIAGNOSIS — K44.9 HH (HIATUS HERNIA): Primary | ICD-10-CM

## 2023-02-21 RX ORDER — OMEPRAZOLE 40 MG/1
40 CAPSULE, DELAYED RELEASE ORAL DAILY
COMMUNITY
End: 2023-02-21 | Stop reason: SDUPTHER

## 2023-02-21 RX ORDER — SUCRALFATE 1 G/1
1 TABLET ORAL 4 TIMES DAILY
Qty: 120 TABLET | Refills: 5 | Status: SHIPPED | OUTPATIENT
Start: 2023-02-21

## 2023-02-21 RX ORDER — OMEPRAZOLE 40 MG/1
40 CAPSULE, DELAYED RELEASE ORAL 2 TIMES DAILY
Qty: 60 CAPSULE | Refills: 2 | Status: SHIPPED | OUTPATIENT
Start: 2023-02-21

## 2023-02-21 NOTE — PROGRESS NOTES
[facility] - Outpatient Follow-up Note  Gianna Cuevas 56 y o  female MRN: [id number]  Encounter: [id number]          ASSESSMENT AND PLAN:      1  HH (hiatus hernia)  2  Lawson's esophagus without dysplasia  3  Gastroesophageal reflux disease without esophagitis  Amitriptyline was not helpful to improve her symptoms, discontinue amitriptyline for now  Patient has a hiatus hernia with findings of Lawson's esophagus, indicating pathologic acid reflux  24-hour pH  monitoring was with poor symptom correlation  She has refractory acid reflux with maximized medical treatment of PPI and Carafate  Refer to surgery to discuss benefits and risks of fundoplication procedure  Continue PPI twice daily and Carafate 4 times daily  - Ambulatory Referral to General Surgery; Future  - sucralfate (CARAFATE) 1 g tablet; Take 1 tablet (1 g total) by mouth 4 (four) times a day  Dispense: 120 tablet; Refill: 5  - Ambulatory Referral to General Surgery; Future  - omeprazole (PriLOSEC) 40 MG capsule; Take 1 capsule (40 mg total) by mouth 2 (two) times a day  Dispense: 60 capsule; Refill: 2    ______________________________________________________________________    SUBJECTIVE: 65-year-old female with refractory GERD presented for follow-up  She has been taking Carafate and Prilosec 40 mg twice daily for long time with persistent acid reflux symptoms  Her most recent EGD a year ago in February 2022 showed short segment Lawson's esophagus with no dysplasia  She has been compliant with PPI and Carafate with no relief of his symptoms  Her most recent esophageal manometry showed overall normal motility her 24-hour pH monitoring showed borderline acid exposure and poor symptom correlation with acid reflux  Patient reported she was not taken off PPI while she had a 24-hour pH monitoring test as she could not tolerate acid reflux without PPI  REVIEW OF SYSTEMS IS OTHERWISE NEGATIVE        Historical Information   Past Medical History:   Diagnosis Date   • Chronic pain    • Disease of thyroid gland     hypothyroid   • Diverticulitis 2021   • GERD (gastroesophageal reflux disease)    • Heel pain, chronic, right 3/1/2021   • Hypertension    • Hyperthyroiditis     HYPERTHYROID; POST IODINE THERAPY   • Intractable vomiting 2021   • Meningioma ([address] )    • Migraine without aura and without status migrainosus, not intractable 2021   • Pain of upper abdomen 2020   • Sciatica of right side 9/3/2020   • Weakness of right upper extremity 2022     Past Surgical History:   Procedure Laterality Date   • APPENDECTOMY     •  SECTION     • HERNIA REPAIR       Social History   Social History     Substance and Sexual Activity   Alcohol Use Yes    Comment: occasional      Social History     Substance and Sexual Activity   Drug Use No     Social History     Tobacco Use   Smoking Status Never   Smokeless Tobacco Never     Family History   Problem Relation Age of Onset   • Endometrial cancer Family    • Ovarian cancer Mother 79   • Heart defect Mother    • Heart attack Father    • Stroke Brother    • Ovarian cancer Sister 52   • No Known Problems Maternal Grandmother    • Breast cancer Paternal Grandmother 61   • No Known Problems Sister    • No Known Problems Sister    • No Known Problems Sister    • No Known Problems Sister    • No Known Problems Daughter    • No Known Problems Maternal Grandfather    • No Known Problems Paternal Grandfather    • No Known Problems Daughter    • No Known Problems Son        Meds/Allergies       Current Outpatient Medications:   •  acetaminophen (TYLENOL) 325 mg tablet  •  dicyclomine (BENTYL) 20 mg tablet  •  fluticasone (FLONASE) 50 mcg/act nasal spray  •  levothyroxine 112 mcg tablet  •  lisinopril (ZESTRIL) 40 mg tablet  •  magnesium Oxide (MAG-OX) 400 mg TABS  •  meclizine (ANTIVERT) 50 MG tablet  •  omeprazole (PriLOSEC) 40 MG capsule  •  sucralfate (CARAFATE) 1 g tablet  •  topiramate (Topamax) 25 mg tablet  •  meloxicam (MOBIC) 7 5 mg tablet    No Known Allergies        Objective     Blood pressure 155/98, pulse 70, temperature 97 8 °F (36 6 °C), temperature source Tympanic, weight 85 8 kg (189 lb 3 2 oz), not currently breastfeeding  Body mass index is 29 63 kg/m²  PHYSICAL EXAM:      General Appearance:   Alert, cooperative, no distress   HEENT:   Normocephalic, atraumatic, anicteric      Neck:  Supple, symmetrical, trachea midline   Lungs:   Clear to auscultation bilaterally; no rales, rhonchi or wheezing; respirations unlabored    Heart[de-identified]   Regular rate and rhythm; no murmur, rub, or gallop  Abdomen:   Soft, non-tender, non-distended; normal bowel sounds; no masses, no organomegaly    Genitalia:   Deferred    Rectal:   Deferred    Extremities:  No cyanosis, clubbing or edema    Pulses:  2+ and symmetric    Skin:  No jaundice, rashes, or lesions    Lymph nodes:  No palpable cervical lymphadenopathy        Lab Results:   No visits with results within 1 Day(s) from this visit  Latest known visit with results is:   Lab on 01/30/2023   Component Date Value   • TSH 3RD GENERATON 01/30/2023 2 100    • Cholesterol 01/30/2023 163    • Triglycerides 01/30/2023 149    • HDL, Direct 01/30/2023 55    • LDL Calculated 01/30/2023 78    • Non-HDL-Chol (CHOL-HDL) 01/30/2023 108    • Sodium 01/30/2023 139    • Potassium 01/30/2023 3 9    • Chloride 01/30/2023 106    • CO2 01/30/2023 26    • ANION GAP 01/30/2023 7    • BUN 01/30/2023 11    • Creatinine 01/30/2023 0 76    • Glucose, Fasting 01/30/2023 80    • Calcium 01/30/2023 8 9    • AST 01/30/2023 24    • ALT 01/30/2023 14    • Alkaline Phosphatase 01/30/2023 61    • Total Protein 01/30/2023 7 2    • Albumin 01/30/2023 4 0    • Total Bilirubin 01/30/2023 0 67    • eGFR 01/30/2023 88          Radiology Results:   XR foot 3+ vw left    Result Date: 2/2/2023  Narrative: LEFT FOOT INDICATION:   [id number]: Pain in left foot  COMPARISON:  None VIEWS:  XR FOOT 3+ VW LEFT FINDINGS: There is no acute fracture or dislocation  Calcaneal spur(s) noted  No lytic or blastic osseous lesion  Soft tissues are unremarkable  Impression: Small plantar calcaneal spur   Workstation performed: [id number]

## 2023-02-23 ENCOUNTER — TELEPHONE (OUTPATIENT)
Dept: FAMILY MEDICINE CLINIC | Facility: CLINIC | Age: 58
End: 2023-02-23

## 2023-02-23 NOTE — TELEPHONE ENCOUNTER
Pt called the nurse line stating she rcvd a letter  Attempted to call pt no answer LVM to call back  If pt call please inform pt of xray results from 1/30/23

## 2023-03-01 NOTE — PROGRESS NOTES
Assessment/Plan:    Migraine without aura and without status migrainosus, not intractable  Patient currently well controlled on Topamax 25 mg BID and has not required a dose increase      - Continue Topamax 25 mg BID  - Let patient know they can use over the counter supplements for headache prevention: magnesium oxide 400 mg/day and/or vitamin B2 200 mg twice/day  - Educated patient about foods or beverages that can trigger a migraine attack  - Advised not to use abortive treatment >3 times per week to prevent medication overuse headache  - Advised to make a headache diary and bring it with them on next visit  Also informed patient on smart phone apps such as migraine shaista and headache tracker  - Counseled patient on smoking cessation, as this can trigger migraine attacks  - Advised patient to stay well hydrated (drink 64 ounces of water/day), get adequate sleep, and eat regularly with good intake of fruits and vegetables  Essential hypertension  BP Today: 142/80  At goal per JNC-8 guidelines  Patient denies headache, blurry vision, chest pain, SOB, dizziness, lower limb edema  CMP from 5/2022 and urine M/C from 10/2022 within normal limits  - Continue Lisinopril 40 mg daily   - Advised on low salt diet  Hypothyroidism  TSH from 1/2023 normal at 2 11     - Continue Levothyroxine 112 mcg daily; went over how to take this medication to ensure proper use  Mild episode of recurrent major depressive disorder [facility])  Patient was tearful at visit in early 2022 regarding her meningioma diagnosis; since then, has undergone treatment and is following regularly with Neurosurgery and is doing much better today  No suicidal or homicidal ideations  Patient states she is doing better  We did discuss medication/therapy today and she said she will consider therapy  PHQ-9 Today: 2    - Referral to behavioral therapy placed  - Will continue to monitor       Gastroesophageal reflux disease without esophagitis  Patient had EGD done by GI on 4/8/2022 which showed a 3 cm hiatal hernia as well as short-segment Lawson's esophagus, which was biopsied  Stomach and duodenum appeared normal and esophagitis was noted to be mostly resolved  pH study showed significant reflux; however, symptom correlation was not significant for reported symptoms of acid reflux and stomach ache  Elavil discontinued as this was not working for the patient  Seen by GI in 2/2023  As she has refractory reflux symptoms even with maximal medical management, will be seeing General Surgery for possible fundoplication procedure  - Continue Sucralfate 1 tablet 4x/day, Bentyl 20 mg Q6H PRN, and Protonix 40 mg BID    - Continue following with GI; appreciate their recommendations  Patient encouraged to call and get this testing repeated  - Reminded of date/time of surgery appointment  Meningioma [facility])  Patient presented to ED in late January 2022 with dizziness and fatigue  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  The right superior cerebellar artery and posterior communicating artery branches are intimately associated along the dorsal and medial borders of the lesion  MRI Brain (1/2022): mass measuring 1 8 x 0 6 x 1 8 cm (compared to 1 1 x 1 1 x 0 5 cm on the prior study)  No adjacent parenchymal edema identified  Minimal mass effect and flattening the right ventral rex  CT Head 5/2022: Similar 1 6 x 0 9 cm right petroclinoid mass compatible with known meningioma  Mild, similar mass effect on the right anterior rex  Patient completed frameless stereotactic radiotherapy for meningioma in 4/2022  Also completed Decadron taper    MRI brain on 10/17/2022 showed no significant change in size of right petroclival meningioma measuring 1 6 x 0 5 x 1 7 cm     - Continue following with Neurosurgery and continue to monitor   - Has upcoming repeat MRI in 5/2023- reminded of this appt today  Lawson's esophagus without dysplasia  EGD on 4/8/2022 showed Lawson's esophagus with no dysplasia and a 3 cm hiatal hernia  Health Maintenance:     - UTD on vaccines  - Patient received 3rd COVID shot on 11/28/2021  Counseled on 2nd booster   - Got colonoscopy in 10/2020- had multiple, moderate extensive pancolonic diverticula causing moderate luminal narrowing and large, protruding external and internal hemorrhoids  Will require repeat in 10 years  - Has mammogram scheduled for 6/2023  Diagnoses and all orders for this visit:    Essential hypertension  -     lisinopril (ZESTRIL) 40 mg tablet; Take 1 tablet (40 mg total) by mouth daily    Migraine without aura and without status migrainosus, not intractable    Hypothyroidism, unspecified type  -     levothyroxine 112 mcg tablet; Take 1 tablet (112 mcg total) by mouth daily    Pain of upper abdomen  -     dicyclomine (BENTYL) 20 mg tablet; Take 1 tablet (20 mg total) by mouth every 6 (six) hours    Migraine with aura and without status migrainosus, not intractable  -     topiramate (Topamax) 25 mg tablet; Take 1 tablet (25 mg total) by mouth 2 (two) times a day    Gastroesophageal reflux disease without esophagitis          Subjective:      Patient ID: Angie Gonzalez is a 56 y o  female  Angie Gonzalez is a pleasant 51-year-old female with PMH of chronic pain syndrome, hypothyroidism, meningioma s/p SRT and hypertension who presents to the clinic today for a follow up on her chronic conditions  She has no concerns other than what is noted in ROS  The following portions of the patient's history were reviewed and updated as appropriate: allergies, current medications, past family history, past medical history, past social history, past surgical history and problem list     Review of Systems   Constitutional: Negative for activity change, appetite change, chills and fever  HENT: Negative for sore throat      Respiratory: Negative for cough and shortness of breath  Cardiovascular: Negative for chest pain, palpitations and leg swelling  Gastrointestinal: Negative for abdominal pain, constipation, diarrhea, nausea and vomiting  Musculoskeletal: Negative for back pain and gait problem  Neurological: Negative for dizziness, seizures, weakness and headaches  Objective:      /80 (BP Location: Left arm, Patient Position: Sitting, Cuff Size: Standard)   Pulse 79   Temp 97 8 °F (36 6 °C) (Temporal)   Resp 18   Ht 5' 7" (1 702 m)   Wt 85 1 kg (187 lb 9 6 oz)   LMP 02/08/2023   SpO2 99%   BMI 29 38 kg/m²        Physical Exam  Vitals reviewed  Constitutional:       General: She is not in acute distress  Appearance: She is well-developed  She is not diaphoretic  HENT:      Head: Normocephalic and atraumatic  Eyes:      Conjunctiva/sclera: Conjunctivae normal    Cardiovascular:      Rate and Rhythm: Normal rate and regular rhythm  Heart sounds: Normal heart sounds  No murmur heard  No friction rub  No gallop  Pulmonary:      Effort: Pulmonary effort is normal  No respiratory distress  Breath sounds: Normal breath sounds  No wheezing or rales  Abdominal:      General: Bowel sounds are normal  There is no distension  Palpations: Abdomen is soft  There is no mass  Tenderness: There is no abdominal tenderness  There is no guarding  Musculoskeletal:         General: No tenderness or deformity  Normal range of motion  Cervical back: Normal range of motion  Right lower leg: No edema  Left lower leg: No edema  Skin:     General: Skin is warm and dry  Neurological:      General: No focal deficit present  Mental Status: She is alert and oriented to person, place, and time             Gisele Gaming MD  3/3/2023

## 2023-03-01 NOTE — ASSESSMENT & PLAN NOTE
Patient currently well controlled on Topamax 25 mg BID and has not required a dose increase      - Continue Topamax 25 mg BID  - Let patient know they can use over the counter supplements for headache prevention: magnesium oxide 400 mg/day and/or vitamin B2 200 mg twice/day  - Educated patient about foods or beverages that can trigger a migraine attack  - Advised not to use abortive treatment >3 times per week to prevent medication overuse headache  - Advised to make a headache diary and bring it with them on next visit  Also informed patient on smart phone apps such as migraine shaista and headache tracker  - Counseled patient on smoking cessation, as this can trigger migraine attacks  - Advised patient to stay well hydrated (drink 64 ounces of water/day), get adequate sleep, and eat regularly with good intake of fruits and vegetables

## 2023-03-01 NOTE — ASSESSMENT & PLAN NOTE
Patient was tearful at visit in early 2022 regarding her meningioma diagnosis; since then, has undergone treatment and is following regularly with Neurosurgery and is doing much better today  No suicidal or homicidal ideations  Patient states she is doing better  We did discuss medication/therapy today and she said she will consider therapy  PHQ-9 Today: 2    - Referral to behavioral therapy placed  - Will continue to monitor

## 2023-03-01 NOTE — ASSESSMENT & PLAN NOTE
TSH from 1/2023 normal at 2 11     - Continue Levothyroxine 112 mcg daily; went over how to take this medication to ensure proper use

## 2023-03-01 NOTE — ASSESSMENT & PLAN NOTE
BP Today: 142/80  At goal per JNC-8 guidelines  Patient denies headache, blurry vision, chest pain, SOB, dizziness, lower limb edema  CMP from 5/2022 and urine M/C from 10/2022 within normal limits  - Continue Lisinopril 40 mg daily   - Advised on low salt diet

## 2023-03-03 ENCOUNTER — OFFICE VISIT (OUTPATIENT)
Dept: FAMILY MEDICINE CLINIC | Facility: CLINIC | Age: 58
End: 2023-03-03

## 2023-03-03 VITALS
OXYGEN SATURATION: 99 % | HEART RATE: 79 BPM | WEIGHT: 187.6 LBS | DIASTOLIC BLOOD PRESSURE: 80 MMHG | TEMPERATURE: 97.8 F | SYSTOLIC BLOOD PRESSURE: 142 MMHG | HEIGHT: 67 IN | BODY MASS INDEX: 29.44 KG/M2 | RESPIRATION RATE: 18 BRPM

## 2023-03-03 DIAGNOSIS — K21.9 GASTROESOPHAGEAL REFLUX DISEASE WITHOUT ESOPHAGITIS: ICD-10-CM

## 2023-03-03 DIAGNOSIS — G43.009 MIGRAINE WITHOUT AURA AND WITHOUT STATUS MIGRAINOSUS, NOT INTRACTABLE: ICD-10-CM

## 2023-03-03 DIAGNOSIS — G43.109 MIGRAINE WITH AURA AND WITHOUT STATUS MIGRAINOSUS, NOT INTRACTABLE: ICD-10-CM

## 2023-03-03 DIAGNOSIS — E03.9 HYPOTHYROIDISM, UNSPECIFIED TYPE: ICD-10-CM

## 2023-03-03 DIAGNOSIS — R10.10 PAIN OF UPPER ABDOMEN: ICD-10-CM

## 2023-03-03 DIAGNOSIS — I10 ESSENTIAL HYPERTENSION: Primary | ICD-10-CM

## 2023-03-03 RX ORDER — LEVOTHYROXINE SODIUM 112 UG/1
112 TABLET ORAL DAILY
Qty: 90 TABLET | Refills: 3 | Status: SHIPPED | OUTPATIENT
Start: 2023-03-03

## 2023-03-03 RX ORDER — DICYCLOMINE HCL 20 MG
20 TABLET ORAL EVERY 6 HOURS
Qty: 120 TABLET | Refills: 5 | Status: SHIPPED | OUTPATIENT
Start: 2023-03-03

## 2023-03-03 RX ORDER — TOPIRAMATE 25 MG/1
25 TABLET ORAL 2 TIMES DAILY
Qty: 60 TABLET | Refills: 2 | Status: SHIPPED | OUTPATIENT
Start: 2023-03-03 | End: 2023-06-01

## 2023-03-03 RX ORDER — LISINOPRIL 40 MG/1
40 TABLET ORAL DAILY
Qty: 90 TABLET | Refills: 1 | Status: SHIPPED | OUTPATIENT
Start: 2023-03-03

## 2023-03-03 NOTE — ASSESSMENT & PLAN NOTE
Patient presented to ED in late January 2022 with dizziness and fatigue  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  The right superior cerebellar artery and posterior communicating artery branches are intimately associated along the dorsal and medial borders of the lesion  MRI Brain (1/2022): mass measuring 1 8 x 0 6 x 1 8 cm (compared to 1 1 x 1 1 x 0 5 cm on the prior study)  No adjacent parenchymal edema identified  Minimal mass effect and flattening the right ventral rex  CT Head 5/2022: Similar 1 6 x 0 9 cm right petroclinoid mass compatible with known meningioma  Mild, similar mass effect on the right anterior rex  Patient completed frameless stereotactic radiotherapy for meningioma in 4/2022  Also completed Decadron taper  MRI brain on 10/17/2022 showed no significant change in size of right petroclival meningioma measuring 1 6 x 0 5 x 1 7 cm     - Continue following with Neurosurgery and continue to monitor   - Has upcoming repeat MRI in 5/2023- reminded of this appt today

## 2023-03-03 NOTE — ASSESSMENT & PLAN NOTE
Patient had EGD done by GI on 4/8/2022 which showed a 3 cm hiatal hernia as well as short-segment Lawson's esophagus, which was biopsied  Stomach and duodenum appeared normal and esophagitis was noted to be mostly resolved  pH study showed significant reflux; however, symptom correlation was not significant for reported symptoms of acid reflux and stomach ache  Elavil discontinued as this was not working for the patient  Seen by GI in 2/2023  As she has refractory reflux symptoms even with maximal medical management, will be seeing General Surgery for possible fundoplication procedure  - Continue Sucralfate 1 tablet 4x/day, Bentyl 20 mg Q6H PRN, and Protonix 40 mg BID    - Continue following with GI; appreciate their recommendations  Patient encouraged to call and get this testing repeated  - Reminded of date/time of surgery appointment

## 2023-03-07 ENCOUNTER — TELEPHONE (OUTPATIENT)
Dept: PSYCHIATRY | Facility: CLINIC | Age: 58
End: 2023-03-07

## 2023-03-07 NOTE — TELEPHONE ENCOUNTER
Called pt regarding referral pt has been placed on wait list for med mgmt and talk therapy   Pt stated when pt is reach out to offer an appt would prefer morning appts

## 2023-03-28 ENCOUNTER — CONSULT (OUTPATIENT)
Dept: SURGERY | Facility: CLINIC | Age: 58
End: 2023-03-28

## 2023-03-28 VITALS
BODY MASS INDEX: 29.19 KG/M2 | HEIGHT: 67 IN | HEART RATE: 74 BPM | RESPIRATION RATE: 16 BRPM | WEIGHT: 186 LBS | TEMPERATURE: 95.7 F | SYSTOLIC BLOOD PRESSURE: 122 MMHG | DIASTOLIC BLOOD PRESSURE: 85 MMHG

## 2023-03-28 DIAGNOSIS — K44.9 HH (HIATUS HERNIA): Primary | ICD-10-CM

## 2023-03-28 DIAGNOSIS — K22.70 BARRETT'S ESOPHAGUS WITHOUT DYSPLASIA: ICD-10-CM

## 2023-03-28 DIAGNOSIS — K21.9 GASTROESOPHAGEAL REFLUX DISEASE WITHOUT ESOPHAGITIS: ICD-10-CM

## 2023-03-28 RX ORDER — SODIUM CHLORIDE, SODIUM LACTATE, POTASSIUM CHLORIDE, CALCIUM CHLORIDE 600; 310; 30; 20 MG/100ML; MG/100ML; MG/100ML; MG/100ML
125 INJECTION, SOLUTION INTRAVENOUS CONTINUOUS
OUTPATIENT
Start: 2023-05-25

## 2023-03-28 NOTE — H&P (VIEW-ONLY)
Assessment/Plan:    Lawson's esophagus without dysplasia  I reviewed with her the results of her testing and her images  She has evidence of Lawson's esophagus confirmed by biopsy from uncontrolled reflux  She had 24-hour pH monitoring which was significant for reflux episodes but it was done on PPI therapy and therefore the acidity was not as elevated  Her esophageal manometry was with in normal limits  she also a small hiatal hernia from EGD  We will plan on a robotic repair of her hiatal hernia with a Nissen fundoplication  The risks benefits alternatives were explained to her and she is agreeable to proceed  I told her she will likely need to stay on her medication postoperatively but might build to be weaned down to a lower dose based on gastroenterology's recommendations and management of Lawson's       Diagnoses and all orders for this visit:    [place] (hiatus hernia)  -     Ambulatory Referral to General Surgery  -     Case request operating room: REPAIR HERNIA PARAESOPHAGEAL LAPAROSCOPIC W ROBOTICS with NISSEN; Standing  -     CBC and differential; Future  -     Basic metabolic panel; Future  -     Type and screen; Future  -     Case request operating room: REPAIR HERNIA PARAESOPHAGEAL LAPAROSCOPIC W ROBOTICS with NISSEN    Lawson's esophagus without dysplasia  -     Ambulatory Referral to General Surgery  -     Case request operating room: REPAIR HERNIA PARAESOPHAGEAL LAPAROSCOPIC W ROBOTICS with NISSEN; Standing  -     CBC and differential; Future  -     Basic metabolic panel; Future  -     Type and screen;  Future  -     Case request operating room: REPAIR HERNIA PARAESOPHAGEAL LAPAROSCOPIC W ROBOTICS with NISSEN    Gastroesophageal reflux disease without esophagitis  -     Ambulatory Referral to General Surgery    Other orders  -     Diet NPO; Sips with meds; Standing  -     Apply Sequential Compression Device; Standing  -     Place sequential compression device; Standing          Subjective: "Patient ID: Jimmie Moody is a 56 y o  female  Ms Nora Osullivan is a 61-year-old female here for evaluation of refractory gastroesophageal reflux disease with Lawson's esophagitis and a hiatal hernia  She had GERD for many years and for a long time she has been taking PPIs twice a day as well as Carafate  She had an EGD in February that showed evidence of Lawson's esophagus and a small hiatal hernia  Currently she does have a lot of symptoms  Mostly epigastric pain and sometimes radiates across her abdomen  A lot of heartburn symptoms  She does bring up reflux at night  The following portions of the patient's history were reviewed and updated as appropriate: allergies, current medications, past family history, past medical history, past social history, past surgical history and problem list     Review of Systems   Constitutional: Negative for chills and fever  HENT: Negative for ear pain and sore throat  Eyes: Negative for pain and visual disturbance  Respiratory: Negative for cough and shortness of breath  Cardiovascular: Negative for chest pain and palpitations  Gastrointestinal: Negative for abdominal pain and vomiting  Genitourinary: Negative for dysuria and hematuria  Musculoskeletal: Negative for arthralgias and back pain  Skin: Negative for color change and rash  Neurological: Negative for seizures and syncope  Psychiatric/Behavioral: Negative for agitation and behavioral problems  All other systems reviewed and are negative  Objective:      /85   Pulse 74   Temp (!) 95 7 °F (35 4 °C)   Resp 16   Ht 5' 7\" (1 702 m)   Wt 84 4 kg (186 lb)   BMI 29 13 kg/m²          Physical Exam  Vitals and nursing note reviewed  Constitutional:       General: She is not in acute distress  Appearance: She is well-developed  She is not diaphoretic  HENT:      Head: Normocephalic and atraumatic  Eyes:      Pupils: Pupils are equal, round, and reactive to light   " Cardiovascular:      Rate and Rhythm: Normal rate and regular rhythm  Heart sounds: Normal heart sounds  No murmur heard  Pulmonary:      Effort: Pulmonary effort is normal  No respiratory distress  Breath sounds: Normal breath sounds  No wheezing  Abdominal:      General: Bowel sounds are normal       Palpations: Abdomen is soft  Comments: Umbilical scar from umbilical hernia repair   Musculoskeletal:         General: Normal range of motion  Cervical back: Normal range of motion and neck supple  Skin:     General: Skin is warm and dry  Neurological:      Mental Status: She is alert and oriented to person, place, and time     Psychiatric:         Behavior: Behavior normal

## 2023-03-28 NOTE — PROGRESS NOTES
Assessment/Plan:    Lawson's esophagus without dysplasia  I reviewed with her the results of her testing and her images  She has evidence of Lawson's esophagus confirmed by biopsy from uncontrolled reflux  She had 24-hour pH monitoring which was significant for reflux episodes but it was done on PPI therapy and therefore the acidity was not as elevated  Her esophageal manometry was with in normal limits  she also a small hiatal hernia from EGD  We will plan on a robotic repair of her hiatal hernia with a Nissen fundoplication  The risks benefits alternatives were explained to her and she is agreeable to proceed  I told her she will likely need to stay on her medication postoperatively but might build to be weaned down to a lower dose based on gastroenterology's recommendations and management of Lawson's       Diagnoses and all orders for this visit:    [facility] (hiatus hernia)  -     Ambulatory Referral to General Surgery  -     Case request operating room: REPAIR HERNIA PARAESOPHAGEAL LAPAROSCOPIC W ROBOTICS with NISSEN; Standing  -     CBC and differential; Future  -     Basic metabolic panel; Future  -     Type and screen; Future  -     Case request operating room: REPAIR HERNIA PARAESOPHAGEAL LAPAROSCOPIC W ROBOTICS with NISSEN    Lawson's esophagus without dysplasia  -     Ambulatory Referral to General Surgery  -     Case request operating room: REPAIR HERNIA PARAESOPHAGEAL LAPAROSCOPIC W ROBOTICS with NISSEN; Standing  -     CBC and differential; Future  -     Basic metabolic panel; Future  -     Type and screen;  Future  -     Case request operating room: REPAIR HERNIA PARAESOPHAGEAL LAPAROSCOPIC W ROBOTICS with NISSEN    Gastroesophageal reflux disease without esophagitis  -     Ambulatory Referral to General Surgery    Other orders  -     Diet NPO; Sips with meds; Standing  -     Apply Sequential Compression Device; Standing  -     Place sequential compression device; Standing          Subjective: "Patient ID: Harriett Barboza is a 56 y o  female  Ms Tari Skiff is a 62-year-old female here for evaluation of refractory gastroesophageal reflux disease with Lawson's esophagitis and a hiatal hernia  She had GERD for many years and for a long time she has been taking PPIs twice a day as well as Carafate  She had an EGD in February that showed evidence of Lawson's esophagus and a small hiatal hernia  Currently she does have a lot of symptoms  Mostly epigastric pain and sometimes radiates across her abdomen  A lot of heartburn symptoms  She does bring up reflux at night  The following portions of the patient's history were reviewed and updated as appropriate: allergies, current medications, past family history, past medical history, past social history, past surgical history and problem list     Review of Systems   Constitutional: Negative for chills and fever  HENT: Negative for ear pain and sore throat  Eyes: Negative for pain and visual disturbance  Respiratory: Negative for cough and shortness of breath  Cardiovascular: Negative for chest pain and palpitations  Gastrointestinal: Negative for abdominal pain and vomiting  Genitourinary: Negative for dysuria and hematuria  Musculoskeletal: Negative for arthralgias and back pain  Skin: Negative for color change and rash  Neurological: Negative for seizures and syncope  Psychiatric/Behavioral: Negative for agitation and behavioral problems  All other systems reviewed and are negative  Objective:      /85   Pulse 74   Temp (!) 95 7 °F (35 4 °C)   Resp 16   Ht 5' 7\" (1 702 m)   Wt 84 4 kg (186 lb)   BMI 29 13 kg/m²          Physical Exam  Vitals and nursing note reviewed  Constitutional:       General: She is not in acute distress  Appearance: She is well-developed  She is not diaphoretic  HENT:      Head: Normocephalic and atraumatic  Eyes:      Pupils: Pupils are equal, round, and reactive to light   " Cardiovascular:      Rate and Rhythm: Normal rate and regular rhythm  Heart sounds: Normal heart sounds  No murmur heard  Pulmonary:      Effort: Pulmonary effort is normal  No respiratory distress  Breath sounds: Normal breath sounds  No wheezing  Abdominal:      General: Bowel sounds are normal       Palpations: Abdomen is soft  Comments: Umbilical scar from umbilical hernia repair   Musculoskeletal:         General: Normal range of motion  Cervical back: Normal range of motion and neck supple  Skin:     General: Skin is warm and dry  Neurological:      Mental Status: She is alert and oriented to person, place, and time     Psychiatric:         Behavior: Behavior normal

## 2023-03-28 NOTE — ASSESSMENT & PLAN NOTE
I reviewed with her the results of her testing and her images  She has evidence of Lawson's esophagus confirmed by biopsy from uncontrolled reflux  She had 24-hour pH monitoring which was significant for reflux episodes but it was done on PPI therapy and therefore the acidity was not as elevated  Her esophageal manometry was with in normal limits  she also a small hiatal hernia from EGD  We will plan on a robotic repair of her hiatal hernia with a Nissen fundoplication  The risks benefits alternatives were explained to her and she is agreeable to proceed    I told her she will likely need to stay on her medication postoperatively but might build to be weaned down to a lower dose based on gastroenterology's recommendations and management of Lawson's

## 2023-03-29 ENCOUNTER — ANESTHESIA EVENT (OUTPATIENT)
Dept: PERIOP | Facility: HOSPITAL | Age: 58
End: 2023-03-29

## 2023-04-10 NOTE — PRE-PROCEDURE INSTRUCTIONS
Pre-Surgery Instructions:   Medication Instructions   • levothyroxine 112 mcg tablet Take day of surgery  • lisinopril (ZESTRIL) 40 mg tablet Hold day of surgery  • omeprazole (PriLOSEC) 40 MG capsule Take day of surgery  • sucralfate (CARAFATE) 1 g tablet Hold day of surgery  • topiramate (Topamax) 25 mg tablet Take day of surgery  Medication instructions for day surgery reviewed  Please use only a sip of water to take your instructed medications  Avoid all over the counter vitamins, supplements and NSAIDS for one week prior to surgery per anesthesia guidelines  Tylenol is ok to take as needed  You will receive a call one business day prior to surgery with an arrival time and hospital directions  If your surgery is scheduled on a Monday, the hospital will be calling you on the Friday prior to your surgery  If you have not heard from anyone by 8pm, please call the hospital supervisor through the hospital  at [phone]  Purn[facility] Fuentes [phone])  Do not eat or drink anything after midnight the night before your surgery, including candy, mints, lifesavers, or chewing gum  Do not drink alcohol 24hrs before your surgery  Try not to smoke at least 24hrs before your surgery  Follow the pre surgery showering instructions as listed in the [facility] Surgical Experience Booklet” or otherwise provided by your surgeon's office  Do not shave the surgical area 24 hours before surgery  Do not apply any lotions, creams, including makeup, cologne, deodorant, or perfumes after showering on the day of your surgery  No contact lenses, eye make-up, or artificial eyelashes  Remove nail polish, including gel polish, and any artificial, gel, or acrylic nails if possible  Remove all jewelry including rings and body piercing jewelry  Wear causal clothing that is easy to take on and off  Consider your type of surgery  Keep any valuables, jewelry, piercings at home   Please bring any specially ordered equipment (sling, braces) if indicated  Arrange for a responsible person to drive you to and from the hospital on the day of your surgery  Visitor Guidelines discussed  Call the surgeon's office with any new illnesses, exposures, or additional questions prior to surgery  Please reference your [facility] Surgical Experience Booklet” for additional information to prepare for your upcoming surgery

## 2023-04-11 ENCOUNTER — APPOINTMENT (OUTPATIENT)
Dept: LAB | Facility: HOSPITAL | Age: 58
End: 2023-04-11

## 2023-04-11 DIAGNOSIS — K44.9 HH (HIATUS HERNIA): Primary | ICD-10-CM

## 2023-04-11 DIAGNOSIS — K22.70 BARRETT'S ESOPHAGUS WITHOUT DYSPLASIA: ICD-10-CM

## 2023-04-11 LAB
ANION GAP SERPL CALCULATED.3IONS-SCNC: 6 MMOL/L (ref 4–13)
BASOPHILS # BLD AUTO: 0.04 THOUSANDS/ΜL (ref 0–0.1)
BASOPHILS NFR BLD AUTO: 1 % (ref 0–1)
BUN SERPL-MCNC: 11 MG/DL (ref 5–25)
CALCIUM SERPL-MCNC: 9.1 MG/DL (ref 8.4–10.2)
CHLORIDE SERPL-SCNC: 107 MMOL/L (ref 96–108)
CO2 SERPL-SCNC: 26 MMOL/L (ref 21–32)
CREAT SERPL-MCNC: 0.76 MG/DL (ref 0.6–1.3)
EOSINOPHIL # BLD AUTO: 0.25 THOUSAND/ΜL (ref 0–0.61)
EOSINOPHIL NFR BLD AUTO: 4 % (ref 0–6)
ERYTHROCYTE [DISTWIDTH] IN BLOOD BY AUTOMATED COUNT: 13.4 % (ref 11.6–15.1)
GFR SERPL CREATININE-BSD FRML MDRD: 88 ML/MIN/1.73SQ M
GLUCOSE P FAST SERPL-MCNC: 88 MG/DL (ref 65–99)
HCT VFR BLD AUTO: 38.5 % (ref 34.8–46.1)
HGB BLD-MCNC: 12.6 G/DL (ref 11.5–15.4)
IMM GRANULOCYTES # BLD AUTO: 0.01 THOUSAND/UL (ref 0–0.2)
IMM GRANULOCYTES NFR BLD AUTO: 0 % (ref 0–2)
LYMPHOCYTES # BLD AUTO: 2.9 THOUSANDS/ΜL (ref 0.6–4.47)
LYMPHOCYTES NFR BLD AUTO: 48 % (ref 14–44)
MCH RBC QN AUTO: 31.9 PG (ref 26.8–34.3)
MCHC RBC AUTO-ENTMCNC: 32.7 G/DL (ref 31.4–37.4)
MCV RBC AUTO: 98 FL (ref 82–98)
MONOCYTES # BLD AUTO: 0.47 THOUSAND/ΜL (ref 0.17–1.22)
MONOCYTES NFR BLD AUTO: 8 % (ref 4–12)
NEUTROPHILS # BLD AUTO: 2.31 THOUSANDS/ΜL (ref 1.85–7.62)
NEUTS SEG NFR BLD AUTO: 39 % (ref 43–75)
NRBC BLD AUTO-RTO: 0 /100 WBCS
PLATELET # BLD AUTO: 316 THOUSANDS/UL (ref 149–390)
PMV BLD AUTO: 8.7 FL (ref 8.9–12.7)
POTASSIUM SERPL-SCNC: 4.2 MMOL/L (ref 3.5–5.3)
RBC # BLD AUTO: 3.95 MILLION/UL (ref 3.81–5.12)
SODIUM SERPL-SCNC: 139 MMOL/L (ref 135–147)
WBC # BLD AUTO: 5.98 THOUSAND/UL (ref 4.31–10.16)

## 2023-04-24 ENCOUNTER — HOSPITAL ENCOUNTER (OUTPATIENT)
Facility: HOSPITAL | Age: 58
Setting detail: OUTPATIENT SURGERY
Discharge: HOME | End: 2023-04-26
Attending: SURGERY | Admitting: SURGERY

## 2023-04-24 ENCOUNTER — ANESTHESIA (OUTPATIENT)
Dept: PERIOP | Facility: HOSPITAL | Age: 58
End: 2023-04-24

## 2023-04-24 DIAGNOSIS — K44.9 HIATAL HERNIA: Primary | ICD-10-CM

## 2023-04-24 LAB
ABO GROUP BLD: NORMAL
EXT PREGNANCY TEST URINE: NEGATIVE
EXT. CONTROL: NORMAL
RH BLD: POSITIVE

## 2023-04-24 RX ORDER — HEPARIN SODIUM 5000 [USP'U]/ML
5000 INJECTION, SOLUTION INTRAVENOUS; SUBCUTANEOUS EVERY 8 HOURS SCHEDULED
Status: DISCONTINUED | OUTPATIENT
Start: 2023-04-24 | End: 2023-04-26 | Stop reason: HOSPADM

## 2023-04-24 RX ORDER — LISINOPRIL 20 MG/1
40 TABLET ORAL DAILY
Status: DISCONTINUED | OUTPATIENT
Start: 2023-04-25 | End: 2023-04-24

## 2023-04-24 RX ORDER — OXYCODONE HYDROCHLORIDE 5 MG/1
5 TABLET ORAL EVERY 4 HOURS PRN
Status: DISCONTINUED | OUTPATIENT
Start: 2023-04-24 | End: 2023-04-26 | Stop reason: HOSPADM

## 2023-04-24 RX ORDER — DEXAMETHASONE SODIUM PHOSPHATE 10 MG/ML
INJECTION, SOLUTION INTRAMUSCULAR; INTRAVENOUS AS NEEDED
Status: DISCONTINUED | OUTPATIENT
Start: 2023-04-24 | End: 2023-04-24

## 2023-04-24 RX ORDER — FENTANYL CITRATE/PF 50 MCG/ML
50 SYRINGE (ML) INJECTION
Status: DISCONTINUED | OUTPATIENT
Start: 2023-04-24 | End: 2023-04-24 | Stop reason: HOSPADM

## 2023-04-24 RX ORDER — PANTOPRAZOLE SODIUM 20 MG/1
20 TABLET, DELAYED RELEASE ORAL
Status: DISCONTINUED | OUTPATIENT
Start: 2023-04-25 | End: 2023-04-26 | Stop reason: HOSPADM

## 2023-04-24 RX ORDER — HYDROMORPHONE HCL/PF 1 MG/ML
SYRINGE (ML) INJECTION AS NEEDED
Status: DISCONTINUED | OUTPATIENT
Start: 2023-04-24 | End: 2023-04-24

## 2023-04-24 RX ORDER — MIDAZOLAM HYDROCHLORIDE 2 MG/2ML
INJECTION, SOLUTION INTRAMUSCULAR; INTRAVENOUS AS NEEDED
Status: DISCONTINUED | OUTPATIENT
Start: 2023-04-24 | End: 2023-04-24

## 2023-04-24 RX ORDER — ONDANSETRON 2 MG/ML
INJECTION INTRAMUSCULAR; INTRAVENOUS AS NEEDED
Status: DISCONTINUED | OUTPATIENT
Start: 2023-04-24 | End: 2023-04-24

## 2023-04-24 RX ORDER — LANOLIN ALCOHOL/MO/W.PET/CERES
6 CREAM (GRAM) TOPICAL
Status: DISCONTINUED | OUTPATIENT
Start: 2023-04-24 | End: 2023-04-26 | Stop reason: HOSPADM

## 2023-04-24 RX ORDER — CEFAZOLIN SODIUM 2 G/50ML
2000 SOLUTION INTRAVENOUS ONCE
Status: COMPLETED | OUTPATIENT
Start: 2023-04-24 | End: 2023-04-24

## 2023-04-24 RX ORDER — ONDANSETRON 2 MG/ML
4 INJECTION INTRAMUSCULAR; INTRAVENOUS EVERY 4 HOURS PRN
Status: DISCONTINUED | OUTPATIENT
Start: 2023-04-24 | End: 2023-04-26 | Stop reason: HOSPADM

## 2023-04-24 RX ORDER — TOPIRAMATE 25 MG/1
25 TABLET ORAL 2 TIMES DAILY
Status: DISCONTINUED | OUTPATIENT
Start: 2023-04-24 | End: 2023-04-26 | Stop reason: HOSPADM

## 2023-04-24 RX ORDER — MAGNESIUM HYDROXIDE 1200 MG/15ML
LIQUID ORAL AS NEEDED
Status: DISCONTINUED | OUTPATIENT
Start: 2023-04-24 | End: 2023-04-24 | Stop reason: HOSPADM

## 2023-04-24 RX ORDER — SODIUM CHLORIDE 9 MG/ML
125 INJECTION, SOLUTION INTRAVENOUS CONTINUOUS
Status: DISCONTINUED | OUTPATIENT
Start: 2023-04-24 | End: 2023-04-24

## 2023-04-24 RX ORDER — ACETAMINOPHEN 325 MG/1
975 TABLET ORAL EVERY 8 HOURS SCHEDULED
Status: DISCONTINUED | OUTPATIENT
Start: 2023-04-24 | End: 2023-04-26 | Stop reason: HOSPADM

## 2023-04-24 RX ORDER — ONDANSETRON 2 MG/ML
4 INJECTION INTRAMUSCULAR; INTRAVENOUS ONCE AS NEEDED
Status: DISCONTINUED | OUTPATIENT
Start: 2023-04-24 | End: 2023-04-24 | Stop reason: HOSPADM

## 2023-04-24 RX ORDER — VECURONIUM BROMIDE 1 MG/ML
INJECTION, POWDER, LYOPHILIZED, FOR SOLUTION INTRAVENOUS AS NEEDED
Status: DISCONTINUED | OUTPATIENT
Start: 2023-04-24 | End: 2023-04-24

## 2023-04-24 RX ORDER — PROPOFOL 10 MG/ML
INJECTION, EMULSION INTRAVENOUS AS NEEDED
Status: DISCONTINUED | OUTPATIENT
Start: 2023-04-24 | End: 2023-04-24

## 2023-04-24 RX ORDER — BUPIVACAINE HYDROCHLORIDE AND EPINEPHRINE 2.5; 5 MG/ML; UG/ML
INJECTION, SOLUTION EPIDURAL; INFILTRATION; INTRACAUDAL; PERINEURAL AS NEEDED
Status: DISCONTINUED | OUTPATIENT
Start: 2023-04-24 | End: 2023-04-24 | Stop reason: HOSPADM

## 2023-04-24 RX ORDER — SUCCINYLCHOLINE/SOD CL,ISO/PF 100 MG/5ML
SYRINGE (ML) INTRAVENOUS AS NEEDED
Status: DISCONTINUED | OUTPATIENT
Start: 2023-04-24 | End: 2023-04-24

## 2023-04-24 RX ORDER — SODIUM CHLORIDE, SODIUM LACTATE, POTASSIUM CHLORIDE, CALCIUM CHLORIDE 600; 310; 30; 20 MG/100ML; MG/100ML; MG/100ML; MG/100ML
125 INJECTION, SOLUTION INTRAVENOUS CONTINUOUS
Status: DISCONTINUED | OUTPATIENT
Start: 2023-04-24 | End: 2023-04-24

## 2023-04-24 RX ORDER — SODIUM CHLORIDE 9 MG/ML
125 INJECTION, SOLUTION INTRAVENOUS CONTINUOUS
Status: DISCONTINUED | OUTPATIENT
Start: 2023-04-24 | End: 2023-04-25

## 2023-04-24 RX ORDER — FENTANYL CITRATE 50 UG/ML
INJECTION, SOLUTION INTRAMUSCULAR; INTRAVENOUS AS NEEDED
Status: DISCONTINUED | OUTPATIENT
Start: 2023-04-24 | End: 2023-04-24

## 2023-04-24 RX ORDER — LABETALOL HYDROCHLORIDE 5 MG/ML
10 INJECTION, SOLUTION INTRAVENOUS EVERY 6 HOURS PRN
Status: DISCONTINUED | OUTPATIENT
Start: 2023-04-24 | End: 2023-04-26 | Stop reason: HOSPADM

## 2023-04-24 RX ORDER — LIDOCAINE HYDROCHLORIDE 20 MG/ML
INJECTION, SOLUTION EPIDURAL; INFILTRATION; INTRACAUDAL; PERINEURAL AS NEEDED
Status: DISCONTINUED | OUTPATIENT
Start: 2023-04-24 | End: 2023-04-24

## 2023-04-24 RX ORDER — HYDROMORPHONE HCL/PF 1 MG/ML
0.5 SYRINGE (ML) INJECTION
Status: DISCONTINUED | OUTPATIENT
Start: 2023-04-24 | End: 2023-04-26 | Stop reason: HOSPADM

## 2023-04-24 RX ORDER — HYDROMORPHONE HCL/PF 1 MG/ML
0.5 SYRINGE (ML) INJECTION
Status: DISCONTINUED | OUTPATIENT
Start: 2023-04-24 | End: 2023-04-24 | Stop reason: HOSPADM

## 2023-04-24 RX ORDER — OXYCODONE HYDROCHLORIDE 10 MG/1
10 TABLET ORAL EVERY 4 HOURS PRN
Status: DISCONTINUED | OUTPATIENT
Start: 2023-04-24 | End: 2023-04-26 | Stop reason: HOSPADM

## 2023-04-24 RX ADMIN — SODIUM CHLORIDE: 0.9 INJECTION, SOLUTION INTRAVENOUS at 16:43

## 2023-04-24 RX ADMIN — FENTANYL CITRATE 50 MCG: 50 INJECTION, SOLUTION INTRAMUSCULAR; INTRAVENOUS at 17:18

## 2023-04-24 RX ADMIN — LIDOCAINE HYDROCHLORIDE 100 MG: 20 INJECTION, SOLUTION EPIDURAL; INFILTRATION; INTRACAUDAL; PERINEURAL at 14:40

## 2023-04-24 RX ADMIN — FENTANYL CITRATE 50 MCG: 50 INJECTION INTRAMUSCULAR; INTRAVENOUS at 15:22

## 2023-04-24 RX ADMIN — VECURONIUM BROMIDE 2 MG: 1 INJECTION, POWDER, LYOPHILIZED, FOR SOLUTION INTRAVENOUS at 16:12

## 2023-04-24 RX ADMIN — VECURONIUM BROMIDE 6 MG: 1 INJECTION, POWDER, LYOPHILIZED, FOR SOLUTION INTRAVENOUS at 14:42

## 2023-04-24 RX ADMIN — FENTANYL CITRATE 50 MCG: 50 INJECTION, SOLUTION INTRAMUSCULAR; INTRAVENOUS at 17:23

## 2023-04-24 RX ADMIN — SUGAMMADEX 200 MG: 100 INJECTION, SOLUTION INTRAVENOUS at 16:52

## 2023-04-24 RX ADMIN — SODIUM CHLORIDE 125 ML/HR: 0.9 INJECTION, SOLUTION INTRAVENOUS at 18:20

## 2023-04-24 RX ADMIN — HYDROMORPHONE HYDROCHLORIDE 0.5 MG: 1 INJECTION, SOLUTION INTRAMUSCULAR; INTRAVENOUS; SUBCUTANEOUS at 17:33

## 2023-04-24 RX ADMIN — FENTANYL CITRATE 50 MCG: 50 INJECTION INTRAMUSCULAR; INTRAVENOUS at 17:03

## 2023-04-24 RX ADMIN — PROPOFOL 200 MG: 10 INJECTION, EMULSION INTRAVENOUS at 14:40

## 2023-04-24 RX ADMIN — VECURONIUM BROMIDE 1 MG: 1 INJECTION, POWDER, LYOPHILIZED, FOR SOLUTION INTRAVENOUS at 14:40

## 2023-04-24 RX ADMIN — FENTANYL CITRATE 50 MCG: 50 INJECTION INTRAMUSCULAR; INTRAVENOUS at 15:14

## 2023-04-24 RX ADMIN — FENTANYL CITRATE 50 MCG: 50 INJECTION INTRAMUSCULAR; INTRAVENOUS at 16:58

## 2023-04-24 RX ADMIN — FENTANYL CITRATE 100 MCG: 50 INJECTION INTRAMUSCULAR; INTRAVENOUS at 14:40

## 2023-04-24 RX ADMIN — OXYCODONE HYDROCHLORIDE 10 MG: 10 TABLET ORAL at 19:57

## 2023-04-24 RX ADMIN — Medication 100 MG: at 14:40

## 2023-04-24 RX ADMIN — CEFAZOLIN SODIUM 2000 MG: 2 SOLUTION INTRAVENOUS at 14:38

## 2023-04-24 RX ADMIN — ONDANSETRON 4 MG: 2 INJECTION INTRAMUSCULAR; INTRAVENOUS at 20:13

## 2023-04-24 RX ADMIN — HYDROMORPHONE HYDROCHLORIDE 0.5 MG: 1 INJECTION, SOLUTION INTRAMUSCULAR; INTRAVENOUS; SUBCUTANEOUS at 21:23

## 2023-04-24 RX ADMIN — ONDANSETRON 4 MG: 2 INJECTION INTRAMUSCULAR; INTRAVENOUS at 16:41

## 2023-04-24 RX ADMIN — HYDROMORPHONE HYDROCHLORIDE 0.5 MG: 1 INJECTION, SOLUTION INTRAMUSCULAR; INTRAVENOUS; SUBCUTANEOUS at 15:53

## 2023-04-24 RX ADMIN — SODIUM CHLORIDE: 0.9 INJECTION, SOLUTION INTRAVENOUS at 15:22

## 2023-04-24 RX ADMIN — PHENYLEPHRINE HYDROCHLORIDE 20 MCG/MIN: 10 INJECTION INTRAVENOUS at 15:10

## 2023-04-24 RX ADMIN — SODIUM CHLORIDE 125 ML/HR: 0.9 INJECTION, SOLUTION INTRAVENOUS at 11:01

## 2023-04-24 RX ADMIN — DEXAMETHASONE SODIUM PHOSPHATE 10 MG: 10 INJECTION INTRAMUSCULAR; INTRAVENOUS at 14:45

## 2023-04-24 RX ADMIN — MIDAZOLAM 2 MG: 1 INJECTION INTRAMUSCULAR; INTRAVENOUS at 14:32

## 2023-04-24 NOTE — OP NOTE
OPERATIVE REPORT  PATIENT NAME: Santa Camacho    :  1965  MRN: [id number]  Pt Location: [facility] OR ROOM 03    SURGERY DATE: 2023    Surgeon(s) and Role:     * Kathia Pearson MD - Primary     * Eva Ford DO - Assisting    Preop Diagnosis:  HH (hiatus hernia) [K44 9]  Lawson's esophagus without dysplasia [K22 70]    Post-Op Diagnosis Codes:     * HH (hiatus hernia) [K44 9]     * Lawson's esophagus without dysplasia [K22 70]    Procedure(s):  REPAIR HERNIA HIATAL HERNIA REPAIR LAPAROSCOPIC  w/ NISSEN FUNDOPLICATION    Specimen(s):  * No specimens in log *    Estimated Blood Loss:   Minimal    Drains:  * No LDAs found *    Anesthesia Type:   General    Operative Indications:  HH (hiatus hernia) [K44 9]  Lawson's esophagus without dysplasia [K22 70]      Operative Findings:  Hiatal hernia    Complications:   None    Procedure and Technique:  The patient was seen again in the Holding Room  The risks, benefits, complications, treatment options, and expected outcomes were discussed with the patient  The patient and/or family concurred with the proposed plan, giving informed consent  The site of surgery properly noted/marked  The patient was taken to Operating Room, identified as Santa Camacho  and the procedure verified  A Time Out was held after prepping and draping in sterile fashion  The above information was confirmed  Prior to the induction of general anesthesia, antibiotic prophylaxis was administered  General endotracheal anesthesia was then administered and tolerated well  After the induction, the abdomen was prepped in the usual sterile fashion  An incision was made hand's breath below the xiphoid with 11 blade scalpel  Tissueswere dissected over clamps down to the fascia  The fascia was elevated and opened with an 11 blade scalpel  The 11 trocar was placed in under direct visualization   A 5mm   Trocar was subxiphoid and the liver retractor was placed in and the left lobe of liver was elevated revealing the  Diaphragm and the retractor was secured to the bed  An 11 mm trochars placed in the midclavicular line on the left  Last 2 5mm trochars were placed to the right of the xiphoid and left lateral       Attention was turned to the stomach where the pars flaccida was opened with Harmonic scalpel to the right rina  The peritoneum at the edge of the rina was opened with Harmonic scalpel entering into the avascular plane around the hernia sac  The attachments w dissected along the rina towards the anterior portion  Using blunt dissection the avascular plane was dissected around the hernia sac dissecting it out of the mediastinum  Then the left rina was freed up from hernial attachments reducing the hernia sac  Next the avascular plane was dissected under the esophagus across the left side  A Penrose drain was passed through the space and wrapped around the esophagus and secured with a Endoloop  Dissection was continued until the gastroesophageal junction was at least 2 cm inside the abdominal cavity  The vagus nerve was preserved  The short gastrics were dissected with Harmonic scalpel from midway down the greater curvature all the way up to the left rina  Care was taken to ensure hemostasis  Also to ensure careful dissection along the gastro -splenic ligament  Now the dissection was complete  The GE junction was identified  At least 2 cm in the abdominal cavity  The Cruroplasty was performed next  The posterior rina were reapproximated with interrupted simple 0 Ethibond  Sutures  A bougie was in place to prevent closing this defect to0 tightly  The fundus was grasped and pulled posterior to esophagus and the 2 ends were wrapped across and held together  Came together nicely without tension  A 54 Fr  bougie was placed in the esophagus for sizing      The fundoplication was performed with 3 interrupted 0  Ethibond sutures taking a small bite of the esophagus to prevent slipping  The bougie was carefully removed  The wrap was loose enough to prevent dysphasia  The liver retractor was removed  The 2  11 mm trochars  Had fascia closed with figure-of-eight 0 Vicryl suture using the Ranfac suture passer  The gas was deflated  The skin was closed with interrupted 4-0 Monocryl suture  Histocryl was applied  I was present for the entire procedure      Patient Disposition:  PACU         SIGNATURE: Aline Delarosa MD  DATE: April 24, 2023  TIME: 4:55 PM

## 2023-04-24 NOTE — ANESTHESIA PREPROCEDURE EVALUATION
Procedure:  REPAIR HERNIA PARAESOPHAGEAL LAP w/ NISSEN (Abdomen)    Relevant Problems   CARDIO   (+) Essential hypertension   (+) Migraine without aura and without status migrainosus, not intractable      ENDO   (+) Hypothyroidism      GI/HEPATIC   (+) Gastroesophageal reflux disease without esophagitis      NEURO/PSYCH   (+) Migraine without aura and without status migrainosus, not intractable   (+) Mild episode of recurrent major depressive disorder (HCC)        Physical Exam    Airway    Mallampati score: II  TM Distance: >3 FB  Neck ROM: full     Dental   Comment: All upper implants,     Cardiovascular  Rhythm: regular, Rate: normal, Cardiovascular exam normal    Pulmonary  Pulmonary exam normal Breath sounds clear to auscultation,     Other Findings        Anesthesia Plan  ASA Score- 2     Anesthesia Type- general with ASA Monitors  Additional Monitors:   Airway Plan: ETT  Comment: RSI of GA  ETT          Plan Factors-    Chart reviewed  Existing labs reviewed  Patient summary reviewed  Patient is not a current smoker  Patient not instructed to abstain from smoking on day of procedure  Patient did not smoke on day of surgery  There is medical exclusion for perioperative obstructive sleep apnea risk education  Induction- intravenous and rapid sequence induction  Postoperative Plan-     Informed Consent- Anesthetic plan and risks discussed with patient

## 2023-04-24 NOTE — INTERVAL H&P NOTE
H&P reviewed  After examining the patient I find no changes in the patients condition since the H&P had been written      Vitals:    04/24/23 1037   BP: 139/80   Pulse: 80   Resp: 16   Temp: 98 6 °F (37 °C)   SpO2: 98%

## 2023-04-24 NOTE — PLAN OF CARE
Problem: MOBILITY - ADULT  Goal: Maintain or return to baseline ADL function  Description: INTERVENTIONS:  -  Assess patient's ability to carry out ADLs; assess patient's baseline for ADL function and identify physical deficits which impact ability to perform ADLs (bathing, care of mouth/teeth, toileting, grooming, dressing, etc )  - Assess/evaluate cause of self-care deficits   - Assess range of motion  - Assess patient's mobility; develop plan if impaired  - Assess patient's need for assistive devices and provide as appropriate  - Encourage maximum independence but intervene and supervise when necessary  - Involve family in performance of ADLs  - Assess for home care needs following discharge   - Consider OT consult to assist with ADL evaluation and planning for discharge  - Provide patient education as appropriate  Outcome: Progressing  Goal: Maintains/Returns to pre admission functional level  Description: INTERVENTIONS:  - Perform BMAT or MOVE assessment daily    - Set and communicate daily mobility goal to care team and patient/family/caregiver     - Collaborate with rehabilitation services on mobility goals if consulted  - Out of bed to chair 3 times a day   - Out of bed for meals 3 times a day  - Out of bed for toileting  - Record patient progress and toleration of activity level   Outcome: Progressing     Problem: PAIN - ADULT  Goal: Verbalizes/displays adequate comfort level or baseline comfort level  Description: Interventions:  - Encourage patient to monitor pain and request assistance  - Assess pain using appropriate pain scale  - Administer analgesics based on type and severity of pain and evaluate response  - Implement non-pharmacological measures as appropriate and evaluate response  - Consider cultural and social influences on pain and pain management  - Notify physician/advanced practitioner if interventions unsuccessful or patient reports new pain  Outcome: Progressing     Problem: METABOLIC, FLUID AND ELECTROLYTES - ADULT  Goal: Electrolytes maintained within normal limits  Description: INTERVENTIONS:  - Monitor labs and assess patient for signs and symptoms of electrolyte imbalances  - Administer electrolyte replacement as ordered  - Monitor response to electrolyte replacements, including repeat lab results as appropriate  - Instruct patient on fluid and nutrition as appropriate  Outcome: Progressing  Goal: Fluid balance maintained  Description: INTERVENTIONS:  - Monitor labs   - Monitor I/O and WT  - Instruct patient on fluid and nutrition as appropriate  - Assess for signs & symptoms of volume excess or deficit  Outcome: Progressing  Goal: Glucose maintained within target range  Description: INTERVENTIONS:  - Monitor Blood Glucose as ordered  - Assess for signs and symptoms of hyperglycemia and hypoglycemia  - Administer ordered medications to maintain glucose within target range  - Assess nutritional intake and initiate nutrition service referral as needed  Outcome: Progressing     Problem: SKIN/TISSUE INTEGRITY - ADULT  Goal: Incision(s), wounds(s) or drain site(s) healing without S/S of infection  Description: INTERVENTIONS  - Assess and document dressing, incision, wound bed, drain sites and surrounding tissue  - Provide patient and family education  Outcome: Progressing

## 2023-04-24 NOTE — ANESTHESIA POSTPROCEDURE EVALUATION
Post-Op Assessment Note    CV Status:  Stable  Pain Score: 1    Pain management: adequate     Mental Status:  Alert and awake   Hydration Status:  Euvolemic   PONV Controlled:  Controlled   Airway Patency:  Patent      Post Op Vitals Reviewed: Yes      Staff: Anesthesiologist         No notable events documented      /86 (04/24/23 1723)    Temp      Pulse 104 (04/24/23 1727)   Resp 13 (04/24/23 1727)    SpO2 96 % (04/24/23 1727)

## 2023-04-25 ENCOUNTER — APPOINTMENT (OUTPATIENT)
Dept: RADIOLOGY | Facility: HOSPITAL | Age: 58
End: 2023-04-25

## 2023-04-25 PROBLEM — K44.9 HIATAL HERNIA: Status: ACTIVE | Noted: 2023-04-25

## 2023-04-25 LAB
ANION GAP SERPL CALCULATED.3IONS-SCNC: 6 MMOL/L (ref 4–13)
BASOPHILS # BLD AUTO: 0.01 THOUSANDS/ΜL (ref 0–0.1)
BASOPHILS NFR BLD AUTO: 0 % (ref 0–1)
BUN SERPL-MCNC: 6 MG/DL (ref 5–25)
CALCIUM SERPL-MCNC: 8.3 MG/DL (ref 8.4–10.2)
CHLORIDE SERPL-SCNC: 106 MMOL/L (ref 96–108)
CO2 SERPL-SCNC: 24 MMOL/L (ref 21–32)
CREAT SERPL-MCNC: 0.59 MG/DL (ref 0.6–1.3)
EOSINOPHIL # BLD AUTO: 0 THOUSAND/ΜL (ref 0–0.61)
EOSINOPHIL NFR BLD AUTO: 0 % (ref 0–6)
ERYTHROCYTE [DISTWIDTH] IN BLOOD BY AUTOMATED COUNT: 13.8 % (ref 11.6–15.1)
GFR SERPL CREATININE-BSD FRML MDRD: 103 ML/MIN/1.73SQ M
GLUCOSE P FAST SERPL-MCNC: 113 MG/DL (ref 65–99)
GLUCOSE SERPL-MCNC: 113 MG/DL (ref 65–140)
HCT VFR BLD AUTO: 38.5 % (ref 34.8–46.1)
HGB BLD-MCNC: 12.6 G/DL (ref 11.5–15.4)
IMM GRANULOCYTES # BLD AUTO: 0.04 THOUSAND/UL (ref 0–0.2)
IMM GRANULOCYTES NFR BLD AUTO: 1 % (ref 0–2)
LYMPHOCYTES # BLD AUTO: 1.35 THOUSANDS/ΜL (ref 0.6–4.47)
LYMPHOCYTES NFR BLD AUTO: 16 % (ref 14–44)
MCH RBC QN AUTO: 31.6 PG (ref 26.8–34.3)
MCHC RBC AUTO-ENTMCNC: 32.7 G/DL (ref 31.4–37.4)
MCV RBC AUTO: 97 FL (ref 82–98)
MONOCYTES # BLD AUTO: 0.59 THOUSAND/ΜL (ref 0.17–1.22)
MONOCYTES NFR BLD AUTO: 7 % (ref 4–12)
NEUTROPHILS # BLD AUTO: 6.57 THOUSANDS/ΜL (ref 1.85–7.62)
NEUTS SEG NFR BLD AUTO: 76 % (ref 43–75)
NRBC BLD AUTO-RTO: 0 /100 WBCS
PLATELET # BLD AUTO: 255 THOUSANDS/UL (ref 149–390)
PMV BLD AUTO: 9.4 FL (ref 8.9–12.7)
POTASSIUM SERPL-SCNC: 4.4 MMOL/L (ref 3.5–5.3)
RBC # BLD AUTO: 3.99 MILLION/UL (ref 3.81–5.12)
SODIUM SERPL-SCNC: 136 MMOL/L (ref 135–147)
WBC # BLD AUTO: 8.56 THOUSAND/UL (ref 4.31–10.16)

## 2023-04-25 RX ORDER — DEXTROSE, SODIUM CHLORIDE, AND POTASSIUM CHLORIDE 5; .45; .15 G/100ML; G/100ML; G/100ML
50 INJECTION INTRAVENOUS CONTINUOUS
Status: DISCONTINUED | OUTPATIENT
Start: 2023-04-25 | End: 2023-04-26

## 2023-04-25 RX ORDER — METOCLOPRAMIDE HYDROCHLORIDE 5 MG/ML
10 INJECTION INTRAMUSCULAR; INTRAVENOUS EVERY 6 HOURS PRN
Status: DISCONTINUED | OUTPATIENT
Start: 2023-04-25 | End: 2023-04-26 | Stop reason: HOSPADM

## 2023-04-25 RX ADMIN — HEPARIN SODIUM 5000 UNITS: 5000 INJECTION INTRAVENOUS; SUBCUTANEOUS at 00:01

## 2023-04-25 RX ADMIN — HYDROMORPHONE HYDROCHLORIDE 0.5 MG: 1 INJECTION, SOLUTION INTRAMUSCULAR; INTRAVENOUS; SUBCUTANEOUS at 21:18

## 2023-04-25 RX ADMIN — PANTOPRAZOLE SODIUM 20 MG: 20 TABLET, DELAYED RELEASE ORAL at 06:56

## 2023-04-25 RX ADMIN — TOPIRAMATE 25 MG: 25 TABLET, FILM COATED ORAL at 16:46

## 2023-04-25 RX ADMIN — HEPARIN SODIUM 5000 UNITS: 5000 INJECTION INTRAVENOUS; SUBCUTANEOUS at 14:37

## 2023-04-25 RX ADMIN — ONDANSETRON 4 MG: 2 INJECTION INTRAMUSCULAR; INTRAVENOUS at 01:18

## 2023-04-25 RX ADMIN — TOPIRAMATE 25 MG: 25 TABLET, FILM COATED ORAL at 08:11

## 2023-04-25 RX ADMIN — ACETAMINOPHEN 325MG 975 MG: 325 TABLET ORAL at 21:19

## 2023-04-25 RX ADMIN — SODIUM CHLORIDE 125 ML/HR: 0.9 INJECTION, SOLUTION INTRAVENOUS at 01:43

## 2023-04-25 RX ADMIN — OXYCODONE HYDROCHLORIDE 10 MG: 10 TABLET ORAL at 06:56

## 2023-04-25 RX ADMIN — OXYCODONE HYDROCHLORIDE 10 MG: 10 TABLET ORAL at 00:01

## 2023-04-25 RX ADMIN — HEPARIN SODIUM 5000 UNITS: 5000 INJECTION INTRAVENOUS; SUBCUTANEOUS at 21:19

## 2023-04-25 RX ADMIN — SODIUM CHLORIDE 125 ML/HR: 0.9 INJECTION, SOLUTION INTRAVENOUS at 11:17

## 2023-04-25 RX ADMIN — METOCLOPRAMIDE 10 MG: 5 INJECTION, SOLUTION INTRAMUSCULAR; INTRAVENOUS at 10:11

## 2023-04-25 RX ADMIN — DEXTROSE, SODIUM CHLORIDE, AND POTASSIUM CHLORIDE 50 ML/HR: 5; .45; .15 INJECTION INTRAVENOUS at 12:54

## 2023-04-25 RX ADMIN — HYDROMORPHONE HYDROCHLORIDE 0.5 MG: 1 INJECTION, SOLUTION INTRAMUSCULAR; INTRAVENOUS; SUBCUTANEOUS at 01:18

## 2023-04-25 RX ADMIN — Medication 6 MG: at 00:01

## 2023-04-25 RX ADMIN — ONDANSETRON 4 MG: 2 INJECTION INTRAMUSCULAR; INTRAVENOUS at 19:49

## 2023-04-25 RX ADMIN — ACETAMINOPHEN 325MG 975 MG: 325 TABLET ORAL at 06:56

## 2023-04-25 RX ADMIN — IOHEXOL 30 ML: 350 INJECTION, SOLUTION INTRAVENOUS at 09:25

## 2023-04-25 RX ADMIN — ONDANSETRON 4 MG: 2 INJECTION INTRAMUSCULAR; INTRAVENOUS at 08:11

## 2023-04-25 RX ADMIN — PANTOPRAZOLE SODIUM 20 MG: 20 TABLET, DELAYED RELEASE ORAL at 16:46

## 2023-04-25 RX ADMIN — ACETAMINOPHEN 325MG 975 MG: 325 TABLET ORAL at 00:01

## 2023-04-25 RX ADMIN — OXYCODONE HYDROCHLORIDE 10 MG: 10 TABLET ORAL at 19:42

## 2023-04-25 RX ADMIN — ACETAMINOPHEN 325MG 975 MG: 325 TABLET ORAL at 14:36

## 2023-04-25 RX ADMIN — HYDROMORPHONE HYDROCHLORIDE 0.5 MG: 1 INJECTION, SOLUTION INTRAMUSCULAR; INTRAVENOUS; SUBCUTANEOUS at 08:28

## 2023-04-25 RX ADMIN — HEPARIN SODIUM 5000 UNITS: 5000 INJECTION INTRAVENOUS; SUBCUTANEOUS at 06:56

## 2023-04-25 NOTE — UTILIZATION REVIEW
"Initial Clinical Review    Elective OP surgical procedure  Age/Sex: 56 y o  female  Surgery Date: 4/24  Procedure:   Preop Diagnosis:  HH (hiatus hernia) [K44 9]  Lawson's esophagus without dysplasia [K22 70]     Post-Op Diagnosis Codes:     * HH (hiatus hernia) [K44 9]     * Lawson's esophagus without dysplasia [K22 70]     Procedure(s):  REPAIR HERNIA HIATAL HERNIA REPAIR LAPAROSCOPIC  w/ NISSEN FUNDOPLICATION    Anesthesia: General  Operative Findings: Hiatal hernia    Date: 4/25 POD#1 Progress Note:    had intermittent regurg of clears  Handling secretions  Pain well controlled  Passing flatus  Also had some severe pain rated 7/10 treated with analgesia  This afternoon continues with N/V, continue w/ analgesia  Remains on IV Fluids w/ KCl repletion, using IV Dilaudid and IV Reglan  Date: 4/26  POD #2:   Pt had 3 doses IV Dilaudid and PO analgesia along with  IV Zofran on 4/25  Has not had any analgesia overnight  Remains on IV fluids  Plan for d/c today  Admission Orders: Date/Time/Statement:   Admission Orders (From admission, onward)     Ordered        04/24/23 1808  Place in Observation  Once                      Orders Placed This Encounter   Procedures   • Place in Observation     Standing Status:   Standing     Number of Occurrences:   1     Order Specific Question:   Level of Care     Answer:   Med Surg [16]     Vital Signs: /85   Pulse 78   Temp 98 1 °F (36 7 °C)   Resp (!) 23   Ht 5' 7\" (1 702 m)   Wt 82 kg (180 lb 12 4 oz)   LMP 04/07/2023 (Exact Date)   SpO2 97%   BMI 28 31 kg/m²     Pertinent Labs/Diagnostic Test Results:   FL upper GI UGI   Final Result by Torsten Bolton MD (04/25 0378)      No evidence of leak  Delayed esophageal emptying likely postoperative in etiology           Workstation performed: [id number]               Results from last 7 days   Lab Units 04/25/23  0459   WBC Thousand/uL 8 56   HEMOGLOBIN g/dL 12 6   HEMATOCRIT % 38 5   PLATELETS " Thousands/uL 255   NEUTROS ABS Thousands/µL 6 57         Results from last 7 days   Lab Units 04/25/23  0459   SODIUM mmol/L 136   POTASSIUM mmol/L 4 4   CHLORIDE mmol/L 106   CO2 mmol/L 24   ANION GAP mmol/L 6   BUN mg/dL 6   CREATININE mg/dL 0 59*   EGFR ml/min/1 73sq m 103   CALCIUM mg/dL 8 3*             Results from last 7 days   Lab Units 04/25/23  0459   GLUCOSE RANDOM mg/dL 113     Diet: full liquid  Mobility: *ambulate 4 x daily   DVT Prophylaxis: SCDs, Heparin sq     Medications/Pain Control:   Scheduled Medications:  acetaminophen, 975 mg, Oral, Q8H MIRIAM  heparin (porcine), 5,000 Units, Subcutaneous, Q8H MIRIAM  pantoprazole, 20 mg, Oral, BID AC  topiramate, 25 mg, Oral, BID      Continuous IV Infusions:  dextrose 5 % and sodium chloride 0 45 % with KCl 20 mEq/L, 50 mL/hr, Intravenous, Continuous      PRN Meds:  HYDROmorphone, 0 5 mg, Intravenous, Q1H PRN - x 1 4/24, x 3 4/25  labetalol, 10 mg, Intravenous, Q6H PRN  melatonin, 6 mg, Oral, HS PRN - x 1 4/25  metoclopramide, 10 mg, Intravenous, Q6H PRN - x 1 4/25  ondansetron, 4 mg, Intravenous, Q4H PRN x 3 4/25  oxyCODONE, 10 mg, Oral, Q4H PRN - x 1 4/24, x 3 4/25  oxyCODONE, 5 mg, Oral, Q4H PRN        Network Utilization Review Department  ATTENTION: Please call with any questions or concerns to [phone] and carefully listen to the prompts so that you are directed to the right person  All voicemails are confidential   Yoko Abel all requests for admission clinical reviews, approved or denied determinations and any other requests to dedicated fax number below belonging to the campus where the patient is receiving treatment   List of dedicated fax numbers for the Facilities:  [address] DENIALS (Administrative/Medical Necessity) [phone]   [address] (Maternity/NICU/Pediatrics) [phone]   100 [facility] [address] - [facility] [phone]   [address] [place] [address] [facility] [address] [facility] 310 [facility] 134 815 [facility] [phone]

## 2023-04-25 NOTE — QUICK NOTE
"   Post op check: General surgery  Assessment   Patient is a 56 y o  female who presented with paraesophageal hernia, now status post laparoscopic hiatal hernia repair with Niesen fundoplication  Plan:  -Clear liquid diet, supplemental IVF  - Pain and nausea control as needed  - DVT prophylaxis  - PPI  - Home medications  - A m  labs    Subjective: Pt feeling well, although she does report regurgitation of clear liquids; she is able to tolerate her own secretions without issue  Pain is well controlled  Denies fevers/chills, chest pain, sob  Objective:  Blood pressure 143/90, pulse 89, temperature 97 7 °F (36 5 °C), resp  rate 18, height 5' 7\" (1 702 m), weight 82 kg (180 lb 12 4 oz), last menstrual period 04/07/2023, SpO2 100 %, not currently breastfeeding  No intake/output data recorded      Scheduled Meds:  Current Facility-Administered Medications   Medication Dose Route Frequency Provider Last Rate   • acetaminophen  975 mg Oral [facility] Shayna Engle MD     • heparin (porcine)  5,000 Units Subcutaneous [facility] Shayna Engle MD     • HYDROmorphone  0 5 mg Intravenous Q1H PRN Shayna Engle MD     • labetalol  10 mg Intravenous Q6H PRN Liliam Hannah DO     • lactated ringers  1,000 mL Intravenous Once PRN Shayna Engle MD      And   • lactated ringers  1,000 mL Intravenous Once PRN Shayna Engle MD     • melatonin  6 mg Oral HS PRN Stephenie Guzman MD     • ondansetron  4 mg Intravenous Q4H PRN Stephenie Guzman MD     • oxyCODONE  10 mg Oral Q4H PRN Shayna Engle MD     • oxyCODONE  5 mg Oral Q4H PRN Shayna Engle MD     • pantoprazole  20 mg Oral BID AC Shayna Engle MD     • sodium chloride  1,000 mL Intravenous Once PRN Shayna Engle MD      And   • sodium chloride  1,000 mL Intravenous Once PRN Shayna Engle MD     • sodium chloride  125 mL/hr Intravenous Continuous Shayna Engle  mL/hr (04/24/23 1820)   • topiramate  25 mg Oral BID Shayna Engle MD   " Continuous Infusions:sodium chloride, 125 mL/hr, Last Rate: 125 mL/hr (04/24/23 1820)      PRN Meds: •  HYDROmorphone  •  labetalol  •  lactated ringers **AND** lactated ringers  •  melatonin  •  ondansetron  •  oxyCODONE  •  oxyCODONE  •  sodium chloride **AND** sodium chloride      Physical Exam   Gen:  Well-developed, well-nourished female NAD  HEENT: EOMI  CV: RRR,   Lungs: Normal respiratory effort  Abd: soft, appropriately tender, mildly distended, incisions clean dry and intact neuro:  AxO x3      Zoey Brumfield MD

## 2023-04-25 NOTE — QUICK NOTE
Patient continues to have nausea and vomiting  Will hold discharge for now  Continue with  IV pain meds until she can tolerate po  Discussed with nursing

## 2023-04-25 NOTE — PROGRESS NOTES
"General Surgery  Progress Note   Jimmie Moody 56 y o  female MRN: [id number]  Unit/Bed#: Rogelio Martinez [address] 87 212-02 Encounter: [id number]    Assessment:  Patient is a 56 y o  female who presented with paraesophageal hernia, now status post laparoscopic hiatal hernia repair with Niesen fundoplication  Afebrile, VSS  WBC: 8 from 5; Hb 6    UOP: Not recorded    Plan:  - Continue clears as tolerated  - Continue IV fluids  - Plan for upper GI today  - Pain and nausea control as needed  - DVT prophylaxis  - Dispo planning    Subjective/Objective     Subjective: Patient seen and examined at bedside, in no acute distress  Overnight did have episodes of intermittent regurgitation with clear liquids  Has been able to tolerate secretions without issue  Patient's pain is well controlled  Pt denies nausea or vomiting  Passing flatus  Objective:     Vitals:Blood pressure 132/83, pulse 75, temperature (!) 97 2 °F (36 2 °C), resp  rate 18, height 5' 7\" (1 702 m), weight 82 kg (180 lb 12 4 oz), last menstrual period 2023, SpO2 98 %, not currently breastfeeding  ,Body mass index is 28 31 kg/m²  Temp (24hrs), Av 2 °F (36 8 °C), Min:97 2 °F (36 2 °C), Max:98 8 °F (37 1 °C)  Current: Temperature: (!) 97 2 °F (36 2 °C)      Intake/Output Summary (Last 24 hours) at 2023 0839  Last data filed at 2023 0142  Gross per 24 hour   Intake 3150 ml   Output --   Net 3150 ml       Invasive Devices     Peripheral Intravenous Line  Duration           Peripheral IV 23 Dorsal (posterior); Left Forearm <1 day                Physical Exam:  General: No acute distress, alert and oriented  CV: Well perfused, regular rate and rhythm  Lungs: Normal work of breathing, no increased respiratory effort  Abdomen: Soft, appropriately tender, mildly distended    Incisions clean dry and intact  Extremities: No edema, clubbing or cyanosis  Skin: Warm, dry    Lab Results: Results: I have personally reviewed all pertinent " laboratory/tests results  VTE Prophylaxis: Sequential compression device (Venodyne)  and Heparin    Lorena Garcia MD  4/25/2023

## 2023-04-25 NOTE — DISCHARGE INSTR - AVS FIRST PAGE
Post-Operative Care Instructions  Dr Lorraine Arango MD, [facility]  [phone]    1  General: You will feel pulling sensations around the wound or funny aches and pains around the incisions  This is normal  Even minor surgery is a change in your body and this is your body’s way of reaction to it  If you have had abdominal surgery, it may help to support the incision with a small pillow or blanket for comfort when moving or coughing  2  Wound care:  Okay to shower  The glue will fall off over the next week or 2     3  Water: You may shower over the wound, unless there are drain tubes left in place  Do not bathe or use a pool or hot tub until cleared by the physician  4  Activity: You may go up and down stairs, walk as much as you are comfortable, but walk at least 3 times each day  If you have had abdominal surgery, do not lift anything heavier than 15 pounds for at least 2-4 weeks, unless cleared by the doctor  5  Diet: You may continue with a full liquid diet  Instructions per Dr Irma Villalpando office  6  Medications: Resume all of your previous medications, unless told otherwise by the doctor  Avoid aspirin or ibuprofen (Advil, Motrin, etc ) products  Tylenol is always fine, unless you are taking any narcotic pain medication containing Tylenol (such as Percocet, Darvocet, Vicodin, or anything containing acetaminophen)  Do not take Tylenol if you're taking these medications  You do not need to take the narcotic pain medications unless you are having significant pain and discomfort  7  Driving: You will need someone to drive you home on the day of surgery  Do not drive or make any important decisions while on narcotic pain medication or 24 hours and after anesthesia or sedation for surgery  Generally, you may drive when your off all narcotic pain medications  8  Upset Stomach: You may take Maalox, Tums, or similar items for an upset stomach   If your narcotic pain medication causes an upset stomach, do not take it on an empty stomach  Try taking it with at least some crackers or toast      9  Constipation: Patients often experienced constipation after surgery  You may take over-the-counter medication for this, such as Metamucil, Senokot, Dulcolax, milk of magnesia, etc  You may take a suppository unless you have had anorectal surgery such as a procedure on your hemorrhoids  If you experience significant nausea or vomiting after abdominal surgery, call the office before trying any of these medications  10  Call the office: If you are experiencing any of the following, fevers above 101 5°, significant nausea or vomiting, if the wound develops drainage and/or is excessive redness around the wound, or if you have significant diarrhea or other worsening symptoms  11  Pain: You may be given a prescription for pain  This will be given to the hospital, the day of surgery  12  Sexual Activity: You may resume sexual activity when you feel ready and comfortable and your incision is sealed and healed without apparent infection risk  13  Urination: If you haven't urinated in 6 hours, go directly to the ER for evaluation for urinary retention  14  Follow-up in 2 weeks

## 2023-04-25 NOTE — NURSING NOTE
Patient in severe pain (10/10) and states that she has been vomiting small amounts since approx 0730  Zofran and Dilaudid administered at this time  Fluoro also pushed back r/t pain and N/V  General surgery made aware at this time  Will continue to monitor

## 2023-04-26 VITALS
OXYGEN SATURATION: 94 % | DIASTOLIC BLOOD PRESSURE: 75 MMHG | HEIGHT: 67 IN | TEMPERATURE: 99.6 F | WEIGHT: 180.78 LBS | RESPIRATION RATE: 16 BRPM | HEART RATE: 75 BPM | SYSTOLIC BLOOD PRESSURE: 121 MMHG | BODY MASS INDEX: 28.37 KG/M2

## 2023-04-26 RX ORDER — ACETAMINOPHEN 325 MG/1
975 TABLET ORAL EVERY 8 HOURS PRN
Refills: 0
Start: 2023-04-26

## 2023-04-26 RX ORDER — OXYCODONE HYDROCHLORIDE 5 MG/1
5 TABLET ORAL EVERY 4 HOURS PRN
Qty: 8 TABLET | Refills: 0 | Status: SHIPPED | OUTPATIENT
Start: 2023-04-26

## 2023-04-26 RX ADMIN — HEPARIN SODIUM 5000 UNITS: 5000 INJECTION INTRAVENOUS; SUBCUTANEOUS at 05:09

## 2023-04-26 RX ADMIN — ACETAMINOPHEN 325MG 975 MG: 325 TABLET ORAL at 05:09

## 2023-04-26 RX ADMIN — PANTOPRAZOLE SODIUM 20 MG: 20 TABLET, DELAYED RELEASE ORAL at 05:09

## 2023-04-26 RX ADMIN — HYDROMORPHONE HYDROCHLORIDE 0.5 MG: 1 INJECTION, SOLUTION INTRAMUSCULAR; INTRAVENOUS; SUBCUTANEOUS at 11:08

## 2023-04-26 RX ADMIN — TOPIRAMATE 25 MG: 25 TABLET, FILM COATED ORAL at 08:23

## 2023-04-26 RX ADMIN — OXYCODONE HYDROCHLORIDE 10 MG: 10 TABLET ORAL at 08:23

## 2023-04-26 NOTE — NURSING NOTE
AVS reviewed with pt and understanding of unstructions was expressed   Pt dc'd to family at exit via wheel chair by this RN

## 2023-04-26 NOTE — PROGRESS NOTES
"Progress Note - General Surgery   Nader Rivera 56 y o  female MRN: [id number]  Unit/Bed#: [place] [address] [address] 212[address]02 Encounter: [id number]    Assessment:  Patient is a 49 y  o  female who presented with paraesophageal hernia, now status post laparoscopic hiatal hernia repair with Nissen fundoplication  VS: AVSS, room air   UOP: Note recorded, reports voiding     AM Labs: n/a           Plan:  -Full liquid diet as tolerated  -discontinue IV fluids  -pain/nausea control   -Out of bed, ambulate  -pulmonary toilet  -I/O recording  -DVt PPX  -disposition planning     Subjective/Objective       Subjective: moderately well-controlled pain, intermittent nausea, no vomiting     Objective:     Blood pressure 121/75, pulse 75, temperature 99 6 °F (37 6 °C), resp  rate 16, height 5' 7\" (1 702 m), weight 82 kg (180 lb 12 4 oz), last menstrual period 04/07/2023, SpO2 94 %, not currently breastfeeding  ,Body mass index is 28 31 kg/m²  No intake or output data in the 24 hours ending 04/26/23 0900    Invasive Devices     Peripheral Intravenous Line  Duration           Peripheral IV 04/24/23 Dorsal (posterior); Left Forearm 1 day                Physical Exam:     General: NAD  HEENT: normocephalic, atraumatic   Cardiovascular: RRR  Respiratory: No distress  Abdomen: soft, appropriately tender, predominantly in thebepigastrium, non-distended, incisions c/d/i  Extremities: No c/c/e  Neuro: AAOx3  Skin: warm, dry       Lab, Imaging and other studies:I have personally reviewed pertinent lab results    , CBC: No results found for: WBC, HGB, HCT, MCV, PLT, ADJUSTEDWBC, MCH, MCHC, RDW, MPV, NRBC, CMP: No results found for: SODIUM, K, CL, CO2, ANIONGAP, BUN, CREATININE, GLUCOSE, CALCIUM, AST, ALT, ALKPHOS, PROT, BILITOT, EGFR  VTE Pharmacologic Prophylaxis: Heparin  VTE Mechanical Prophylaxis: sequential compression device    "

## 2023-04-26 NOTE — DISCHARGE SUMMARY
Discharge Summary   August Parth 56 y o  female MRN: [id number]  Unit/Bed#: [place] [address]02 Encounter: [id number]  2023    Admission Date: 2023   Discharge Date: 2023  Length of Stay: 2 days  Attending and Service: Claire Gorman MD  General Surgery    Admitting Diagnosis:   [place] (hiatus hernia) [K44 9]  Lawson's esophagus without dysplasia [K22 70]    Discharge Diagnoses:   Principal Problem:    Hiatal hernia  S/p laparoscopic hiatal hernia repair with Ken fundoplication     PMH/Secondary Diagnoses:  Past Medical History:   Diagnosis Date   • Chronic pain    • Disease of thyroid gland     hypothyroid   • Diverticulitis 2021   • GERD (gastroesophageal reflux disease)     NISSEN   today 2023   • Heel pain, chronic, right 2021   • Hiatal hernia    • Hypertension    • Hyperthyroiditis     HYPERTHYROID; POST IODINE THERAPY   • Intractable vomiting 2021   • Meningioma ([address] )    • Migraine without aura and without status migrainosus, not intractable 2021   • Pain of upper abdomen 2020   • Sciatica of right side 2020   • Weakness of right upper extremity 2022     Past Surgical History:   Procedure Laterality Date   • APPENDECTOMY     •  SECTION     • EGD     • HERNIA REPAIR     • [place] LAPS RPR PARAESPHGL HRNA INCL FUNDPLSTY W/MESH N/A 2023    Procedure: REPAIR HERNIA HIATAL HERNIA REPAIR LAPAROSCOPIC  w/ NISSEN FUNDOPLICATION;  Surgeon: Claire Gorman MD;  Location: [facility];  Service: General     Consultants: None    Procedures/Surgeries Performed:  23 Laparoscopic hiatal hernia repair with Ken fundoplication   Surgeon: Claire Gorman MD  Co-assistant: Jose A Barth DO     Imaging Studies/Significant Findings:  FL upper GI UGI  Result Date: 2023  Impression: No evidence of leak  Delayed esophageal emptying likely postoperative in etiology   Workstation performed: [id number]     History of Present Illness: As per   "[facility] H&P HPI: \"Ms Devan Chan is a 56-year-old female here for evaluation of refractory gastroesophageal reflux disease with Lawson's esophagitis and a hiatal hernia  She had GERD for many years and for a long time she has been taking PPIs twice a day as well as Carafate  She had an EGD in February that showed evidence of Lawson's esophagus and a small hiatal hernia  Currently she does have a lot of symptoms  Mostly epigastric pain and sometimes radiates across her abdomen  A lot of heartburn symptoms  She does bring up reflux at night  \"      Summary of Hospital Course: Alecia Sanchez presented 4/24/23 for elective laparoscopic hiatal hernia repair for treatment of her chronic symptomatic GERD and Lawson's esophagus  The operation was successful without any complications  Post-operatively she was given clear liquids, which tolerated (she did have some regurgitation of the clears, but was able to tolerate secretions without issue)  POD#1 and upper GI study was performed and showed no evidence of a leak  There was note of delayed esophageal emptying, which was likely related to her postoperative state  She was still requiring IV analgesia for pain control so she stayed another day for a goal to transition to oral medications  POD#2 day of discharge she was seen by the residents of the general surgery service  She was tolerating full liquids, without N/V, pain was better controlled, and she was voiding well  She was deemed stable for discharge and follow up with general surgery in the outpatient office  Complications: None      Plan Upon Discharge:  Script for oxycodone to be used PRN severe pain if not controlled with OTC analgesics  Follow up general surgery 1-2 weeks      Physical Exam on Day of Discharge:   See progress note by Ja Pressley MD performed on day of discharge 4/26/23        Condition at Discharge: stable       Discharge instructions/Information to patient and family:   See " after visit summary for information provided to patient and family  All instructions were discussed and the patient was understanding  All questions answered  Provisions for Follow-Up Care:  See after visit summary for information related to follow-up care and any pertinent home health orders  PCP: Aviva Brothers MD    Disposition: Home    Planned Readmission: No    Discharge Statement   I spent 15 minutes discharging the patient  This time was spent on the day of discharge  I did not have direct contact with the patient day of discharge, she was seen and evaluated by the general surgery residents and deemed stable for discharge  Additional documentation is required if more than 30 minutes were spent on discharge  Discharge Medications:  See after visit summary for reconciled discharge medications provided to patient and family        [address] [address]   4/26/2023

## 2023-05-01 ENCOUNTER — TELEPHONE (OUTPATIENT)
Dept: SURGERY | Facility: CLINIC | Age: 58
End: 2023-05-01

## 2023-05-02 ENCOUNTER — HOSPITAL ENCOUNTER (OUTPATIENT)
Dept: MRI IMAGING | Facility: HOSPITAL | Age: 58
Discharge: HOME | End: 2023-05-02

## 2023-05-02 ENCOUNTER — OFFICE VISIT (OUTPATIENT)
Dept: FAMILY MEDICINE CLINIC | Facility: CLINIC | Age: 58
End: 2023-05-02

## 2023-05-02 VITALS
WEIGHT: 179 LBS | HEIGHT: 67 IN | BODY MASS INDEX: 28.09 KG/M2 | OXYGEN SATURATION: 98 % | HEART RATE: 72 BPM | TEMPERATURE: 97.8 F | DIASTOLIC BLOOD PRESSURE: 70 MMHG | SYSTOLIC BLOOD PRESSURE: 114 MMHG | RESPIRATION RATE: 18 BRPM

## 2023-05-02 DIAGNOSIS — R42 VERTIGO: ICD-10-CM

## 2023-05-02 DIAGNOSIS — D32.9 MENINGIOMA (HCC): ICD-10-CM

## 2023-05-02 RX ORDER — MECLIZINE HYDROCHLORIDE 50 MG/1
50 TABLET ORAL EVERY 12 HOURS PRN
Qty: 60 TABLET | Refills: 2 | Status: SHIPPED | OUTPATIENT
Start: 2023-05-02

## 2023-05-02 RX ORDER — ONDANSETRON 4 MG/1
4 TABLET, FILM COATED ORAL EVERY 8 HOURS PRN
Qty: 20 TABLET | Refills: 0 | Status: SHIPPED | OUTPATIENT
Start: 2023-05-02

## 2023-05-02 RX ORDER — DEXAMETHASONE 4 MG/1
TABLET ORAL
Qty: 6 TABLET | Refills: 0 | Status: SHIPPED | OUTPATIENT
Start: 2023-05-02

## 2023-05-02 RX ADMIN — GADOBUTROL 7.5 ML: 604.72 INJECTION INTRAVENOUS at 07:37

## 2023-05-02 NOTE — LETTER
May 2, 2023     Patient: Jaclyn Ear   YOB: 1965   Date of Visit: 5/2/2023       To Whom it May Concern:    Jaclyn Ear was seen in my clinic on 5/2/2023  She may return to work on 5/5/2023  If you have any questions or concerns, please don't hesitate to call           Sincerely,      Patricia CALVIN     [facility]        CC: No Recipients

## 2023-05-02 NOTE — PROGRESS NOTES
Name: Reyes Levine      : 1965      MRN: [id number]  Encounter Provider: [address] [place], [place] [address]  Encounter Date: 2023   Encounter department: [facility]    Assessment & Plan     1  Vertigo  -     ondansetron (ZOFRAN) 4 mg tablet; Take 1 tablet (4 mg total) by mouth every 8 (eight) hours as needed for nausea or vomiting  -     meclizine (ANTIVERT) 50 MG tablet; Take 1 tablet (50 mg total) by mouth every 12 (twelve) hours as needed for dizziness  -     Ambulatory Referral to Physical Therapy; Future  -     dexamethasone (DECADRON) 4 mg tablet; Day 1 take 3 tabs, day 2 take 2 tabs, day 3 take 1 tab, then stop    Recommend return to work Thursday, do not drive until sx improve   ED parameters discussed        Subjective     57 YO female with past medical history of vertigo and meningioma presents today for same day visit due to dizziness  Vertigo  Patient presents for evaluation of dizziness  The symptoms started a week ago and have gradually worsened  The attacks occur daily and last a few seconds  Positions that worsen symptoms: head back and lying down  Previous workup/treatments: MRI of the brain done prior to visit today for history of meningioma, results are not available for review  Associated ear symptoms: none  Associated CNS symptoms: none  Recent infections: none  Head trauma: denied  Drug ingestion: none  Noise exposure: no occupational exposure  She previously was prescribed meclizine with improvement in sx but currently is out  Review of Systems   Constitutional: Negative for chills and fever  HENT: Negative for ear pain and sore throat  Eyes: Negative for pain and visual disturbance  Respiratory: Negative for cough and shortness of breath  Cardiovascular: Negative for chest pain and palpitations  Gastrointestinal: Positive for nausea  Negative for abdominal pain and vomiting     Genitourinary: Negative for dysuria and hematuria  Musculoskeletal: Negative for arthralgias and back pain  Skin: Negative for color change and rash  Neurological: Positive for dizziness  Negative for seizures and syncope  All other systems reviewed and are negative        Past Medical History:   Diagnosis Date    Chronic pain     Disease of thyroid gland     hypothyroid    Diverticulitis 2021    GERD (gastroesophageal reflux disease)     NISSEN   today 2023    Heel pain, chronic, right 2021    Hiatal hernia     Hypertension     Hyperthyroiditis     HYPERTHYROID; POST IODINE THERAPY    Intractable vomiting 2021    Meningioma (HCC)     Migraine without aura and without status migrainosus, not intractable 2021    Pain of upper abdomen 2020    Sciatica of right side 2020    Weakness of right upper extremity 2022     Past Surgical History:   Procedure Laterality Date    APPENDECTOMY       SECTION      EGD      HERNIA REPAIR      [place] LAPS RPR PARAESPHGL HRNA INCL FUNDPLSTY W/MESH N/A 2023    Procedure: REPAIR HERNIA HIATAL HERNIA REPAIR LAPAROSCOPIC  w/ NISSEN FUNDOPLICATION;  Surgeon: Lilton Simmonds, MD;  Location: [facility] OR;  Service: General     Family History   Problem Relation Age of Onset    Endometrial cancer Family     Ovarian cancer Mother 79    Heart defect Mother     Heart attack Father     Stroke Brother     Ovarian cancer Sister 52    No Known Problems Maternal Grandmother     Breast cancer Paternal Grandmother 61    No Known Problems Sister     No Known Problems Sister     No Known Problems Sister     No Known Problems Sister     No Known Problems Daughter     No Known Problems Maternal Grandfather     No Known Problems Paternal Grandfather     No Known Problems Daughter     No Known Problems Son      Social History     Socioeconomic History    Marital status: Single     Spouse name: None    Number of children: None    Years of education: None    Highest education level: None   Occupational History    None   Tobacco Use    Smoking status: Former     Packs/day: 1 00     Years: 5 00     Pack years: 5 00     Types: Cigarettes     Quit date:      Years since quittin 3     Passive exposure: Past    Smokeless tobacco: Never   Vaping Use    Vaping Use: Never used   Substance and Sexual Activity    Alcohol use: Not Currently     Comment: occasional     Drug use: Never    Sexual activity: None   Other Topics Concern    None   Social History Narrative    AS OF 9/22/15 IN [employer]:        CONSUMES CAFFEINE    CAFFEINE: COFFEE     Social Determinants of Health     Financial Resource Strain: Low Risk     Difficulty of Paying Living Expenses: Not hard at all   Food Insecurity: No Food Insecurity    Worried About Running Out of Food in the Last Year: Never true    Sol of Food in the Last Year: Never true   Transportation Needs: No Transportation Needs    Lack of Transportation (Medical): No    Lack of Transportation (Non-Medical):  No   Physical Activity: Not on file   Stress: Not on file   Social Connections: Not on file   Intimate Partner Violence: Not on file   Housing Stability: Not on file     Current Outpatient Medications on File Prior to Visit   Medication Sig    acetaminophen (TYLENOL) 325 mg tablet Take 3 tablets (975 mg total) by mouth every 8 (eight) hours as needed for mild pain    levothyroxine 112 mcg tablet Take 1 tablet (112 mcg total) by mouth daily    lisinopril (ZESTRIL) 40 mg tablet Take 1 tablet (40 mg total) by mouth daily    omeprazole (PriLOSEC) 40 MG capsule Take 1 capsule (40 mg total) by mouth 2 (two) times a day    oxyCODONE (ROXICODONE) 5 immediate release tablet Take 1 tablet (5 mg total) by mouth every 4 (four) hours as needed for severe pain for up to 8 doses Max Daily Amount: 30 mg    sucralfate (CARAFATE) 1 g tablet Take 1 tablet (1 g total) by mouth 4 (four) times a day    topiramate (Topamax) 25 "mg tablet Take 1 tablet (25 mg total) by mouth 2 (two) times a day    [DISCONTINUED] meclizine (ANTIVERT) 50 MG tablet Take 1 tablet (50 mg total) by mouth every 12 (twelve) hours as needed for dizziness     No Known Allergies  Immunization History   Administered Date(s) Administered    COVID-19 MODERNA VACC 0 5 ML IM 04/30/2021, 05/26/2021, 11/28/2021    Influenza, recombinant, quadrivalent,injectable, preservative free 02/09/2022    Influenza, seasonal, injectable 10/03/2013    Pneumococcal Conjugate Vaccine 20-valent (Pcv20), Polysace 07/18/2022    Tdap 07/18/2022       Objective     /70 (BP Location: Left arm, Patient Position: Sitting, Cuff Size: Standard)   Pulse 72   Temp 97 8 °F (36 6 °C) (Temporal)   Resp 18   Ht 5' 7\" (1 702 m)   Wt 81 2 kg (179 lb)   LMP 04/07/2023 (Exact Date)   SpO2 98%   BMI 28 04 kg/m²     Physical Exam  Vitals and nursing note reviewed  Constitutional:       Appearance: She is not toxic-appearing  HENT:      Right Ear: Tympanic membrane and external ear normal       Left Ear: Tympanic membrane and external ear normal       Mouth/Throat:      Mouth: Mucous membranes are moist    Eyes:      General:         Right eye: No discharge  Left eye: No discharge  Conjunctiva/sclera: Conjunctivae normal    Neck:      Comments: +sx with head extension/ flexion   Cardiovascular:      Rate and Rhythm: Normal rate and regular rhythm  Pulmonary:      Effort: Pulmonary effort is normal  No respiratory distress  Musculoskeletal:         General: Normal range of motion  Skin:     General: Skin is warm and dry  Capillary Refill: Capillary refill takes less than 2 seconds  Neurological:      Mental Status: She is alert        Comments: +[place] Left, unable to complete right side due to severity of sx    Psychiatric:         Behavior: Behavior normal        [address]  "

## 2023-05-04 ENCOUNTER — TELEPHONE (OUTPATIENT)
Dept: FAMILY MEDICINE CLINIC | Facility: CLINIC | Age: 58
End: 2023-05-04

## 2023-05-16 ENCOUNTER — OFFICE VISIT (OUTPATIENT)
Dept: SURGERY | Facility: CLINIC | Age: 58
End: 2023-05-16

## 2023-05-16 VITALS — WEIGHT: 178 LBS | TEMPERATURE: 97.3 F | HEIGHT: 67 IN | BODY MASS INDEX: 27.94 KG/M2 | RESPIRATION RATE: 16 BRPM

## 2023-05-16 DIAGNOSIS — Z98.890 STATUS POST LAPAROSCOPIC NISSEN FUNDOPLICATION: Primary | ICD-10-CM

## 2023-05-16 DIAGNOSIS — K22.70 BARRETT'S ESOPHAGUS WITHOUT DYSPLASIA: ICD-10-CM

## 2023-05-16 NOTE — ASSESSMENT & PLAN NOTE
Overall she doing very well since her surgery  She states her reflux symptoms have resolved  She is no longer taking her omeprazole  I explained to her with her history of Lawson's is important to continue with follow-up with gastroenterology  She is an appointment in 2 weeks  I told her to discuss whether or not she needs to be kept on a low-dose PPI or not  She will likely need repeat endoscopy or screening purposes with her history of Lawson's    She can follow-up with me as needed

## 2023-05-16 NOTE — PROGRESS NOTES
Assessment/Plan:    Status post laparoscopic Nissen fundoplication  Overall she doing very well since her surgery  She states her reflux symptoms have resolved  She is no longer taking her omeprazole  I explained to her with her history of Lawson's is important to continue with follow-up with gastroenterology  She is an appointment in 2 weeks  I told her to discuss whether or not she needs to be kept on a low-dose PPI or not  She will likely need repeat endoscopy or screening purposes with her history of Lawson's  She can follow-up with me as needed       Diagnoses and all orders for this visit:    Status post laparoscopic Nissen fundoplication    Lawson's esophagus without dysplasia          Subjective:      Patient ID: Guero  is a 56 y o  female  Ms Saroj Valvedre is a 77-year-old female here for evaluation of refractory gastroesophageal reflux disease with Lawson's esophagitis and a hiatal hernia  She had GERD for many years and for a long time she has been taking PPIs twice a day as well as Carafate  She had an EGD in February that showed evidence of Lawson's esophagus and a small hiatal hernia  Currently she does have a lot of symptoms  Mostly epigastric pain and sometimes radiates across her abdomen  A lot of heartburn symptoms  She does bring up reflux at night     5/16/2023 she is now 3 weeks status post laparoscopic hiatal hernia repair with Nissen fundoplication  Overall she is doing very well  She has some mild incisional pains but mostly resolved  She has been advancing her diet and still having difficulty with meats and other certain foods but feels is getting better each day  She states that her reflux symptoms have resolved    She is no longer taking any medication and happy with her outcome      The following portions of the patient's history were reviewed and updated as appropriate: allergies, current medications, past family history, past medical history, past social "history, past surgical history and problem list     Review of Systems      Objective:      Temp (!) 97 3 °F (36 3 °C)   Resp 16   Ht 5' 7\" (1 702 m)   Wt 80 7 kg (178 lb)   BMI 27 88 kg/m²          Physical Exam  Abdominal:      Comments: Incisions clean dry and intact         "

## 2023-05-17 ENCOUNTER — CLINICAL SUPPORT (OUTPATIENT)
Dept: RADIATION ONCOLOGY | Facility: HOSPITAL | Age: 58
End: 2023-05-17
Attending: RADIOLOGY

## 2023-05-17 ENCOUNTER — RADIATION ONCOLOGY FOLLOW-UP (OUTPATIENT)
Dept: RADIATION ONCOLOGY | Facility: HOSPITAL | Age: 58
End: 2023-05-17
Attending: RADIOLOGY

## 2023-05-17 VITALS
HEIGHT: 67 IN | BODY MASS INDEX: 28.88 KG/M2 | RESPIRATION RATE: 18 BRPM | HEART RATE: 87 BPM | TEMPERATURE: 98.4 F | DIASTOLIC BLOOD PRESSURE: 84 MMHG | SYSTOLIC BLOOD PRESSURE: 122 MMHG | WEIGHT: 184 LBS | OXYGEN SATURATION: 97 %

## 2023-05-17 DIAGNOSIS — D32.9 MENINGIOMA (HCC): Primary | ICD-10-CM

## 2023-05-17 NOTE — PROGRESS NOTES
Follow-up - Radiation Oncology   Guero  1965 56 y o  female [id number]      History of Present Illness   Cancer Staging   No matching staging information was found for the patient  Interval History:  Guero  1965 is a 56 y o  female with right petroclinoid meningioma  She completed a course of SRT to the meningioma in the pre pontine region on 2022  Last follow-up on 22  She returns today s/p 6 month repeat MRI       23 Laparoscopic hiatal hernia repair with Ken fundoplication      2/3/58 - MRI brain w wo contrast  IMPRESSION:  1  Similar petroclival meningioma measuring 1 5 x 0 4 x 1 6 cm   2   Stable nonspecific white matter change most compatible with microangiopathy            Historical Information   Oncology History   Meningioma ([facility] )   2022 Initial Diagnosis    M[facility]ioma ([facility] )     3/23/2022 - 2022 Radiation    Plan ID Energy Fractions Dose per Fraction (cGy) Dose Correction (cGy) Total Dose Delivered (cGy) Elapsed Days   SRTPrepontcis 6X 5 / 5 500 0 2,500 9      Treatment Dates:  3/23/2022 - 2022     Dr Evelyn Barbosa         Past Medical History:   Diagnosis Date   • Chronic pain    • Disease of thyroid gland     hypothyroid   • Diverticulitis 2021   • GERD (gastroesophageal reflux disease)     NISSEN   today 2023   • Heel pain, chronic, right 2021   • Hiatal hernia    • Hypertension    • Hyperthyroiditis     HYPERTHYROID; POST IODINE THERAPY   • Intractable vomiting 2021   • Meningioma ([facility] )    • Migraine without aura and without status migrainosus, not intractable 2021   • Pain of upper abdomen 2020   • Sciatica of right side 2020   • Weakness of right upper extremity 2022     Past Surgical History:   Procedure Laterality Date   • APPENDECTOMY     •  SECTION     • EGD     • HERNIA REPAIR     • IN LAPS RPR PARAESPHGL HRNA INCL FUNDPLSTY W/MESH N/A 2023    Procedure: REPAIR HERNIA HIATAL HERNIA REPAIR LAPAROSCOPIC  w/ NISSEN FUNDOPLICATION;  Surgeon: Aggie Chavez MD;  Location: [facility];  Service: General       Social History   Social History     Substance and Sexual Activity   Alcohol Use Not Currently    Comment: occasional      Social History     Substance and Sexual Activity   Drug Use Never     Social History     Tobacco Use   Smoking Status Former   • Packs/day: 1 00   • Years: 5 00   • Pack years: 5 00   • Types: Cigarettes   • Quit date:    • Years since quittin 3   • Passive exposure: Past   Smokeless Tobacco Never         Meds/Allergies     Current Outpatient Medications:   •  dexamethasone (DECADRON) 4 mg tablet, Day 1 take 3 tabs, day 2 take 2 tabs, day 3 take 1 tab, then stop, Disp: 6 tablet, Rfl: 0  •  levothyroxine 112 mcg tablet, Take 1 tablet (112 mcg total) by mouth daily, Disp: 90 tablet, Rfl: 3  •  lisinopril (ZESTRIL) 40 mg tablet, Take 1 tablet (40 mg total) by mouth daily, Disp: 90 tablet, Rfl: 1  •  topiramate (Topamax) 25 mg tablet, Take 1 tablet (25 mg total) by mouth 2 (two) times a day, Disp: 60 tablet, Rfl: 2  •  acetaminophen (TYLENOL) 325 mg tablet, Take 3 tablets (975 mg total) by mouth every 8 (eight) hours as needed for mild pain (Patient not taking: Reported on 2023), Disp: , Rfl: 0  •  meclizine (ANTIVERT) 50 MG tablet, Take 1 tablet (50 mg total) by mouth every 12 (twelve) hours as needed for dizziness (Patient not taking: Reported on 2023), Disp: 60 tablet, Rfl: 2  •  omeprazole (PriLOSEC) 40 MG capsule, Take 1 capsule (40 mg total) by mouth 2 (two) times a day (Patient not taking: Reported on 2023), Disp: 60 capsule, Rfl: 2  •  ondansetron (ZOFRAN) 4 mg tablet, Take 1 tablet (4 mg total) by mouth every 8 (eight) hours as needed for nausea or vomiting (Patient not taking: Reported on 2023), Disp: 20 tablet, Rfl: 0  •  oxyCODONE (ROXICODONE) 5 immediate release tablet, Take 1 tablet (5 mg total) by mouth every 4 (four) hours as needed "for severe pain for up to 8 doses Max Daily Amount: 30 mg (Patient not taking: Reported on 5/16/2023), Disp: 8 tablet, Rfl: 0  •  sucralfate (CARAFATE) 1 g tablet, Take 1 tablet (1 g total) by mouth 4 (four) times a day (Patient not taking: Reported on 5/17/2023), Disp: 120 tablet, Rfl: 5  No Known Allergies      Review of Systems   Constitutional: Positive for appetite change (Up and down) and fatigue (Moderate)  HENT: Positive for ear pain (Intermittent feeling of pressure and occasionally muffled hearing)  Eyes:        Pain to right eye with headaches    Respiratory: Negative  Cardiovascular: Negative  Gastrointestinal: Negative  Genitourinary: Negative  Musculoskeletal: Negative  Skin:        Incision from hernia repair well healing   Allergic/Immunologic: Negative  Neurological: Positive for dizziness (Constant with laying down, other wise intermittent), numbness (Occasional to left hand/fingers) and headaches (Intermittent, moderate)  Hematological: Negative  Psychiatric/Behavioral: Negative            OBJECTIVE:   /84 (BP Location: Left arm, Patient Position: Sitting, Cuff Size: Standard)   Pulse 87   Temp 98 4 °F (36 9 °C) (Temporal)   Resp 18   Ht 5' 7\" (1 702 m)   Wt 83 5 kg (184 lb)   SpO2 97%   BMI 28 82 kg/m²   Pain Assessment:  0  ECOG/Zubrod/WHO: 1 - Symptomatic but completely ambulatory    Physical Exam   She is conversing appropriately  Ambulating dependently without any signs of imbalance          RESULTS    Lab Results:   Recent Results (from the past 672 hour(s))   ABORh Recheck - Contact Blood Bank Prior to Collection    Collection Time: 04/24/23 10:52 AM   Result Value Ref Range    ABO Grouping O     Rh Factor Positive    POCT pregnancy, urine    Collection Time: 04/24/23 11:22 AM   Result Value Ref Range    EXT Preg Test, Ur Negative Negative    Control Valid Valid   CBC and differential    Collection Time: 04/25/23  4:59 AM   Result Value Ref Range    " WBC 8 56 4 31 - 10 16 Thousand/uL    RBC 3 99 3 81 - 5 12 Million/uL    Hemoglobin 12 6 11 5 - 15 4 g/dL    Hematocrit 38 5 34 8 - 46 1 %    MCV 97 82 - 98 fL    MCH 31 6 26 8 - 34 3 pg    MCHC 32 7 31 4 - 37 4 g/dL    RDW 13 8 11 6 - 15 1 %    MPV 9 4 8 9 - 12 7 fL    Platelets 981 717 - 806 Thousands/uL    nRBC 0 /100 WBCs    Neutrophils Relative 76 (H) 43 - 75 %    Immat GRANS % 1 0 - 2 %    Lymphocytes Relative 16 14 - 44 %    Monocytes Relative 7 4 - 12 %    Eosinophils Relative 0 0 - 6 %    Basophils Relative 0 0 - 1 %    Neutrophils Absolute 6 57 1 85 - 7 62 Thousands/µL    Immature Grans Absolute 0 04 0 00 - 0 20 Thousand/uL    Lymphocytes Absolute 1 35 0 60 - 4 47 Thousands/µL    Monocytes Absolute 0 59 0 17 - 1 22 Thousand/µL    Eosinophils Absolute 0 00 0 00 - 0 61 Thousand/µL    Basophils Absolute 0 01 0 00 - 0 10 Thousands/µL   Basic metabolic panel    Collection Time: 04/25/23  4:59 AM   Result Value Ref Range    Sodium 136 135 - 147 mmol/L    Potassium 4 4 3 5 - 5 3 mmol/L    Chloride 106 96 - 108 mmol/L    CO2 24 21 - 32 mmol/L    ANION GAP 6 4 - 13 mmol/L    BUN 6 5 - 25 mg/dL    Creatinine 0 59 (L) 0 60 - 1 30 mg/dL    Glucose 113 65 - 140 mg/dL    Glucose, Fasting 113 (H) 65 - 99 mg/dL    Calcium 8 3 (L) 8 4 - 10 2 mg/dL    eGFR 103 ml/min/1 73sq m       Imaging Studies:MRI brain w wo contrast    Result Date: 5/4/2023  Narrative: MRI BRAIN WITH AND WITHOUT CONTRAST INDICATION: D32 9: Benign neoplasm of meninges, unspecified  COMPARISON: 10/17/2022 TECHNIQUE: Multiplanar, multisequence imaging of the brain was performed before and after gadolinium administration  IV Contrast:  7 5 mL of Gadobutrol injection (SINGLE-DOSE) IMAGE QUALITY:   Diagnostic  FINDINGS: Homogeneously enhancing right petroclival extra-axial lesion consistent with meningioma measuring 1 5 x 0 4 x 1 6 cm, not significantly changed from prior exam  This is unchanged from the prior exam 10/17/2022   There is unchanged mild mass effect upon right ventral surface of the rex  There is no intracranial hemorrhage  Normal posterior fossa  Diffusion imaging is unremarkable  Significant change in foci of T2/FLAIR hyperintensities involving periventricular and subcortical white matter, most compatible with microangiopathic change  VENTRICLES:  Normal for the patient's age  SELLA AND PITUITARY GLAND:  Normal  ORBITS:  Normal  PARANASAL SINUSES:  Normal  VASCULATURE:  Evaluation of the major intracranial vasculature demonstrates appropriate flow voids  CALVARIUM AND SKULL BASE:  Normal  EXTRACRANIAL SOFT TISSUES:  Normal      Impression: 1  Similar petroclival meningioma measuring 1 5 x 0 4 x 1 6 cm  2   Stable nonspecific white matter change most compatible with microangiopathy  Workstation performed: [id number]     FL upper GI UGI    Result Date: 4/25/2023  Narrative: LIMITED UPPER GI SERIES INDICATION:  Status post gastric bypass  COMPARISON:  None IMAGES:  18 FLUOROSCOPY TIME:   7 MINUTES FINDINGS: A limited single contrast upper GI study was performed with approximately 30 mL Omnipaque 350 contrast  Distal esophagus is unremarkable  Delayed esophageal emptying is noted likely postoperative  Free passage of contrast into the stomach is noted  No hiatal hernia is seen  Impression: No evidence of leak  Delayed esophageal emptying likely postoperative in etiology  Workstation performed: [id number]           Assessment/Plan:  No orders of the defined types were placed in this encounter  Bhavani Cutler is a 56y o  year old female 1 year status post SRT for meningioma  I reviewed her MRI of the brain which really revealed stability of her lesion  She has gone to physical therapy for balance/crystals for her chronic vertigo which is intermittent and has been present pre-RT  She states that the therapy improved her symptoms somewhat however not completely resolved  Her episodes last proximately 1 second and are intermittent    I "have ordered follow-up MRI scan in 1 year and she will return to neuro-oncology clinic thereafter      Donte Hines MD  5/17/2023,9:03 AM    Portions of the record may have been created with voice recognition software   Occasional wrong word or \"sound a like\" substitutions may have occurred due to the inherent limitations of voice recognition software   Read the chart carefully and recognize, using context, where substitutions have occurred        "

## 2023-05-17 NOTE — PROGRESS NOTES
Jacob Savage 1965 is a 56 y o  female with right petroclinoid meningioma  She completed a course of SRT to the meningioma in the pre pontine region on 4/1/2022  Last follow-up on 11/2/22  She returns today s/p 6 month repeat MRI      4/24/23 Laparoscopic hiatal hernia repair with Ken fundoplication     2/1/29 - MRI brain w wo contrast  IMPRESSION:  1  Similar petroclival meningioma measuring 1 5 x 0 4 x 1 6 cm   2   Stable nonspecific white matter change most compatible with microangiopathy  Upcoming:  -      Oncology History   Meningioma ([facility] )   1/29/2022 Initial Diagnosis    Meningioma ([facility] )     3/23/2022 - 4/1/2022 Radiation    Plan ID Energy Fractions Dose per Fraction (cGy) Dose Correction (cGy) Total Dose Delivered (cGy) Elapsed Days   SRTPrepontcis 6X 5 / 5 500 0 2,500 9      Treatment Dates:  3/23/2022 - 4/1/2022  Dr Jamel Siu         Review of Systems:  Review of Systems   Constitutional: Positive for appetite change (Up and down) and fatigue (Moderate)  HENT: Positive for ear pain (Intermittent feeling of pressure and occasionally muffled hearing)  Eyes:        Pain to right eye with headaches    Respiratory: Negative  Cardiovascular: Negative  Gastrointestinal: Negative  Genitourinary: Negative  Musculoskeletal: Negative  Skin:        Incision from hernia repair well healing   Allergic/Immunologic: Negative  Neurological: Positive for dizziness (Constant with laying down, other wise intermittent), numbness (Occasional to left hand/fingers) and headaches (Intermittent, moderate)  Hematological: Negative  Psychiatric/Behavioral: Negative          Clinical Trial: no        Health Maintenance   Topic Date Due   • Annual Physical  Never done   • BMI: Followup Plan  09/03/2021   • COVID-19 Vaccine (4 - Booster for Moderna series) 01/23/2022   • PT PLAN OF CARE  11/17/2022   • Breast Cancer Screening: Mammogram  03/02/2023   • OT PLAN OF CARE  12/02/2023 (Originally 3/10/2022)   • Influenza Vaccine (Season Ended) 2023   • Depression Remission PHQ  2023   • BMI: Adult  2024   • Cervical Cancer Screening  2026   • Colorectal Cancer Screening  10/09/2030   • DTaP,Tdap,and Td Vaccines (2 - Td or Tdap) 2032   • HIV Screening  Completed   • Hepatitis C Screening  Completed   • Pneumococcal Vaccine: Pediatrics (0 to 5 Years) and At-Risk Patients (6 to 59 Years)  Completed   • HIB Vaccine  Aged Out   • IPV Vaccine  Aged Out   • Hepatitis A Vaccine  Aged Out   • Meningococcal ACWY Vaccine  Aged Out   • HPV Vaccine  Aged Out     Patient Active Problem List   Diagnosis   • Essential hypertension   • Hypothyroidism   • Hemorrhoids   • Slow transit constipation   • Vertigo   • Migraine without aura and without status migrainosus, not intractable   • Gastroesophageal reflux disease without esophagitis   • Meningioma (HCC)   • Visual field defects   • Mild episode of recurrent major depressive disorder ([facility] )   • Lawson's esophagus without dysplasia   • Hiatal hernia   • Status post laparoscopic Nissen fundoplication     Past Medical History:   Diagnosis Date   • Chronic pain    • Disease of thyroid gland     hypothyroid   • Diverticulitis 2021   • GERD (gastroesophageal reflux disease)     NISSEN   today 2023   • Heel pain, chronic, right 2021   • Hiatal hernia    • Hypertension    • Hyperthyroiditis     HYPERTHYROID; POST IODINE THERAPY   • Intractable vomiting 2021   • Meningioma ([facility] )    • Migraine without aura and without status migrainosus, not intractable 2021   • Pain of upper abdomen 2020   • Sciatica of right side 2020   • Weakness of right upper extremity 2022     Past Surgical History:   Procedure Laterality Date   • APPENDECTOMY     •  SECTION     • EGD     • HERNIA REPAIR     • MI LAPS RPR PARAESPHGL HRNA INCL FUNDPLSTY W/MESH N/A 2023    Procedure: REPAIR HERNIA HIATAL HERNIA REPAIR LAPAROSCOPIC  w/ NISSEN FUNDOPLICATION;  Surgeon: Gisell Valdez MD;  Location: [facility] OR;  Service: General     Family History   Problem Relation Age of Onset   • Endometrial cancer Family    • Ovarian cancer Mother 79   • Heart defect Mother    • Heart attack Father    • Stroke Brother    • Ovarian cancer Sister 52   • No Known Problems Maternal Grandmother    • Breast cancer Paternal Grandmother 61   • No Known Problems Sister    • No Known Problems Sister    • No Known Problems Sister    • No Known Problems Sister    • No Known Problems Daughter    • No Known Problems Maternal Grandfather    • No Known Problems Paternal Grandfather    • No Known Problems Daughter    • No Known Problems Son      Social History     Socioeconomic History   • Marital status: Single     Spouse name: Not on file   • Number of children: Not on file   • Years of education: Not on file   • Highest education level: Not on file   Occupational History   • Not on file   Tobacco Use   • Smoking status: Former     Packs/day: 1 00     Years: 5 00     Pack years: 5 00     Types: Cigarettes     Quit date:      Years since quittin 3     Passive exposure: Past   • Smokeless tobacco: Never   Vaping Use   • Vaping Use: Never used   Substance and Sexual Activity   • Alcohol use: Not Currently     Comment: occasional    • Drug use: Never   • Sexual activity: Not on file   Other Topics Concern   • Not on file   Social History Narrative    AS OF 9/22/15 IN [facility]:        CONSUMES CAFFEINE    CAFFEINE: COFFEE     Social Determinants of Health     Financial Resource Strain: Low Risk    • Difficulty of Paying Living Expenses: Not hard at all   Food Insecurity: No Food Insecurity   • Worried About Running Out of Food in the Last Year: Never true   • Ran Out of Food in the Last Year: Never true   Transportation Needs: No Transportation Needs   • Lack of Transportation (Medical): No   • Lack of Transportation (Non-Medical):  No   Physical Activity: Not on file   Stress: Not on file   Social Connections: Not on file   Intimate Partner Violence: Not on file   Housing Stability: Not on file       Current Outpatient Medications:   •  dexamethasone (DECADRON) 4 mg tablet, Day 1 take 3 tabs, day 2 take 2 tabs, day 3 take 1 tab, then stop, Disp: 6 tablet, Rfl: 0  •  levothyroxine 112 mcg tablet, Take 1 tablet (112 mcg total) by mouth daily, Disp: 90 tablet, Rfl: 3  •  lisinopril (ZESTRIL) 40 mg tablet, Take 1 tablet (40 mg total) by mouth daily, Disp: 90 tablet, Rfl: 1  •  topiramate (Topamax) 25 mg tablet, Take 1 tablet (25 mg total) by mouth 2 (two) times a day, Disp: 60 tablet, Rfl: 2  •  acetaminophen (TYLENOL) 325 mg tablet, Take 3 tablets (975 mg total) by mouth every 8 (eight) hours as needed for mild pain (Patient not taking: Reported on 5/17/2023), Disp: , Rfl: 0  •  meclizine (ANTIVERT) 50 MG tablet, Take 1 tablet (50 mg total) by mouth every 12 (twelve) hours as needed for dizziness (Patient not taking: Reported on 5/17/2023), Disp: 60 tablet, Rfl: 2  •  omeprazole (PriLOSEC) 40 MG capsule, Take 1 capsule (40 mg total) by mouth 2 (two) times a day (Patient not taking: Reported on 5/17/2023), Disp: 60 capsule, Rfl: 2  •  ondansetron (ZOFRAN) 4 mg tablet, Take 1 tablet (4 mg total) by mouth every 8 (eight) hours as needed for nausea or vomiting (Patient not taking: Reported on 5/16/2023), Disp: 20 tablet, Rfl: 0  •  oxyCODONE (ROXICODONE) 5 immediate release tablet, Take 1 tablet (5 mg total) by mouth every 4 (four) hours as needed for severe pain for up to 8 doses Max Daily Amount: 30 mg (Patient not taking: Reported on 5/16/2023), Disp: 8 tablet, Rfl: 0  •  sucralfate (CARAFATE) 1 g tablet, Take 1 tablet (1 g total) by mouth 4 (four) times a day (Patient not taking: Reported on 5/17/2023), Disp: 120 tablet, Rfl: 5  No Known Allergies  Vitals:    05/17/23 0828   BP: 122/84   BP Location: Left arm   Patient Position: Sitting   Cuff Size: Standard   Pulse: 87 "  Resp: 18   Temp: 98 4 °F (36 9 °C)   TempSrc: Temporal   SpO2: 97%   Weight: 83 5 kg (184 lb)   Height: 5' 7\" (1 702 m)      Pain Score: 0-No pain  "

## 2023-05-19 DIAGNOSIS — K21.9 GASTROESOPHAGEAL REFLUX DISEASE WITHOUT ESOPHAGITIS: ICD-10-CM

## 2023-05-19 RX ORDER — OMEPRAZOLE 40 MG/1
CAPSULE, DELAYED RELEASE ORAL
Qty: 60 CAPSULE | Refills: 2 | Status: SHIPPED | OUTPATIENT
Start: 2023-05-19

## 2023-06-05 DIAGNOSIS — G43.109 MIGRAINE WITH AURA AND WITHOUT STATUS MIGRAINOSUS, NOT INTRACTABLE: ICD-10-CM

## 2023-06-05 RX ORDER — TOPIRAMATE 25 MG/1
TABLET ORAL
Qty: 60 TABLET | Refills: 2 | Status: SHIPPED | OUTPATIENT
Start: 2023-06-05

## 2023-06-05 NOTE — RESULT ENCOUNTER NOTE
Please call patient and inform her that her thyroid function, cholesterol, kidney function, and liver function are all within normal limits  Patient prefers Swiss  Thank you! Removed intact

## 2023-06-07 ENCOUNTER — OFFICE VISIT (OUTPATIENT)
Dept: FAMILY MEDICINE CLINIC | Facility: CLINIC | Age: 58
End: 2023-06-07

## 2023-06-07 VITALS
BODY MASS INDEX: 27.76 KG/M2 | WEIGHT: 176.9 LBS | DIASTOLIC BLOOD PRESSURE: 84 MMHG | RESPIRATION RATE: 18 BRPM | HEIGHT: 67 IN | HEART RATE: 68 BPM | TEMPERATURE: 98 F | SYSTOLIC BLOOD PRESSURE: 118 MMHG | OXYGEN SATURATION: 97 %

## 2023-06-07 DIAGNOSIS — R42 VERTIGO: Primary | ICD-10-CM

## 2023-06-07 PROCEDURE — 99213 OFFICE O/P EST LOW 20 MIN: CPT | Performed by: FAMILY MEDICINE

## 2023-06-07 PROCEDURE — 3079F DIAST BP 80-89 MM HG: CPT | Performed by: FAMILY MEDICINE

## 2023-06-07 PROCEDURE — 3074F SYST BP LT 130 MM HG: CPT | Performed by: FAMILY MEDICINE

## 2023-06-07 RX ORDER — MECLIZINE HYDROCHLORIDE 50 MG/1
50 TABLET ORAL EVERY 12 HOURS PRN
Qty: 60 TABLET | Refills: 2 | Status: SHIPPED | OUTPATIENT
Start: 2023-06-07

## 2023-06-07 NOTE — ASSESSMENT & PLAN NOTE
Intermittent Vertigo symptoms continue  H/o of falls- last fall 4/2023  S/p Vestibular therapy  Continue Meclizine prn

## 2023-06-07 NOTE — PROGRESS NOTES
Name: Soha Jaimes      : 1965      MRN: [id number]  Encounter Provider: Gabriel Seymour MD  Encounter Date: 2023   Encounter department: [address] [place] [address]  Vertigo  Assessment & Plan:  Intermittent Vertigo symptoms continue  H/o of falls- last fall 2023  S/p Vestibular therapy  Continue Meclizine prn    Orders:  -     meclizine (ANTIVERT) 50 MG tablet; Take 1 tablet (50 mg total) by mouth every 12 (twelve) hours as needed for dizziness         Subjective      Soha Jaimes is a 56 y o  female who presented to the office today due to continued dizziness  She has a h/o of vertigo and headaches for the past few years  She was diagnosed with right petroclinoid meningioma in   She completed a course of SRT to the meningioma in the pre pontine region on 2022 - MRI brain w wo contrast shows stable petroclival meningioma measuring 1 5 x 0 4 x 1 6 cm  Following with Radiation Oncology and Neurosurgery  23 Laparoscopic hiatal hernia repair with Ken fundoplication        Today pateint states that she is still feeling dizzy  It feels like the room is spinning  It happens intermittently throughout the day, about 4 times, and increased with movement of her head or body  It lasts for about 1/2 minute and resolves  Her vision becomes blurred during the episodes of dizziness  She has fallen in the past due to the dizziness, last fall was 2023 in the afternoon at home in the backyard  She injured her right arm and both knees  She has done 3 session of Vestibular therapy which she states made her feel more dizzy and tired  She take meclizine with relief of the sx        The following portions of the patient's history were reviewed and updated as appropriate: allergies, current medications, past family history, past medical history, past social history, past surgical history and problem list       Review of Systems Constitutional: Negative for chills and fever  HENT: Negative for congestion, rhinorrhea and sore throat  Eyes: Positive for pain  Respiratory: Negative for cough and shortness of breath  Cardiovascular: Negative for chest pain  Gastrointestinal: Negative for diarrhea, nausea and vomiting  Endocrine: Positive for cold intolerance  Skin: Negative for rash  Neurological: Positive for dizziness and headaches         Current Outpatient Medications on File Prior to Visit   Medication Sig   • acetaminophen (TYLENOL) 325 mg tablet Take 3 tablets (975 mg total) by mouth every 8 (eight) hours as needed for mild pain (Patient not taking: Reported on 5/17/2023)   • dexamethasone (DECADRON) 4 mg tablet Day 1 take 3 tabs, day 2 take 2 tabs, day 3 take 1 tab, then stop   • levothyroxine 112 mcg tablet Take 1 tablet (112 mcg total) by mouth daily   • lisinopril (ZESTRIL) 40 mg tablet Take 1 tablet (40 mg total) by mouth daily   • omeprazole (PriLOSEC) 40 MG capsule take 1 capsule by mouth twice a day   • ondansetron (ZOFRAN) 4 mg tablet Take 1 tablet (4 mg total) by mouth every 8 (eight) hours as needed for nausea or vomiting (Patient not taking: Reported on 5/16/2023)   • oxyCODONE (ROXICODONE) 5 immediate release tablet Take 1 tablet (5 mg total) by mouth every 4 (four) hours as needed for severe pain for up to 8 doses Max Daily Amount: 30 mg (Patient not taking: Reported on 5/16/2023)   • sucralfate (CARAFATE) 1 g tablet Take 1 tablet (1 g total) by mouth 4 (four) times a day (Patient not taking: Reported on 5/17/2023)   • topiramate (TOPAMAX) 25 mg tablet take 1 tablet by mouth twice a day   • [DISCONTINUED] meclizine (ANTIVERT) 50 MG tablet Take 1 tablet (50 mg total) by mouth every 12 (twelve) hours as needed for dizziness (Patient not taking: Reported on 5/17/2023)       Objective     /84 (BP Location: Right arm, Patient Position: Sitting, Cuff Size: Standard)   Pulse 68   Temp 98 °F (36 7 °C) "(Temporal)   Resp 18   Ht 5' 7\" (1 702 m)   Wt 80 2 kg (176 lb 14 4 oz)   LMP 05/30/2023   SpO2 97%   BMI 27 71 kg/m²     Physical Exam  Constitutional:       Appearance: She is overweight  HENT:      Head: Normocephalic and atraumatic  Right Ear: Tympanic membrane, ear canal and external ear normal       Left Ear: Tympanic membrane, ear canal and external ear normal       Nose: Nose normal  No congestion  Mouth/Throat:      Mouth: Mucous membranes are moist       Pharynx: No posterior oropharyngeal erythema  Eyes:      Extraocular Movements: Extraocular movements intact  Conjunctiva/sclera: Conjunctivae normal       Pupils: Pupils are equal, round, and reactive to light  Comments: + vertigo when asked to look to the right   Cardiovascular:      Rate and Rhythm: Normal rate and regular rhythm  Heart sounds: Normal heart sounds  No murmur heard  Pulmonary:      Effort: Pulmonary effort is normal  No respiratory distress  Breath sounds: Normal breath sounds  Abdominal:      General: Bowel sounds are normal  There is no distension  Palpations: Abdomen is soft  Musculoskeletal:      Cervical back: Neck supple  Right lower leg: No edema  Left lower leg: No edema  Skin:     General: Skin is warm  Capillary Refill: Capillary refill takes less than 2 seconds  Neurological:      General: No focal deficit present  Mental Status: She is alert and oriented to person, place, and time     Psychiatric:         Mood and Affect: Mood normal          Behavior: Behavior normal        Tyrel Gross MD  "

## 2023-06-09 ENCOUNTER — HOSPITAL ENCOUNTER (OUTPATIENT)
Dept: MAMMOGRAPHY | Facility: CLINIC | Age: 58
End: 2023-06-09
Payer: COMMERCIAL

## 2023-06-09 DIAGNOSIS — Z12.31 ENCOUNTER FOR SCREENING MAMMOGRAM FOR BREAST CANCER: ICD-10-CM

## 2023-06-09 PROCEDURE — 77063 BREAST TOMOSYNTHESIS BI: CPT

## 2023-06-09 PROCEDURE — 77067 SCR MAMMO BI INCL CAD: CPT

## 2023-07-12 ENCOUNTER — OFFICE VISIT (OUTPATIENT)
Dept: FAMILY MEDICINE CLINIC | Facility: CLINIC | Age: 58
End: 2023-07-12

## 2023-07-12 VITALS
RESPIRATION RATE: 18 BRPM | HEART RATE: 70 BPM | WEIGHT: 176 LBS | SYSTOLIC BLOOD PRESSURE: 136 MMHG | BODY MASS INDEX: 27.62 KG/M2 | HEIGHT: 67 IN | OXYGEN SATURATION: 97 % | TEMPERATURE: 97.8 F | DIASTOLIC BLOOD PRESSURE: 82 MMHG

## 2023-07-12 DIAGNOSIS — M79.672 FOOT PAIN, LEFT: ICD-10-CM

## 2023-07-12 DIAGNOSIS — M54.50 CHRONIC MIDLINE LOW BACK PAIN WITHOUT SCIATICA: Primary | ICD-10-CM

## 2023-07-12 DIAGNOSIS — G89.29 CHRONIC MIDLINE LOW BACK PAIN WITHOUT SCIATICA: Primary | ICD-10-CM

## 2023-07-12 PROCEDURE — 96372 THER/PROPH/DIAG INJ SC/IM: CPT

## 2023-07-12 PROCEDURE — 3079F DIAST BP 80-89 MM HG: CPT

## 2023-07-12 PROCEDURE — 3075F SYST BP GE 130 - 139MM HG: CPT

## 2023-07-12 PROCEDURE — 99214 OFFICE O/P EST MOD 30 MIN: CPT

## 2023-07-12 RX ORDER — LIDOCAINE 50 MG/G
1 PATCH TOPICAL DAILY
Qty: 30 PATCH | Refills: 0 | Status: SHIPPED | OUTPATIENT
Start: 2023-07-12

## 2023-07-12 RX ORDER — METHYLPREDNISOLONE 4 MG/1
TABLET ORAL
Qty: 21 EACH | Refills: 0 | Status: SHIPPED | OUTPATIENT
Start: 2023-07-12

## 2023-07-12 RX ORDER — KETOROLAC TROMETHAMINE 30 MG/ML
30 INJECTION, SOLUTION INTRAMUSCULAR; INTRAVENOUS ONCE
Status: COMPLETED | OUTPATIENT
Start: 2023-07-12 | End: 2023-07-12

## 2023-07-12 RX ADMIN — KETOROLAC TROMETHAMINE 30 MG: 30 INJECTION, SOLUTION INTRAMUSCULAR; INTRAVENOUS at 08:55

## 2023-07-12 NOTE — PROGRESS NOTES
Name: Kaiser Mancilla      : 1965      MRN: [id number]  Encounter Provider: Korin Mckeon  Encounter Date: 2023   Encounter department: [place] [facility]    Assessment & Plan     1. Chronic midline low back pain without sciatica  Assessment & Plan:  - Continue ibuprofen PRN. Reviewed daily dose limits with pt.   - Can continue brace if helpful but encouraged physical activity and stretching.   - Ketorolac injection today. - Medrol dose pack. Lidocaine patches PRN. - Referred to PT, follow up with PCP in 4 weeks, if no improvement, would recommend referral to spine specialist.     Orders:  -     ketorolac (TORADOL) injection 30 mg  -     Ambulatory Referral to Physical Therapy; Future  -     methylPREDNISolone 4 MG tablet therapy pack; Use as directed on package  -     lidocaine (Lidoderm) 5 %; Apply 1 patch topically over 12 hours daily Remove & Discard patch within 12 hours or as directed by MD    2. Foot pain, left  Assessment & Plan:  - Continue ibuprofen PRN. Reviewed daily dose limits with pt.   - Can wrap, apply ice PRN.   - Ketorolac injection today. - Medrol dose pack. - Referred to Podiatry Dr Marcus Parker. Orders:  -     Ambulatory Referral to Podiatry; Future  -     methylPREDNISolone 4 MG tablet therapy pack; Use as directed on package         Subjective     HPI     Jeanie Delatorre presents to the office for a SAME DAY appt for c/o chronic low back pain >1 year. There was no accident or injury. Pain is constant, no exacerbating or alleviating factors. Pain affects pt's ability to sleep. She cannot sit for too long. Pt is wearing an OTC back brace that helps a little. She is taking Advil which provides only temporary relief. Pt rates pain at "100" out of 10. Pt had PT in past which was helpful. Pt also reporting left foot pain and swelling. There was no injury. Pt had an XR in January that revealed left heel spur.  Pain has been worsening, described as a burning pain on the bottom of the foot. Pt has left foot/ankle wrapped and has tried several different shoes but pain continues. Review of Systems   Constitutional: Negative for fever and unexpected weight change. Respiratory: Negative. Cardiovascular: Negative. Musculoskeletal: Positive for arthralgias, back pain, gait problem and joint swelling. Neurological: Positive for dizziness and numbness (occasional, lateral LLE). Negative for weakness, light-headedness and headaches. Psychiatric/Behavioral: Positive for sleep disturbance. All other systems reviewed and are negative.       Past Medical History:   Diagnosis Date   • Chronic pain    • Disease of thyroid gland     hypothyroid   • Diverticulitis 2021   • GERD (gastroesophageal reflux disease)     NISSEN   today 2023   • Heel pain, chronic, right 2021   • Hiatal hernia    • Hypertension    • Hyperthyroiditis     HYPERTHYROID; POST IODINE THERAPY   • Intractable vomiting 2021   • Meningioma ([address])    • Migraine without aura and without status migrainosus, not intractable 2021   • Pain of upper abdomen 2020   • Sciatica of right side 2020   • Weakness of right upper extremity 2022     Past Surgical History:   Procedure Laterality Date   • APPENDECTOMY     •  SECTION     • EGD     • HERNIA REPAIR     • RI LAPS RPR PARAESPHGL HRNA INCL FUNDPLSTY W/MESH N/A 2023    Procedure: REPAIR HERNIA HIATAL HERNIA REPAIR LAPAROSCOPIC  w/ NISSEN FUNDOPLICATION;  Surgeon: Adriana Romero MD;  Location: [facility] OR;  Service: General     Family History   Problem Relation Age of Onset   • Endometrial cancer Family    • Ovarian cancer Mother 79   • Heart defect Mother    • Heart attack Father    • Stroke Brother    • Ovarian cancer Sister 52   • No Known Problems Maternal Grandmother    • Breast cancer Paternal Grandmother 61   • No Known Problems Sister    • No Known Problems Sister    • No Known Problems Sister    • No Known Problems Sister    • No Known Problems Daughter    • No Known Problems Maternal Grandfather    • No Known Problems Paternal Grandfather    • No Known Problems Daughter    • No Known Problems Son      Social History     Socioeconomic History   • Marital status: Single     Spouse name: None   • Number of children: None   • Years of education: None   • Highest education level: None   Occupational History   • None   Tobacco Use   • Smoking status: Former     Packs/day: 1.00     Years: 5.00     Total pack years: 5.00     Types: Cigarettes     Quit date:      Years since quittin.5     Passive exposure: Past   • Smokeless tobacco: Never   Vaping Use   • Vaping Use: Never used   Substance and Sexual Activity   • Alcohol use: Not Currently     Comment: occasional    • Drug use: Never   • Sexual activity: None   Other Topics Concern   • None   Social History Narrative    AS OF 9/22/15 IN NEXTGEN:        CONSUMES CAFFEINE    CAFFEINE: COFFEE     Social Determinants of Health     Financial Resource Strain: Low Risk  (2022)    Overall Financial Resource Strain (CARDIA)    • Difficulty of Paying Living Expenses: Not hard at all   Food Insecurity: No Food Insecurity (2022)    Hunger Vital Sign    • Worried About Running Out of Food in the Last Year: Never true    • Ran Out of Food in the Last Year: Never true   Transportation Needs: No Transportation Needs (2022)    PRAPARE - Transportation    • Lack of Transportation (Medical): No    • Lack of Transportation (Non-Medical):  No   Physical Activity: Not on file   Stress: Not on file   Social Connections: Not on file   Intimate Partner Violence: Not on file   Housing Stability: Not on file     Current Outpatient Medications on File Prior to Visit   Medication Sig   • acetaminophen (TYLENOL) 325 mg tablet Take 3 tablets (975 mg total) by mouth every 8 (eight) hours as needed for mild pain (Patient not taking: Reported on 5/17/2023)   • dexamethasone (DECADRON) 4 mg tablet Day 1 take 3 tabs, day 2 take 2 tabs, day 3 take 1 tab, then stop   • levothyroxine 112 mcg tablet Take 1 tablet (112 mcg total) by mouth daily   • lisinopril (ZESTRIL) 40 mg tablet Take 1 tablet (40 mg total) by mouth daily   • meclizine (ANTIVERT) 50 MG tablet Take 1 tablet (50 mg total) by mouth every 12 (twelve) hours as needed for dizziness   • omeprazole (PriLOSEC) 40 MG capsule take 1 capsule by mouth twice a day   • ondansetron (ZOFRAN) 4 mg tablet Take 1 tablet (4 mg total) by mouth every 8 (eight) hours as needed for nausea or vomiting (Patient not taking: Reported on 5/16/2023)   • oxyCODONE (ROXICODONE) 5 immediate release tablet Take 1 tablet (5 mg total) by mouth every 4 (four) hours as needed for severe pain for up to 8 doses Max Daily Amount: 30 mg (Patient not taking: Reported on 5/16/2023)   • sucralfate (CARAFATE) 1 g tablet Take 1 tablet (1 g total) by mouth 4 (four) times a day (Patient not taking: Reported on 5/17/2023)   • topiramate (TOPAMAX) 25 mg tablet take 1 tablet by mouth twice a day     No Known Allergies  Immunization History   Administered Date(s) Administered   • COVID-19 MODERNA VACC 0.5 ML IM 04/30/2021, 05/26/2021, 11/28/2021   • Influenza, recombinant, quadrivalent,injectable, preservative free 02/09/2022   • Influenza, seasonal, injectable 10/03/2013   • Pneumococcal Conjugate Vaccine 20-valent (Pcv20), Polysace 07/18/2022   • Tdap 07/18/2022       Objective     /82 (BP Location: Left arm, Patient Position: Sitting, Cuff Size: Standard)   Pulse 70   Temp 97.8 °F (36.6 °C) (Temporal)   Resp 18   Ht 5' 7" (1.702 m)   Wt 79.8 kg (176 lb)   LMP 07/08/2023 (Exact Date)   SpO2 97%   BMI 27.57 kg/m²     Physical Exam  Vitals reviewed. Constitutional:       General: She is not in acute distress. Appearance: She is overweight. She is not ill-appearing or diaphoretic. HENT:      Head: Normocephalic and atraumatic. Cardiovascular:      Rate and Rhythm: Normal rate and regular rhythm. Heart sounds: Normal heart sounds. No murmur heard. Pulmonary:      Effort: Pulmonary effort is normal. No tachypnea. Breath sounds: Normal breath sounds. No decreased breath sounds or wheezing. Musculoskeletal:      Cervical back: Normal.      Thoracic back: Normal.      Lumbar back: Tenderness present. No swelling or deformity. Negative right straight leg raise test and negative left straight leg raise test.      Right lower leg: No edema. Left lower leg: No edema. Right foot: Normal.      Left foot: Decreased range of motion. Normal capillary refill. Tenderness present. No swelling, deformity or crepitus. Normal pulse. Skin:     General: Skin is warm and dry. Neurological:      Mental Status: She is alert and oriented to person, place, and time. Psychiatric:         Attention and Perception: Attention normal.         Mood and Affect: Mood and affect normal.         Speech: Speech normal.         Behavior: Behavior normal.         Thought Content:  Thought content normal.       [address]

## 2023-07-12 NOTE — ASSESSMENT & PLAN NOTE
- Continue ibuprofen PRN. Reviewed daily dose limits with pt.   - Can continue brace if helpful but encouraged physical activity and stretching.   - Ketorolac injection today. - Medrol dose pack. Lidocaine patches PRN.   - Referred to PT, follow up with PCP in 4 weeks, if no improvement, would recommend referral to spine specialist.

## 2023-07-12 NOTE — ASSESSMENT & PLAN NOTE
- Continue ibuprofen PRN. Reviewed daily dose limits with pt.   - Can wrap, apply ice PRN.   - Ketorolac injection today. - Medrol dose pack. - Referred to Podiatry Dr Lilliam Stevens.

## 2023-07-12 NOTE — PATIENT INSTRUCTIONS
Ejercicios para la espalda baja   LO QUE NECESITA SABER:   ¿Qué necesito saber acerca de los ejercicios de la espalda baja? Los ejercicios de la espalda baja ayudan a sanar y a fortalecer los músculos de torres espalda para evitar otra Verónica Salmons. Pregúntele a torres médico si usted necesita acudir con un fisioterapeuta para que le indique ejercicios más avanzado. ¿Qué clive saber acerca de hacer ejercicios de Wilburt Maxim? Riley los ejercicios sobre nicole colchoneta o superficie firme (no sobre nicole cama). Nicole superficie firme sostendrá torres columna y evitará el dolor lumbar. Muévase lenta y suavemente. Evite movimientos rápidos o bruscos. Respire normalmente. No contenga la respiración. Deténgase si siente dolor. Es normal sentir alguna molestia al principio, leif no debería sentir dolor. Practicar los ejercicios con regularidad ayudará a disminuir torres incomodidad con el paso del Danita. ¿Cómo realizo los ejercicios para la espalda baja de [place]? Torres médico podría recomendarle que realice ejercicios para la espalda de 10 a 30 minutos cada día. También podría recomendarle que riley ejercicios de 1 a 3 veces cada día. Pregunte a torres médico cuáles ejercicios son los mejores para usted y con qué frecuencia hacerlos. Bombeo del tobillo: Acuéstese boca arriba. Levante torres pie (con shan dedos apuntando hacia torres santiago). Luego, baje torres pie (con los dedos apuntando lejos de usted). Repita reji ejercicio 10 veces en cada lado. Deslizamiento de talón: Acuéstese boca arriba. Muy despacio doble nicole pierna y luego enderécela. Luego, doble la otra pierna y enderécela. Repita 10 veces en cada lado. Inclinación pélvica: Acuéstese boca arriba con shan rodillas dobladas y shan pies planos sobre el piso. Coloque shan brazos en nicole posición relajada junto a torres cuerpo. Contraiga los músculos de torres abdomen y aplane torres espalda contra el piso. Sostenga está posición por 5 segundos. Repita 5 veces.          Estiramiento de la espalda: Acuéstese boca arriba con shan jessica detrás de torres santiago. Doble shan rodillas y gire la mitad de torres cuerpo hacia un lado. Mantenga esta posición por 10 segundos. Repita 3 veces en cada lado. Levantamiento de la pierna estirada: Acuéstese boca Rob Karma con nicole pierna estirada. Doble la otra rodilla. Contraiga torres abdomen y luego levante lentamente la pierna estirada entre 6 a 12 pulgadas del piso. Mantenga esta posición por 1 a 5 segundos. Baje torres pierna lentamente. Repita 10 veces en cada pierna. Rodillas al pecho: Acuéstese boca arriba con shan rodillas dobladas y shan pies planos sobre el piso. Primo Roam nicole de las rodillas hacia torres pecho y sosténgala por 5 segundos. Regrese torres pierna a la posición inicial. Levante la otra rodilla hacia el pecho y sosténgala por 5 segundos. Kee esto 5 veces en cada lado. Posición anoop camello: Coloque shan jessica y [facility]. Arquee torres espalda Mathew Hawk, hacia el [facility] y [facility] Arquee torres agata dorsal lo más posible. Sostenga está posición por 5 segundos. Levante torres santiago hacia arriba y baje torres pecho hacia el piso. Sostenga está posición por 5 segundos. Kee 3 series o josé antonio se le indique. Posición de cuclillas contra la pared: Párese con torres espalda contra la pared. Contraiga los músculos de torres abdomen. Lentamente deslice torres cuerpo hasta que shan rodillas queden dobladas en un ángulo de 45 grados. Mantenga esta posición por 5 segundos. Deslice lentamente torres espalda hacia arriba hasta quedar de pie. Repita 10 veces. Posición de acurrucarse: Acuéstese boca arriba con shan rodillas dobladas y shan pies planos sobre el piso. Coloque shan jessica con las merlin hacia abajo debajo de la curva de la parte baja de torres espalda. Después, con shan codos [place], levante shan hombros y [place] 2 a 3 pulgadas. Mantenga torres santiago a la misma altura de shan hombros. Mantenga esta posición por 5 segundos.  Cuando usted [employer] ejercicio sin sentir dolor por 10 a 15 segundos, puede entonces añadir nicole rotación. Mientras shan hombros y arvizu pecho estén levantados del piso, voltee levemente hacia la izquierda y [place]. Repita en el otro lado. Ejercicio pájaro saud: Coloque shan jessica y rodillas sobre el piso. Mantenga shan muñecas directamente debajo de shan hombros y shan rodillas directamente debajo de shan caderas. Contraiga arvizu ombligo hacia adentro en dirección a arvizu columna. No estire ni arquee arvizu espalda. Ponga tensos shan músculos abdominales. Levante un brazo extendido para que se alinee con arvizu santiago. Luego, levante la pierna opuesta a arvizu brazo. Mantenga esta posición por 15 segundos. Baje arvizu Maddison Zambrano y pierna lentamente y [place] de lado. Kee 5 series. ¿Cuándo clive buscar atención inmediata? Usted tiene dolor severo que le impide moverse. ¿Cuándo clive llamar a mi médico?  Arvizu dolor empeora. Usted tiene un dolor nuevo. Usted tiene preguntas o inquietudes acerca de arvizu condición o cuidado. ACUERDOS SOBRE ARVIZU CUIDADO:   Usted tiene el derecho de ayudar a planear arvizu cuidado. Aprenda todo lo que pueda sobre avrizu condición y josé antonio darle tratamiento. Discuta shan opciones de tratamiento con shan médicos para decidir el cuidado que usted desea recibir. Usted siempre tiene el derecho de rechazar el tratamiento. Esta información es sólo para uso en educación. Arvizu intención no es darle un consejo médico sobre enfermedades o tratamientos. Colsulte con arvizu Josefine Curl farmacéutico antes de seguir cualquier régimen médico para saber si es seguro y efectivo para usted. © Copyright Amauri Payton 2022 Information is for End User's use only and may not be sold, redistributed or otherwise used for commercial purposes.

## 2023-07-19 ENCOUNTER — OFFICE VISIT (OUTPATIENT)
Dept: FAMILY MEDICINE CLINIC | Facility: CLINIC | Age: 58
End: 2023-07-19

## 2023-07-19 VITALS
HEART RATE: 75 BPM | WEIGHT: 172 LBS | DIASTOLIC BLOOD PRESSURE: 88 MMHG | TEMPERATURE: 96.5 F | RESPIRATION RATE: 16 BRPM | BODY MASS INDEX: 27 KG/M2 | OXYGEN SATURATION: 99 % | HEIGHT: 67 IN | SYSTOLIC BLOOD PRESSURE: 140 MMHG

## 2023-07-19 DIAGNOSIS — H52.03 FAR-SIGHTED, BILATERAL: ICD-10-CM

## 2023-07-19 DIAGNOSIS — E03.9 HYPOTHYROIDISM, UNSPECIFIED TYPE: ICD-10-CM

## 2023-07-19 DIAGNOSIS — Z00.00 ANNUAL PHYSICAL EXAM: ICD-10-CM
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
## 2023-07-19 DIAGNOSIS — I10 ESSENTIAL HYPERTENSION: ICD-10-CM

## 2023-07-19 RX ORDER — LEVOTHYROXINE SODIUM 112 UG/1
112 TABLET ORAL DAILY
Qty: 90 TABLET | Refills: 3 | Status: SHIPPED | OUTPATIENT
Start: 2023-07-19

## 2023-07-19 RX ORDER — LISINOPRIL 40 MG/1
40 TABLET ORAL DAILY
Qty: 90 TABLET | Refills: 1 | Status: SHIPPED | OUTPATIENT
Start: 2023-07-19

## 2023-07-19 NOTE — ASSESSMENT & PLAN NOTE
Hx of Meningioma  Patient still c/o dizziness  Does not drive; her sister drives her around  Was in Vestibular therapy and now has completed that. Was told to continue exercises at home and she does them. She states they sometimes help  Last fall yesterday 7/18/2023 [address]  She fell with her right hand outstretched    - XR of R hand and humerus  - Continue to take Meclizine as needed  - Continue home vestibular tx/exercises  - Will monitor result of XR and f/u accordingly.

## 2023-07-19 NOTE — PROGRESS NOTES
[facility]    NAME: Delphine Finney  AGE: 56 y.o. SEX: female  : 1965     DATE: 2023     Assessment and Plan:     Problem List Items Addressed This Visit        Endocrine    Hypothyroidism    Relevant Medications    levothyroxine 112 mcg tablet       Cardiovascular and Mediastinum    Essential hypertension    Relevant Medications    lisinopril (ZESTRIL) 40 mg tablet       Other    Fall from standing - Primary     Hx of Meningioma  Patient still c/o dizziness  Does not drive; her sister drives her around  Was in Vestibular therapy and now has completed that. Was told to continue exercises at home and she does them. She states they sometimes help  Last fall yesterday 2023 on the [address]  She fell with her right hand outstretched    - XR of R hand and humerus  - Continue to take Meclizine as needed  - Continue home vestibular tx/exercises  - Will monitor result of XR and f/u accordingly. Relevant Orders    XR hand 3+ vw right    XR humerus right   Other Visit Diagnoses     Annual physical exam        Far-sighted, bilateral        Relevant Orders    Visual acuity screening          Immunizations and preventive care screenings were discussed with patient today. Appropriate education was printed on patient's after visit summary. Counseling:  Alcohol/drug use: discussed moderation in alcohol intake, the recommendations for healthy alcohol use, and avoidance of illicit drug use. Denies use of recreational drugs or tobacco. Drinks alcohol only on occasion. Dental Health: discussed importance of regular tooth brushing, flossing, and dental visits. Will schedule an appointment soon. Injury prevention: discussed safety/seat belts, safety helmets, smoke detectors, carbon dioxide detectors, and smoking near bedding or upholstery.   Sexual health: discussed sexually transmitted diseases, partner selection, use of condoms, avoidance of unintended pregnancy, and contraceptive alternatives. Abstinent now. · Safety: Given current dizziness, she does not drive. Discussed need to have a chaperon when taking walks. Her sister also takes walks with her and is usually always around. She has great family support. · Diet/xercise: Endorses a healthy diet for the most part. She limits snacking and sugary drinks. She will increase her movement to lose about 10 lbs this year to meet her BMI goal.  · Blood pressure: Discussed goal of less than 140/90. Discussed reduced salt intake. BMI Counseling: Body mass index is 26.94 kg/m². The BMI is above normal. Nutrition recommendations include encouraging healthy choices of fruits and vegetables. Exercise recommendations include exercising 3-5 times per week. No pharmacotherapy was ordered. Rationale for BMI follow-up plan is due to patient being overweight or obese. Return for Next scheduled follow up. Chief Complaint:     Chief Complaint   Patient presents with   • Physical Exam   • Dizziness      History of Present Illness:     Adult Annual Physical   Patient here for a comprehensive physical exam. The patient reports problems - Dizziness and fall. Diet and Physical Activity  · Diet/Nutrition: well balanced diet, limited junk food, consuming 3-5 servings of fruits/vegetables daily and adequate whole grain intake. · Exercise: walking and more than 2 hours on average. Depression Screening  PHQ-2/9 Depression Screening    Little interest or pleasure in doing things: 1 - several days  Feeling down, depressed, or hopeless: 1 - several days       General Health  · Sleep: gets 4-6 hours of sleep on average. · Hearing: normal - bilateral.  · Vision: vision problems: Far sighted. Needs an appointment with opthamology/optometry, most recent eye exam >1 year ago and uses reading glasses. · Dental: no dental visits for >1 year.        /GYN Health  · Patient is: premenopausal  · Last menstrual period: 2023- Lasted 2 weeks. This is a first occurrence. · Contraceptive method: Abstinent. Review of Systems:     Review of Systems   Cardiovascular: Negative for chest pain. Gastrointestinal: Positive for abdominal pain. Recent hernia repair. Musculoskeletal: Positive for gait problem. Ankle pain   Skin: Negative for color change. Neurological: Positive for dizziness and headaches.       Past Medical History:     Past Medical History:   Diagnosis Date   • Chronic pain    • Disease of thyroid gland     hypothyroid   • Diverticulitis 2021   • GERD (gastroesophageal reflux disease)     NISSEN   today 2023   • Heel pain, chronic, right 2021   • Hiatal hernia    • Hypertension    • Hyperthyroiditis     HYPERTHYROID; POST IODINE THERAPY   • Intractable vomiting 2021   • Meningioma ([address])    • Migraine without aura and without status migrainosus, not intractable 2021   • Pain of upper abdomen 2020   • Sciatica of right side 2020   • Weakness of right upper extremity 2022      Past Surgical History:     Past Surgical History:   Procedure Laterality Date   • APPENDECTOMY     •  SECTION     • EGD     • HERNIA REPAIR     • HI LAPS RPR PARAESPHGL HRNA INCL FUNDPLSTY W/MESH N/A 2023    Procedure: REPAIR HERNIA HIATAL HERNIA REPAIR LAPAROSCOPIC  w/ NISSEN FUNDOPLICATION;  Surgeon: Corey Chase MD;  Location: [facility];  Service: General      Social History:     Social History     Socioeconomic History   • Marital status: Single     Spouse name: None   • Number of children: None   • Years of education: None   • Highest education level: None   Occupational History   • None   Tobacco Use   • Smoking status: Former     Packs/day: 1.00     Years: 5.00     Total pack years: 5.00     Types: Cigarettes     Quit date:      Years since quittin.5     Passive exposure: Past   • Smokeless tobacco: Never Vaping Use   • Vaping Use: Never used   Substance and Sexual Activity   • Alcohol use: Not Currently     Comment: occasional    • Drug use: Never   • Sexual activity: None   Other Topics Concern   • None   Social History Narrative    AS OF 9/22/15 IN NEXTGEN:        CONSUMES CAFFEINE    CAFFEINE: COFFEE     Social Determinants of Health     Financial Resource Strain: Low Risk  (9/26/2022)    Overall Financial Resource Strain (CARDIA)    • Difficulty of Paying Living Expenses: Not hard at all   Food Insecurity: No Food Insecurity (9/26/2022)    Hunger Vital Sign    • Worried About Running Out of Food in the Last Year: Never true    • Ran Out of Food in the Last Year: Never true   Transportation Needs: No Transportation Needs (9/26/2022)    PRAPARE - Transportation    • Lack of Transportation (Medical): No    • Lack of Transportation (Non-Medical):  No   Physical Activity: Not on file   Stress: Not on file   Social Connections: Not on file   Intimate Partner Violence: Not on file   Housing Stability: Not on file      Family History:     Family History   Problem Relation Age of Onset   • Endometrial cancer Family    • Ovarian cancer Mother 79   • Heart defect Mother    • Heart attack Father    • Stroke Brother    • Ovarian cancer Sister 52   • No Known Problems Maternal Grandmother    • Breast cancer Paternal Grandmother 61   • No Known Problems Sister    • No Known Problems Sister    • No Known Problems Sister    • No Known Problems Sister    • No Known Problems Daughter    • No Known Problems Maternal Grandfather    • No Known Problems Paternal Grandfather    • No Known Problems Daughter    • No Known Problems Son       Current Medications:     Current Outpatient Medications   Medication Sig Dispense Refill   • levothyroxine 112 mcg tablet Take 1 tablet (112 mcg total) by mouth daily 90 tablet 3   • lidocaine (Lidoderm) 5 % Apply 1 patch topically over 12 hours daily Remove & Discard patch within 12 hours or as directed by MD 30 patch 0   • lisinopril (ZESTRIL) 40 mg tablet Take 1 tablet (40 mg total) by mouth daily 90 tablet 1   • meclizine (ANTIVERT) 50 MG tablet Take 1 tablet (50 mg total) by mouth every 12 (twelve) hours as needed for dizziness 60 tablet 2   • methylPREDNISolone 4 MG tablet therapy pack Use as directed on package 21 each 0   • omeprazole (PriLOSEC) 40 MG capsule take 1 capsule by mouth twice a day 60 capsule 2   • ondansetron (ZOFRAN) 4 mg tablet Take 1 tablet (4 mg total) by mouth every 8 (eight) hours as needed for nausea or vomiting 20 tablet 0   • acetaminophen (TYLENOL) 325 mg tablet Take 3 tablets (975 mg total) by mouth every 8 (eight) hours as needed for mild pain (Patient not taking: Reported on 5/17/2023)  0   • dexamethasone (DECADRON) 4 mg tablet Day 1 take 3 tabs, day 2 take 2 tabs, day 3 take 1 tab, then stop 6 tablet 0   • sucralfate (CARAFATE) 1 g tablet Take 1 tablet (1 g total) by mouth 4 (four) times a day (Patient not taking: Reported on 5/17/2023) 120 tablet 5   • topiramate (TOPAMAX) 25 mg tablet take 1 tablet by mouth twice a day 60 tablet 2     No current facility-administered medications for this visit. Allergies:     No Known Allergies   Physical Exam:     /88 (BP Location: Left arm, Patient Position: Sitting, Cuff Size: Standard)   Pulse 75   Temp (!) 96.5 °F (35.8 °C) (Temporal)   Resp 16   Ht 5' 7" (1.702 m)   Wt 78 kg (172 lb)   LMP 07/08/2023 (Exact Date)   SpO2 99%   BMI 26.94 kg/m²     Physical Exam  Vitals reviewed. Constitutional:       General: She is not in acute distress. Appearance: Normal appearance. She is not ill-appearing, toxic-appearing or diaphoretic. HENT:      Right Ear: Tympanic membrane, ear canal and external ear normal. There is no impacted cerumen. Left Ear: Tympanic membrane, ear canal and external ear normal. There is no impacted cerumen. Nose: No congestion or rhinorrhea.       Mouth/Throat:      Mouth: Mucous membranes are moist.      Pharynx: No oropharyngeal exudate or posterior oropharyngeal erythema. Eyes:      General: No scleral icterus. Extraocular Movements: Extraocular movements intact. Conjunctiva/sclera: Conjunctivae normal.      Pupils: Pupils are equal, round, and reactive to light. Cardiovascular:      Rate and Rhythm: Normal rate and regular rhythm. Heart sounds: Normal heart sounds. Pulmonary:      Effort: Pulmonary effort is normal.      Breath sounds: Normal breath sounds. No wheezing. Abdominal:      Palpations: Abdomen is soft. Tenderness: There is abdominal tenderness. There is no right CVA tenderness, left CVA tenderness, guarding or rebound. Comments: Periumbilical tenderness 2/2 recent hernia repair. Musculoskeletal:      Cervical back: No rigidity or tenderness. Right lower leg: No edema. Left lower leg: No edema. Right foot: Decreased range of motion. Left foot: Normal range of motion. Feet:    Feet:      Right foot:      Skin integrity: Skin integrity normal.      Left foot:      Skin integrity: Skin integrity normal.      Comments: Tenderness/pain. Slight limp noted. Endorses reduced pain since last visit. Skin:     General: Skin is warm. Capillary Refill: Capillary refill takes less than 2 seconds. Findings: No lesion or rash. Neurological:      General: No focal deficit present. Mental Status: She is alert.    Psychiatric:         Behavior: Behavior normal.          Jose Angel Torres MD  [address] [place] [address]

## 2023-07-20 ENCOUNTER — OFFICE VISIT (OUTPATIENT)
Dept: GASTROENTEROLOGY | Facility: MEDICAL CENTER | Age: 58
End: 2023-07-20

## 2023-07-20 VITALS
DIASTOLIC BLOOD PRESSURE: 82 MMHG | SYSTOLIC BLOOD PRESSURE: 138 MMHG | HEART RATE: 72 BPM | WEIGHT: 169.8 LBS | BODY MASS INDEX: 26.59 KG/M2 | TEMPERATURE: 96.9 F

## 2023-07-20 DIAGNOSIS — K21.9 GASTROESOPHAGEAL REFLUX DISEASE WITHOUT ESOPHAGITIS: Primary | ICD-10-CM

## 2023-07-20 DIAGNOSIS — R10.10 PAIN OF UPPER ABDOMEN: ICD-10-CM

## 2023-07-20 NOTE — PROGRESS NOTES
[facility] - Outpatient Follow-up Note  Jermaine Singer 56 y.o. female MRN: [id number]  Encounter: [id number]          ASSESSMENT AND PLAN:      1. Dysphagia  2. GERD  Status post laparoscopic hiatal hernia repair with Niesen fundoplication 6/88. Since surgery acid reflux symptoms have resolved but she is having intermittent bouts of dysphagia with solids and liquids in the midesophagus. It advances on its own. Had an upper GI after surgery which noted some delay in the emptying of the esophagus. Saw her surgeon and was told that this is "common" after surgery and should improve over time. She reports this has improved slightly but still occurring several times per week. I recommended that she have a follow-up with her surgeon again to discuss these symptoms.    -Follow-up with surgeon    3. Upper abdominal pain  Complaining of intermittent left upper and right upper quadrant pain that occurs in the late evening and occasionally overnight since her hiatal hernia repair and Nissen fundoplication 4/91. She reports the pain is sharp and lasting up to 1 hour. Not related to eating or BM. BMs are brown and formed daily. Denies any nausea or vomiting. Recent CBC and CMP normal.  We will obtain an ultrasound of the abdomen to assess upper pathology. We will also assess for H. pylori.    -Stool for H. Pylori  -Ultrasound of the abdomen  -Follow-up in office in 4 to 6 weeks      ______________________________________________________________________    SUBJECTIVE: 28-year-old female last seen by Dr. Domi Babb 2/23. She has a history of GERD and Lawson's esophagus without dysplasia and hiatal hernia. She had a 24-hour pH monitoring which showed poor symptom correlation. She has refractory acid reflux and maximized medical treatment of PPI and Carafate. At last visit she was referred to surgery to discuss benefits and risk of fundoplication procedure.   She did undergo a laparoscopic hiatal hernia repair with Niesen fundoplication . Since her surgery she is denying any heartburn or reflux. She is reporting some dysphagia with solids and liquids. This occurs in her mid esophagus and eventually advances. She saw the surgeon for the symptoms and was told that this is common after surgery and should improve. She did have an upper GI after surgery which was normal other than some delay in esophageal emptying. She was told to call him if the symptoms persisted. She has reporting intermittent right upper and left upper quadrant pain that can be sharp at times and lasts up to an hour. This usually occurs later in the evening and not related to eating. It resolves on its own. Recent CBC, CMP normal.  Denies any nausea or vomiting. Prior EGD/colonoscopy     EGD  showed short segment Lawson's esophagus with no dysplasia    Colonoscopy 10/20 noted multiple moderate extensive pancolonic diverticula causing moderate luminal narrowing and large protruding external and internal hemorrhoids    REVIEW OF SYSTEMS IS OTHERWISE NEGATIVE. 10 point review of systems is negative other than per HPI.     Historical Information   Past Medical History:   Diagnosis Date   • Chronic pain    • Disease of thyroid gland     hypothyroid   • Diverticulitis 2021   • GERD (gastroesophageal reflux disease)     NISSEN   today 2023   • Heel pain, chronic, right 2021   • Hiatal hernia    • Hypertension    • Hyperthyroiditis     HYPERTHYROID; POST IODINE THERAPY   • Intractable vomiting 2021   • Meningioma ([address])    • Migraine without aura and without status migrainosus, not intractable 2021   • Pain of upper abdomen 2020   • Sciatica of right side 2020   • Weakness of right upper extremity 2022     Past Surgical History:   Procedure Laterality Date   • APPENDECTOMY     •  SECTION     • EGD     • HERNIA REPAIR     • NE LAPS RPR PARAESPHGL HRNA INCL FUNDPLSTY W/MESH N/A 2023    Procedure: REPAIR HERNIA HIATAL HERNIA REPAIR LAPAROSCOPIC  w/ NISSEN FUNDOPLICATION;  Surgeon: Aspen Falcon MD;  Location: [facility];  Service: General     Social History   Social History     Substance and Sexual Activity   Alcohol Use Not Currently    Comment: occasional      Social History     Substance and Sexual Activity   Drug Use Never     Social History     Tobacco Use   Smoking Status Former   • Packs/day: 1.00   • Years: 5.00   • Total pack years: 5.00   • Types: Cigarettes   • Quit date:    • Years since quittin.5   • Passive exposure: Past   Smokeless Tobacco Never     Family History   Problem Relation Age of Onset   • Endometrial cancer Family    • Ovarian cancer Mother 79   • Heart defect Mother    • Heart attack Father    • Stroke Brother    • Ovarian cancer Sister 52   • No Known Problems Maternal Grandmother    • Breast cancer Paternal Grandmother 61   • No Known Problems Sister    • No Known Problems Sister    • No Known Problems Sister    • No Known Problems Sister    • No Known Problems Daughter    • No Known Problems Maternal Grandfather    • No Known Problems Paternal Grandfather    • No Known Problems Daughter    • No Known Problems Son        Meds/Allergies       Current Outpatient Medications:   •  dexamethasone (DECADRON) 4 mg tablet  •  levothyroxine 112 mcg tablet  •  lidocaine (Lidoderm) 5 %  •  lisinopril (ZESTRIL) 40 mg tablet  •  meclizine (ANTIVERT) 50 MG tablet  •  methylPREDNISolone 4 MG tablet therapy pack  •  omeprazole (PriLOSEC) 40 MG capsule  •  ondansetron (ZOFRAN) 4 mg tablet  •  topiramate (TOPAMAX) 25 mg tablet  •  acetaminophen (TYLENOL) 325 mg tablet  •  sucralfate (CARAFATE) 1 g tablet    No Known Allergies        Objective     Blood pressure 138/82, pulse 72, temperature (!) 96.9 °F (36.1 °C), weight 77 kg (169 lb 12.8 oz), last menstrual period 2023, not currently breastfeeding. Body mass index is 26.59 kg/m².       PHYSICAL EXAM:      General Appearance:   Alert, cooperative, no distress   HEENT:   Normocephalic, atraumatic, anicteric. Neck:  Supple, symmetrical, trachea midline   Lungs:   Clear to auscultation bilaterally; no rales, rhonchi or wheezing; respirations unlabored    Heart[de-identified]   Regular rate and rhythm; no murmur, rub, or gallop. Abdomen:   Soft, non-tender, non-distended; normal bowel sounds; no masses, no organomegaly    Genitalia:   Deferred    Rectal:   Deferred    Extremities:  No cyanosis, clubbing or edema    Pulses:  2+ and symmetric    Skin:  No jaundice, rashes, or lesions    Lymph nodes:  No palpable cervical lymphadenopathy        Lab Results:   No visits with results within 1 Day(s) from this visit.    Latest known visit with results is:   Admission on 04/24/2023, Discharged on 04/26/2023   Component Date Value   • ABO Grouping 04/24/2023 O    • Rh Factor 04/24/2023 Positive    • EXT Preg Test, Ur 04/24/2023 Negative    • Control 04/24/2023 Valid    • WBC 04/25/2023 8.56    • RBC 04/25/2023 3.99    • Hemoglobin 04/25/2023 12.6    • Hematocrit 04/25/2023 38.5    • MCV 04/25/2023 97    • MCH 04/25/2023 31.6    • MCHC 04/25/2023 32.7    • RDW 04/25/2023 13.8    • MPV 04/25/2023 9.4    • Platelets 51/73/3847 255    • nRBC 04/25/2023 0    • Neutrophils Relative 04/25/2023 76 (H)    • Immat GRANS % 04/25/2023 1    • Lymphocytes Relative 04/25/2023 16    • Monocytes Relative 04/25/2023 7    • Eosinophils Relative 04/25/2023 0    • Basophils Relative 04/25/2023 0    • Neutrophils Absolute 04/25/2023 6.57    • Immature Grans Absolute 04/25/2023 0.04    • Lymphocytes Absolute 04/25/2023 1.35    • Monocytes Absolute 04/25/2023 0.59    • Eosinophils Absolute 04/25/2023 0.00    • Basophils Absolute 04/25/2023 0.01    • Sodium 04/25/2023 136    • Potassium 04/25/2023 4.4    • Chloride 04/25/2023 106    • CO2 04/25/2023 24    • ANION GAP 04/25/2023 6    • BUN 04/25/2023 6    • Creatinine 04/25/2023 0.59 (L)    • Glucose 04/25/2023 113    • Glucose, Fasting 04/25/2023 113 (H)    • Calcium 04/25/2023 8.3 (L)    • eGFR 04/25/2023 103          Radiology Results:   No results found.

## 2023-07-21 ENCOUNTER — OFFICE VISIT (OUTPATIENT)
Dept: DENTISTRY | Facility: CLINIC | Age: 58
End: 2023-07-21

## 2023-07-21 VITALS — HEART RATE: 58 BPM | DIASTOLIC BLOOD PRESSURE: 99 MMHG | SYSTOLIC BLOOD PRESSURE: 175 MMHG | TEMPERATURE: 97.7 F

## 2023-07-21 DIAGNOSIS — Z01.20 ENCOUNTER FOR DENTAL EXAMINATION: Primary | ICD-10-CM

## 2023-07-21 PROCEDURE — D0140 LIMITED ORAL EVALUATION - PROBLEM FOCUSED: HCPCS

## 2023-07-21 PROCEDURE — D0220 INTRAORAL - PERIAPICAL FIRST RADIOGRAPHIC IMAGE: HCPCS

## 2023-07-21 RX ORDER — AMOXICILLIN 500 MG/1
500 CAPSULE ORAL EVERY 8 HOURS SCHEDULED
Qty: 21 CAPSULE | Refills: 0 | Status: SHIPPED | OUTPATIENT
Start: 2023-07-21 | End: 2023-07-28

## 2023-07-21 NOTE — PROGRESS NOTES
Subjective   Patient ID: Nguyễn Conner is a 56 y.o. female. Chief Complaint   Patient presents with   • Emergency/limited Exam     Reviewed medical history   ASA  2  BP was high today- she did not take meds yet    CC pain LL # 20  Radiating up side of face  Has hurt for about 1 month    PAX taken  Sens.  To percussion     [facility]     explained tooth needs endo therapy followed by post and core/ CR     Discussed SFS in case denied by insurance   prescribed antibiotic  NV  Endo # 20  NV 2 comp exam FMX   may need pan and PAX  She has extensive bridge work maxillary teeth  [facility]

## 2023-07-23 ENCOUNTER — HOSPITAL ENCOUNTER (OUTPATIENT)
Dept: ULTRASOUND IMAGING | Facility: HOSPITAL | Age: 58
Discharge: HOME | End: 2023-07-23
Payer: COMMERCIAL

## 2023-07-23 DIAGNOSIS — R10.10 PAIN OF UPPER ABDOMEN: ICD-10-CM

## 2023-07-23 PROCEDURE — 76700 US EXAM ABDOM COMPLETE: CPT

## 2023-08-23 DIAGNOSIS — K21.9 GASTROESOPHAGEAL REFLUX DISEASE WITHOUT ESOPHAGITIS: ICD-10-CM

## 2023-08-23 RX ORDER — OMEPRAZOLE 40 MG/1
CAPSULE, DELAYED RELEASE ORAL
Qty: 60 CAPSULE | Refills: 5 | Status: SHIPPED | OUTPATIENT
Start: 2023-08-23

## 2023-09-02 DIAGNOSIS — G43.109 MIGRAINE WITH AURA AND WITHOUT STATUS MIGRAINOSUS, NOT INTRACTABLE: ICD-10-CM

## 2023-09-05 RX ORDER — TOPIRAMATE 25 MG/1
TABLET ORAL
Qty: 60 TABLET | Refills: 2 | Status: SHIPPED | OUTPATIENT
Start: 2023-09-05

## 2023-09-28 ENCOUNTER — TELEPHONE (OUTPATIENT)
Dept: FAMILY MEDICINE CLINIC | Facility: CLINIC | Age: 58
End: 2023-09-28

## 2023-09-28 NOTE — TELEPHONE ENCOUNTER
My name is Ashu Cortes. I'm having a lot of dizziness and a lot, a lot of pain. My date of birth, [birth year]. My phone is [phone]. Please, I want you to call me to make an appointment. Thank you for having an excellent afternoon. [phone]. Appointment scheduled.

## 2023-10-23 DIAGNOSIS — K21.9 GASTROESOPHAGEAL REFLUX DISEASE WITHOUT ESOPHAGITIS: ICD-10-CM

## 2023-10-23 RX ORDER — SUCRALFATE 1 G/1
1 TABLET ORAL 4 TIMES DAILY
Qty: 120 TABLET | Refills: 5 | Status: SHIPPED | OUTPATIENT
Start: 2023-10-23

## 2023-12-18 NOTE — ASSESSMENT & PLAN NOTE
Patient had repeat EGD done by GI on 4/8/2022 which showed a 3 cm hiatal hernia as well as short-segment Lawson's esophagus, which was biopsied  Stomach and duodenum appeared normal and esophagitis was noted to be mostly resolved  She will be undergoing a pH study  - Continue Sucralfate 1 tablet 4x/day, Bentyl 20 mg Q6H, and Protonix 40 mg BID    - Continue following with GI; appreciate their recommendations  Vitamin D3 2000 IU daily  Vitamin C 1000mg twice per day  Multivitamin daily  Fluids and rest  Over the counter cold medication as needed (EX: Mucinex, tylenol/motrin)  Follow up with PCP in 3-5 days.  Proceed to ER if symptoms worsen.

## 2024-01-17 ENCOUNTER — TELEPHONE (OUTPATIENT)
Dept: PSYCHIATRY | Facility: CLINIC | Age: 59
End: 2024-01-17

## 2024-01-17 NOTE — TELEPHONE ENCOUNTER
Contacted patient off of Talk Therapy wait list using  interpretive services (Davey, [phone]) to verify needs of services in attempts to offer patient an appointment at [facility] . spoke with patient whom stated they are still interested in services. When patient asked are they okay with using interpretor services during appointment patient stated that would be okay.

## 2024-01-17 NOTE — TELEPHONE ENCOUNTER
"Behavioral Health Outpatient Intake Questions    Referred By   : pcp    Please advise interviewee that they need to answer all questions truthfully to allow for best care, and any misrepresentations of information may affect their ability to be seen at this clinic   => Was this discussed? Yes     If Minor Child (under age 18)    Who is/are the legal guardian(s) of the child?     Is there a custody agreement?      If \"YES\"- Custody orders must be obtained prior to scheduling the first appointment  In addition, Consent to Treatment must be signed by all legal guardians prior to scheduling the first appointment    If \"NO\"- Consent to Treatment must be signed by all legal guardians prior to scheduling the first appointment    Behavioral Health Outpatient Intake History -     Presenting Problem (in patient's own words): depression, anxiety, complications with health causing stress    Are there any communication barriers for this patient?     No                                               If yes, please describe barriers:   If there is a unique situation, please refer to Yousif Short/Krystle Goel for final determination.    Are you taking any psychiatric medications? No     If \"YES\" -What are they      If \"YES\" -Who prescribes?     Has the Patient previously received outpatient Talk Therapy or Medication Management from [facility]  No        If \"YES\"- When, Where and with Whom?         If \"NO\" -Has Patient received these services elsewhere?       If \"YES\" -When, Where, and with Whom?  15 years prior    Has the Patient abused alcohol or other substances in the last 6 months ? No  No concerns of substance abuse are reported.     If \"YES\" -What substance, How much, How often?     If illegal substance: Refer to [facility] (for KAREL) or [facility].   If Alcohol in excess of 10 drinks per week:  Refer to [facility] (for KAREL) or [facility]    Legal History-     Is this treatment court ordered? No   If \"yes " "\"send to :  Talk Therapy : Send to Yousif Short/Krystle Goel for final determination   Med Management: Send to Dr Michael for final determination     Has the Patient been convicted of a felony?  No   If \"Yes\" send to -When, What?  Talk Therapy : Send to Yousif Short/Krystle Goel for final determination   Med Management: Send to Dr Michael for final determination     ACCEPTED as a patient Yes  If \"Yes\" Appointment Date:   Georgina Valentine 1/23 @ 10a     Referred Elsewhere? No  If “Yes” - (Where? Ex: [facility], [facility], [facility], [facility], etc.)       Name of Insurance Co:erin  Insurance ID#[id number]   Insurance Phone #  If ins is primary or secondary?  If patient is a minor, parents information such as Name, D.O.B of guarantor.  "

## 2024-01-22 ENCOUNTER — TELEPHONE (OUTPATIENT)
Dept: PSYCHIATRY | Facility: CLINIC | Age: 59
End: 2024-01-22

## 2024-01-22 NOTE — TELEPHONE ENCOUNTER
Patient called to cancel NP and Follow up appt due to her not having insurance and she cannot pay out of pocket. Writer canceled her appts and sent her name back to Intake.

## 2024-08-29 NOTE — ASSESSMENT & PLAN NOTE
EGD on 4/8/2022 showed Lawson's esophagus with no dysplasia and a 3 cm hiatal hernia  [Restricted in physically strenuous activity but ambulatory and able to carry out work of a light or sedentary nature] : Status 1- Restricted in physically strenuous activity but ambulatory and able to carry out work of a light or sedentary nature, e.g., light house work, office work [Obese] : obese [de-identified] : In the right groin wound very well-healed  [Normal] : normoactive bowel sounds, soft and nontender, no hepatosplenomegaly or masses appreciated [de-identified] : generalized rash on upper ext.

## 2024-10-07 ENCOUNTER — TELEPHONE (OUTPATIENT)
Age: 59
End: 2024-10-07

## 2024-10-07 NOTE — TELEPHONE ENCOUNTER
Contacted patient off of [facility]  to verify needs of services in attempts to offer patient an appointment. spoke with patient whom stated She can't schedule an appointment right now since she does not have insurance. Patient just started working and is waiting for insurance. Pt would like to remain on the wait list. For TT.

## 2024-10-27 ENCOUNTER — APPOINTMENT (EMERGENCY)
Dept: CT IMAGING | Facility: HOSPITAL | Age: 59
End: 2024-10-27

## 2024-10-27 ENCOUNTER — HOSPITAL ENCOUNTER (EMERGENCY)
Facility: HOSPITAL | Age: 59
Discharge: HOME | End: 2024-10-27
Attending: EMERGENCY MEDICINE

## 2024-10-27 VITALS
HEART RATE: 61 BPM | BODY MASS INDEX: 25.55 KG/M2 | TEMPERATURE: 97.7 F | WEIGHT: 163.14 LBS | SYSTOLIC BLOOD PRESSURE: 111 MMHG | OXYGEN SATURATION: 100 % | DIASTOLIC BLOOD PRESSURE: 66 MMHG | RESPIRATION RATE: 18 BRPM

## 2024-10-27 DIAGNOSIS — R10.9 ABDOMINAL PAIN: Primary | ICD-10-CM

## 2024-10-27 LAB
ALBUMIN SERPL BCG-MCNC: 4.1 G/DL (ref 3.5–5)
ALP SERPL-CCNC: 57 U/L (ref 34–104)
ALT SERPL W P-5'-P-CCNC: 22 U/L (ref 7–52)
ANION GAP SERPL CALCULATED.3IONS-SCNC: 3 MMOL/L (ref 4–13)
AST SERPL W P-5'-P-CCNC: 18 U/L (ref 13–39)
BASOPHILS # BLD AUTO: 0.03 THOUSANDS/ΜL (ref 0–0.1)
BASOPHILS NFR BLD AUTO: 0 % (ref 0–1)
BILIRUB SERPL-MCNC: 0.5 MG/DL (ref 0.2–1)
BUN SERPL-MCNC: 10 MG/DL (ref 5–25)
CALCIUM SERPL-MCNC: 9.2 MG/DL (ref 8.4–10.2)
CHLORIDE SERPL-SCNC: 104 MMOL/L (ref 96–108)
CO2 SERPL-SCNC: 28 MMOL/L (ref 21–32)
CREAT SERPL-MCNC: 0.83 MG/DL (ref 0.6–1.3)
EOSINOPHIL # BLD AUTO: 0.19 THOUSAND/ΜL (ref 0–0.61)
EOSINOPHIL NFR BLD AUTO: 3 % (ref 0–6)
ERYTHROCYTE [DISTWIDTH] IN BLOOD BY AUTOMATED COUNT: 14 % (ref 11.6–15.1)
GFR SERPL CREATININE-BSD FRML MDRD: 78 ML/MIN/1.73SQ M
GLUCOSE SERPL-MCNC: 90 MG/DL (ref 65–140)
HCT VFR BLD AUTO: 39.3 % (ref 34.8–46.1)
HGB BLD-MCNC: 13.1 G/DL (ref 11.5–15.4)
IMM GRANULOCYTES # BLD AUTO: 0.01 THOUSAND/UL (ref 0–0.2)
IMM GRANULOCYTES NFR BLD AUTO: 0 % (ref 0–2)
LIPASE SERPL-CCNC: 20 U/L (ref 11–82)
LYMPHOCYTES # BLD AUTO: 2.26 THOUSANDS/ΜL (ref 0.6–4.47)
LYMPHOCYTES NFR BLD AUTO: 30 % (ref 14–44)
MCH RBC QN AUTO: 31.6 PG (ref 26.8–34.3)
MCHC RBC AUTO-ENTMCNC: 33.3 G/DL (ref 31.4–37.4)
MCV RBC AUTO: 95 FL (ref 82–98)
MONOCYTES # BLD AUTO: 0.44 THOUSAND/ΜL (ref 0.17–1.22)
MONOCYTES NFR BLD AUTO: 6 % (ref 4–12)
NEUTROPHILS # BLD AUTO: 4.51 THOUSANDS/ΜL (ref 1.85–7.62)
NEUTS SEG NFR BLD AUTO: 61 % (ref 43–75)
NRBC BLD AUTO-RTO: 0 /100 WBCS
PLATELET # BLD AUTO: 296 THOUSANDS/UL (ref 149–390)
PMV BLD AUTO: 9 FL (ref 8.9–12.7)
POTASSIUM SERPL-SCNC: 4.5 MMOL/L (ref 3.5–5.3)
PROT SERPL-MCNC: 6.8 G/DL (ref 6.4–8.4)
RBC # BLD AUTO: 4.14 MILLION/UL (ref 3.81–5.12)
SODIUM SERPL-SCNC: 135 MMOL/L (ref 135–147)
WBC # BLD AUTO: 7.44 THOUSAND/UL (ref 4.31–10.16)

## 2024-10-27 PROCEDURE — 99284 EMERGENCY DEPT VISIT MOD MDM: CPT

## 2024-10-27 PROCEDURE — 96374 THER/PROPH/DIAG INJ IV PUSH: CPT

## 2024-10-27 PROCEDURE — 80053 COMPREHEN METABOLIC PANEL: CPT | Performed by: EMERGENCY MEDICINE

## 2024-10-27 PROCEDURE — 70450 CT HEAD/BRAIN W/O DYE: CPT

## 2024-10-27 PROCEDURE — 36415 COLL VENOUS BLD VENIPUNCTURE: CPT | Performed by: EMERGENCY MEDICINE

## 2024-10-27 PROCEDURE — 71260 CT THORAX DX C+: CPT

## 2024-10-27 PROCEDURE — 83690 ASSAY OF LIPASE: CPT | Performed by: EMERGENCY MEDICINE

## 2024-10-27 PROCEDURE — 96361 HYDRATE IV INFUSION ADD-ON: CPT

## 2024-10-27 PROCEDURE — 96375 TX/PRO/DX INJ NEW DRUG ADDON: CPT

## 2024-10-27 PROCEDURE — 85025 COMPLETE CBC W/AUTO DIFF WBC: CPT | Performed by: EMERGENCY MEDICINE

## 2024-10-27 PROCEDURE — 99284 EMERGENCY DEPT VISIT MOD MDM: CPT | Performed by: EMERGENCY MEDICINE

## 2024-10-27 PROCEDURE — 74177 CT ABD & PELVIS W/CONTRAST: CPT

## 2024-10-27 RX ORDER — SUCRALFATE 1 G/1
1 TABLET ORAL
Qty: 40 TABLET | Refills: 0 | Status: SHIPPED | OUTPATIENT
Start: 2024-10-27 | End: 2024-11-06

## 2024-10-27 RX ORDER — MORPHINE SULFATE 4 MG/ML
4 INJECTION, SOLUTION INTRAMUSCULAR; INTRAVENOUS ONCE
Status: COMPLETED | OUTPATIENT
Start: 2024-10-27 | End: 2024-10-27

## 2024-10-27 RX ORDER — SUCRALFATE 1 G/1
1 TABLET ORAL ONCE
Status: COMPLETED | OUTPATIENT
Start: 2024-10-27 | End: 2024-10-27

## 2024-10-27 RX ORDER — MAGNESIUM HYDROXIDE/ALUMINUM HYDROXICE/SIMETHICONE 120; 1200; 1200 MG/30ML; MG/30ML; MG/30ML
30 SUSPENSION ORAL ONCE
Status: COMPLETED | OUTPATIENT
Start: 2024-10-27 | End: 2024-10-27

## 2024-10-27 RX ORDER — PANTOPRAZOLE SODIUM 20 MG/1
20 TABLET, DELAYED RELEASE ORAL DAILY
Qty: 20 TABLET | Refills: 0 | Status: SHIPPED | OUTPATIENT
Start: 2024-10-27

## 2024-10-27 RX ORDER — PANTOPRAZOLE SODIUM 40 MG/10ML
40 INJECTION, POWDER, LYOPHILIZED, FOR SOLUTION INTRAVENOUS ONCE
Status: COMPLETED | OUTPATIENT
Start: 2024-10-27 | End: 2024-10-27

## 2024-10-27 RX ADMIN — SODIUM CHLORIDE 1000 ML: 0.9 INJECTION, SOLUTION INTRAVENOUS at 15:52

## 2024-10-27 RX ADMIN — SUCRALFATE 1 G: 1 TABLET ORAL at 18:25

## 2024-10-27 RX ADMIN — IOHEXOL 100 ML: 350 INJECTION, SOLUTION INTRAVENOUS at 16:23

## 2024-10-27 RX ADMIN — PANTOPRAZOLE SODIUM 40 MG: 40 INJECTION, POWDER, FOR SOLUTION INTRAVENOUS at 18:26

## 2024-10-27 RX ADMIN — ALUMINUM HYDROXIDE, MAGNESIUM HYDROXIDE, AND DIMETHICONE 30 ML: 200; 20; 200 SUSPENSION ORAL at 18:25

## 2024-10-27 RX ADMIN — MORPHINE SULFATE 4 MG: 4 INJECTION INTRAVENOUS at 15:52

## 2024-10-27 NOTE — ED PROVIDER NOTES
Pt Name: Lakeshia Christianson  MRN: [id number]  Birthdate 1965  Age/Sex: 59 y.o. female  Date of evaluation: 10/27/2024  PCP: Marcelo Pillai MD        FINAL IMPRESSION    Final diagnoses:   Abdominal pain         DISPOSITION/PLAN    Time reflects when diagnosis was documented in both MDM as applicable and the Disposition within this note       Time User Action Codes Description Comment    10/27/2024  6:04 PM Kimberly Rueda Add [R10.9] Abdominal pain           ED Disposition       ED Disposition   Discharge    Condition   Stable    Date/Time   Sun Oct 27, 2024  6:04 PM    Comment   Lakeshia Christianson discharge to home/self care.                   Follow-up Information       Follow up With Specialties Details Why Contact Info Additional Information    [facility] Schedule an appointment as soon as possible for a visit   [address]  [address]  [facility] [address]  [phone] [facility] [place], [facility] [address], [place], [place], [address]   [phone]              PATIENT REFERRED TO:    [facility]  [address]  [address]  [facility] [address]  [phone]  Schedule an appointment as soon as possible for a visit         DISCHARGE MEDICATIONS:    Discharge Medication List as of 10/27/2024  6:07 PM        START taking these medications    Details   pantoprazole (PROTONIX) 20 mg tablet Take 1 tablet (20 mg total) by mouth daily, Starting Sun 10/27/2024, Normal      !! sucralfate (CARAFATE) 1 g tablet Take 1 tablet (1 g total) by mouth 4 (four) times a day (before meals and at bedtime) for 10 days, Starting Sun 10/27/2024, Until Wed 11/6/2024, Normal       !! - Potential duplicate medications found. Please discuss with provider.        CONTINUE these medications which have NOT CHANGED    Details   acetaminophen (TYLENOL) 325 mg tablet  Take 3 tablets (975 mg total) by mouth every 8 (eight) hours as needed for mild pain, Starting Wed 4/26/2023, No Print      dexamethasone (DECADRON) 4 mg tablet Day 1 take 3 tabs, day 2 take 2 tabs, day 3 take 1 tab, then stop, Normal      levothyroxine 112 mcg tablet Take 1 tablet (112 mcg total) by mouth daily, Starting Wed 7/19/2023, Normal      lidocaine (Lidoderm) 5 % Apply 1 patch topically over 12 hours daily Remove & Discard patch within 12 hours or as directed by MD, Starting Wed 7/12/2023, Normal      lisinopril (ZESTRIL) 40 mg tablet Take 1 tablet (40 mg total) by mouth daily, Starting Wed 7/19/2023, Normal      meclizine (ANTIVERT) 50 MG tablet Take 1 tablet (50 mg total) by mouth every 12 (twelve) hours as needed for dizziness, Starting Wed 6/7/2023, Normal      methylPREDNISolone 4 MG tablet therapy pack Use as directed on package, Normal      omeprazole (PriLOSEC) 40 MG capsule take 1 capsule by mouth twice a day, Normal      ondansetron (ZOFRAN) 4 mg tablet Take 1 tablet (4 mg total) by mouth every 8 (eight) hours as needed for nausea or vomiting, Starting Tue 5/2/2023, Normal      !! sucralfate (CARAFATE) 1 g tablet take 1 tablet by mouth four times a day, Starting Mon 10/23/2023, Normal      topiramate (TOPAMAX) 25 mg tablet take 1 tablet by mouth twice a day, Normal       !! - Potential duplicate medications found. Please discuss with provider.                    CHIEF COMPLAINT    Chief Complaint   Patient presents with    Abdominal Pain     Upper abdominal pain after eating x1 month    Dizziness     Left ear pain and dizziness x1 week.          HPI    Lakeshia presents to the Emergency Department complaining of upper abdominal pain.  This has been ongoing and seems worse after meals.       HPI      Past Medical and Surgical History    Past Medical History:   Diagnosis Date    Chronic pain     Disease of thyroid gland     hypothyroid    Diverticulitis 12/01/2021    GERD (gastroesophageal reflux  disease)     NISSEN   today 2023    Heel pain, chronic, right 2021    Hiatal hernia     Hypertension     Hyperthyroiditis     HYPERTHYROID; POST IODINE THERAPY    Intractable vomiting 2021    Meningioma (HCC)     Migraine without aura and without status migrainosus, not intractable 2021    Pain of upper abdomen 2020    Sciatica of right side 2020    Weakness of right upper extremity 2022       Past Surgical History:   Procedure Laterality Date    APPENDECTOMY       SECTION      EGD      HERNIA REPAIR      IL LAPS RPR PARAESPHGL HRNA INCL FUNDPLSTY W/MESH N/A 2023    Procedure: REPAIR HERNIA HIATAL HERNIA REPAIR LAPAROSCOPIC  w/ NISSEN FUNDOPLICATION;  Surgeon: Vladimir Min MD;  Location: [facility];  Service: General       Family History   Problem Relation Age of Onset    Endometrial cancer Family     Ovarian cancer Mother 67    Heart defect Mother     Heart attack Father     Stroke Brother     Ovarian cancer Sister 47    No Known Problems Maternal Grandmother     Breast cancer Paternal Grandmother 63    No Known Problems Sister     No Known Problems Sister     No Known Problems Sister     No Known Problems Sister     No Known Problems Daughter     No Known Problems Maternal Grandfather     No Known Problems Paternal Grandfather     No Known Problems Daughter     No Known Problems Son        Social History     Tobacco Use    Smoking status: Former     Current packs/day: 0.00     Average packs/day: 1 pack/day for 5.0 years (5.0 ttl pk-yrs)     Types: Cigarettes     Start date:      Quit date:      Years since quittin.8     Passive exposure: Past    Smokeless tobacco: Never   Vaping Use    Vaping status: Never Used   Substance Use Topics    Alcohol use: Not Currently     Comment: occasional     Drug use: Never         .    Allergies    No Known Allergies    Home Medications    Prior to Admission medications    Medication Sig Start Date End Date  Taking? Authorizing Provider   acetaminophen (TYLENOL) 325 mg tablet Take 3 tablets (975 mg total) by mouth every 8 (eight) hours as needed for mild pain  Patient not taking: Reported on 5/17/2023 4/26/23   Antoinette Hyde PA-C   dexamethasone (DECADRON) 4 mg tablet Day 1 take 3 tabs, day 2 take 2 tabs, day 3 take 1 tab, then stop 5/2/23   NIKUNJ Rudolph   levothyroxine 112 mcg tablet Take 1 tablet (112 mcg total) by mouth daily 7/19/23   Nickie Cordova MD   lidocaine (Lidoderm) 5 % Apply 1 patch topically over 12 hours daily Remove & Discard patch within 12 hours or as directed by MD 7/12/23   NIKUNJ Bragg   lisinopril (ZESTRIL) 40 mg tablet Take 1 tablet (40 mg total) by mouth daily 7/19/23   Nickie Cordova MD   meclizine (ANTIVERT) 50 MG tablet Take 1 tablet (50 mg total) by mouth every 12 (twelve) hours as needed for dizziness 6/7/23   Briana Butts MD   methylPREDNISolone 4 MG tablet therapy pack Use as directed on package  Patient not taking: Reported on 7/21/2023 7/12/23   NIKUNJ Bragg   omeprazole (PriLOSEC) 40 MG capsule take 1 capsule by mouth twice a day 8/23/23   Henok Lopez MD   ondansetron (ZOFRAN) 4 mg tablet Take 1 tablet (4 mg total) by mouth every 8 (eight) hours as needed for nausea or vomiting 5/2/23   NIKUNJ Rudolph   sucralfate (CARAFATE) 1 g tablet take 1 tablet by mouth four times a day 10/23/23   Henok Lopez MD   topiramate (TOPAMAX) 25 mg tablet take 1 tablet by mouth twice a day 9/5/23   Marcelo Pillai MD           Review of Systems    Review of Systems   Constitutional:  Negative for chills and fever.   HENT:  Negative for ear pain and sore throat.    Eyes:  Negative for pain and visual disturbance.   Respiratory:  Negative for cough and shortness of breath.    Cardiovascular:  Negative for chest pain and palpitations.   Gastrointestinal:  Positive for abdominal pain. Negative for vomiting.   Genitourinary:   Negative for dysuria and hematuria.   Musculoskeletal:  Negative for arthralgias and back pain.   Skin:  Negative for color change and rash.   Neurological:  Negative for seizures and syncope.   All other systems reviewed and are negative.        Physical Exam      ED Triage Vitals   Temperature Pulse Respirations Blood Pressure SpO2   10/27/24 1420 10/27/24 1421 10/27/24 1421 10/27/24 1421 10/27/24 1421   97.7 °F (36.5 °C) 69 18 158/70 100 %      Temp Source Heart Rate Source Patient Position - Orthostatic VS BP Location FiO2 (%)   10/27/24 1420 10/27/24 1421 10/27/24 1421 10/27/24 1421 --   Oral Monitor Sitting Right arm       Pain Score       10/27/24 1552       10 - Worst Possible Pain               Physical Exam  Vitals and nursing note reviewed.   Constitutional:       General: She is not in acute distress.     Appearance: She is well-developed.   HENT:      Head: Normocephalic and atraumatic.   Eyes:      Conjunctiva/sclera: Conjunctivae normal.   Cardiovascular:      Rate and Rhythm: Normal rate and regular rhythm.      Heart sounds: No murmur heard.  Pulmonary:      Effort: Pulmonary effort is normal. No respiratory distress.      Breath sounds: Normal breath sounds.   Abdominal:      Palpations: Abdomen is soft.      Tenderness: There is no abdominal tenderness.   Musculoskeletal:         General: No swelling.      Cervical back: Neck supple.   Skin:     General: Skin is warm and dry.      Capillary Refill: Capillary refill takes less than 2 seconds.   Neurological:      Mental Status: She is alert.   Psychiatric:         Mood and Affect: Mood normal.         Assessment and Plan    Lakeshia Christianson is a 59 y.o. female who presents with upper abdominal pain that has been ongoing. Physical examination unremarkable. Differential diagnosis (not completely inclusive) includes intra-abdominal pathology. Plan will be to perform diagnostic testing and treat symptomatically.      MDM      Diagnostic  Results        Labs:    Results for orders placed or performed during the hospital encounter of 10/27/24   CBC and differential    Collection Time: 10/27/24  3:51 PM   Result Value Ref Range    WBC 7.44 4.31 - 10.16 Thousand/uL    RBC 4.14 3.81 - 5.12 Million/uL    Hemoglobin 13.1 11.5 - 15.4 g/dL    Hematocrit 39.3 34.8 - 46.1 %    MCV 95 82 - 98 fL    MCH 31.6 26.8 - 34.3 pg    MCHC 33.3 31.4 - 37.4 g/dL    RDW 14.0 11.6 - 15.1 %    MPV 9.0 8.9 - 12.7 fL    Platelets 296 149 - 390 Thousands/uL    nRBC 0 /100 WBCs    Segmented % 61 43 - 75 %    Immature Grans % 0 0 - 2 %    Lymphocytes % 30 14 - 44 %    Monocytes % 6 4 - 12 %    Eosinophils Relative 3 0 - 6 %    Basophils Relative 0 0 - 1 %    Absolute Neutrophils 4.51 1.85 - 7.62 Thousands/µL    Absolute Immature Grans 0.01 0.00 - 0.20 Thousand/uL    Absolute Lymphocytes 2.26 0.60 - 4.47 Thousands/µL    Absolute Monocytes 0.44 0.17 - 1.22 Thousand/µL    Eosinophils Absolute 0.19 0.00 - 0.61 Thousand/µL    Basophils Absolute 0.03 0.00 - 0.10 Thousands/µL   Comprehensive metabolic panel    Collection Time: 10/27/24  3:51 PM   Result Value Ref Range    Sodium 135 135 - 147 mmol/L    Potassium 4.5 3.5 - 5.3 mmol/L    Chloride 104 96 - 108 mmol/L    CO2 28 21 - 32 mmol/L    ANION GAP 3 (L) 4 - 13 mmol/L    BUN 10 5 - 25 mg/dL    Creatinine 0.83 0.60 - 1.30 mg/dL    Glucose 90 65 - 140 mg/dL    Calcium 9.2 8.4 - 10.2 mg/dL    AST 18 13 - 39 U/L    ALT 22 7 - 52 U/L    Alkaline Phosphatase 57 34 - 104 U/L    Total Protein 6.8 6.4 - 8.4 g/dL    Albumin 4.1 3.5 - 5.0 g/dL    Total Bilirubin 0.50 0.20 - 1.00 mg/dL    eGFR 78 ml/min/1.73sq m   Lipase    Collection Time: 10/27/24  3:51 PM   Result Value Ref Range    Lipase 20 11 - 82 u/L       All labs reviewed and utilized in the medical decision making process    Radiology:    CT head wo contrast   Final Result      No acute intracranial abnormality.                  Workstation performed: [id number]         CT chest abdomen  pelvis w contrast   Final Result      Possible mild gastritis.               Workstation performed: [id number]             All radiology studies independently viewed by me and interpreted by the radiologist.    Procedure    Procedures      ED Course of Care and Re-Assessments        Medications   sodium chloride 0.9 % bolus 1,000 mL (0 mL Intravenous Stopped 10/27/24 1655)   morphine injection 4 mg (4 mg Intravenous Given 10/27/24 1552)   iohexol (OMNIPAQUE) 350 MG/ML injection (MULTI-DOSE) 100 mL (100 mL Intravenous Given 10/27/24 1623)   aluminum-magnesium hydroxide-simethicone (MAALOX) oral suspension 30 mL (30 mL Oral Given 10/27/24 1825)   sucralfate (CARAFATE) tablet 1 g (1 g Oral Given 10/27/24 1825)   pantoprazole (PROTONIX) injection 40 mg (40 mg Intravenous Given 10/27/24 1826)                  Kimberly Rueda, DO Kimberly Rueda DO  10/31/24 0105

## 2025-01-13 NOTE — PROGRESS NOTES
Adult Annual Physical  Name: Lakeshia Christianson      : 1965      MRN: [id number]  Encounter Provider: NIKUNJ Jeffries  Encounter Date: 2025   Encounter department: [facility]    Assessment & Plan  Annual physical exam         Chronic midline low back pain with bilateral sciatica  - Continue Tylenol PRN  - Ketorolac injection today.   - Will send baclofen to use as needed. Patient understands possible side effects  - Encouraged Heat/Ice application  - Discussed regular exercise for skeletal strengthening and weight loss  - Will refer to PT    Orders:    baclofen 10 mg tablet; Take 1 tablet (10 mg total) by mouth 2 (two) times a day as needed for muscle spasms    lidocaine (Lidoderm) 5 %; Apply 1 patch topically over 12 hours daily Remove & Discard patch within 12 hours or as directed by MD    Ambulatory Referral to Physical Therapy; Future    ketorolac (TORADOL) injection 30 mg    Chronic pain of both knees  - Use Tylenol + Voltaren gel   - Encouraged Heat/Ice application  - Discussed regular exercise for skeletal strengthening and weight loss  - Encouraged using a tibiofemoral knee brace.   - Offered to order cane but she declines   - Will refer to PT    Orders:    Ambulatory Referral to Physical Therapy; Future    ketorolac (TORADOL) injection 30 mg    Diclofenac Sodium (VOLTAREN) 1 %; Apply 2 g topically 4 (four) times a day    Gastroesophageal reflux disease without esophagitis  - to take Sucralfate 1 tablet 4x/day, and Omeprazole 40 mg BID.   -Recommends eating small healthy meals, do not eat spicy, fatty or fried food. Decrease caffeine or carbonated drinks. Do not eat 2 to 3 hours before bedtime. Maintain healthy weight and importance of physical exercises.      Orders:    omeprazole (PriLOSEC) 40 MG capsule; Take 1 capsule (40 mg total) by mouth 2 (two) times a day    sucralfate (CARAFATE) 1 g tablet; Take 1 tablet (1 g total) by mouth 4 (four) times a  day    Mild episode of recurrent major depressive disorder (HCC)  PHQ-2/9 Depression Screening    Little interest or pleasure in doing things: 1 - several days  Feeling down, depressed, or hopeless: 1 - several days  Trouble falling or staying asleep, or sleeping too much: 2 - more than half the days  Feeling tired or having little energy: 1 - several days  Poor appetite or overeatin - not at all  Feeling bad about yourself - or that you are a failure or have let yourself or your family down: 1 - several days  Trouble concentrating on things, such as reading the newspaper or watching television: 0 - not at all  Moving or speaking so slowly that other people could have noticed. Or the opposite - being so fidgety or restless that you have been moving around a lot more than usual: 0 - not at all  Thoughts that you would be better off dead, or of hurting yourself in some way: 0 - not at all  PHQ-9 Score: 6  PHQ-9 Interpretation: Mild depression     - Denies SI/HI  - Patient wishes not to start with medication  - Strongly recommended for patient to start therapy. Provided patient with outpatient MH resources.   - Reviewed lifestyle management recommendations, including regular exercise, meditation, drinking 64 oz of water daily, and maintaining a healthy diet.  - Patient is aware to call 911, report to ED or call the office if patient does develop SI/HI.            Essential hypertension  BP Readings from Last 3 Encounters:   25 112/66   10/27/24 111/66   23 (!) 175/99     - At goal  -Continue lisinopril 40 mg daily  -Reports compliance with medications.  - eating a low salt diet,  exercising, and weight loss.     Orders:    Hemoglobin A1C; Future    Lipid panel; Future    lisinopril (ZESTRIL) 40 mg tablet; Take 1 tablet (40 mg total) by mouth daily    Hypothyroidism, unspecified type  - to obtain repeat TSH    Orders:    TSH + Free T4; Future    levothyroxine 112 mcg tablet; Take 1 tablet  (112 mcg total) by mouth daily    US thyroid; Future    Migraine with aura and without status migrainosus, not intractable    Orders:    topiramate (TOPAMAX) 25 mg tablet; Take 1 tablet (25 mg total) by mouth 2 (two) times a day    Vertigo  - Continue meclizine    Orders:    meclizine (ANTIVERT) 50 MG tablet; Take 1 tablet (50 mg total) by mouth every 12 (twelve) hours as needed for dizziness    Encounter for screening mammogram for breast cancer    Orders:    Mammo screening bilateral w 3d and cad; Future    Screening for osteoporosis    Orders:    DXA bone density spine hip and pelvis; Future    Screening for diabetes mellitus    Orders:    Hemoglobin A1C; Future    Menorrhagia with regular cycle  - F/U with OBGYN         Immunizations and preventive care screenings were discussed with patient today. Appropriate education was printed on patient's after visit summary.    Counseling:  Alcohol/drug use: discussed moderation in alcohol intake, the recommendations for healthy alcohol use, and avoidance of illicit drug use.  Dental Health: discussed importance of regular tooth brushing, flossing, and dental visits.  Injury prevention: discussed safety/seat belts, safety helmets, smoke detectors, carbon monoxide detectors, and smoking near bedding or upholstery.  Sexual health: discussed sexually transmitted diseases, partner selection, use of condoms, avoidance of unintended pregnancy, and contraceptive alternatives.  Exercise: the importance of regular exercise/physical activity was discussed. Recommend exercise 3-5 times per week for at least 30 minutes.     BMI Counseling: Body mass index is 28.75 kg/m². The BMI is above normal. Nutrition recommendations include decreasing portion sizes, encouraging healthy choices of fruits and vegetables, decreasing fast food intake, consuming healthier snacks, limiting drinks that contain sugar, moderation in carbohydrate intake, increasing intake of lean protein, reducing intake  of saturated and trans fat and reducing intake of cholesterol. Exercise recommendations include exercising 3-5 times per week. No pharmacotherapy was ordered. Patient referred to PCP. Rationale for BMI follow-up plan is due to patient being overweight or obese.     Depression Screening and Follow-up Plan: Patient's depression screening was positive with a PHQ-9 score of 6.   Patient declines further evaluation by mental health professional and/or medications. Brief counseling provided. Will re-evaluate at next office visit.       History of Present Illness     Adult Annual Physical:  Patient presents for annual physical. Lakeshia Christianson is a 59 y.o. with  has a past medical history of Chronic pain, Disease of thyroid gland, Diverticulitis, GERD (gastroesophageal reflux disease), Heel pain, chronic, right, Hiatal hernia, Hypertension, Hyperthyroiditis, Intractable vomiting, Meningioma (HCC), Migraine without aura and without status migrainosus, not intractable, Pain of upper abdomen, Sciatica of right side, and Weakness of right upper extremity.     Patient  presents to the clinic for management of her chronic medical conditions.  Patient complains of low back pain that radiates to her legs. This is a chronic problem. States taking tylenol which not effective. Also endorses bilateral knee pain.Pain is worst when ambulating.  Patient also reports ongoing epigastric pain. Patient have history of GERD. Imaging was obtain 10/2024 which shows possible mild gastritis. Patient states that pantoprazole is not effective. Patient is requesting ultrasound of her thyroid. She reports having an ultrasound done in [place] and was found to have nodules on her thyroid. Lastly, patient  reports that her last menstrual period last a month.     .     Diet and Physical Activity:  - Diet/Nutrition: well balanced diet.  - Exercise: no formal exercise.    Depression Screening:    - PHQ-9 Score: 6    General Health:  - Sleep: sleeps well.  -  "Hearing: normal hearing bilateral ears.  - Vision: vision problems.  - Dental: brushes teeth twice daily.    /GYN Health:  - Follows with GYN: no.   - Last menstrual cycle: 1/2/2025.   - History of STDs: no    Review of Systems   Constitutional: Negative.  Negative for chills, fatigue and fever.   HENT: Negative.  Negative for ear pain and sore throat.    Eyes: Negative.  Negative for pain and visual disturbance.   Respiratory: Negative.  Negative for cough and shortness of breath.    Cardiovascular: Negative.  Negative for chest pain and palpitations.   Gastrointestinal:  Positive for abdominal pain. Negative for vomiting.   Endocrine: Negative.    Genitourinary:  Positive for menstrual problem. Negative for dysuria and hematuria.   Musculoskeletal:  Positive for arthralgias, back pain and myalgias.   Skin: Negative.  Negative for color change and rash.   Allergic/Immunologic: Negative.    Neurological:  Positive for dizziness. Negative for seizures and syncope.   Hematological: Negative.    Psychiatric/Behavioral: Negative.     All other systems reviewed and are negative.        Objective   /66 (BP Location: Left arm, Patient Position: Sitting, Cuff Size: Standard)   Pulse 72   Temp 97.8 °F (36.6 °C) (Temporal)   Resp 18   Ht 5' 4\" (1.626 m)   Wt 76 kg (167 lb 8 oz)   LMP 12/10/2024 (Exact Date)   SpO2 98%   BMI 28.75 kg/m²     Physical Exam  Vitals and nursing note reviewed.   Constitutional:       General: She is not in acute distress.     Appearance: Normal appearance. She is well-developed.   HENT:      Head: Normocephalic and atraumatic.      Right Ear: Tympanic membrane normal.      Left Ear: Tympanic membrane normal.      Nose: Nose normal.      Mouth/Throat:      Mouth: Mucous membranes are moist.   Eyes:      Conjunctiva/sclera: Conjunctivae normal.   Cardiovascular:      Rate and Rhythm: Normal rate and regular rhythm.      Pulses: Normal pulses.      Heart sounds: Normal heart sounds. No " murmur heard.  Pulmonary:      Effort: Pulmonary effort is normal. No respiratory distress.      Breath sounds: Normal breath sounds.   Abdominal:      General: Abdomen is flat. Bowel sounds are normal.      Palpations: Abdomen is soft.      Tenderness: There is no abdominal tenderness.   Musculoskeletal:         General: No swelling.      Cervical back: Normal range of motion and neck supple.      Lumbar back: Spasms and tenderness present. Positive right straight leg raise test and positive left straight leg raise test.   Skin:     General: Skin is warm and dry.      Capillary Refill: Capillary refill takes less than 2 seconds.   Neurological:      General: No focal deficit present.      Mental Status: She is alert and oriented to person, place, and time. Mental status is at baseline.      GCS: GCS eye subscore is 4. GCS verbal subscore is 5. GCS motor subscore is 6.      Cranial Nerves: Cranial nerves 2-12 are intact.      Sensory: Sensation is intact.      Motor: Motor function is intact.      Coordination: Coordination is intact.      Gait: Gait is intact.   Psychiatric:         Mood and Affect: Mood normal.         Behavior: Behavior normal.         Thought Content: Thought content normal.         Judgment: Judgment normal.

## 2025-01-14 ENCOUNTER — RESULTS FOLLOW-UP (OUTPATIENT)
Dept: FAMILY MEDICINE CLINIC | Facility: CLINIC | Age: 60
End: 2025-01-14

## 2025-01-14 ENCOUNTER — OFFICE VISIT (OUTPATIENT)
Dept: FAMILY MEDICINE CLINIC | Facility: CLINIC | Age: 60
End: 2025-01-14

## 2025-01-14 ENCOUNTER — APPOINTMENT (OUTPATIENT)
Dept: LAB | Facility: HOSPITAL | Age: 60
End: 2025-01-14
Payer: COMMERCIAL

## 2025-01-14 VITALS
TEMPERATURE: 97.8 F | SYSTOLIC BLOOD PRESSURE: 112 MMHG | OXYGEN SATURATION: 98 % | DIASTOLIC BLOOD PRESSURE: 66 MMHG | WEIGHT: 167.5 LBS | HEIGHT: 64 IN | RESPIRATION RATE: 18 BRPM | HEART RATE: 72 BPM | BODY MASS INDEX: 28.6 KG/M2

## 2025-01-14 DIAGNOSIS — F33.0 MILD EPISODE OF RECURRENT MAJOR DEPRESSIVE DISORDER (HCC): ICD-10-CM

## 2025-01-14 DIAGNOSIS — Z13.820 SCREENING FOR OSTEOPOROSIS: ICD-10-CM

## 2025-01-14 DIAGNOSIS — R42 VERTIGO: ICD-10-CM

## 2025-01-14 DIAGNOSIS — G43.109 MIGRAINE WITH AURA AND WITHOUT STATUS MIGRAINOSUS, NOT INTRACTABLE: ICD-10-CM

## 2025-01-14 DIAGNOSIS — I10 ESSENTIAL HYPERTENSION: ICD-10-CM

## 2025-01-14 DIAGNOSIS — Z13.1 SCREENING FOR DIABETES MELLITUS: ICD-10-CM

## 2025-01-14 DIAGNOSIS — M25.562 CHRONIC PAIN OF BOTH KNEES: ICD-10-CM

## 2025-01-14 DIAGNOSIS — E03.9 HYPOTHYROIDISM, UNSPECIFIED TYPE: ICD-10-CM

## 2025-01-14 DIAGNOSIS — Z00.00 ANNUAL PHYSICAL EXAM: Primary | ICD-10-CM

## 2025-01-14 DIAGNOSIS — G89.29 CHRONIC PAIN OF BOTH KNEES: ICD-10-CM

## 2025-01-14 DIAGNOSIS — M54.42 CHRONIC MIDLINE LOW BACK PAIN WITH BILATERAL SCIATICA: ICD-10-CM

## 2025-01-14 DIAGNOSIS — D32.9 MENINGIOMA (HCC): Primary | ICD-10-CM

## 2025-01-14 DIAGNOSIS — M25.561 CHRONIC PAIN OF BOTH KNEES: ICD-10-CM

## 2025-01-14 DIAGNOSIS — M54.41 CHRONIC MIDLINE LOW BACK PAIN WITH BILATERAL SCIATICA: ICD-10-CM

## 2025-01-14 DIAGNOSIS — G89.29 CHRONIC MIDLINE LOW BACK PAIN WITH BILATERAL SCIATICA: ICD-10-CM

## 2025-01-14 DIAGNOSIS — N92.0 MENORRHAGIA WITH REGULAR CYCLE: ICD-10-CM

## 2025-01-14 DIAGNOSIS — Z12.31 ENCOUNTER FOR SCREENING MAMMOGRAM FOR BREAST CANCER: ICD-10-CM

## 2025-01-14 DIAGNOSIS — K21.9 GASTROESOPHAGEAL REFLUX DISEASE WITHOUT ESOPHAGITIS: ICD-10-CM

## 2025-01-14 PROBLEM — F33.9 EPISODE OF RECURRENT MAJOR DEPRESSIVE DISORDER (HCC): Status: ACTIVE | Noted: 2022-04-15

## 2025-01-14 LAB
CHOLEST SERPL-MCNC: 127 MG/DL (ref ?–200)
EST. AVERAGE GLUCOSE BLD GHB EST-MCNC: 108 MG/DL
HBA1C MFR BLD: 5.4 %
HDLC SERPL-MCNC: 53 MG/DL
LDLC SERPL CALC-MCNC: 56 MG/DL (ref 0–100)
NONHDLC SERPL-MCNC: 74 MG/DL
T4 FREE SERPL-MCNC: 0.65 NG/DL (ref 0.61–1.12)
TRIGL SERPL-MCNC: 88 MG/DL (ref ?–150)
TSH SERPL DL<=0.05 MIU/L-ACNC: 1.39 UIU/ML (ref 0.45–4.5)

## 2025-01-14 PROCEDURE — 83036 HEMOGLOBIN GLYCOSYLATED A1C: CPT

## 2025-01-14 PROCEDURE — 99396 PREV VISIT EST AGE 40-64: CPT

## 2025-01-14 PROCEDURE — 84443 ASSAY THYROID STIM HORMONE: CPT

## 2025-01-14 PROCEDURE — 80061 LIPID PANEL: CPT

## 2025-01-14 PROCEDURE — 96372 THER/PROPH/DIAG INJ SC/IM: CPT

## 2025-01-14 PROCEDURE — 84439 ASSAY OF FREE THYROXINE: CPT

## 2025-01-14 PROCEDURE — 99214 OFFICE O/P EST MOD 30 MIN: CPT

## 2025-01-14 PROCEDURE — 36415 COLL VENOUS BLD VENIPUNCTURE: CPT

## 2025-01-14 RX ORDER — MECLIZINE HYDROCHLORIDE 50 MG/1
50 TABLET ORAL EVERY 12 HOURS PRN
Qty: 60 TABLET | Refills: 2 | Status: SHIPPED | OUTPATIENT
Start: 2025-01-14

## 2025-01-14 RX ORDER — LEVOTHYROXINE SODIUM 112 UG/1
112 TABLET ORAL DAILY
Qty: 30 TABLET | Refills: 0 | Status: SHIPPED | OUTPATIENT
Start: 2025-01-14

## 2025-01-14 RX ORDER — KETOROLAC TROMETHAMINE 30 MG/ML
30 INJECTION, SOLUTION INTRAMUSCULAR; INTRAVENOUS ONCE
Status: COMPLETED | OUTPATIENT
Start: 2025-01-14 | End: 2025-01-14

## 2025-01-14 RX ORDER — LISINOPRIL 40 MG/1
40 TABLET ORAL DAILY
Qty: 90 TABLET | Refills: 1 | Status: SHIPPED | OUTPATIENT
Start: 2025-01-14

## 2025-01-14 RX ORDER — OMEPRAZOLE 40 MG/1
40 CAPSULE, DELAYED RELEASE ORAL 2 TIMES DAILY
Qty: 60 CAPSULE | Refills: 5 | Status: SHIPPED | OUTPATIENT
Start: 2025-01-14

## 2025-01-14 RX ORDER — SUCRALFATE 1 G/1
1 TABLET ORAL 4 TIMES DAILY
Qty: 120 TABLET | Refills: 5 | Status: SHIPPED | OUTPATIENT
Start: 2025-01-14

## 2025-01-14 RX ORDER — BACLOFEN 10 MG/1
10 TABLET ORAL 2 TIMES DAILY PRN
Qty: 60 TABLET | Refills: 1 | Status: SHIPPED | OUTPATIENT
Start: 2025-01-14

## 2025-01-14 RX ORDER — LIDOCAINE 50 MG/G
1 PATCH TOPICAL DAILY
Qty: 30 PATCH | Refills: 1 | Status: SHIPPED | OUTPATIENT
Start: 2025-01-14

## 2025-01-14 RX ORDER — TOPIRAMATE 25 MG/1
25 TABLET, FILM COATED ORAL 2 TIMES DAILY
Qty: 60 TABLET | Refills: 2 | Status: SHIPPED | OUTPATIENT
Start: 2025-01-14

## 2025-01-14 RX ADMIN — KETOROLAC TROMETHAMINE 30 MG: 30 INJECTION, SOLUTION INTRAMUSCULAR; INTRAVENOUS at 08:35

## 2025-01-14 NOTE — ASSESSMENT & PLAN NOTE
- Continue Tylenol PRN  - Ketorolac injection today.   - Will send baclofen to use as needed. Patient understands possible side effects  - Encouraged Heat/Ice application  - Discussed regular exercise for skeletal strengthening and weight loss  - Will refer to PT    Orders:    baclofen 10 mg tablet; Take 1 tablet (10 mg total) by mouth 2 (two) times a day as needed for muscle spasms    lidocaine (Lidoderm) 5 %; Apply 1 patch topically over 12 hours daily Remove & Discard patch within 12 hours or as directed by MD    Ambulatory Referral to Physical Therapy; Future    ketorolac (TORADOL) injection 30 mg

## 2025-01-14 NOTE — ASSESSMENT & PLAN NOTE
- to obtain repeat TSH    Orders:    TSH + Free T4; Future    levothyroxine 112 mcg tablet; Take 1 tablet (112 mcg total) by mouth daily    US thyroid; Future

## 2025-01-14 NOTE — ASSESSMENT & PLAN NOTE
- Continue Tylenol PRN  - Ketorolac injection today.   - Will send baclofen to use as needed. Patient understands possible side effects  - Encouraged Heat/Ice application  - Discussed regular exercise for skeletal strengthening and weight loss  - Will refer to PT

## 2025-01-14 NOTE — ASSESSMENT & PLAN NOTE
- to take Sucralfate 1 tablet 4x/day, and Omeprazole 40 mg BID.   -Recommends eating small healthy meals, do not eat spicy, fatty or fried food. Decrease caffeine or carbonated drinks. Do not eat 2 to 3 hours before bedtime. Maintain healthy weight and importance of physical exercises.      Orders:    omeprazole (PriLOSEC) 40 MG capsule; Take 1 capsule (40 mg total) by mouth 2 (two) times a day    sucralfate (CARAFATE) 1 g tablet; Take 1 tablet (1 g total) by mouth 4 (four) times a day

## 2025-01-14 NOTE — ASSESSMENT & PLAN NOTE
- Use Tylenol + Voltaren gel   - Encouraged Heat/Ice application  - Discussed regular exercise for skeletal strengthening and weight loss  - Encouraged using a tibiofemoral knee brace.   - Offered to order cane but she declines   - Will refer to PT    Orders:    Ambulatory Referral to Physical Therapy; Future    ketorolac (TORADOL) injection 30 mg    Diclofenac Sodium (VOLTAREN) 1 %; Apply 2 g topically 4 (four) times a day

## 2025-01-14 NOTE — ASSESSMENT & PLAN NOTE
- Continue meclizine    Orders:    meclizine (ANTIVERT) 50 MG tablet; Take 1 tablet (50 mg total) by mouth every 12 (twelve) hours as needed for dizziness

## 2025-01-14 NOTE — PATIENT INSTRUCTIONS
"Patient Education     Routine physical for adults   The Basics   Written by the doctors and editors at [facility]   What is a physical? -- A physical is a routine visit, or \"check-up,\" with your doctor. You might also hear it called a \"wellness visit\" or \"preventive visit.\"  During each visit, the doctor will:   Ask about your physical and mental health   Ask about your habits, behaviors, and lifestyle   Do an exam   Give you vaccines if needed   Talk to you about any medicines you take   Give advice about your health   Answer your questions  Getting regular check-ups is an important part of taking care of your health. It can help your doctor find and treat any problems you have. But it's also important for preventing health problems.  A routine physical is different from a \"sick visit.\" A sick visit is when you see a doctor because of a health concern or problem. Since physicals are scheduled ahead of time, you can think about what you want to ask the doctor.  How often should I get a physical? -- It depends on your age and health. In general, for people age 21 years and older:   If you are younger than 50 years, you might be able to get a physical every 3 years.   If you are 50 years or older, your doctor might recommend a physical every year.  If you have an ongoing health condition, like diabetes or high blood pressure, your doctor will probably want to see you more often.  What happens during a physical? -- In general, each visit will include:   Physical exam - The doctor or nurse will check your height, weight, heart rate, and blood pressure. They will also look at your eyes and ears. They will ask about how you are feeling and whether you have any symptoms that bother you.   Medicines - It's a good idea to bring a list of all the medicines you take to each doctor visit. Your doctor will talk to you about your medicines and answer any questions. Tell them if you are having any side effects that bother you. You " "should also tell them if you are having trouble paying for any of your medicines.   Habits and behaviors - This includes:   Your diet   Your exercise habits   Whether you smoke, drink alcohol, or use drugs   Whether you are sexually active   Whether you feel safe at home  Your doctor will talk to you about things you can do to improve your health and lower your risk of health problems. They will also offer help and support. For example, if you want to quit smoking, they can give you advice and might prescribe medicines. If you want to improve your diet or get more physical activity, they can help you with this, too.   Lab tests, if needed - The tests you get will depend on your age and situation. For example, your doctor might want to check your:   Cholesterol   Blood sugar   Iron level   Vaccines - The recommended vaccines will depend on your age, health, and what vaccines you already had. Vaccines are very important because they can prevent certain serious or deadly infections.   Discussion of screening - \"Screening\" means checking for diseases or other health problems before they cause symptoms. Your doctor can recommend screening based on your age, risk, and preferences. This might include tests to check for:   Cancer, such as breast, prostate, cervical, ovarian, colorectal, prostate, lung, or skin cancer   Sexually transmitted infections, such as chlamydia and gonorrhea   Mental health conditions like depression and anxiety  Your doctor will talk to you about the different types of screening tests. They can help you decide which screenings to have. They can also explain what the results might mean.   Answering questions - The physical is a good time to ask the doctor or nurse questions about your health. If needed, they can refer you to other doctors or specialists, too.  Adults older than 65 years often need other care, too. As you get older, your doctor will talk to you about:   How to prevent falling at " home   Hearing or vision tests   Memory testing   How to take your medicines safely   Making sure that you have the help and support you need at home  All topics are updated as new evidence becomes available and our peer review process is complete.  This topic retrieved from [employer] on: May 02, 2024.  Topic 588839 Version 1.0  Release: 32.4.3 - C32.122  © 2024 UpToDate, Inc. and/or its affiliates. All rights reserved.  Consumer Information Use and Disclaimer   Disclaimer: This generalized information is a limited summary of diagnosis, treatment, and/or medication information. It is not meant to be comprehensive and should be used as a tool to help the user understand and/or assess potential diagnostic and treatment options. It does NOT include all information about conditions, treatments, medications, side effects, or risks that may apply to a specific patient. It is not intended to be medical advice or a substitute for the medical advice, diagnosis, or treatment of a health care provider based on the health care provider's examination and assessment of a patient's specific and unique circumstances. Patients must speak with a health care provider for complete information about their health, medical questions, and treatment options, including any risks or benefits regarding use of medications. This information does not endorse any treatments or medications as safe, effective, or approved for treating a specific patient. UpToDate, Inc. and its affiliates disclaim any warranty or liability relating to this information or the use thereof.The use of this information is governed by the Terms of Use, available at https://www.woltersChangouwer.com/en/know/clinical-effectiveness-terms. 2024© UpToDate, Inc. and its affiliates and/or licensors. All rights reserved.  Copyright   © 2024 UpToDate, Inc. and/or its affiliates. All rights reserved.

## 2025-01-14 NOTE — ASSESSMENT & PLAN NOTE
PHQ-2/9 Depression Screening    Little interest or pleasure in doing things: 1 - several days  Feeling down, depressed, or hopeless: 1 - several days  Trouble falling or staying asleep, or sleeping too much: 2 - more than half the days  Feeling tired or having little energy: 1 - several days  Poor appetite or overeatin - not at all  Feeling bad about yourself - or that you are a failure or have let yourself or your family down: 1 - several days  Trouble concentrating on things, such as reading the newspaper or watching television: 0 - not at all  Moving or speaking so slowly that other people could have noticed. Or the opposite - being so fidgety or restless that you have been moving around a lot more than usual: 0 - not at all  Thoughts that you would be better off dead, or of hurting yourself in some way: 0 - not at all  PHQ-9 Score: 6  PHQ-9 Interpretation: Mild depression     - Denies SI/HI  - Patient wishes not to start with medication  - Strongly recommended for patient to start therapy. Provided patient with outpatient MH resources.   - Reviewed lifestyle management recommendations, including regular exercise, meditation, drinking 64 oz of water daily, and maintaining a healthy diet.  - Patient is aware to call 911, report to ED or call the office if patient does develop SI/HI.

## 2025-01-14 NOTE — ASSESSMENT & PLAN NOTE
BP Readings from Last 3 Encounters:   01/14/25 112/66   10/27/24 111/66   07/21/23 (!) 175/99     - At goal  -Continue lisinopril 40 mg daily  -Reports compliance with medications.  - eating a low salt diet,  exercising, and weight loss.     Orders:    Hemoglobin A1C; Future    Lipid panel; Future    lisinopril (ZESTRIL) 40 mg tablet; Take 1 tablet (40 mg total) by mouth daily

## 2025-01-21 ENCOUNTER — HOSPITAL ENCOUNTER (OUTPATIENT)
Dept: ULTRASOUND IMAGING | Facility: HOSPITAL | Age: 60
Discharge: HOME | End: 2025-01-21
Payer: COMMERCIAL

## 2025-01-21 DIAGNOSIS — E03.9 HYPOTHYROIDISM, UNSPECIFIED TYPE: ICD-10-CM

## 2025-01-21 PROCEDURE — 76536 US EXAM OF HEAD AND NECK: CPT

## 2025-01-24 ENCOUNTER — OFFICE VISIT (OUTPATIENT)
Dept: OBGYN CLINIC | Facility: CLINIC | Age: 60
End: 2025-01-24
Payer: COMMERCIAL

## 2025-01-24 ENCOUNTER — RESULTS FOLLOW-UP (OUTPATIENT)
Dept: FAMILY MEDICINE CLINIC | Facility: CLINIC | Age: 60
End: 2025-01-24

## 2025-01-24 VITALS — DIASTOLIC BLOOD PRESSURE: 78 MMHG | SYSTOLIC BLOOD PRESSURE: 118 MMHG | BODY MASS INDEX: 27.84 KG/M2 | WEIGHT: 162.2 LBS

## 2025-01-24 DIAGNOSIS — N92.4 ABNORMAL PERIMENOPAUSAL BLEEDING: Primary | ICD-10-CM

## 2025-01-24 PROCEDURE — 99203 OFFICE O/P NEW LOW 30 MIN: CPT | Performed by: OBSTETRICS & GYNECOLOGY

## 2025-01-24 NOTE — PROGRESS NOTES
Assessment/Plan:      Diagnoses and all orders for this visit:    Abnormal perimenopausal bleeding          Subjective:     Patient ID: Lakeshia Christianson is a 59 y.o. female.    Patient is a 59-year-old female who presents today because of irregular vaginal bleeding.    Patient reports that although she is 59, she is still experiencing erratic menstrual cycles.  They generally last for several days and are not necessarily accompanied by any heavy bleeding or clotting.    She does report intermittent pain and cramping associated with the cycles however.    Patient reports normal bowel and bladder habits.    She is had GI evaluation with colonoscopy and what sounds like an endoscopy in the past.    Patient denies any significant urinary complaints at this time.    Patient reports that there is history of ovarian cancer on her mother side.    In light of the erratic bleeding in her 50s, we will order a pelvic ultrasound for complete evaluation of uterus and ovaries including endometrial stripe.    Further treatment and follow-up planning will be based upon results    We will have a yearly appointment for the patient after her ultrasound to obtain Pap smear as well as complete evaluation.    Patient is scheduled in the near future for screening mammography    All questions were answered in detail for the patient at today's visit    Patient to call for any problems, questions, issues or concerns which may arise for her      Total time of today's visit was 30 minutes of which greater than 50% was spent face-to-face counseling the patient as well as coordination of care, review of chart and lab values, physical examination as well as computer entry into the [employer] medical record system.          Review of Systems   Constitutional: Negative.  Negative for appetite change, diaphoresis, fatigue, fever and unexpected weight change.   HENT: Negative.     Eyes: Negative.    Respiratory: Negative.     Cardiovascular: Negative.          Followed for blood pressure   Gastrointestinal: Negative.  Negative for abdominal pain, blood in stool, constipation, diarrhea, nausea and vomiting.   Endocrine: Negative.  Negative for cold intolerance and heat intolerance.        Followed for thyroid   Genitourinary: Negative.  Negative for dysuria, frequency, hematuria, urgency, vaginal bleeding, vaginal discharge and vaginal pain.   Musculoskeletal: Negative.    Skin: Negative.    Allergic/Immunologic: Negative.    Neurological: Negative.    Hematological: Negative.  Negative for adenopathy.   Psychiatric/Behavioral: Negative.           Objective:     Physical Exam

## 2025-02-10 NOTE — PROGRESS NOTES
AMB US Pelvic Non OB    Date/Time: 2/18/2025 8:00 AM    Performed by: Renee Garrett  Authorized by: Simon Ojeda MD  Universal Protocol:  Consent: Verbal consent obtained.  Consent given by: patient  Timeout called at: 2/18/2025 7:58 AM.  Patient understanding: patient states understanding of the procedure being performed  Patient identity confirmed: verbally with patient    Procedure details:     SIS Procedure: No    Indications: non-obstetric vaginal bleeding      Technique:  Transvaginal US, Non-OB    Position: lithotomy exam    Uterine findings:     Length (cm): 9.59    Height (cm):  5.08    Width (cm):  5.97    Endometrial stripe: identified      Endometrium thickness (mm):  10.62  Left ovary findings:     Left ovary:  Visualized    Length (cm): 3.92    Height (cm): 2.35    Width (cm): 2.57  Right ovary findings:     Right ovary:  Visualized    Length (cm): 2.73    Height (cm): 1.67    Width (cm): 1.87  Other findings:     Free pelvic fluid: not identified      Free peritoneal fluid: not identified    Post-Procedure Details:     Impression:  Anteverted uterus demonstrates a thickened endometrium which appears to contain a polyp. There appears to be a feeder vessel extending into the endometrium. The right ovary appears within normal limits. The left ovary demonstrates a 1.9cm simple cyst. No free fluid.     Tolerance:  Tolerated well, no immediate complications    Complications: no complications    Additional Procedure Comments:      SellaroundP8 [id number] transvaginal transducer Serial #[id number] was used to perform the examination today and subsequently followed with high level disinfection utilizing Trophon EPR procedure.     Ultrasound performed at:     [facility]  [address] [place] [address]  [address] [place] [address]  Phone  [phone]  Fax  [phone]       [de-identified] : Follow up 06/12/2023: Ms. Orozco is here for follow up. She had a MRI of her Cervical Spine and is here for review. She has had some intermittent mild neck discomfort without much overall symptoms. She denies any numbness, tingling, or weakness in her hands. She denies any further episodes of incontinence. She reports a mild stiffness in her low back, but no significant symptoms in her back or legs at this point.\par \par Follow up 05/23/2023: Ms. Orozco is here for follow up. She had a CT of her Lumbar Spine and is here for review. She reports that she has not had any issues with urinary incontinence since her last visit with me 04/03/2023. She has been walking and functioning well though does feel numbness in her feet that is chronic and has had increased numbness in her legs over the past 6 to 12 months. She does have an intermittent radicular symptoms. She has an upcoming appointment with Dr. Orly Almaraz.\par \par Initial visit 04/03/2023: Referred by dr. jl dias.  has had intermittent incontinence for the past year.  Reports numbness in the feet chronic. now having increased numbness in legs..  does have intermittent radicular symtpoms.

## 2025-02-13 ENCOUNTER — OFFICE VISIT (OUTPATIENT)
Dept: FAMILY MEDICINE CLINIC | Facility: CLINIC | Age: 60
End: 2025-02-13

## 2025-02-13 VITALS
SYSTOLIC BLOOD PRESSURE: 108 MMHG | TEMPERATURE: 97.8 F | BODY MASS INDEX: 27.83 KG/M2 | DIASTOLIC BLOOD PRESSURE: 66 MMHG | RESPIRATION RATE: 18 BRPM | HEART RATE: 66 BPM | HEIGHT: 64 IN | OXYGEN SATURATION: 96 % | WEIGHT: 163 LBS

## 2025-02-13 DIAGNOSIS — R53.83 OTHER FATIGUE: ICD-10-CM

## 2025-02-13 DIAGNOSIS — R42 VERTIGO: ICD-10-CM

## 2025-02-13 DIAGNOSIS — M54.2 NECK PAIN: ICD-10-CM

## 2025-02-13 DIAGNOSIS — I10 ESSENTIAL HYPERTENSION: ICD-10-CM

## 2025-02-13 DIAGNOSIS — M54.42 CHRONIC MIDLINE LOW BACK PAIN WITH BILATERAL SCIATICA: Primary | ICD-10-CM

## 2025-02-13 DIAGNOSIS — E03.9 HYPOTHYROIDISM, UNSPECIFIED TYPE: ICD-10-CM

## 2025-02-13 DIAGNOSIS — M54.41 CHRONIC MIDLINE LOW BACK PAIN WITH BILATERAL SCIATICA: Primary | ICD-10-CM

## 2025-02-13 DIAGNOSIS — G89.29 CHRONIC MIDLINE LOW BACK PAIN WITH BILATERAL SCIATICA: Primary | ICD-10-CM

## 2025-02-13 NOTE — ASSESSMENT & PLAN NOTE
BP Readings from Last 3 Encounters:   02/13/25 108/66   01/24/25 118/78   01/14/25 112/66      At goal    -Continue lisinopril 40 mg daily  Orders:  •  Basic metabolic panel; Future

## 2025-02-13 NOTE — ASSESSMENT & PLAN NOTE
TSH from 1/14/25 normal but complaining of increased fatigue     - Continue Levothyroxine 112 mcg daily.   - Check TSH, Vit b12 and D      Orders:  •  Vitamin B12; Future  •  Vitamin D 25 hydroxy; Future  •  TSH, 3rd generation with Free T4 reflex; Future

## 2025-02-13 NOTE — ASSESSMENT & PLAN NOTE
Low back pain with sciatica.  No red flag symptoms.  No imaging present.    PLAN:  - Continue Tylenol PRN  - DEXA scan to be completed to screen for osteoporosis. Order in place  - Baclofen 10 mg twice daily as needed, diclofenac gel as needed, lidocaine patches daily  - Will start gabapentin 100 mg twice daily for nerve pain  - Encouraged Heat/Ice application  - Discussed regular exercise for skeletal strengthening and weight loss  - PT referral in place.  Patient to make appointment    Orders:  •  gabapentin (NEURONTIN) 100 mg capsule; Take 1 capsule (100 mg total) by mouth 2 (two) times a day

## 2025-02-13 NOTE — PROGRESS NOTES
Name: Lakeshia Christianson      : 1965      MRN: [id number]  Encounter Provider: Marcelo Pillai MD  Encounter Date: 2025   Encounter department: [facility]  :  Assessment & Plan  Chronic midline low back pain with bilateral sciatica  Low back pain with sciatica.  No red flag symptoms.  No imaging present.    PLAN:  - Continue Tylenol PRN  - DEXA scan to be completed to screen for osteoporosis. Order in place  - Baclofen 10 mg twice daily as needed, diclofenac gel as needed, lidocaine patches daily  - Will start gabapentin 100 mg twice daily for nerve pain  - Encouraged Heat/Ice application  - Discussed regular exercise for skeletal strengthening and weight loss  - PT referral in place.  Patient to make appointment    Orders:  •  gabapentin (NEURONTIN) 100 mg capsule; Take 1 capsule (100 mg total) by mouth 2 (two) times a day    Neck pain  Likely to be 2/2 cervicalgia.    PLAN:  - Pain management as above  - Xray cervical spine    Orders:  •  XR spine cervical complete 4 or 5 vw non injury; Future    Vertigo  Hx of Meningioma  Completed Vestibular therapy.    - Continue meclizine   - Consider restarting vestibular therapy if symptoms worsen  - ENT referral per pt request.          Essential hypertension  BP Readings from Last 3 Encounters:   25 108/66   25 118/78   25 112/66      At goal    -Continue lisinopril 40 mg daily  Orders:  •  Basic metabolic panel; Future    Hypothyroidism, unspecified type  TSH from 25 normal but complaining of increased fatigue     - Continue Levothyroxine 112 mcg daily.   - Check TSH, Vit b12 and D      Orders:  •  Vitamin B12; Future  •  Vitamin D 25 hydroxy; Future  •  TSH, 3rd generation with Free T4 reflex; Future    Other fatigue    Orders:  •  CBC and differential; Future  •  Vitamin B12; Future  •  Vitamin D 25 hydroxy; Future           History of Present Illness {?Quick Links Encounters * My Last Note *  "Last Note in Specialty * Snapshot * Since Last Visit * History :92277}  Lakeshia Christianson is a 59 y.o. female with a PMH of HTN, hypothyroidism, Migraines, GERD, Vertigo, meningioma s/p SRT, Chronic back pain, presenting today for a follow-up visit.  Patient continues to complain of dizziness that is chronic for her.  She notices significant relief for use of meclizine.  Further, patient is complaining of low back pain that began in September.  Tylenol provides minimal relief.  She describes pain as a sharp sensation that radiates down her bilateral legs.  She has not tried physical therapy yet.  Denies any loss of sensation, Loss of bladder or bowel control, & weight loss.        Review of Systems   Constitutional:  Positive for fatigue. Negative for chills, fever and unexpected weight change.   HENT:  Negative for congestion.    Eyes:  Negative for visual disturbance.   Respiratory:  Negative for cough, shortness of breath and wheezing.    Cardiovascular:  Negative for chest pain, palpitations and leg swelling.   Gastrointestinal:  Negative for abdominal pain, blood in stool, diarrhea, nausea and vomiting.   Genitourinary:  Negative for difficulty urinating, dysuria and hematuria.   Musculoskeletal:  Positive for arthralgias and back pain. Negative for gait problem.   Skin:  Negative for rash.   Neurological:  Positive for dizziness. Negative for seizures, syncope, light-headedness and headaches.   All other systems reviewed and are negative.      Objective {?Quick Links Trend Vitals * Enter New Vitals * Results Review * Timeline (Adult) * Labs * Imaging * Cardiology * Procedures * Lung Cancer Screening * Surgical eConsent :14617}  /66 (BP Location: Right arm, Patient Position: Sitting, Cuff Size: Standard)   Pulse 66   Temp 97.8 °F (36.6 °C) (Temporal)   Resp 18   Ht 5' 4\" (1.626 m)   Wt 73.9 kg (163 lb)   LMP 01/19/2025 (Exact Date)   SpO2 96%   BMI 27.98 kg/m²      Physical Exam  Vitals and nursing " note reviewed.   Constitutional:       General: She is not in acute distress.     Appearance: Normal appearance. She is well-developed.   HENT:      Head: Normocephalic and atraumatic.      Right Ear: Tympanic membrane normal.      Left Ear: Tympanic membrane normal.      Nose: Nose normal.      Mouth/Throat:      Mouth: Mucous membranes are moist.   Eyes:      Conjunctiva/sclera: Conjunctivae normal.   Cardiovascular:      Rate and Rhythm: Normal rate and regular rhythm.      Pulses: Normal pulses.      Heart sounds: Normal heart sounds. No murmur heard.  Pulmonary:      Effort: Pulmonary effort is normal. No respiratory distress.      Breath sounds: Normal breath sounds.   Abdominal:      General: Abdomen is flat. Bowel sounds are normal.      Palpations: Abdomen is soft.      Tenderness: There is no abdominal tenderness.   Musculoskeletal:         General: Tenderness (bilateral cervical back) present. No swelling.      Cervical back: Normal range of motion and neck supple.      Lumbar back: Spasms and tenderness present. Positive right straight leg raise test and positive left straight leg raise test.   Skin:     General: Skin is warm and dry.      Capillary Refill: Capillary refill takes less than 2 seconds.   Neurological:      General: No focal deficit present.      Mental Status: She is alert and oriented to person, place, and time. Mental status is at baseline.      GCS: GCS eye subscore is 4. GCS verbal subscore is 5. GCS motor subscore is 6.      Cranial Nerves: Cranial nerves 2-12 are intact.      Sensory: Sensation is intact.      Motor: Motor function is intact.      Coordination: Coordination is intact.      Gait: Gait is intact.   Psychiatric:         Mood and Affect: Mood normal.         Behavior: Behavior normal.         Thought Content: Thought content normal.         Judgment: Judgment normal.

## 2025-02-13 NOTE — ASSESSMENT & PLAN NOTE
Hx of Meningioma  Completed Vestibular therapy.    - Continue meclizine   - Consider restarting vestibular therapy if symptoms worsen  - ENT referral per pt request.

## 2025-02-14 PROBLEM — M54.2 NECK PAIN: Status: ACTIVE | Noted: 2025-02-14

## 2025-02-14 RX ORDER — GABAPENTIN 100 MG/1
100 CAPSULE ORAL 2 TIMES DAILY
Qty: 60 CAPSULE | Refills: 1 | Status: SHIPPED | OUTPATIENT
Start: 2025-02-14

## 2025-02-15 NOTE — ASSESSMENT & PLAN NOTE
Likely to be 2/2 cervicalgia.    PLAN:  - Pain management as above  - Xray cervical spine    Orders:  •  XR spine cervical complete 4 or 5 vw non injury; Future

## 2025-02-18 ENCOUNTER — OFFICE VISIT (OUTPATIENT)
Dept: OBGYN CLINIC | Facility: CLINIC | Age: 60
End: 2025-02-18
Payer: COMMERCIAL

## 2025-02-18 ENCOUNTER — ULTRASOUND (OUTPATIENT)
Dept: OBGYN CLINIC | Facility: CLINIC | Age: 60
End: 2025-02-18
Payer: COMMERCIAL

## 2025-02-18 VITALS
DIASTOLIC BLOOD PRESSURE: 100 MMHG | BODY MASS INDEX: 28.05 KG/M2 | WEIGHT: 163.4 LBS | SYSTOLIC BLOOD PRESSURE: 160 MMHG

## 2025-02-18 DIAGNOSIS — N84.0 ENDOMETRIAL POLYP: ICD-10-CM

## 2025-02-18 DIAGNOSIS — Z71.9 ENCOUNTER FOR CONSULTATION: Primary | ICD-10-CM

## 2025-02-18 DIAGNOSIS — N95.0 PMB (POSTMENOPAUSAL BLEEDING): ICD-10-CM

## 2025-02-18 DIAGNOSIS — N93.9 ABNORMAL UTERINE BLEEDING (AUB): Primary | ICD-10-CM

## 2025-02-18 PROCEDURE — 99213 OFFICE O/P EST LOW 20 MIN: CPT | Performed by: OBSTETRICS & GYNECOLOGY

## 2025-02-18 PROCEDURE — 76830 TRANSVAGINAL US NON-OB: CPT | Performed by: OBSTETRICS & GYNECOLOGY

## 2025-02-18 NOTE — PROGRESS NOTES
Pelvic ultrasound results reviewed and note is made of an endometrial polyp    Findings discussed with patient    Referral made to Dr. Raúl Roach for evaluation for hysteroscopy and polypectomy and D&C    All questions answered for patient at today's visit    Patient to call for any problems, questions, issues or concerns which may arise for her

## 2025-02-18 NOTE — PROGRESS NOTES
:  Assessment & Plan  Encounter for consultation         PMB (postmenopausal bleeding)    Orders:    Ambulatory referral to Obstetrics / Gynecology; Future    Endometrial polyp             History of Present Illness     Lakeshia Christianson is a 59 y.o. female   Patient is a 59-year-old female who presents today for pelvic ultrasound evaluation secondary to abnormal vaginal menopausal bleeding    Pelvic ultrasound today was remarkable for what appeared to be an endometrial polyp.  No other significant findings were seen on the imaging study.    I carefully explained the endometrial polyp to the patient    I explained to her that the best approach would be diagnostic hysteroscopy with removal of the polyp followed by D&C.    She agreed to this treatment planning    Referral was placed for Dr. Raúl Roach for evaluation and treatment planning for her    All questions were answered in detail during today's visit    Patient was told to feel free to call for any problems, questions, issues or concerns which may arise for her      Total time of today's visit was 20 minutes of which greater than 50% was spent face-to-face counseling the patient as well as coordination of care, review of chart and lab values, physical examination as well as computer entry into the [employer] medical record system.        Review of Systems   Constitutional: Negative.  Negative for appetite change, diaphoresis, fatigue, fever and unexpected weight change.   HENT: Negative.     Eyes: Negative.    Respiratory: Negative.     Cardiovascular: Negative.         Followed for blood pressure   Gastrointestinal: Negative.  Negative for abdominal pain, blood in stool, constipation, diarrhea, nausea and vomiting.   Endocrine: Negative.  Negative for cold intolerance and heat intolerance.        Followed for thyroid   Genitourinary: Negative.  Negative for dysuria, frequency, hematuria, urgency, vaginal bleeding, vaginal discharge and vaginal pain.    Musculoskeletal: Negative.    Skin: Negative.    Allergic/Immunologic: Negative.    Neurological: Negative.    Hematological: Negative.  Negative for adenopathy.   Psychiatric/Behavioral: Negative.       Objective   /100 Comment: feeling pressure in head/ forgot blood pressure meds  Wt 74.1 kg (163 lb 6.4 oz)   LMP 01/19/2025 (Exact Date)   BMI 28.05 kg/m²      Physical Exam  Constitutional:       Appearance: Normal appearance. She is well-developed.   HENT:      Head: Normocephalic.   Eyes:      Pupils: Pupils are equal, round, and reactive to light.   Cardiovascular:      Rate and Rhythm: Normal rate.   Pulmonary:      Effort: Pulmonary effort is normal.   Musculoskeletal:         General: Normal range of motion.      Cervical back: Normal range of motion and neck supple.   Lymphadenopathy:      Cervical: No cervical adenopathy.   Skin:     General: Skin is warm and dry.      Findings: No rash.   Neurological:      General: No focal deficit present.      Mental Status: She is alert and oriented to person, place, and time.   Psychiatric:         Mood and Affect: Mood normal.         Speech: Speech normal.         Behavior: Behavior normal.         Thought Content: Thought content normal.         Judgment: Judgment normal.

## 2025-02-18 NOTE — PATIENT INSTRUCTIONS
Topic: Menopausal bleeding    Pelvic ultrasound today revealed the presence of a probable endometrial polyp.    Results were discussed with patient and findings were carefully explained to her    Recommended treatment would be hysteroscopy with removal of the polyp and D&C    Patient is in agreement with this plan    Referral was placed for consultation with Dr. Raúl Roach    All questions were answered for her    Patient knows to call for any problems, questions, issues or concerns which may arise for her

## 2025-02-18 NOTE — PROGRESS NOTES
:  Assessment & Plan  PMB (postmenopausal bleeding)    Orders:    Ambulatory referral to Obstetrics / Gynecology; Future        History of Present Illness     Lakeshia Christianson is a 59 y.o. female   HPI  Review of Systems  Objective   /100 Comment: feeling pressure in head/ forgot blood pressure meds  Wt 74.1 kg (163 lb 6.4 oz)   LMP 01/19/2025 (Exact Date)   BMI 28.05 kg/m²      Physical Exam

## 2025-03-08 ENCOUNTER — APPOINTMENT (OUTPATIENT)
Dept: LAB | Facility: HOSPITAL | Age: 60
End: 2025-03-08
Payer: COMMERCIAL

## 2025-03-08 DIAGNOSIS — D32.9 MENINGIOMA (HCC): Primary | ICD-10-CM

## 2025-03-08 LAB
25(OH)D3 SERPL-MCNC: 51 NG/ML (ref 30–100)
ANION GAP SERPL CALCULATED.3IONS-SCNC: 6 MMOL/L (ref 4–13)
BASOPHILS # BLD AUTO: 0.03 THOUSANDS/ÂΜL (ref 0–0.1)
BASOPHILS NFR BLD AUTO: 1 % (ref 0–1)
BUN SERPL-MCNC: 9 MG/DL (ref 5–25)
CALCIUM SERPL-MCNC: 8.8 MG/DL (ref 8.4–10.2)
CHLORIDE SERPL-SCNC: 104 MMOL/L (ref 96–108)
CO2 SERPL-SCNC: 28 MMOL/L (ref 21–32)
CREAT SERPL-MCNC: 0.7 MG/DL (ref 0.6–1.3)
EOSINOPHIL # BLD AUTO: 0.33 THOUSAND/ÂΜL (ref 0–0.61)
EOSINOPHIL NFR BLD AUTO: 7 % (ref 0–6)
ERYTHROCYTE [DISTWIDTH] IN BLOOD BY AUTOMATED COUNT: 14 % (ref 11.6–15.1)
GFR SERPL CREATININE-BSD FRML MDRD: 96 ML/MIN/1.73SQ M
GLUCOSE P FAST SERPL-MCNC: 95 MG/DL (ref 65–99)
HCT VFR BLD AUTO: 40.2 % (ref 34.8–46.1)
HGB BLD-MCNC: 12.9 G/DL (ref 11.5–15.4)
IMM GRANULOCYTES # BLD AUTO: 0.01 THOUSAND/UL (ref 0–0.2)
IMM GRANULOCYTES NFR BLD AUTO: 0 % (ref 0–2)
LYMPHOCYTES # BLD AUTO: 1.17 THOUSANDS/ÂΜL (ref 0.6–4.47)
LYMPHOCYTES NFR BLD AUTO: 26 % (ref 14–44)
MCH RBC QN AUTO: 32.6 PG (ref 26.8–34.3)
MCHC RBC AUTO-ENTMCNC: 32.1 G/DL (ref 31.4–37.4)
MCV RBC AUTO: 102 FL (ref 82–98)
MONOCYTES # BLD AUTO: 0.42 THOUSAND/ÂΜL (ref 0.17–1.22)
MONOCYTES NFR BLD AUTO: 9 % (ref 4–12)
NEUTROPHILS # BLD AUTO: 2.62 THOUSANDS/ÂΜL (ref 1.85–7.62)
NEUTS SEG NFR BLD AUTO: 57 % (ref 43–75)
NRBC BLD AUTO-RTO: 0 /100 WBCS
PLATELET # BLD AUTO: 289 THOUSANDS/UL (ref 149–390)
PMV BLD AUTO: 8.6 FL (ref 8.9–12.7)
POTASSIUM SERPL-SCNC: 3.9 MMOL/L (ref 3.5–5.3)
RBC # BLD AUTO: 3.96 MILLION/UL (ref 3.81–5.12)
SODIUM SERPL-SCNC: 138 MMOL/L (ref 135–147)
TSH SERPL DL<=0.05 MIU/L-ACNC: 1.84 UIU/ML (ref 0.45–4.5)
VIT B12 SERPL-MCNC: 623 PG/ML (ref 180–914)
WBC # BLD AUTO: 4.58 THOUSAND/UL (ref 4.31–10.16)

## 2025-03-08 PROCEDURE — 82306 VITAMIN D 25 HYDROXY: CPT

## 2025-03-08 PROCEDURE — 80048 BASIC METABOLIC PNL TOTAL CA: CPT

## 2025-03-08 PROCEDURE — 85025 COMPLETE CBC W/AUTO DIFF WBC: CPT

## 2025-03-08 PROCEDURE — 82607 VITAMIN B-12: CPT

## 2025-03-08 PROCEDURE — 36415 COLL VENOUS BLD VENIPUNCTURE: CPT

## 2025-03-08 PROCEDURE — 84443 ASSAY THYROID STIM HORMONE: CPT

## 2025-03-10 ENCOUNTER — RESULTS FOLLOW-UP (OUTPATIENT)
Dept: FAMILY MEDICINE CLINIC | Facility: CLINIC | Age: 60
End: 2025-03-10

## 2025-03-10 DIAGNOSIS — D75.89 MACROCYTOSIS: Primary | ICD-10-CM

## 2025-03-10 NOTE — RESULT ENCOUNTER NOTE
Labs mostly unremarkable except for a slightly elevated MCV. Order to check folate placed. Otherwise, continue with current medications.

## 2025-03-11 ENCOUNTER — PROCEDURE VISIT (OUTPATIENT)
Dept: GYNECOLOGY | Facility: CLINIC | Age: 60
End: 2025-03-11
Payer: COMMERCIAL

## 2025-03-11 VITALS
WEIGHT: 158 LBS | BODY MASS INDEX: 26.98 KG/M2 | HEIGHT: 64 IN | SYSTOLIC BLOOD PRESSURE: 128 MMHG | HEART RATE: 70 BPM | DIASTOLIC BLOOD PRESSURE: 70 MMHG

## 2025-03-11 DIAGNOSIS — N95.0 PMB (POSTMENOPAUSAL BLEEDING): ICD-10-CM

## 2025-03-11 DIAGNOSIS — Z01.818 PRE-OP TESTING: Primary | ICD-10-CM

## 2025-03-11 PROCEDURE — 99215 OFFICE O/P EST HI 40 MIN: CPT | Performed by: OBSTETRICS & GYNECOLOGY

## 2025-03-11 NOTE — PROGRESS NOTES
Name: Lakeshia Christianson      : 1965      MRN: [id number]  Encounter Provider: Nirav Roach DO  Encounter Date: 3/11/2025   Encounter department: [facility]  :  Assessment & Plan  PMB (postmenopausal bleeding)    Plan is to proceed to hysterectomy D&C polypectomy.  Risks of the procedure were discussed including, but not limited to infection, uterine perforation, possible need for further surgery.    Pre-op testing    Orders:    ECG 12 lead; Future    CBC and differential; Future        History of Present Illness   HPI  Lakeshia Christianson is a 59 y.o. female G5 P 3023, C section x 3, new patient, referred by Dr Ojeda secondary to postmenopausal bleeding.  Patient states that prior to December her menses were regular.  Since December she has had nearly daily episodes of light vaginal bleeding.    Ultrasound done in his office     Date/Time: 2025 8:00 AM     Performed by: Renee Garrett  Authorized by: Simon Ojeda MD  Universal Protocol:  Consent: Verbal consent obtained.  Consent given by: patient  Timeout called at: 2025 7:58 AM.  Patient understanding: patient states understanding of the procedure being performed  Patient identity confirmed: verbally with patient     Procedure details:     SIS Procedure: No    Indications: non-obstetric vaginal bleeding      Technique:  Transvaginal US, Non-OB    Position: lithotomy exam    Uterine findings:     Length (cm): 9.59    Height (cm):  5.08    Width (cm):  5.97    Endometrial stripe: identified      Endometrium thickness (mm):  10.62  Left ovary findings:     Left ovary:  Visualized    Length (cm): 3.92    Height (cm): 2.35    Width (cm): 2.57  Right ovary findings:     Right ovary:  Visualized    Length (cm): 2.73    Height (cm): 1.67    Width (cm): 1.87  Other findings:     Free pelvic fluid: not identified      Free peritoneal fluid: not identified    Post-Procedure Details:     Impression:   Anteverted uterus demonstrates a thickened endometrium which appears to contain a polyp. There appears to be a feeder vessel extending into the endometrium. The right ovary appears within normal limits. The left ovary demonstrates a 1.9cm simple cyst. No free fluid.     Tolerance:  Tolerated well, no immediate complications    Complications: no complications      Review of Systems  Past Medical History   Past Medical History:   Diagnosis Date    Chronic pain     Disease of thyroid gland     hypothyroid    Diverticulitis 2021    GERD (gastroesophageal reflux disease)     NISSEN   today 2023    Heel pain, chronic, right 2021    Hiatal hernia     Hypertension     Hyperthyroiditis     HYPERTHYROID; POST IODINE THERAPY    Intractable vomiting 2021    Meningioma (HCC)     Migraine without aura and without status migrainosus, not intractable 2021    Pain of upper abdomen 2020    Sciatica of right side 2020    Weakness of right upper extremity 2022     Past Surgical History:   Procedure Laterality Date    APPENDECTOMY       SECTION      EGD      HERNIA REPAIR      ID LAPS RPR PARAESPHGL HRNA INCL FUNDPLSTY W/MESH N/A 2023    Procedure: REPAIR HERNIA HIATAL HERNIA REPAIR LAPAROSCOPIC  w/ NISSEN FUNDOPLICATION;  Surgeon: Vladimir Min MD;  Location: [facility] OR;  Service: General     Family History   Problem Relation Age of Onset    Endometrial cancer Family     Ovarian cancer Mother 67    Heart defect Mother     Heart attack Father     Stroke Brother     Ovarian cancer Sister 47    No Known Problems Maternal Grandmother     Breast cancer Paternal Grandmother 63    No Known Problems Sister     No Known Problems Sister     No Known Problems Sister     No Known Problems Sister     No Known Problems Daughter     No Known Problems Maternal Grandfather     No Known Problems Paternal Grandfather     No Known Problems Daughter     No Known Problems Son       reports that  she quit smoking about 25 years ago. Her smoking use included cigarettes. She started smoking about 30 years ago. She has a 5 pack-year smoking history. She has been exposed to tobacco smoke. She has never used smokeless tobacco. She reports that she does not currently use alcohol. She reports that she does not use drugs.  Current Outpatient Medications   Medication Instructions    acetaminophen (TYLENOL) 975 mg, Oral, Every 8 hours PRN    baclofen 10 mg, Oral, 2 times daily PRN    Diclofenac Sodium (VOLTAREN) 2 g, Topical, 4 times daily    gabapentin (NEURONTIN) 100 mg, Oral, 2 times daily    levothyroxine 112 mcg, Oral, Daily    lidocaine (Lidoderm) 5 % 1 patch, Topical, Daily, Remove & Discard patch within 12 hours or as directed by MD    lisinopril (ZESTRIL) 40 mg, Oral, Daily    meclizine (ANTIVERT) 50 mg, Oral, Every 12 hours PRN    omeprazole (PRILOSEC) 40 mg, Oral, 2 times daily    sucralfate (CARAFATE) 1 g, Oral, 4 times daily    topiramate (TOPAMAX) 25 mg, Oral, 2 times daily   No Known Allergies   Current Outpatient Medications on File Prior to Visit   Medication Sig Dispense Refill    acetaminophen (TYLENOL) 325 mg tablet Take 3 tablets (975 mg total) by mouth every 8 (eight) hours as needed for mild pain  0    baclofen 10 mg tablet Take 1 tablet (10 mg total) by mouth 2 (two) times a day as needed for muscle spasms 60 tablet 1    Diclofenac Sodium (VOLTAREN) 1 % Apply 2 g topically 4 (four) times a day 100 g 1    gabapentin (NEURONTIN) 100 mg capsule Take 1 capsule (100 mg total) by mouth 2 (two) times a day 60 capsule 1    levothyroxine 112 mcg tablet Take 1 tablet (112 mcg total) by mouth daily 30 tablet 0    lidocaine (Lidoderm) 5 % Apply 1 patch topically over 12 hours daily Remove & Discard patch within 12 hours or as directed by MD 30 patch 1    lisinopril (ZESTRIL) 40 mg tablet Take 1 tablet (40 mg total) by mouth daily 90 tablet 1    meclizine (ANTIVERT) 50 MG tablet Take 1 tablet (50 mg total)  "by mouth every 12 (twelve) hours as needed for dizziness 60 tablet 2    omeprazole (PriLOSEC) 40 MG capsule Take 1 capsule (40 mg total) by mouth 2 (two) times a day 60 capsule 5    sucralfate (CARAFATE) 1 g tablet Take 1 tablet (1 g total) by mouth 4 (four) times a day 120 tablet 5    topiramate (TOPAMAX) 25 mg tablet Take 1 tablet (25 mg total) by mouth 2 (two) times a day 60 tablet 2     No current facility-administered medications on file prior to visit.      Social History     Tobacco Use    Smoking status: Former     Current packs/day: 0.00     Average packs/day: 1 pack/day for 5.0 years (5.0 ttl pk-yrs)     Types: Cigarettes     Start date:      Quit date:      Years since quittin.2     Passive exposure: Past    Smokeless tobacco: Never   Vaping Use    Vaping status: Never Used   Substance and Sexual Activity    Alcohol use: Not Currently     Comment: occasional     Drug use: Never    Sexual activity: Not on file        Objective   /70   Pulse 70   Ht 5' 4\" (1.626 m)   Wt 71.7 kg (158 lb)   LMP 2025 (Exact Date)   BMI 27.12 kg/m²      Physical Exam  Vitals reviewed.   Constitutional:       Appearance: Normal appearance. She is normal weight.   Cardiovascular:      Rate and Rhythm: Normal rate and regular rhythm.      Pulses: Normal pulses.   Pulmonary:      Effort: Pulmonary effort is normal.      Breath sounds: Normal breath sounds.   Abdominal:      General: There is no distension.      Palpations: Abdomen is soft. There is no mass.      Tenderness: There is no abdominal tenderness. There is no guarding or rebound.      Hernia: No hernia is present. There is no hernia in the left inguinal area or right inguinal area.   Genitourinary:     General: Normal vulva.      Labia:         Right: No rash, tenderness or lesion.         Left: No rash, tenderness or lesion.       Vagina: Normal.      Cervix: Normal.      Uterus: Normal.       Adnexa:         Right: No mass, tenderness or " fullness.          Left: No mass, tenderness or fullness.     Musculoskeletal:      Cervical back: Normal range of motion and neck supple. No tenderness.   Lymphadenopathy:      Cervical: No cervical adenopathy.      Lower Body: No right inguinal adenopathy. No left inguinal adenopathy.   Neurological:      Mental Status: She is alert.         Administrative Statements   I have spent a total time of 60 minutes in caring for this patient on the day of the visit/encounter including Diagnostic results, Prognosis, Risks and benefits of tx options, Instructions for management, Impressions, Counseling / Coordination of care, Documenting in the medical record, Reviewing/placing orders in the medical record (including tests, medications, and/or procedures), Obtaining or reviewing history  , and Communicating with other healthcare professionals .

## 2025-03-13 ENCOUNTER — TELEPHONE (OUTPATIENT)
Dept: GYNECOLOGY | Facility: CLINIC | Age: 60
End: 2025-03-13

## 2025-03-13 NOTE — TELEPHONE ENCOUNTER
----- Message from Nirav Roach DO sent at 3/11/2025 11:18 AM EDT -----  [facility] GYN Department  Surgery Scheduling Sheet    Patient Name: Lakeshia Christianson  : 1965    Provider: Nirav Roach DO     Needed: no; Language: N/A    Procedure: exam under anesthesia, dilation and curettage , and operative hysteroscopy    Diagnosis: Postmenopausal bleeding/endometrial polyp    Special Needs or Equipment: none and Symfi on    Anesthesia: IV sedation with anesthesia    Length of stay: outpatient  Does patient have comorbid conditions that will require close perioperative monitoring prior to safe discharge: no    The patient has comorbid conditions that will require close perioperative monitoring prior to safe discharge, including N/A.   This may require acute care beyond the usual and routine recovery period. As such, inpatient admission post-operatively is expected and appropriate, and anticipated hospital length of stay will be >2 midnights.    Pre-Admission Testing Needed: yes   Labs that should be ordered: cbc and EKG    Order PAT that is recommended in prep for procedure?: Not Indicated    Medical Clearance Needed: no; Provider: N/A    MA Form Signed (tubals/hysterectomy): Not Indicated    Surgical Drink Given: no     How many days out of work: 2 day(s)     How many days no drivin day(s)       Is pre op appt needed?  no  Interval for post op appt: 2 week(s)       For Surgical Scheduler:     Surgery Scheduled On:  [place] [place] [facility]    Pre-op Appt:   Post op Appt:  Consult/Medical clearance appt:

## 2025-03-17 DIAGNOSIS — R42 VERTIGO: Primary | ICD-10-CM

## 2025-03-17 NOTE — TELEPHONE ENCOUNTER
Patient does not have My Chart so she asked for me to mail all the information to her house including her post-op appointment information.. Sent pt her labs to be done and appt info and Surgery date info.. Teams number given..

## 2025-04-20 NOTE — ASSESSMENT & PLAN NOTE
Mildly controlled with meclizine but could be secondary to likely brain pathology.    - Plan as above regarding meningioma  Orders:  •  Ambulatory Referral to Neurosurgery; Future  •  MRI brain w wo contrast; Future

## 2025-04-20 NOTE — PROGRESS NOTES
Name: Lakeshia Christianson      : 1965      MRN: [id number]  Encounter Provider: Marcelo Pillai MD  Encounter Date: 2025   Encounter department: [facility]  :  Assessment & Plan  Meningioma (HCC)  CTA head neck with and without contrast (2022): Right petroclinoid meningioma.The right superior cerebellar artery and posterior communicating artery branches are intimately associated along the dorsal and medial borders of the lesion.   MRI Brain (2022): mass measuring 1.8 x 0.6 x 1.8 cm (compared to 1.1 x 1.1 x 0.5 cm on the prior study).  No adjacent parenchymal edema identified.  Minimal mass effect and flattening the right ventral rex.  CT Head 2022: Similar 1.6 x 0.9 cm right petroclinoid mass compatible with known meningioma.  Mild, similar mass effect on the right anterior rex.    Patient completed frameless stereotactic radiotherapy for meningioma in 3/23/2022.     MRI brain on 10/17/2022 no significant change in size of right petroclival meningioma measuring 1.6 x 0.5 x 1.7 cm.  MRI Karthikeyan 2023 - 1. Similar petroclival meningioma measuring 1.5 x 0.4 x 1.6 cm. Stable nonspecific white matter change most compatible with microangiopathy.    Last Neurosurgery visit was on 6/15/22    Patient now presenting with increased memory loss and mild neurological deficits as noted in HPI and physical exam.  There is concern that there may be changes in the brain versus increase in meningioma size which could be causing mass effect.    PLAN  - Urgent follow-up with neurology.  Referral team in the room to help with setting up appointment  - Noted that there was a pending order for MRI brain by neurologist.  New order placed for urgent MRI brain with and without contrast.  Referral team in room to help schedule.      Orders:  •  Ambulatory Referral to Neurosurgery; Future  •  MRI brain w wo contrast; Future  •  Comprehensive metabolic panel; Future  •  Heavy metals,  blood; Future    Memory loss  As noted above regarding meningioma.  -Other differentials to consider grief due to recent multiple deaths in the family.  Exposure to heavy metals vs hypothyroidism vs B12 deficiency.    - Will obtain labs as below    Orders:  •  Ambulatory Referral to Neurosurgery; Future  •  MRI brain w wo contrast; Future  •  Vitamin B12; Future  •  TSH, 3rd generation with Free T4 reflex; Future  •  Comprehensive metabolic panel; Future  •  Heavy metals, blood; Future    Vertigo  Mildly controlled with meclizine but could be secondary to likely brain pathology.    - Plan as above regarding meningioma  Orders:  •  Ambulatory Referral to Neurosurgery; Future  •  MRI brain w wo contrast; Future    Migraine without aura and without status migrainosus, not intractable  Previously controlled with Topamax 25 mg twice daily.  In the setting of memory loss and possibility of worsening meningioma, would like to rule out brain pathology before making adjustment to migraine regimen.    - Plan as per meningioma       Hypothyroidism, unspecified type  Lab Results   Component Value Date    [id number] 1.837 03/08/2025     - Continue Levothyroxine 112 mcg daily.     Orders:  •  TSH, 3rd generation with Free T4 reflex; Future  •  levothyroxine 112 mcg tablet; Take 1 tablet (112 mcg total) by mouth daily    Essential hypertension    At goal     -Continue lisinopril 40 mg daily              History of Present Illness {?Quick Links Encounters * My Last Note * Last Note in Specialty * Snapshot * Since Last Visit * History :75669}    Lakeshia Christianson is a 59 y.o. female PMH of HTN, hypothyroidism, Migraines, GERD, Vertigo, meningioma s/p SRT, presenting due to concerns for memory loss.  Symptoms began December 2024 and she has noted 3 distinct episodes.  Patient was in the [place] in December 2024 when she found it difficult to recognize the faces of her family members and friends.  Everyone would ask if she  remembered the name of a certain person and she was unable to have any recollection.  She finally was then able to remember everyone but this was very traumatic for her.  Another distinct episode was when she drove all the way to [place], [place] and did not realize why or where she was going and was confused for a few minutes before she started heading back home to [place].  She has also noted moments of wandering around with no reason and forgetting where she placed her phone at home frequently.  Most recent episode was last week.    She is also complaining of severe headaches that are similar to her migraine headaches.  Headache is located all over her head with associated dizziness.  She is unsure of what makes it worse or better.    Of note, patient has lost multiple family members in the past year and she has been grieving.    Cognitive Impairment:  History:     History of head trauma: Yes (Dec 2024 - fell forward on head)      History of alcohol abuse: No      Family history of dementia: Yes      Memory issues noticed since: 6 months    Memory issues noticed by: patient and family member    Form of memory affected: short term memory    Memory progression: waxing and waning    Onset: gradual  Difficulties with:     Finding the right words while speaking: Yes      Operating household appliances: Yes      Asks the same questions repeatedly: Yes      Misplacing/losing objects: Yes      Handling financial affairs: No    ADLs:     Hearing issues: No      Vision issues: No    Gait or balance disorders: No      Using assistive devices: No      Urinary incontinence: No      Stool incontinence: No      Driving: No      Recent accidents: No      Citations: No      Getting lost in familiar places: Yes    Behaviors:     Change in mood or personality: Yes      Current or previous depression or anxiety treatments: No      Sleep issues: Yes      Fluctuation in alertness: No      Hallucination or delusion: No       "Aggressive or combative behavior: No      Agitated: Yes      Wandering: Yes      Resistance to care: No      Hoarding/hiding objects: No      Suspicious: No      Withdrawn: Yes      Inappropriate sexual behavior: No      Review of Systems   Constitutional:  Positive for fatigue. Negative for chills, diaphoresis, fever and unexpected weight change.   HENT:  Negative for congestion, ear discharge, ear pain and hearing loss.    Eyes:  Positive for visual disturbance (far sightedness). Negative for photophobia and pain.   Respiratory:  Negative for cough, chest tightness, shortness of breath and wheezing.    Cardiovascular:  Negative for chest pain, palpitations and leg swelling.   Gastrointestinal:  Negative for abdominal pain, blood in stool, diarrhea, nausea and vomiting.   Genitourinary:  Negative for difficulty urinating, dysuria and hematuria.   Musculoskeletal:  Positive for arthralgias. Negative for gait problem and neck pain.   Skin:  Negative for rash.   Neurological:  Positive for dizziness, numbness and headaches. Negative for tremors, seizures, syncope, facial asymmetry, speech difficulty, weakness and light-headedness.   Psychiatric/Behavioral:  Positive for agitation, confusion and dysphoric mood. Negative for hallucinations, sleep disturbance and suicidal ideas. The patient is not nervous/anxious.    All other systems reviewed and are negative.      Objective {?Quick Links Trend Vitals * Enter New Vitals * Results Review * Timeline (Adult) * Labs * Imaging * Cardiology * Procedures * Lung Cancer Screening * Surgical eConsent :71068}  /78 (BP Location: Left arm, Patient Position: Sitting, Cuff Size: Large)   Pulse 73   Temp 97.5 °F (36.4 °C) (Temporal)   Resp 16   Ht 5' 4\" (1.626 m)   Wt 73.5 kg (162 lb)   LMP 04/01/2025   SpO2 98%   BMI 27.81 kg/m²      Physical Exam  Vitals and nursing note reviewed.   Constitutional:       General: She is not in acute distress.     Appearance: Normal " appearance. She is well-developed. She is not ill-appearing.   HENT:      Head: Normocephalic and atraumatic.      Right Ear: Tympanic membrane, ear canal and external ear normal. Decreased hearing noted.      Left Ear: Tympanic membrane, ear canal and external ear normal. No decreased hearing noted.      Nose: Nose normal.      Mouth/Throat:      Mouth: Mucous membranes are moist.   Eyes:      General: No visual field deficit.     Extraocular Movements: Extraocular movements intact.      Conjunctiva/sclera: Conjunctivae normal.      Pupils: Pupils are equal, round, and reactive to light.   Cardiovascular:      Rate and Rhythm: Normal rate and regular rhythm.      Heart sounds: Normal heart sounds. No murmur heard.  Pulmonary:      Effort: Pulmonary effort is normal. No respiratory distress.      Breath sounds: Normal breath sounds.   Abdominal:      Palpations: Abdomen is soft.      Tenderness: There is no abdominal tenderness.   Musculoskeletal:         General: No swelling or tenderness. Normal range of motion.      Cervical back: Normal range of motion and neck supple. No tenderness.      Right lower leg: No edema.      Left lower leg: No edema.   Skin:     General: Skin is warm and dry.      Capillary Refill: Capillary refill takes less than 2 seconds.   Neurological:      Mental Status: She is alert and oriented to person, place, and time.      GCS: GCS eye subscore is 4. GCS verbal subscore is 5. GCS motor subscore is 6.      Cranial Nerves: Cranial nerves 2-12 are intact. No dysarthria or facial asymmetry.      Sensory: Sensory deficit (Decsreased sensation of lelft upper and mid face) present.      Motor: Weakness present. No tremor, abnormal muscle tone or pronator drift.      Coordination: Coordination is intact. Finger-Nose-Finger Test normal.      Gait: Gait is intact.      Deep Tendon Reflexes:      Reflex Scores:       Patellar reflexes are 2+ on the right side and 2+ on the left side.     Comments:  Strength - 4/5 right leg; 5/5 left leg     Psychiatric:         Attention and Perception: Attention normal.         Mood and Affect: Mood is depressed. Affect is tearful.         Speech: Speech normal.         Behavior: Behavior normal. Behavior is cooperative.         Thought Content: Thought content does not include suicidal ideation. Thought content does not include suicidal plan.         Cognition and Memory: Cognition normal. Memory is impaired.

## 2025-04-21 ENCOUNTER — OFFICE VISIT (OUTPATIENT)
Dept: FAMILY MEDICINE CLINIC | Facility: CLINIC | Age: 60
End: 2025-04-21

## 2025-04-21 VITALS
HEART RATE: 73 BPM | OXYGEN SATURATION: 98 % | TEMPERATURE: 97.5 F | BODY MASS INDEX: 27.66 KG/M2 | DIASTOLIC BLOOD PRESSURE: 78 MMHG | HEIGHT: 64 IN | RESPIRATION RATE: 16 BRPM | WEIGHT: 162 LBS | SYSTOLIC BLOOD PRESSURE: 120 MMHG

## 2025-04-21 DIAGNOSIS — E03.9 HYPOTHYROIDISM, UNSPECIFIED TYPE: ICD-10-CM

## 2025-04-21 DIAGNOSIS — R41.3 MEMORY LOSS: ICD-10-CM

## 2025-04-21 DIAGNOSIS — R42 VERTIGO: ICD-10-CM

## 2025-04-21 DIAGNOSIS — I10 ESSENTIAL HYPERTENSION: ICD-10-CM

## 2025-04-21 DIAGNOSIS — G43.009 MIGRAINE WITHOUT AURA AND WITHOUT STATUS MIGRAINOSUS, NOT INTRACTABLE: ICD-10-CM

## 2025-04-21 DIAGNOSIS — D32.9 MENINGIOMA (HCC): Primary | ICD-10-CM

## 2025-04-21 PROCEDURE — 99213 OFFICE O/P EST LOW 20 MIN: CPT | Performed by: FAMILY MEDICINE

## 2025-04-21 RX ORDER — LEVOTHYROXINE SODIUM 112 UG/1
112 TABLET ORAL DAILY
Qty: 30 TABLET | Refills: 3 | Status: SHIPPED | OUTPATIENT
Start: 2025-04-21

## 2025-04-21 NOTE — ASSESSMENT & PLAN NOTE
Lab Results   Component Value Date    [id number] 1.837 03/08/2025     - Continue Levothyroxine 112 mcg daily.     Orders:  •  TSH, 3rd generation with Free T4 reflex; Future  •  levothyroxine 112 mcg tablet; Take 1 tablet (112 mcg total) by mouth daily

## 2025-04-21 NOTE — ASSESSMENT & PLAN NOTE
As noted above regarding meningioma.  -Other differentials to consider grief due to recent multiple deaths in the family.  Exposure to heavy metals vs hypothyroidism vs B12 deficiency.    - Will obtain labs as below    Orders:  •  Ambulatory Referral to Neurosurgery; Future  •  MRI brain w wo contrast; Future  •  Vitamin B12; Future  •  TSH, 3rd generation with Free T4 reflex; Future  •  Comprehensive metabolic panel; Future  •  Heavy metals, blood; Future

## 2025-04-21 NOTE — ASSESSMENT & PLAN NOTE
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.The right superior cerebellar artery and posterior communicating artery branches are intimately associated along the dorsal and medial borders of the lesion.   MRI Brain (1/2022): mass measuring 1.8 x 0.6 x 1.8 cm (compared to 1.1 x 1.1 x 0.5 cm on the prior study).  No adjacent parenchymal edema identified.  Minimal mass effect and flattening the right ventral rex.  CT Head 5/2022: Similar 1.6 x 0.9 cm right petroclinoid mass compatible with known meningioma.  Mild, similar mass effect on the right anterior rex.    Patient completed frameless stereotactic radiotherapy for meningioma in 3/23/2022.     MRI brain on 10/17/2022 no significant change in size of right petroclival meningioma measuring 1.6 x 0.5 x 1.7 cm.  MRI Karthikeyan 5/2023 - 1. Similar petroclival meningioma measuring 1.5 x 0.4 x 1.6 cm. Stable nonspecific white matter change most compatible with microangiopathy.    Last Neurosurgery visit was on 6/15/22    Patient now presenting with increased memory loss and mild neurological deficits as noted in HPI and physical exam.  There is concern that there may be changes in the brain versus increase in meningioma size which could be causing mass effect.    PLAN  - Urgent follow-up with neurology.  Referral team in the room to help with setting up appointment  - Noted that there was a pending order for MRI brain by neurologist.  New order placed for urgent MRI brain with and without contrast.  Referral team in room to help schedule.      Orders:  •  Ambulatory Referral to Neurosurgery; Future  •  MRI brain w wo contrast; Future  •  Comprehensive metabolic panel; Future  •  Heavy metals, blood; Future

## 2025-04-22 PROBLEM — M79.672 FOOT PAIN, LEFT: Status: RESOLVED | Noted: 2023-07-12 | Resolved: 2025-04-22

## 2025-04-22 PROBLEM — F32.A DEPRESSION: Status: ACTIVE | Noted: 2022-04-15
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
## 2025-04-22 PROBLEM — Z98.890 STATUS POST LAPAROSCOPIC NISSEN FUNDOPLICATION: Status: RESOLVED | Noted: 2023-05-16 | Resolved: 2025-04-22

## 2025-04-22 NOTE — ASSESSMENT & PLAN NOTE
Previously controlled with Topamax 25 mg twice daily.  In the setting of memory loss and possibility of worsening meningioma, would like to rule out brain pathology before making adjustment to migraine regimen.    - Plan as per meningioma

## 2025-04-24 ENCOUNTER — HOSPITAL ENCOUNTER (OUTPATIENT)
Dept: MRI IMAGING | Facility: HOSPITAL | Age: 60
End: 2025-04-24
Payer: COMMERCIAL

## 2025-04-24 DIAGNOSIS — R41.3 MEMORY LOSS: ICD-10-CM

## 2025-04-24 DIAGNOSIS — D32.9 MENINGIOMA (HCC): ICD-10-CM

## 2025-04-24 DIAGNOSIS — R42 VERTIGO: ICD-10-CM

## 2025-04-24 PROCEDURE — 70553 MRI BRAIN STEM W/O & W/DYE: CPT

## 2025-04-24 PROCEDURE — A9585 GADOBUTROL INJECTION: HCPCS

## 2025-04-24 RX ORDER — GADOBUTROL 604.72 MG/ML
7 INJECTION INTRAVENOUS
Status: COMPLETED | OUTPATIENT
Start: 2025-04-24 | End: 2025-04-24

## 2025-04-24 RX ADMIN — GADOBUTROL 7 ML: 604.72 INJECTION INTRAVENOUS at 07:24

## 2025-04-27 NOTE — ASSESSMENT & PLAN NOTE
MRI Brain w/wo contrast 4/24/25: Stable right petroclival meningioma. No acute process seen.  Neurologically stable as compared to last visit.     PLAN  - Follow up with neurosurgery. Appointment in place for 4/29/25

## 2025-04-27 NOTE — ASSESSMENT & PLAN NOTE
MRI Brain w/wo contrast 4/24/25: Stable right petroclival meningioma. No acute process seen.  Today, MMSE score 25/30. No cognitive impairment    Postulate that memory loss is probably secondary to depression and underlying grief    PLAN:  - Treatment as per depression  - Need to rule out other metabolic causes such as presence of heavy metals vs vitamin deficiency. Labs in place as ordered at previous visit.

## 2025-04-27 NOTE — PROGRESS NOTES
Name: Lakeshia Christianson      : 1965      MRN: [id number]  Encounter Provider: Marcelo Pillai MD  Encounter Date: 2025   Encounter department: [facility]  :  Assessment & Plan  Memory loss  MRI Brain w/wo contrast 25: Stable right petroclival meningioma. No acute process seen.  Today, MMSE score 25/30. No cognitive impairment    Postulate that memory loss is probably secondary to depression and underlying grief    PLAN:  - Treatment as per depression  - Need to rule out other metabolic causes such as presence of heavy metals vs vitamin deficiency. Labs in place as ordered at previous visit.        Meningioma (HCC)  MRI Brain w/wo contrast 25: Stable right petroclival meningioma. No acute process seen.  Neurologically stable as compared to last visit.     PLAN  - Follow up with neurosurgery. Appointment in place for 25         Moderate episode of recurrent major depressive disorder (HCC)  Depression Screening Follow-up Plan: Patient's depression screening was positive with a PHQ-9 score of 15. Patient with underlying depression and was advised to continue current medications as prescribed.    Depression likely worsened by grief.    PLAN  - Will start pt on Venlafaxine which has the added effect of treating migraines and sciatica pain.   - Avoiding TCAs due to possible side effects of dizziness that could worsen pt's current vertigo   -Mental health resources provided     Orders:    venlafaxine 150 MG TB24 24 hr tablet; Take 1 tablet (150 mg total) by mouth daily with breakfast    Grief  Brother  3 years ago and multiple deaths in the family in the past year.   Fear that she is next. Underlying depression likely worsened by grief.     - Will treat underlying depression        Migraine without aura and without status migrainosus, not intractable  Previously controlled with Topamax 25 mg twice daily.     PLAN:  -Discontinue Topamax  - Will start  the patient on a Venlafaxine 150mg daily  - Imitrex 25mg once a day as needed for abortive therapy.    -Advised the patient stay well hydrated  -Advised the patient to improve her sleeping habits  -Advised the patient about diet and exercise     Orders:    venlafaxine 150 MG TB24 24 hr tablet; Take 1 tablet (150 mg total) by mouth daily with breakfast    SUMAtriptan (Imitrex) 25 mg tablet; Take 1 tablet (25 mg total) by mouth once as needed for migraine    Gastroesophageal reflux disease without esophagitis  Stable     - Continue Omeprazole and Carafe. Refill sent  Orders:    sucralfate (CARAFATE) 1 g tablet; Take 1 tablet (1 g total) by mouth 4 (four) times a day          Depression Screening and Follow-up Plan:   Patient with underlying depression and was advised to continue current medications as prescribed.       History of Present Illness   Lakeshia Christianson is a 59 y.o. female PMH of HTN, hypothyroidism, Migraines, GERD, Vertigo, meningioma s/p SRT, presenting due to concerns for memory loss. Symptoms began December 2024 and she has noted 3 distinct episodes.  Patient was in the [place] in [place] 2024 when she found it difficult to recognize the faces of her family members and friends.  Everyone would ask if she remembered the name of a certain person and she was unable to have any recollection.  She finally was then able to remember everyone but this was very traumatic for her.  Another distinct episode was when she drove all the way to [place], [place] and did not realize why or where she was going and was confused for a few minutes before she started heading back home to [place].  She has also noted moments of wandering around with no reason and forgetting where she placed her phone at home frequently.  Of note, patient has lost multiple family members in the past year and she has been grieving.        Review of Systems   Constitutional:  Positive for fatigue. Negative for chills,  "diaphoresis, fever and unexpected weight change.   HENT:  Negative for congestion, ear discharge, ear pain and hearing loss.    Eyes:  Positive for photophobia. Negative for pain and visual disturbance.   Respiratory:  Negative for cough, chest tightness, shortness of breath and wheezing.    Cardiovascular:  Negative for chest pain, palpitations and leg swelling.   Gastrointestinal:  Positive for nausea. Negative for abdominal pain and vomiting.   Genitourinary:  Negative for difficulty urinating.   Musculoskeletal:  Positive for arthralgias. Negative for gait problem and neck pain.   Skin:  Negative for rash.   Neurological:  Positive for dizziness, numbness and headaches. Negative for tremors, seizures, syncope, facial asymmetry, speech difficulty, weakness and light-headedness.   Psychiatric/Behavioral:  Positive for confusion, dysphoric mood and sleep disturbance. Negative for agitation, hallucinations and suicidal ideas. The patient is not nervous/anxious.    All other systems reviewed and are negative.      Objective   /76 (BP Location: Right arm, Patient Position: Sitting, Cuff Size: Standard)   Pulse 74   Temp 98 °F (36.7 °C) (Temporal)   Resp 18   Ht 5' 4\" (1.626 m)   Wt 72.1 kg (159 lb)   LMP 04/01/2025 (Approximate)   SpO2 99%   BMI 27.29 kg/m²      Physical Exam  Vitals and nursing note reviewed.   Constitutional:       General: She is not in acute distress.     Appearance: Normal appearance. She is well-developed. She is not ill-appearing.   HENT:      Head: Normocephalic and atraumatic.   Eyes:      Extraocular Movements: Extraocular movements intact.      Conjunctiva/sclera: Conjunctivae normal.      Pupils: Pupils are equal, round, and reactive to light.   Cardiovascular:      Rate and Rhythm: Normal rate and regular rhythm.      Heart sounds: No murmur heard.  Pulmonary:      Effort: Pulmonary effort is normal. No respiratory distress.      Breath sounds: Normal breath sounds. No " wheezing.   Abdominal:      General: There is no distension.      Palpations: Abdomen is soft.      Tenderness: There is no abdominal tenderness. There is no guarding.   Musculoskeletal:         General: No swelling. Normal range of motion.      Cervical back: Neck supple.   Skin:     General: Skin is warm and dry.      Capillary Refill: Capillary refill takes less than 2 seconds.   Neurological:      Mental Status: She is alert and oriented to person, place, and time.   Psychiatric:         Mood and Affect: Mood is depressed. Affect is blunt and tearful.         Speech: Speech normal.         Behavior: Behavior normal.         Thought Content: Thought content does not include homicidal or suicidal ideation. Thought content does not include homicidal or suicidal plan.         Cognition and Memory: Cognition normal. Memory is impaired.             Marcelo Pillai MD

## 2025-04-28 ENCOUNTER — OFFICE VISIT (OUTPATIENT)
Dept: FAMILY MEDICINE CLINIC | Facility: CLINIC | Age: 60
End: 2025-04-28

## 2025-04-28 VITALS
TEMPERATURE: 98 F | WEIGHT: 159 LBS | OXYGEN SATURATION: 99 % | DIASTOLIC BLOOD PRESSURE: 76 MMHG | SYSTOLIC BLOOD PRESSURE: 132 MMHG | HEART RATE: 74 BPM | HEIGHT: 64 IN | RESPIRATION RATE: 18 BRPM | BODY MASS INDEX: 27.14 KG/M2

## 2025-04-28 DIAGNOSIS — R41.3 MEMORY LOSS: Primary | ICD-10-CM

## 2025-04-28 DIAGNOSIS — D32.9 MENINGIOMA (HCC): ICD-10-CM

## 2025-04-28 DIAGNOSIS — K21.9 GASTROESOPHAGEAL REFLUX DISEASE WITHOUT ESOPHAGITIS: ICD-10-CM

## 2025-04-28 DIAGNOSIS — F43.21 GRIEF: ICD-10-CM

## 2025-04-28 DIAGNOSIS — G43.009 MIGRAINE WITHOUT AURA AND WITHOUT STATUS MIGRAINOSUS, NOT INTRACTABLE: ICD-10-CM

## 2025-04-28 DIAGNOSIS — F33.1 MODERATE EPISODE OF RECURRENT MAJOR DEPRESSIVE DISORDER (HCC): ICD-10-CM

## 2025-04-28 PROBLEM — F32.9 MAJOR DEPRESSIVE DISORDER: Status: ACTIVE | Noted: 2022-04-15

## 2025-04-28 PROCEDURE — 99213 OFFICE O/P EST LOW 20 MIN: CPT | Performed by: FAMILY MEDICINE

## 2025-04-28 RX ORDER — VENLAFAXINE HYDROCHLORIDE 150 MG/1
150 TABLET, EXTENDED RELEASE ORAL
Qty: 30 TABLET | Refills: 3 | Status: SHIPPED | OUTPATIENT
Start: 2025-04-28 | End: 2025-04-29

## 2025-04-28 RX ORDER — SUMATRIPTAN SUCCINATE 25 MG/1
25 TABLET ORAL ONCE AS NEEDED
Qty: 10 TABLET | Refills: 0 | Status: SHIPPED | OUTPATIENT
Start: 2025-04-28

## 2025-04-28 RX ORDER — SUCRALFATE 1 G/1
1 TABLET ORAL 4 TIMES DAILY
Qty: 120 TABLET | Refills: 5 | Status: SHIPPED | OUTPATIENT
Start: 2025-04-28

## 2025-04-28 NOTE — ASSESSMENT & PLAN NOTE
Follow-up status post SRT for meningioma 3/23/2022 with Dr. Pettit.  Notes having frequent headaches    Imaging:  MRI brain w/wo 4/24/25: 1. Similar petroclival meningioma measuring 1.5 x 0.4 x 1.6 cm. 2.  Stable nonspecific white matter change most compatible with microangiopathy.    Plan:  Reviewed imaging with patient.  Stable appearance to meningioma following radiation therapy.  This would not be contributing to her headaches or intermittent positional vertigo episodes  No neurosurgical invention recommended.  Can consider neurology evaluation for headaches  She was lost to follow-up briefly, recommend getting back 1 year follow-up schedule  Reviewed red flag signs and symptoms.  Follow-up in 1 year with MRI brain with/without contrast to see AP  Call sooner with any questions or concerns.    Orders:    Ambulatory Referral to Neurosurgery    BUN; Future    Creatinine, serum; Future    MRI brain w wo contrast; Future

## 2025-04-28 NOTE — ASSESSMENT & PLAN NOTE
Stable     - Continue Omeprazole and Carafe. Refill sent  Orders:    sucralfate (CARAFATE) 1 g tablet; Take 1 tablet (1 g total) by mouth 4 (four) times a day

## 2025-04-28 NOTE — ASSESSMENT & PLAN NOTE
Depression Screening Follow-up Plan: Patient's depression screening was positive with a PHQ-9 score of 15. Patient with underlying depression and was advised to continue current medications as prescribed.    Depression likely worsened by grief.    PLAN  - Will start pt on Venlafaxine which has the added effect of treating migraines and sciatica pain.   - Avoiding TCAs due to possible side effects of dizziness that could worsen pt's current vertigo   -Mental health resources provided     Orders:    venlafaxine 150 MG TB24 24 hr tablet; Take 1 tablet (150 mg total) by mouth daily with breakfast

## 2025-04-28 NOTE — PATIENT INSTRUCTIONS
Recursos de Codie Mental    Si desea ser incluido en la lista de espera para los servicios de [facility], DEBE comunicarse con la admisión en [facility] - [phone]    Apoyo de Emergencia y Crisis    Línea de [facility] para la Suicidio y Crisis: Llama o envía un mensaje de texto al [phone] (Disponible las 24 horas)  Línea de Texto de Crisis: Envía un mensaje de texto con la palabra HOME al [id number] (Disponible 24/7)  Línea de Apoyo: Llama al [phone] (Apoyo confidencial para la codie mental, disponible de lunes a mirtha: 6 AM-10 AM y 4 PM-12 AM)  Crisis del [place]ado de [place]: Llama al [phone] (Para emergencias de codie mental, o visita tu Departamento de Emergencias local)  Consejo de Víctimas de Crimen: Llama al [phone] (Línea de apoyo para víctimas, consejería, acompañamiento a tribunales y hospitales, servicios gratuitos)    Servicios Locales de Codie Mental    [facility]  [phone]  [email or web address]  Apoyo para condiciones de codie mental. Servicios gratuitos para todos    [place] [address] [place] [address] [phone]  Servicios para todas las edades; Bilingüe (Inglés/Español); Acepta Asistencia Médica, Medicare y seguros comerciales    [facility]  [address] [address] [phone]  Servicios para todas las edades; Bilingüe (Inglés/Español); Acepta Highmark Blue Cross Blue Shield, Magellan, Aetna, Optum y Cigna    [facility]  [address] [place] [address] [phone]  Servicios para mayores de 4 años. Solo inglés; NO acepta Asistencia Médica (solo seguros comerciales)    [facility]  [address] [address], [place]  [phone]  Servicios para todas las edades; Solo inglés; Acepta [facility], [facility] y Medicare [facility]  [address], [place]  [address]  [phone]  Servicios para mayores de 6 años. Bilingüe (Inglés/Español); Acepta solo Asistencia Médica    [facility]  [address] [place] [address]  [phone]  Servicios para mayores de 5 años. Bilingüe (Inglés/Español); Acepta Asistencia Médica    [facility]  [address] [place] [address]  [phone]  Servicios para mayores de 14 años. Bilingüe (Inglés/Español); Acepta Asistencia Médica, Medicare y seguros comerciales    [facility]  [address] [place] [address]  [phone]  Servicios para mayores de 5 años. Bilingüe (Inglés/Español); Acepta Asistencia Médica    [facility]  [address] [place] [address]  [phone]  Servicios para mayores de 3 años. Bilingüe (Inglés/Español); Acepta Asistencia Médica y algunos seguros comerciales    [facility]  [address], [address], [place] [address]  [phone]  Servicios para mayores de 5 años. Bilingüe (Inglés/Español); Acepta Asistencia Médica    [facility]  [address], [address], [place] [address]  [phone]  Servicios para mayores de 6 años. Bilingüe (Inglés/Español); Acepta Asistencia Médica y seguros comerciales    [facility]  [address] [place] [address]  [phone]  Servicios para mayores de 5 años. Bilingüe (Inglés/Español); Acepta Asistencia Médica, Medicare y seguros comerciales    [facility]  [address], [address], [place] [address]  [phone]  Servicios para todas las edades ([place]); 18+ (Psiquiatría); Solo inglés; Acepta Capital Blue Cross, Aetna, Highmark, Magellan, [facility] (CHIP y seguros comerciales)      [email or web address] es un recurso para encontrar proveedores de psicoterapia. Los pacientes pueden filtrar los resultados por idioma, especialidad, género, seguro y más. Se recomienda contactar a varios proveedores del directorio, ya que muchos tienen listas de espera y es importante  encontrar el adecuado para ti.    Por favor, contacte a torres proveedor de seguro para más información.

## 2025-04-28 NOTE — ASSESSMENT & PLAN NOTE
Previously controlled with Topamax 25 mg twice daily.     PLAN:  -Discontinue Topamax  - Will start the patient on a Venlafaxine 150mg daily  - Imitrex 25mg once a day as needed for abortive therapy.    -Advised the patient stay well hydrated  -Advised the patient to improve her sleeping habits  -Advised the patient about diet and exercise     Orders:    venlafaxine 150 MG TB24 24 hr tablet; Take 1 tablet (150 mg total) by mouth daily with breakfast    SUMAtriptan (Imitrex) 25 mg tablet; Take 1 tablet (25 mg total) by mouth once as needed for migraine

## 2025-04-29 ENCOUNTER — OFFICE VISIT (OUTPATIENT)
Dept: NEUROSURGERY | Facility: CLINIC | Age: 60
End: 2025-04-29
Payer: COMMERCIAL

## 2025-04-29 VITALS
HEIGHT: 64 IN | WEIGHT: 159 LBS | BODY MASS INDEX: 27.14 KG/M2 | SYSTOLIC BLOOD PRESSURE: 118 MMHG | DIASTOLIC BLOOD PRESSURE: 66 MMHG | OXYGEN SATURATION: 98 % | TEMPERATURE: 97.5 F | RESPIRATION RATE: 16 BRPM | HEART RATE: 65 BPM

## 2025-04-29 DIAGNOSIS — G89.29 CHRONIC MIDLINE LOW BACK PAIN WITH BILATERAL SCIATICA: ICD-10-CM

## 2025-04-29 DIAGNOSIS — D32.9 MENINGIOMA (HCC): Primary | ICD-10-CM

## 2025-04-29 DIAGNOSIS — R41.3 MEMORY LOSS: ICD-10-CM

## 2025-04-29 DIAGNOSIS — H53.40 VISUAL FIELD DEFECTS: ICD-10-CM

## 2025-04-29 DIAGNOSIS — M54.42 CHRONIC MIDLINE LOW BACK PAIN WITH BILATERAL SCIATICA: ICD-10-CM

## 2025-04-29 DIAGNOSIS — R42 VERTIGO: ICD-10-CM

## 2025-04-29 DIAGNOSIS — M54.41 CHRONIC MIDLINE LOW BACK PAIN WITH BILATERAL SCIATICA: ICD-10-CM

## 2025-04-29 DIAGNOSIS — R42 VERTIGO: Primary | ICD-10-CM

## 2025-04-29 DIAGNOSIS — G43.009 MIGRAINE WITHOUT AURA AND WITHOUT STATUS MIGRAINOSUS, NOT INTRACTABLE: ICD-10-CM

## 2025-04-29 DIAGNOSIS — M54.2 NECK PAIN: ICD-10-CM

## 2025-04-29 PROCEDURE — 99213 OFFICE O/P EST LOW 20 MIN: CPT | Performed by: PHYSICIAN ASSISTANT

## 2025-04-29 RX ORDER — VENLAFAXINE HYDROCHLORIDE 150 MG/1
150 CAPSULE, EXTENDED RELEASE ORAL
Qty: 30 CAPSULE | Refills: 3 | Status: SHIPPED | OUTPATIENT
Start: 2025-04-29

## 2025-04-29 NOTE — PROGRESS NOTES
Name: Lakeshia Christianson      : 1965      MRN: [id number]  Encounter Provider: Lala Acharya PA-C  Encounter Date: 2025   Encounter department: [facility] [place]  :  Assessment & Plan  Meningioma (HCC)  Follow-up status post SRT for meningioma 3/23/2022 with Dr. Pettit.  Notes having frequent headaches    Imaging:  MRI brain w/wo 25: 1. Similar petroclival meningioma measuring 1.5 x 0.4 x 1.6 cm. 2.  Stable nonspecific white matter change most compatible with microangiopathy.    Plan:  Reviewed imaging with patient.  Stable appearance to meningioma following radiation therapy.  This would not be contributing to her headaches or intermittent positional vertigo episodes  No neurosurgical invention recommended.  Can consider neurology evaluation for headaches  She was lost to follow-up briefly, recommend getting back 1 year follow-up schedule  Reviewed red flag signs and symptoms.  Follow-up in 1 year with MRI brain with/without contrast to see AP  Call sooner with any questions or concerns.    Orders:    Ambulatory Referral to Neurosurgery    BUN; Future    Creatinine, serum; Future    MRI brain w wo contrast; Future        History of Present Illness     Lakeshia Christianson is a 59 y.o. female who presents for follow up of a meningioma.  She is status post SRT for meningioma 3/23/2022 with Dr. Pettit.  Lost to follow-up for a couple of years. Complains of headaches 3x/ week on the right side of her head. They come on in the mornings. Notes dizziness when laying in bed or changing head position and pressure in her ears. She has intermittent blurry vision. Reports some weakness in the right hand  and drops things at times.     HPI     Review of Systems   HENT:  Positive for tinnitus (pressure b/l ears).    Eyes:  Positive for visual disturbance (blurry vision, no optho).   Gastrointestinal: Negative.    Genitourinary: Negative.    Musculoskeletal:  Negative for gait  problem.   Neurological:  Positive for dizziness (daily), speech difficulty (occ word finding), numbness (n+t b/l fingers) and headaches (daily headaches). Negative for tremors, seizures, syncope and weakness.   Hematological:  Positive for adenopathy. Does not bruise/bleed easily.   Psychiatric/Behavioral:  Negative for confusion, decreased concentration and sleep disturbance.      I have personally reviewed the MA's review of systems and made changes as necessary.    Past Medical History   Past Medical History:   Diagnosis Date    Chronic pain     Disease of thyroid gland     hypothyroid    GERD (gastroesophageal reflux disease)     NISSEN   today 2023    Hiatal hernia     Hypertension     Hyperthyroiditis     HYPERTHYROID; POST IODINE THERAPY    Meningioma (HCC)     Migraine without aura and without status migrainosus, not intractable 2021    Sciatica of right side 2020    Status post laparoscopic Nissen fundoplication 2023    Weakness of right upper extremity 2022     Past Surgical History:   Procedure Laterality Date    APPENDECTOMY       SECTION      EGD      HERNIA REPAIR      UT LAPS RPR PARAESPHGL HRNA INCL FUNDPLSTY W/MESH N/A 2023    Procedure: REPAIR HERNIA HIATAL HERNIA REPAIR LAPAROSCOPIC  w/ NISSEN FUNDOPLICATION;  Surgeon: Vladimir Min MD;  Location: [facility];  Service: General     Family History   Problem Relation Age of Onset    Endometrial cancer Family     Ovarian cancer Mother 67    Heart defect Mother     Heart attack Father     Stroke Brother     Ovarian cancer Sister 47    No Known Problems Maternal Grandmother     Breast cancer Paternal Grandmother 63    No Known Problems Sister     No Known Problems Sister     No Known Problems Sister     No Known Problems Sister     No Known Problems Daughter     No Known Problems Maternal Grandfather     No Known Problems Paternal Grandfather     No Known Problems Daughter     No Known Problems Son      she  "reports that she quit smoking about 25 years ago. Her smoking use included cigarettes. She started smoking about 30 years ago. She has a 5 pack-year smoking history. She has been exposed to tobacco smoke. She has never used smokeless tobacco. She reports that she does not currently use alcohol. She reports that she does not use drugs.  Current Outpatient Medications   Medication Instructions    Diclofenac Sodium (VOLTAREN) 2 g, Topical, 4 times daily    gabapentin (NEURONTIN) 100 mg, Oral, 2 times daily    levothyroxine 112 mcg, Oral, Daily    lidocaine (Lidoderm) 5 % 1 patch, Topical, Daily, Remove & Discard patch within 12 hours or as directed by MD    lisinopril (ZESTRIL) 40 mg, Oral, Daily    meclizine (ANTIVERT) 50 mg, Oral, Every 12 hours PRN    omeprazole (PRILOSEC) 40 mg, Oral, 2 times daily    sucralfate (CARAFATE) 1 g, Oral, 4 times daily    SUMAtriptan (IMITREX) 25 mg, Oral, Once as needed    venlafaxine 150 mg, Oral, Daily with breakfast   No Known Allergies   Objective   /66 (BP Location: Right arm, Patient Position: Sitting, Cuff Size: Standard)   Pulse 65   Temp 97.5 °F (36.4 °C) (Temporal)   Resp 16   Ht 5' 4\" (1.626 m)   Wt 72.1 kg (159 lb)   LMP 04/01/2025 (Approximate)   SpO2 98%   BMI 27.29 kg/m²     Physical Exam  Vitals reviewed.   Constitutional:       General: She is awake.      Appearance: Normal appearance.   HENT:      Head: Normocephalic and atraumatic.   Eyes:      Extraocular Movements: Extraocular movements intact.      Conjunctiva/sclera: Conjunctivae normal.      Pupils: Pupils are equal, round, and reactive to light.   Cardiovascular:      Rate and Rhythm: Normal rate.   Pulmonary:      Effort: Pulmonary effort is normal.   Skin:     General: Skin is warm and dry.   Neurological:      Mental Status: She is alert.      Deep Tendon Reflexes:      Reflex Scores:       Bicep reflexes are 2+ on the right side and 2+ on the left side.       Brachioradialis reflexes are 2+ on " the right side and 2+ on the left side.       Patellar reflexes are 2+ on the right side and 2+ on the left side.  Psychiatric:         Attention and Perception: Attention and perception normal.         Mood and Affect: Mood and affect normal.         Speech: Speech normal.         Behavior: Behavior normal. Behavior is cooperative.         Thought Content: Thought content normal.         Cognition and Memory: Cognition and memory normal.         Judgment: Judgment normal.       Neurological Exam  Mental Status  Awake and alert. Memory is normal. Recent and remote memory are intact. Speech is normal. Language is fluent with no aphasia. Attention and concentration are normal. Fund of knowledge is appropriate for level of education.    Cranial Nerves  CN III, IV, VI: Extraocular movements intact bilaterally. Pupils equal round and reactive to light bilaterally.  CN V:  Right: Facial sensation is normal.  Left: Diminished sensation of the entire left side of the face. Left side of face.  CN VII: Full and symmetric facial movement.  CN VIII: Hearing is normal.  CN XI:  Right: Trapezius strength is normal.  Left: Trapezius strength is normal.  CN XII: Tongue midline without atrophy or fasciculations.    Motor  Normal muscle bulk throughout. Normal muscle tone. No pronator drift.                                             Right                     Left  Hip flexion                              5                          5  Plantarflexion                         5                          5  Dorsiflexion                            5                          5                                             Right                     Left   Biceps                                   5                          5   Triceps                                  5                          5   4/5 on the right    5/5 on the left.    Sensory  Diminished in left arm and leg.     Reflexes                                             Right                      Left  Brachioradialis                    2+                         2+  Biceps                                 2+                         2+  Patellar                                2+                         2+    Right pathological reflexes: Steffen's absent. Ankle clonus absent.  Left pathological reflexes: Steffen's absent. Ankle clonus absent.    Coordination  Right: Finger-to-nose normal.Left: Finger-to-nose normal.    Gait  Casual gait is normal including stance, stride, and arm swing.

## 2025-04-30 ENCOUNTER — OFFICE VISIT (OUTPATIENT)
Dept: GASTROENTEROLOGY | Facility: MEDICAL CENTER | Age: 60
End: 2025-04-30
Payer: COMMERCIAL

## 2025-04-30 ENCOUNTER — TELEPHONE (OUTPATIENT)
Dept: GASTROENTEROLOGY | Facility: MEDICAL CENTER | Age: 60
End: 2025-04-30

## 2025-04-30 VITALS
BODY MASS INDEX: 28.05 KG/M2 | DIASTOLIC BLOOD PRESSURE: 81 MMHG | TEMPERATURE: 96.9 F | SYSTOLIC BLOOD PRESSURE: 137 MMHG | HEART RATE: 74 BPM | WEIGHT: 163.4 LBS

## 2025-04-30 DIAGNOSIS — R14.0 BLOATING: ICD-10-CM

## 2025-04-30 DIAGNOSIS — K22.70 BARRETT'S ESOPHAGUS WITHOUT DYSPLASIA: ICD-10-CM

## 2025-04-30 DIAGNOSIS — R10.10 UPPER ABDOMINAL PAIN: ICD-10-CM

## 2025-04-30 DIAGNOSIS — R13.10 DYSPHAGIA, UNSPECIFIED TYPE: Primary | ICD-10-CM

## 2025-04-30 PROCEDURE — 99214 OFFICE O/P EST MOD 30 MIN: CPT

## 2025-04-30 RX ORDER — SODIUM CHLORIDE, SODIUM LACTATE, POTASSIUM CHLORIDE, CALCIUM CHLORIDE 600; 310; 30; 20 MG/100ML; MG/100ML; MG/100ML; MG/100ML
125 INJECTION, SOLUTION INTRAVENOUS CONTINUOUS
OUTPATIENT
Start: 2025-04-30

## 2025-04-30 NOTE — H&P (VIEW-ONLY)
Name: Lakeshia Christianson      : 1965      MRN: [id number]  Encounter Provider: Elodia Mendieta PA-C  Encounter Date: 2025   Encounter department: [facility]  :  Assessment & Plan  Dysphagia, unspecified type  Patient previously with significant reflux despite PPI, EGD with Lawson's esophagus without dysplasia.  She had 24-hour pH monitoring which did show significant reflux and underwent laparoscopic hiatal hernia repair with Nissen fundoplication 2023.  Since then patient with ongoing solid food dysphagia, bloating, gas, epigastric pain.  She had unremarkable abdominal ultrasound, CT 10/2024 with mild gastric wall thickening.  Will get barium swallow.  Will get EGD with biopsy to r/o esophagitis/gastritis/h pylori/PUD. Educated patient on GERD diet/lifestyle changes and provided handouts. Limit NSAIDs as able.   Orders:    EGD; Future    FL barium swallow; Future    Bloating  Patient previously negative for celiac disease.  Will get EGD with stomach biopsies to evaluate for H. pylori.       Upper abdominal pain  Plan as above.  Orders:    EGD; Future    Lawson's esophagus without dysplasia  Continue with current medication regimen.  Rest of plan as above.  Orders:    EGD; Future      Follow-up after procedure    History of Present Illness   Lakeshia Christianson is a 59 y.o. female with PMH of GERD, Lawson's esophagus without dysplasia.  Patient declined  for visit today.  Patient was previously seen for uncontrolled GERD and had 24-hour pH monitoring which showed significant reflux.  She then underwent laparoscopic hiatal hernia repair with Nissen fundoplication 2023.  Patient reports ongoing dysphagia with solid foods since this time.  She feels as though foods are getting stuck in chest area almost every day.  This is frequently happening with rice and drier foods.  No issues with liquids.  She also reports ongoing epigastric pain, bloating, gas.  Has  been taking omeprazole 40 mg twice daily and Carafate.  No NSAID use.  Denies unintentional weight loss, nausea, vomiting, odynophagia, change in bowel habits, melena, hematochezia.    Prior imaging/procedures:  CT C/A/P with IV contrast 10/2024: Post Nissen fundoplication with mild gastric wall thickening and mild surrounding fat stranding  Esophageal manometry/24-hour probe 5/2022: DeMeester score 14.5, study with significant reflux, symptom correlation was not significant for reported symptoms  Abdominal ultrasound 7/2023: Normal  EGD 4/2022: C0 M1 Lawson's esophagus, 3 cm hiatal hernia (GE junction biopsy with many goblet cell metaplasia, negative for dysplasia)  EGD 10/2020: Severe erythematous and scarred mucosa with erosion in the GE junction, normal stomach and duodenum    Review of Systems   Constitutional:  Negative for appetite change, chills and fever.   HENT:  Positive for trouble swallowing.    Respiratory:  Negative for shortness of breath.    Gastrointestinal:  Positive for abdominal pain. Negative for blood in stool, constipation, diarrhea, nausea and vomiting.     Medical History Reviewed by provider this encounter:     .  Current Outpatient Medications on File Prior to Visit   Medication Sig Dispense Refill    Diclofenac Sodium (VOLTAREN) 1 % Apply 2 g topically 4 (four) times a day 100 g 1    gabapentin (NEURONTIN) 100 mg capsule Take 1 capsule (100 mg total) by mouth 2 (two) times a day 60 capsule 1    levothyroxine 112 mcg tablet Take 1 tablet (112 mcg total) by mouth daily 30 tablet 3    lidocaine (Lidoderm) 5 % Apply 1 patch topically over 12 hours daily Remove & Discard patch within 12 hours or as directed by MD 30 patch 1    lisinopril (ZESTRIL) 40 mg tablet Take 1 tablet (40 mg total) by mouth daily 90 tablet 1    meclizine (ANTIVERT) 50 MG tablet Take 1 tablet (50 mg total) by mouth every 12 (twelve) hours as needed for dizziness 60 tablet 2    omeprazole (PriLOSEC) 40 MG capsule Take 1  capsule (40 mg total) by mouth 2 (two) times a day 60 capsule 5    sucralfate (CARAFATE) 1 g tablet Take 1 tablet (1 g total) by mouth 4 (four) times a day 120 tablet 5    SUMAtriptan (Imitrex) 25 mg tablet Take 1 tablet (25 mg total) by mouth once as needed for migraine 10 tablet 0    venlafaxine (EFFEXOR-XR) 150 mg 24 hr capsule Take 1 capsule (150 mg total) by mouth daily with breakfast 30 capsule 3     No current facility-administered medications on file prior to visit.      Social History     Tobacco Use    Smoking status: Former     Current packs/day: 0.00     Average packs/day: 1 pack/day for 5.0 years (5.0 ttl pk-yrs)     Types: Cigarettes     Start date:      Quit date:      Years since quittin.3     Passive exposure: Past    Smokeless tobacco: Never   Vaping Use    Vaping status: Never Used   Substance and Sexual Activity    Alcohol use: Not Currently     Comment: occasional     Drug use: Never    Sexual activity: Not on file        Objective   /81   Pulse 74   Temp (!) 96.9 °F (36.1 °C)   Wt 74.1 kg (163 lb 6.4 oz)   LMP 2025 (Approximate)   BMI 28.05 kg/m²      Physical Exam  Vitals reviewed.   Constitutional:       General: She is not in acute distress.     Appearance: She is not ill-appearing.   HENT:      Head: Normocephalic and atraumatic.   Cardiovascular:      Rate and Rhythm: Normal rate and regular rhythm.   Pulmonary:      Effort: Pulmonary effort is normal.      Breath sounds: Normal breath sounds.   Abdominal:      General: Abdomen is flat. Bowel sounds are normal. There is no distension.      Palpations: Abdomen is soft.      Tenderness: There is abdominal tenderness in the epigastric area. There is no guarding.   Skin:     General: Skin is warm and dry.   Neurological:      Mental Status: She is alert. Mental status is at baseline.

## 2025-04-30 NOTE — PROGRESS NOTES
Name: Lakeshia Christianson      : 1965      MRN: [id number]  Encounter Provider: Elodia Mendieta PA-C  Encounter Date: 2025   Encounter department: [facility]  :  Assessment & Plan  Dysphagia, unspecified type  Patient previously with significant reflux despite PPI, EGD with Lawson's esophagus without dysplasia.  She had 24-hour pH monitoring which did show significant reflux and underwent laparoscopic hiatal hernia repair with Nissen fundoplication 2023.  Since then patient with ongoing solid food dysphagia, bloating, gas, epigastric pain.  She had unremarkable abdominal ultrasound, CT 10/2024 with mild gastric wall thickening.  Will get barium swallow.  Will get EGD with biopsy to r/o esophagitis/gastritis/h pylori/PUD. Educated patient on GERD diet/lifestyle changes and provided handouts. Limit NSAIDs as able.   Orders:    EGD; Future    FL barium swallow; Future    Bloating  Patient previously negative for celiac disease.  Will get EGD with stomach biopsies to evaluate for H. pylori.       Upper abdominal pain  Plan as above.  Orders:    EGD; Future    Lawson's esophagus without dysplasia  Continue with current medication regimen.  Rest of plan as above.  Orders:    EGD; Future      Follow-up after procedure    History of Present Illness   Lakeshia Christianson is a 59 y.o. female with PMH of GERD, Lawson's esophagus without dysplasia.  Patient declined  for visit today.  Patient was previously seen for uncontrolled GERD and had 24-hour pH monitoring which showed significant reflux.  She then underwent laparoscopic hiatal hernia repair with Nissen fundoplication 2023.  Patient reports ongoing dysphagia with solid foods since this time.  She feels as though foods are getting stuck in chest area almost every day.  This is frequently happening with rice and drier foods.  No issues with liquids.  She also reports ongoing epigastric pain, bloating, gas.  Has  been taking omeprazole 40 mg twice daily and Carafate.  No NSAID use.  Denies unintentional weight loss, nausea, vomiting, odynophagia, change in bowel habits, melena, hematochezia.    Prior imaging/procedures:  CT C/A/P with IV contrast 10/2024: Post Nissen fundoplication with mild gastric wall thickening and mild surrounding fat stranding  Esophageal manometry/24-hour probe 5/2022: DeMeester score 14.5, study with significant reflux, symptom correlation was not significant for reported symptoms  Abdominal ultrasound 7/2023: Normal  EGD 4/2022: C0 M1 Lawson's esophagus, 3 cm hiatal hernia (GE junction biopsy with many goblet cell metaplasia, negative for dysplasia)  EGD 10/2020: Severe erythematous and scarred mucosa with erosion in the GE junction, normal stomach and duodenum    Review of Systems   Constitutional:  Negative for appetite change, chills and fever.   HENT:  Positive for trouble swallowing.    Respiratory:  Negative for shortness of breath.    Gastrointestinal:  Positive for abdominal pain. Negative for blood in stool, constipation, diarrhea, nausea and vomiting.     Medical History Reviewed by provider this encounter:     .  Current Outpatient Medications on File Prior to Visit   Medication Sig Dispense Refill    Diclofenac Sodium (VOLTAREN) 1 % Apply 2 g topically 4 (four) times a day 100 g 1    gabapentin (NEURONTIN) 100 mg capsule Take 1 capsule (100 mg total) by mouth 2 (two) times a day 60 capsule 1    levothyroxine 112 mcg tablet Take 1 tablet (112 mcg total) by mouth daily 30 tablet 3    lidocaine (Lidoderm) 5 % Apply 1 patch topically over 12 hours daily Remove & Discard patch within 12 hours or as directed by MD 30 patch 1    lisinopril (ZESTRIL) 40 mg tablet Take 1 tablet (40 mg total) by mouth daily 90 tablet 1    meclizine (ANTIVERT) 50 MG tablet Take 1 tablet (50 mg total) by mouth every 12 (twelve) hours as needed for dizziness 60 tablet 2    omeprazole (PriLOSEC) 40 MG capsule Take 1  capsule (40 mg total) by mouth 2 (two) times a day 60 capsule 5    sucralfate (CARAFATE) 1 g tablet Take 1 tablet (1 g total) by mouth 4 (four) times a day 120 tablet 5    SUMAtriptan (Imitrex) 25 mg tablet Take 1 tablet (25 mg total) by mouth once as needed for migraine 10 tablet 0    venlafaxine (EFFEXOR-XR) 150 mg 24 hr capsule Take 1 capsule (150 mg total) by mouth daily with breakfast 30 capsule 3     No current facility-administered medications on file prior to visit.      Social History     Tobacco Use    Smoking status: Former     Current packs/day: 0.00     Average packs/day: 1 pack/day for 5.0 years (5.0 ttl pk-yrs)     Types: Cigarettes     Start date:      Quit date:      Years since quittin.3     Passive exposure: Past    Smokeless tobacco: Never   Vaping Use    Vaping status: Never Used   Substance and Sexual Activity    Alcohol use: Not Currently     Comment: occasional     Drug use: Never    Sexual activity: Not on file        Objective   /81   Pulse 74   Temp (!) 96.9 °F (36.1 °C)   Wt 74.1 kg (163 lb 6.4 oz)   LMP 2025 (Approximate)   BMI 28.05 kg/m²      Physical Exam  Vitals reviewed.   Constitutional:       General: She is not in acute distress.     Appearance: She is not ill-appearing.   HENT:      Head: Normocephalic and atraumatic.   Cardiovascular:      Rate and Rhythm: Normal rate and regular rhythm.   Pulmonary:      Effort: Pulmonary effort is normal.      Breath sounds: Normal breath sounds.   Abdominal:      General: Abdomen is flat. Bowel sounds are normal. There is no distension.      Palpations: Abdomen is soft.      Tenderness: There is abdominal tenderness in the epigastric area. There is no guarding.   Skin:     General: Skin is warm and dry.   Neurological:      Mental Status: She is alert. Mental status is at baseline.

## 2025-04-30 NOTE — TELEPHONE ENCOUNTER
Procedure: EGD  Date: 05/05/2025  Physician performing: Dr. Hernandez  Location of procedure:  [facility]  Instructions given to patient: EGD  Diabetic: N/A  Clearances: N/A    Patient has scheduled follow up

## 2025-05-01 ENCOUNTER — ANESTHESIA EVENT (OUTPATIENT)
Dept: ANESTHESIOLOGY | Facility: HOSPITAL | Age: 60
End: 2025-05-01

## 2025-05-01 ENCOUNTER — ANESTHESIA (OUTPATIENT)
Dept: ANESTHESIOLOGY | Facility: HOSPITAL | Age: 60
End: 2025-05-01

## 2025-05-01 ENCOUNTER — TELEPHONE (OUTPATIENT)
Dept: GASTROENTEROLOGY | Facility: MEDICAL CENTER | Age: 60
End: 2025-05-01

## 2025-05-01 NOTE — TELEPHONE ENCOUNTER
Scheduled date of EGD(as of today):5/15/25  Physician performing EGD:Dr Stubbs  Location of EGD:[facility]  Instructions reviewed with patient by:already reviewed  Clearances: na    Pt calling to r/s EGD

## 2025-05-03 ENCOUNTER — APPOINTMENT (OUTPATIENT)
Dept: LAB | Facility: HOSPITAL | Age: 60
End: 2025-05-03
Payer: COMMERCIAL

## 2025-05-03 ENCOUNTER — OFFICE VISIT (OUTPATIENT)
Dept: LAB | Facility: HOSPITAL | Age: 60
End: 2025-05-03
Payer: COMMERCIAL

## 2025-05-03 DIAGNOSIS — Z01.818 PRE-OP TESTING: ICD-10-CM

## 2025-05-03 DIAGNOSIS — R41.3 MEMORY LOSS: Primary | ICD-10-CM

## 2025-05-03 DIAGNOSIS — D75.89 MACROCYTOSIS: ICD-10-CM

## 2025-05-03 DIAGNOSIS — E03.9 HYPOTHYROIDISM, UNSPECIFIED TYPE: ICD-10-CM

## 2025-05-03 DIAGNOSIS — D32.9 MENINGIOMA (HCC): ICD-10-CM

## 2025-05-03 LAB
ALBUMIN SERPL BCG-MCNC: 3.9 G/DL (ref 3.5–5)
ALP SERPL-CCNC: 89 U/L (ref 34–104)
ALT SERPL W P-5'-P-CCNC: 43 U/L (ref 7–52)
ANION GAP SERPL CALCULATED.3IONS-SCNC: 6 MMOL/L (ref 4–13)
AST SERPL W P-5'-P-CCNC: 19 U/L (ref 13–39)
BASOPHILS # BLD AUTO: 0.04 THOUSANDS/ÂΜL (ref 0–0.1)
BASOPHILS NFR BLD AUTO: 1 % (ref 0–1)
BILIRUB SERPL-MCNC: 0.28 MG/DL (ref 0.2–1)
BUN SERPL-MCNC: 7 MG/DL (ref 5–25)
CALCIUM SERPL-MCNC: 8.8 MG/DL (ref 8.4–10.2)
CHLORIDE SERPL-SCNC: 105 MMOL/L (ref 96–108)
CO2 SERPL-SCNC: 27 MMOL/L (ref 21–32)
CREAT SERPL-MCNC: 0.62 MG/DL (ref 0.6–1.3)
EOSINOPHIL # BLD AUTO: 0.21 THOUSAND/ÂΜL (ref 0–0.61)
EOSINOPHIL NFR BLD AUTO: 5 % (ref 0–6)
ERYTHROCYTE [DISTWIDTH] IN BLOOD BY AUTOMATED COUNT: 13.5 % (ref 11.6–15.1)
FOLATE SERPL-MCNC: 14.7 NG/ML
GFR SERPL CREATININE-BSD FRML MDRD: 100 ML/MIN/1.73SQ M
GLUCOSE P FAST SERPL-MCNC: 88 MG/DL (ref 65–99)
HCT VFR BLD AUTO: 38 % (ref 34.8–46.1)
HGB BLD-MCNC: 12.6 G/DL (ref 11.5–15.4)
IMM GRANULOCYTES # BLD AUTO: 0.01 THOUSAND/UL (ref 0–0.2)
IMM GRANULOCYTES NFR BLD AUTO: 0 % (ref 0–2)
LYMPHOCYTES # BLD AUTO: 1.54 THOUSANDS/ÂΜL (ref 0.6–4.47)
LYMPHOCYTES NFR BLD AUTO: 38 % (ref 14–44)
MCH RBC QN AUTO: 32.6 PG (ref 26.8–34.3)
MCHC RBC AUTO-ENTMCNC: 33.2 G/DL (ref 31.4–37.4)
MCV RBC AUTO: 98 FL (ref 82–98)
MONOCYTES # BLD AUTO: 0.39 THOUSAND/ÂΜL (ref 0.17–1.22)
MONOCYTES NFR BLD AUTO: 10 % (ref 4–12)
NEUTROPHILS # BLD AUTO: 1.91 THOUSANDS/ÂΜL (ref 1.85–7.62)
NEUTS SEG NFR BLD AUTO: 46 % (ref 43–75)
NRBC BLD AUTO-RTO: 0 /100 WBCS
PLATELET # BLD AUTO: 372 THOUSANDS/UL (ref 149–390)
PMV BLD AUTO: 8.7 FL (ref 8.9–12.7)
POTASSIUM SERPL-SCNC: 3.9 MMOL/L (ref 3.5–5.3)
PROT SERPL-MCNC: 7 G/DL (ref 6.4–8.4)
RBC # BLD AUTO: 3.86 MILLION/UL (ref 3.81–5.12)
SODIUM SERPL-SCNC: 138 MMOL/L (ref 135–147)
TSH SERPL DL<=0.05 MIU/L-ACNC: 1.65 UIU/ML (ref 0.45–4.5)
VIT B12 SERPL-MCNC: 892 PG/ML (ref 180–914)
WBC # BLD AUTO: 4.1 THOUSAND/UL (ref 4.31–10.16)

## 2025-05-03 PROCEDURE — 83655 ASSAY OF LEAD: CPT

## 2025-05-03 PROCEDURE — 83825 ASSAY OF MERCURY: CPT

## 2025-05-03 PROCEDURE — 82746 ASSAY OF FOLIC ACID SERUM: CPT

## 2025-05-03 PROCEDURE — 85025 COMPLETE CBC W/AUTO DIFF WBC: CPT

## 2025-05-03 PROCEDURE — 82607 VITAMIN B-12: CPT

## 2025-05-03 PROCEDURE — 82175 ASSAY OF ARSENIC: CPT

## 2025-05-03 PROCEDURE — 93005 ELECTROCARDIOGRAM TRACING: CPT

## 2025-05-03 PROCEDURE — 84443 ASSAY THYROID STIM HORMONE: CPT

## 2025-05-03 PROCEDURE — 36415 COLL VENOUS BLD VENIPUNCTURE: CPT

## 2025-05-03 PROCEDURE — 80053 COMPREHEN METABOLIC PANEL: CPT

## 2025-05-06 LAB
ATRIAL RATE: 61 BPM
P AXIS: 62 DEGREES
PR INTERVAL: 154 MS
QRS AXIS: 3 DEGREES
QRSD INTERVAL: 88 MS
QT INTERVAL: 434 MS
QTC INTERVAL: 438 MS
T WAVE AXIS: 57 DEGREES
VENTRICULAR RATE: 61 BPM

## 2025-05-06 PROCEDURE — 93010 ELECTROCARDIOGRAM REPORT: CPT | Performed by: INTERNAL MEDICINE

## 2025-05-07 ENCOUNTER — TELEPHONE (OUTPATIENT)
Dept: GASTROENTEROLOGY | Facility: MEDICAL CENTER | Age: 60
End: 2025-05-07

## 2025-05-07 NOTE — TELEPHONE ENCOUNTER
Confirming Upcoming Procedure  Procedure: EGD   Date: May 15  Physician performing: Dr. Stubbs  Location of procedure:  [facility]  Prep: EGD  Diabetic: No    Clearances and status: N/A

## 2025-05-07 NOTE — TELEPHONE ENCOUNTER
Via # [id number]    Spoke with patient; reviewed instructions--patient knows to call with any questions.        Patient again requested a morning appt due to caring for her mother-I let her know it was noted in her chart.

## 2025-05-09 ENCOUNTER — RESULTS FOLLOW-UP (OUTPATIENT)
Dept: FAMILY MEDICINE CLINIC | Facility: CLINIC | Age: 60
End: 2025-05-09

## 2025-05-09 PROCEDURE — NC001 PR NO CHARGE: Performed by: OBSTETRICS & GYNECOLOGY

## 2025-05-09 RX ORDER — IBUPROFEN 200 MG
400 TABLET ORAL EVERY 6 HOURS PRN
COMMUNITY

## 2025-05-09 NOTE — PRE-PROCEDURE INSTRUCTIONS
Pre-Surgery Instructions:   Medication Instructions    ibuprofen (MOTRIN) 200 mg tablet Stop taking 7 days prior to surgery.    levothyroxine 112 mcg tablet Take day of surgery.    lidocaine (Lidoderm) 5 % Uses PRN- DO NOT take day of surgery    lisinopril (ZESTRIL) 40 mg tablet Hold day of surgery.    meclizine (ANTIVERT) 50 MG tablet Uses PRN- OK to take day of surgery    omeprazole (PriLOSEC) 40 MG capsule Take day of surgery.    sucralfate (CARAFATE) 1 g tablet Take day of surgery.    SUMAtriptan (Imitrex) 25 mg tablet Uses PRN- OK to take day of surgery    venlafaxine (EFFEXOR-XR) 150 mg 24 hr capsule Take day of surgery.   Medication instructions for day of surgery reviewed. Please take all instructed medications with only a sip of water.       You will receive a call one business day prior to surgery with an arrival time and hospital directions. If your surgery is scheduled on a Monday, the hospital will be calling you on the Friday prior to your surgery. If you have not heard from anyone by 8pm, please call the hospital supervisor through the hospital  at [phone]. ([place] [phone] or [place] [phone]).    Do not eat or drink anything after midnight the night before your surgery, including candy, mints, lifesavers, or chewing gum. Do not drink alcohol 24hrs before your surgery. Try not to smoke at least 24hrs before your surgery.       Follow the pre surgery showering instructions as listed in the “My Surgical Experience Booklet” or otherwise provided by your surgeon's office. Do not use a blade to shave the surgical area 1 week before surgery. It is okay to use a clean electric clippers up to 24 hours before surgery. Do not apply any lotions, creams, including makeup, cologne, deodorant, or perfumes after showering on the day of your surgery. Do not use dry shampoo, hair spray, hair gel, or any type of hair products.     No contact lenses, eye make-up, or artificial eyelashes. Remove  nail polish, including gel polish, and any artificial, gel, or acrylic nails if possible. Remove all jewelry including rings and body piercing jewelry.     Wear causal clothing that is easy to take on and off. Consider your type of surgery.    Keep any valuables, jewelry, piercings at home. Please bring any specially ordered equipment (sling, braces) if indicated.    Arrange for a responsible person to drive you to and from the hospital on the day of your surgery. Please confirm the visitor policy for the day of your procedure when you receive your phone call with an arrival time.     Call the surgeon's office with any new illnesses, exposures, or additional questions prior to surgery.    Please reference your “My Surgical Experience Booklet” for additional information to prepare for your upcoming surgery.

## 2025-05-09 NOTE — H&P
PMB (postmenopausal bleeding)     Plan is to proceed to hysterectomy D&C polypectomy.  Risks of the procedure were discussed including, but not limited to infection, uterine perforation, possible need for further surgery.     Pre-op testing     Orders:    ECG 12 lead; Future    CBC and differential; Future           History of Present Illness     HPI  Lakeshia Christianson is a 59 y.o. female G5 P 3023, C section x 3, new patient, referred by Dr Ojeda secondary to postmenopausal bleeding.  Patient states that prior to December her menses were regular.  Since December she has had nearly daily episodes of light vaginal bleeding.     Ultrasound done in his office     Date/Time: 2/18/2025 8:00 AM     Performed by: Renee Garrett  Authorized by: Simon Ojeda MD  Universal Protocol:  Consent: Verbal consent obtained.  Consent given by: patient  Timeout called at: 2/18/2025 7:58 AM.  Patient understanding: patient states understanding of the procedure being performed  Patient identity confirmed: verbally with patient     Procedure details:     SIS Procedure: No    Indications: non-obstetric vaginal bleeding      Technique:  Transvaginal US, Non-OB    Position: lithotomy exam    Uterine findings:     Length (cm): 9.59    Height (cm):  5.08    Width (cm):  5.97    Endometrial stripe: identified      Endometrium thickness (mm):  10.62  Left ovary findings:     Left ovary:  Visualized    Length (cm): 3.92    Height (cm): 2.35    Width (cm): 2.57  Right ovary findings:     Right ovary:  Visualized    Length (cm): 2.73    Height (cm): 1.67    Width (cm): 1.87  Other findings:     Free pelvic fluid: not identified      Free peritoneal fluid: not identified    Post-Procedure Details:     Impression:  Anteverted uterus demonstrates a thickened endometrium which appears to contain a polyp. There appears to be a feeder vessel extending into the endometrium. The right ovary appears within normal limits. The left ovary  demonstrates a 1.9cm simple cyst. No free fluid.     Tolerance:  Tolerated well, no immediate complications    Complications: no complications        Review of Systems  Past Medical History  Medical History        Past Medical History:   Diagnosis Date    Chronic pain      Disease of thyroid gland       hypothyroid    Diverticulitis 2021    GERD (gastroesophageal reflux disease)       NISSEN   today 2023    Heel pain, chronic, right 2021    Hiatal hernia      Hypertension      Hyperthyroiditis       HYPERTHYROID; POST IODINE THERAPY    Intractable vomiting 2021    Meningioma (HCC)      Migraine without aura and without status migrainosus, not intractable 2021    Pain of upper abdomen 2020    Sciatica of right side 2020    Weakness of right upper extremity 2022         Surgical History         Past Surgical History:   Procedure Laterality Date    APPENDECTOMY         SECTION        EGD        HERNIA REPAIR        DC LAPS RPR PARAESPHGL HRNA INCL FUNDPLSTY W/MESH N/A 2023     Procedure: REPAIR HERNIA HIATAL HERNIA REPAIR LAPAROSCOPIC  w/ NISSEN FUNDOPLICATION;  Surgeon: Vladimir Min MD;  Location: [facility] OR;  Service: General         Family History         Family History   Problem Relation Age of Onset    Endometrial cancer Family      Ovarian cancer Mother 67    Heart defect Mother      Heart attack Father      Stroke Brother      Ovarian cancer Sister 47    No Known Problems Maternal Grandmother      Breast cancer Paternal Grandmother 63    No Known Problems Sister      No Known Problems Sister      No Known Problems Sister      No Known Problems Sister      No Known Problems Daughter      No Known Problems Maternal Grandfather      No Known Problems Paternal Grandfather      No Known Problems Daughter      No Known Problems Son            reports that she quit smoking about 25 years ago. Her smoking use included cigarettes. She started smoking about 30  years ago. She has a 5 pack-year smoking history. She has been exposed to tobacco smoke. She has never used smokeless tobacco. She reports that she does not currently use alcohol. She reports that she does not use drugs.       Current Outpatient Medications   Medication Instructions    acetaminophen (TYLENOL) 975 mg, Oral, Every 8 hours PRN    baclofen 10 mg, Oral, 2 times daily PRN    Diclofenac Sodium (VOLTAREN) 2 g, Topical, 4 times daily    gabapentin (NEURONTIN) 100 mg, Oral, 2 times daily    levothyroxine 112 mcg, Oral, Daily    lidocaine (Lidoderm) 5 % 1 patch, Topical, Daily, Remove & Discard patch within 12 hours or as directed by MD    lisinopril (ZESTRIL) 40 mg, Oral, Daily    meclizine (ANTIVERT) 50 mg, Oral, Every 12 hours PRN    omeprazole (PRILOSEC) 40 mg, Oral, 2 times daily    sucralfate (CARAFATE) 1 g, Oral, 4 times daily    topiramate (TOPAMAX) 25 mg, Oral, 2 times daily     Allergies   No Known Allergies     Medications Ordered Prior to Encounter          Current Outpatient Medications on File Prior to Visit   Medication Sig Dispense Refill    acetaminophen (TYLENOL) 325 mg tablet Take 3 tablets (975 mg total) by mouth every 8 (eight) hours as needed for mild pain   0    baclofen 10 mg tablet Take 1 tablet (10 mg total) by mouth 2 (two) times a day as needed for muscle spasms 60 tablet 1    Diclofenac Sodium (VOLTAREN) 1 % Apply 2 g topically 4 (four) times a day 100 g 1    gabapentin (NEURONTIN) 100 mg capsule Take 1 capsule (100 mg total) by mouth 2 (two) times a day 60 capsule 1    levothyroxine 112 mcg tablet Take 1 tablet (112 mcg total) by mouth daily 30 tablet 0    lidocaine (Lidoderm) 5 % Apply 1 patch topically over 12 hours daily Remove & Discard patch within 12 hours or as directed by MD 30 patch 1    lisinopril (ZESTRIL) 40 mg tablet Take 1 tablet (40 mg total) by mouth daily 90 tablet 1    meclizine (ANTIVERT) 50 MG tablet Take 1 tablet (50 mg total) by mouth every 12 (twelve) hours  "as needed for dizziness 60 tablet 2    omeprazole (PriLOSEC) 40 MG capsule Take 1 capsule (40 mg total) by mouth 2 (two) times a day 60 capsule 5    sucralfate (CARAFATE) 1 g tablet Take 1 tablet (1 g total) by mouth 4 (four) times a day 120 tablet 5    topiramate (TOPAMAX) 25 mg tablet Take 1 tablet (25 mg total) by mouth 2 (two) times a day 60 tablet 2      No current facility-administered medications on file prior to visit.         Social History               Tobacco Use    Smoking status: Former       Current packs/day: 0.00       Average packs/day: 1 pack/day for 5.0 years (5.0 ttl pk-yrs)       Types: Cigarettes       Start date:        Quit date:        Years since quittin.2       Passive exposure: Past    Smokeless tobacco: Never   Vaping Use    Vaping status: Never Used   Substance and Sexual Activity    Alcohol use: Not Currently       Comment: occasional     Drug use: Never    Sexual activity: Not on file            Objective     /70   Pulse 70   Ht 5' 4\" (1.626 m)   Wt 71.7 kg (158 lb)   LMP 2025 (Exact Date)   BMI 27.12 kg/m²      Physical Exam  Vitals reviewed.   Constitutional:       Appearance: Normal appearance. She is normal weight.   Cardiovascular:      Rate and Rhythm: Normal rate and regular rhythm.      Pulses: Normal pulses.   Pulmonary:      Effort: Pulmonary effort is normal.      Breath sounds: Normal breath sounds.   Abdominal:      General: There is no distension.      Palpations: Abdomen is soft. There is no mass.      Tenderness: There is no abdominal tenderness. There is no guarding or rebound.      Hernia: No hernia is present. There is no hernia in the left inguinal area or right inguinal area.   Genitourinary:     General: Normal vulva.      Labia:         Right: No rash, tenderness or lesion.         Left: No rash, tenderness or lesion.       Vagina: Normal.      Cervix: Normal.      Uterus: Normal.       Adnexa:         Right: No mass, tenderness or " fullness.          Left: No mass, tenderness or fullness.     Musculoskeletal:      Cervical back: Normal range of motion and neck supple. No tenderness.   Lymphadenopathy:      Cervical: No cervical adenopathy.      Lower Body: No right inguinal adenopathy. No left inguinal adenopathy.   Neurological:      Mental Status: She is alert.

## 2025-05-10 LAB
ARSENIC BLD-MCNC: 3 UG/L (ref 0–9)
LEAD BLD-MCNC: 1.4 UG/DL (ref 0–3.4)
MERCURY BLD-MCNC: 1.5 UG/L (ref 0–14.9)

## 2025-05-15 ENCOUNTER — HOSPITAL ENCOUNTER (OUTPATIENT)
Dept: GASTROENTEROLOGY | Facility: MEDICAL CENTER | Age: 60
Setting detail: OUTPATIENT SURGERY
End: 2025-05-15
Payer: COMMERCIAL

## 2025-05-15 ENCOUNTER — ANESTHESIA EVENT (OUTPATIENT)
Dept: GASTROENTEROLOGY | Facility: MEDICAL CENTER | Age: 60
End: 2025-05-15
Payer: COMMERCIAL

## 2025-05-15 ENCOUNTER — ANESTHESIA (OUTPATIENT)
Dept: GASTROENTEROLOGY | Facility: MEDICAL CENTER | Age: 60
End: 2025-05-15
Payer: COMMERCIAL

## 2025-05-15 VITALS
OXYGEN SATURATION: 100 % | WEIGHT: 163 LBS | TEMPERATURE: 98 F | BODY MASS INDEX: 25.58 KG/M2 | DIASTOLIC BLOOD PRESSURE: 68 MMHG | HEART RATE: 58 BPM | HEIGHT: 67 IN | RESPIRATION RATE: 15 BRPM | SYSTOLIC BLOOD PRESSURE: 119 MMHG

## 2025-05-15 DIAGNOSIS — K22.70 BARRETT'S ESOPHAGUS WITHOUT DYSPLASIA: ICD-10-CM

## 2025-05-15 DIAGNOSIS — R10.10 UPPER ABDOMINAL PAIN: ICD-10-CM

## 2025-05-15 DIAGNOSIS — R13.10 DYSPHAGIA, UNSPECIFIED TYPE: ICD-10-CM

## 2025-05-15 RX ORDER — LIDOCAINE HYDROCHLORIDE 20 MG/ML
INJECTION, SOLUTION EPIDURAL; INFILTRATION; INTRACAUDAL; PERINEURAL AS NEEDED
Status: DISCONTINUED | OUTPATIENT
Start: 2025-05-15 | End: 2025-05-15

## 2025-05-15 RX ORDER — ONDANSETRON 2 MG/ML
4 INJECTION INTRAMUSCULAR; INTRAVENOUS ONCE AS NEEDED
Status: DISCONTINUED | OUTPATIENT
Start: 2025-05-15 | End: 2025-05-19 | Stop reason: HOSPADM

## 2025-05-15 RX ORDER — SODIUM CHLORIDE, SODIUM LACTATE, POTASSIUM CHLORIDE, CALCIUM CHLORIDE 600; 310; 30; 20 MG/100ML; MG/100ML; MG/100ML; MG/100ML
125 INJECTION, SOLUTION INTRAVENOUS CONTINUOUS
Status: DISCONTINUED | OUTPATIENT
Start: 2025-05-15 | End: 2025-05-19 | Stop reason: HOSPADM

## 2025-05-15 RX ORDER — PROPOFOL 10 MG/ML
INJECTION, EMULSION INTRAVENOUS AS NEEDED
Status: DISCONTINUED | OUTPATIENT
Start: 2025-05-15 | End: 2025-05-15

## 2025-05-15 RX ADMIN — SODIUM CHLORIDE, SODIUM LACTATE, POTASSIUM CHLORIDE, AND CALCIUM CHLORIDE 125 ML/HR: .6; .31; .03; .02 INJECTION, SOLUTION INTRAVENOUS at 07:25

## 2025-05-15 RX ADMIN — PROPOFOL 150 MG: 10 INJECTION, EMULSION INTRAVENOUS at 08:09

## 2025-05-15 RX ADMIN — LIDOCAINE HYDROCHLORIDE 100 MG: 20 INJECTION, SOLUTION EPIDURAL; INFILTRATION; INTRACAUDAL at 08:09

## 2025-05-15 NOTE — ANESTHESIA POSTPROCEDURE EVALUATION
Post-Op Assessment Note    CV Status:  Stable    Pain management: satisfactory to patient       Mental Status:  Alert and awake   Hydration Status:  Stable   PONV Controlled:  None   Airway Patency:  Patent     Post Op Vitals Reviewed: Yes    No anethesia notable event occurred.    Staff: CRNA           Last Filed PACU Vitals:  Vitals Value Taken Time   Temp     Pulse 61    BP 99/62    Resp 14    SpO2 99

## 2025-05-15 NOTE — INTERVAL H&P NOTE
H&P reviewed. After examining the patient I find no changes in the patients condition since the H&P had been written.    Vitals:    05/15/25 0718   BP: 122/77   Pulse: 60   Resp: 18   Temp: (!) 97.1 °F (36.2 °C)   SpO2: 99%

## 2025-05-15 NOTE — ANESTHESIA PREPROCEDURE EVALUATION
Procedure:  EGD    Relevant Problems   CARDIO   (+) Essential hypertension   (+) Hemorrhoids   (+) Migraine without aura and without status migrainosus, not intractable      ENDO   (+) Hypothyroidism      GI/HEPATIC   (+) Gastroesophageal reflux disease without esophagitis   (+) Hiatal hernia      MUSCULOSKELETAL   (+) Chronic midline low back pain with bilateral sciatica      NEURO/PSYCH   (+) Chronic midline low back pain with bilateral sciatica   (+) Major depressive disorder   (+) Migraine without aura and without status migrainosus, not intractable        Physical Exam    Airway     Mallampati score: I  TM Distance: >3 FB  Neck ROM: full  Mouth opening: >= 4 cm      Cardiovascular  Rhythm: regular, Rate: normalCardiovascular exam normal    Dental        Pulmonary  Pulmonary exam normal     Neurological  - normal exam    Other Findings  post-pubertal.      Anesthesia Plan  ASA Score- 2     Anesthesia Type- MAC with ASA Monitors.         Additional Monitors:     Airway Plan:            Plan Factors-Exercise tolerance (METS): >4 METS.    Chart reviewed.   Existing labs reviewed. Patient summary reviewed.    Patient is not a current smoker.              Induction-     Postoperative Plan- .   Monitoring Plan - Monitoring plan - standard ASA monitoring          Informed Consent- Anesthetic plan and risks discussed with patient.  I personally reviewed this patient with the CRNA. Discussed and agreed on the Anesthesia Plan with the CRNA..      NPO Status:  Vitals Value Taken Time   Date of last liquid 05/14/25 05/15/25 07:05   Time of last liquid 2330 05/15/25 07:05   Date of last solid 05/14/25 05/15/25 07:05   Time of last solid 2130 05/15/25 07:05

## 2025-05-19 ENCOUNTER — ANESTHESIA EVENT (OUTPATIENT)
Dept: PERIOP | Facility: HOSPITAL | Age: 60
End: 2025-05-19
Payer: COMMERCIAL

## 2025-05-19 PROBLEM — N95.0 POSTMENOPAUSAL BLEEDING: Status: ACTIVE | Noted: 2025-05-19

## 2025-05-20 ENCOUNTER — ANESTHESIA (OUTPATIENT)
Dept: PERIOP | Facility: HOSPITAL | Age: 60
End: 2025-05-20
Payer: COMMERCIAL

## 2025-05-20 ENCOUNTER — HOSPITAL ENCOUNTER (OUTPATIENT)
Facility: HOSPITAL | Age: 60
Setting detail: OUTPATIENT SURGERY
Discharge: HOME | End: 2025-05-20
Attending: OBSTETRICS & GYNECOLOGY | Admitting: OBSTETRICS & GYNECOLOGY
Payer: COMMERCIAL

## 2025-05-20 ENCOUNTER — TELEPHONE (OUTPATIENT)
Dept: GASTROENTEROLOGY | Facility: CLINIC | Age: 60
End: 2025-05-20

## 2025-05-20 VITALS
RESPIRATION RATE: 16 BRPM | BODY MASS INDEX: 25.43 KG/M2 | SYSTOLIC BLOOD PRESSURE: 117 MMHG | HEART RATE: 62 BPM | HEIGHT: 67 IN | WEIGHT: 162.04 LBS | OXYGEN SATURATION: 95 % | TEMPERATURE: 97.1 F | DIASTOLIC BLOOD PRESSURE: 68 MMHG

## 2025-05-20 DIAGNOSIS — R10.10 UPPER ABDOMINAL PAIN: Primary | ICD-10-CM

## 2025-05-20 DIAGNOSIS — N95.0 PMB (POSTMENOPAUSAL BLEEDING): ICD-10-CM

## 2025-05-20 DIAGNOSIS — R14.0 BLOATING: ICD-10-CM

## 2025-05-20 DIAGNOSIS — R68.81 EARLY SATIETY: ICD-10-CM

## 2025-05-20 DIAGNOSIS — N84.0 ENDOMETRIAL POLYP: ICD-10-CM

## 2025-05-20 PROBLEM — Z98.890 STATUS POST HYSTEROSCOPY: Status: ACTIVE | Noted: 2025-05-20

## 2025-05-20 LAB
EXT PREGNANCY TEST URINE: NEGATIVE
EXT. CONTROL: NORMAL

## 2025-05-20 PROCEDURE — 81025 URINE PREGNANCY TEST: CPT | Performed by: ANESTHESIOLOGY

## 2025-05-20 PROCEDURE — 88305 TISSUE EXAM BY PATHOLOGIST: CPT | Performed by: PATHOLOGY

## 2025-05-20 RX ORDER — PHENYLEPHRINE HCL IN 0.9% NACL 1 MG/10 ML
SYRINGE (ML) INTRAVENOUS AS NEEDED
Status: DISCONTINUED | OUTPATIENT
Start: 2025-05-20 | End: 2025-05-20

## 2025-05-20 RX ORDER — MIDAZOLAM HYDROCHLORIDE 2 MG/2ML
INJECTION, SOLUTION INTRAMUSCULAR; INTRAVENOUS AS NEEDED
Status: DISCONTINUED | OUTPATIENT
Start: 2025-05-20 | End: 2025-05-20

## 2025-05-20 RX ORDER — LIDOCAINE HYDROCHLORIDE 10 MG/ML
0.5 INJECTION, SOLUTION EPIDURAL; INFILTRATION; INTRACAUDAL; PERINEURAL ONCE AS NEEDED
Status: DISCONTINUED | OUTPATIENT
Start: 2025-05-20 | End: 2025-05-20 | Stop reason: HOSPADM

## 2025-05-20 RX ORDER — ACETAMINOPHEN 325 MG/1
975 TABLET ORAL EVERY 6 HOURS PRN
Status: CANCELLED | OUTPATIENT
Start: 2025-05-20

## 2025-05-20 RX ORDER — SUCCINYLCHOLINE/SOD CL,ISO/PF 100 MG/5ML
SYRINGE (ML) INTRAVENOUS AS NEEDED
Status: DISCONTINUED | OUTPATIENT
Start: 2025-05-20 | End: 2025-05-20

## 2025-05-20 RX ORDER — HYDROMORPHONE HCL/PF 1 MG/ML
0.5 SYRINGE (ML) INJECTION
Status: DISCONTINUED | OUTPATIENT
Start: 2025-05-20 | End: 2025-05-20 | Stop reason: HOSPADM

## 2025-05-20 RX ORDER — METOCLOPRAMIDE HYDROCHLORIDE 5 MG/ML
INJECTION INTRAMUSCULAR; INTRAVENOUS AS NEEDED
Status: DISCONTINUED | OUTPATIENT
Start: 2025-05-20 | End: 2025-05-20

## 2025-05-20 RX ORDER — PROMETHAZINE HYDROCHLORIDE 25 MG/ML
6.25 INJECTION, SOLUTION INTRAMUSCULAR; INTRAVENOUS ONCE AS NEEDED
Status: DISCONTINUED | OUTPATIENT
Start: 2025-05-20 | End: 2025-05-20 | Stop reason: HOSPADM

## 2025-05-20 RX ORDER — PROPOFOL 10 MG/ML
INJECTION, EMULSION INTRAVENOUS AS NEEDED
Status: DISCONTINUED | OUTPATIENT
Start: 2025-05-20 | End: 2025-05-20

## 2025-05-20 RX ORDER — SODIUM CHLORIDE, SODIUM LACTATE, POTASSIUM CHLORIDE, CALCIUM CHLORIDE 600; 310; 30; 20 MG/100ML; MG/100ML; MG/100ML; MG/100ML
125 INJECTION, SOLUTION INTRAVENOUS CONTINUOUS
Status: DISCONTINUED | OUTPATIENT
Start: 2025-05-20 | End: 2025-05-20 | Stop reason: HOSPADM

## 2025-05-20 RX ORDER — KETOROLAC TROMETHAMINE 30 MG/ML
INJECTION, SOLUTION INTRAMUSCULAR; INTRAVENOUS AS NEEDED
Status: DISCONTINUED | OUTPATIENT
Start: 2025-05-20 | End: 2025-05-20

## 2025-05-20 RX ORDER — FENTANYL CITRATE 50 UG/ML
INJECTION, SOLUTION INTRAMUSCULAR; INTRAVENOUS AS NEEDED
Status: DISCONTINUED | OUTPATIENT
Start: 2025-05-20 | End: 2025-05-20

## 2025-05-20 RX ORDER — METOCLOPRAMIDE HYDROCHLORIDE 5 MG/ML
10 INJECTION INTRAMUSCULAR; INTRAVENOUS ONCE AS NEEDED
Status: DISCONTINUED | OUTPATIENT
Start: 2025-05-20 | End: 2025-05-20 | Stop reason: HOSPADM

## 2025-05-20 RX ORDER — MEPERIDINE HYDROCHLORIDE 25 MG/ML
12.5 INJECTION INTRAMUSCULAR; INTRAVENOUS; SUBCUTANEOUS ONCE AS NEEDED
Status: DISCONTINUED | OUTPATIENT
Start: 2025-05-20 | End: 2025-05-20 | Stop reason: HOSPADM

## 2025-05-20 RX ORDER — ONDANSETRON 2 MG/ML
INJECTION INTRAMUSCULAR; INTRAVENOUS AS NEEDED
Status: DISCONTINUED | OUTPATIENT
Start: 2025-05-20 | End: 2025-05-20

## 2025-05-20 RX ORDER — IBUPROFEN 600 MG/1
600 TABLET, FILM COATED ORAL EVERY 6 HOURS PRN
Status: CANCELLED | OUTPATIENT
Start: 2025-05-20

## 2025-05-20 RX ORDER — LIDOCAINE HYDROCHLORIDE 10 MG/ML
INJECTION, SOLUTION EPIDURAL; INFILTRATION; INTRACAUDAL; PERINEURAL AS NEEDED
Status: DISCONTINUED | OUTPATIENT
Start: 2025-05-20 | End: 2025-05-20

## 2025-05-20 RX ORDER — ONDANSETRON 2 MG/ML
4 INJECTION INTRAMUSCULAR; INTRAVENOUS EVERY 6 HOURS PRN
Status: CANCELLED | OUTPATIENT
Start: 2025-05-20

## 2025-05-20 RX ORDER — HYDROMORPHONE HCL/PF 1 MG/ML
0.2 SYRINGE (ML) INJECTION
Status: DISCONTINUED | OUTPATIENT
Start: 2025-05-20 | End: 2025-05-20 | Stop reason: HOSPADM

## 2025-05-20 RX ORDER — DIPHENHYDRAMINE HYDROCHLORIDE 50 MG/ML
12.5 INJECTION, SOLUTION INTRAMUSCULAR; INTRAVENOUS ONCE AS NEEDED
Status: DISCONTINUED | OUTPATIENT
Start: 2025-05-20 | End: 2025-05-20 | Stop reason: HOSPADM

## 2025-05-20 RX ORDER — ONDANSETRON 2 MG/ML
4 INJECTION INTRAMUSCULAR; INTRAVENOUS ONCE AS NEEDED
Status: DISCONTINUED | OUTPATIENT
Start: 2025-05-20 | End: 2025-05-20 | Stop reason: HOSPADM

## 2025-05-20 RX ORDER — SODIUM CHLORIDE 9 MG/ML
INJECTION, SOLUTION INTRAVENOUS AS NEEDED
Status: DISCONTINUED | OUTPATIENT
Start: 2025-05-20 | End: 2025-05-20 | Stop reason: HOSPADM

## 2025-05-20 RX ORDER — FENTANYL CITRATE/PF 50 MCG/ML
50 SYRINGE (ML) INJECTION
Status: DISCONTINUED | OUTPATIENT
Start: 2025-05-20 | End: 2025-05-20 | Stop reason: HOSPADM

## 2025-05-20 RX ORDER — DEXAMETHASONE SODIUM PHOSPHATE 10 MG/ML
INJECTION, SOLUTION INTRAMUSCULAR; INTRAVENOUS AS NEEDED
Status: DISCONTINUED | OUTPATIENT
Start: 2025-05-20 | End: 2025-05-20

## 2025-05-20 RX ADMIN — DEXAMETHASONE SODIUM PHOSPHATE 10 MG: 10 INJECTION, SOLUTION INTRAMUSCULAR; INTRAVENOUS at 07:31

## 2025-05-20 RX ADMIN — KETOROLAC TROMETHAMINE 15 MG: 30 INJECTION, SOLUTION INTRAMUSCULAR; INTRAVENOUS at 07:52

## 2025-05-20 RX ADMIN — Medication 100 MCG: at 07:38

## 2025-05-20 RX ADMIN — ONDANSETRON 4 MG: 2 INJECTION INTRAMUSCULAR; INTRAVENOUS at 07:31

## 2025-05-20 RX ADMIN — PROPOFOL 80 MCG/KG/MIN: 10 INJECTION, EMULSION INTRAVENOUS at 07:33

## 2025-05-20 RX ADMIN — PROPOFOL 170 MG: 10 INJECTION, EMULSION INTRAVENOUS at 07:31

## 2025-05-20 RX ADMIN — FENTANYL CITRATE 50 MCG: 50 INJECTION INTRAMUSCULAR; INTRAVENOUS at 07:31

## 2025-05-20 RX ADMIN — LIDOCAINE HYDROCHLORIDE 80 MG: 10 INJECTION, SOLUTION EPIDURAL; INFILTRATION; INTRACAUDAL; PERINEURAL at 07:31

## 2025-05-20 RX ADMIN — MIDAZOLAM 2 MG: 1 INJECTION INTRAMUSCULAR; INTRAVENOUS at 07:24

## 2025-05-20 RX ADMIN — Medication 100 MG: at 07:31

## 2025-05-20 RX ADMIN — METOCLOPRAMIDE 10 MG: 5 INJECTION, SOLUTION INTRAMUSCULAR; INTRAVENOUS at 07:31

## 2025-05-20 RX ADMIN — SODIUM CHLORIDE, SODIUM LACTATE, POTASSIUM CHLORIDE, AND CALCIUM CHLORIDE 125 ML/HR: .6; .31; .03; .02 INJECTION, SOLUTION INTRAVENOUS at 06:33

## 2025-05-20 NOTE — INTERVAL H&P NOTE
H&P reviewed. After examining the patient I find no changes in the patients condition since the H&P had been written.    Vitals:    05/20/25 0620   BP: 118/62   Pulse: 58   Resp: 16   Temp: (!) 96.9 °F (36.1 °C)   SpO2: 99%

## 2025-05-20 NOTE — TELEPHONE ENCOUNTER
I called patient with  [id number] and informed her to complete the following testing  in message below and provided her with the central scheduling number [phone], thank you                      Please call patient and let her know I have ordered an upper GI series to her esophagus and stomach.  She should get this test done instead of the barium swallow as previously ordered.  I would also like her gastric emptying study to evaluate for gastroparesis or delayed stomach emptying given her EGD findings.  I have placed orders for both of these test. Thanks!

## 2025-05-20 NOTE — ANESTHESIA POSTPROCEDURE EVALUATION
Post-Op Assessment Note    CV Status:  Stable    Pain management: adequate       Mental Status:  Alert and awake   Hydration Status:  Euvolemic   PONV Controlled:  Controlled   Airway Patency:  Patent     Post Op Vitals Reviewed: Yes    No anethesia notable event occurred.    Staff: CRNA           Last Filed PACU Vitals:  Vitals Value Taken Time   Temp 97.3 °F (36.3 °C) 05/20/25 08:00   Pulse 75 05/20/25 08:00   /76 05/20/25 08:00   Resp 14 05/20/25 08:00   SpO2 99 % 05/20/25 08:00

## 2025-05-20 NOTE — ANESTHESIA PREPROCEDURE EVALUATION
Procedure:  DILATATION AND CURETTAGE WITH HYSTEROSCOPY, EXAM UNDER ANESTHESIA (Uterus)    Relevant Problems   ANESTHESIA (within normal limits)      CARDIO   (+) Essential hypertension   (+) Hemorrhoids   (+) Migraine without aura and without status migrainosus, not intractable      ENDO   (+) Hypothyroidism      GI/HEPATIC   (+) Gastroesophageal reflux disease without esophagitis   (+) Hiatal hernia      /RENAL (within normal limits)      GYN (within normal limits)      HEMATOLOGY (within normal limits)      MUSCULOSKELETAL   (+) Chronic midline low back pain with bilateral sciatica      NEURO/PSYCH   (+) Chronic midline low back pain with bilateral sciatica   (+) Major depressive disorder   (+) Migraine without aura and without status migrainosus, not intractable      PULMONARY (within normal limits)      Obstetrics/Gynecology   (+) Postmenopausal bleeding      Orthopedic/Musculoskeletal   (+) Neck pain      FEN/Gastrointestinal   (+) Lawsno's esophagus without dysplasia        Physical Exam    Airway     Mallampati score: II  TM Distance: >3 FB  Neck ROM: full  Mouth opening: >= 4 cm      Cardiovascular  Rhythm: regular, Rate: normal, Pulse is palpable. Cardiovascular exam normal    Dental   No notable dental hx     Pulmonary  Pulmonary exam normal Breath sounds clear to auscultation    Neurological    She appears awake, alert and oriented x3.      Other Findings  post-pubertal.      Anesthesia Plan  ASA Score- 2     Anesthesia Type- general with ASA Monitors.         Additional Monitors:     Airway Plan: Oral ETT.           Plan Factors-Exercise tolerance (METS): >4 METS.    Chart reviewed. EKG reviewed. Imaging results reviewed. Existing labs reviewed. Patient summary reviewed.                  Induction- intravenous and rapid sequence.    Postoperative Plan- Plan for postoperative opioid use.   Monitoring Plan - Monitoring plan - standard ASA monitoring  Post Operative Pain Plan - non-opiod analgesics, plan  for postoperative opioid use and multimodal analgesia    Perioperative Resuscitation Plan - Level 1 - Full Code.       Informed Consent- Anesthetic plan and risks discussed with patient.  I personally reviewed this patient with the CRNA. Discussed and agreed on the Anesthesia Plan with the CRNA..      NPO Status:  No vitals data found for the desired time range.      Recent labs personally reviewed:  Lab Results   Component Value Date    WBC 4.10 (L) 05/03/2025    HGB 12.6 05/03/2025     05/03/2025     Lab Results   Component Value Date    K 3.9 05/03/2025    BUN 7 05/03/2025    CREATININE 0.62 05/03/2025     Lab Results   Component Value Date    PTT 28 01/29/2022      Lab Results   Component Value Date    INR 0.92 01/29/2022       Blood type O    Lab Results   Component Value Date    HGBA1C 5.4 01/14/2025       I, Alexandro Mondragon MD, have personally seen and evaluated the patient prior to anesthetic care.  I have reviewed the pre-anesthetic record, and other medical records if appropriate to the anesthetic care.  If a CRNA is involved in the case, I have reviewed the CRNA assessment, if present, and agree. Risks/benefits and alternatives discussed with patient including possible PONV, sore throat, and possibility of rare anesthetic and surgical emergencies.    Pt had EDG last week during which food was found in her stomach.  Procedure aborted.  Pt aware she will have an ETT for this procedure to protect against aspiration of food if still present in her stomach.

## 2025-05-20 NOTE — OP NOTE
OPERATIVE REPORT  PATIENT NAME: Lakeshia Christianson    :  1965  MRN: [id number]  Pt Location: [facility] OR ROOM 01    SURGERY DATE: 2025    Surgeons and Role:     * Nirav Roach DO - Primary     * Dio Rock MD - Assisting    Preop Diagnosis:  PMB (postmenopausal bleeding) [N95.0]  Endometrial polyp [N84.0]    Post-Op Diagnosis Codes:     * PMB (postmenopausal bleeding) [N95.0]     * Endometrial polyp [N84.0]    Procedure(s):  DILATATION AND CURETTAGE WITH HYSTEROSCOPY. EXAM UNDER ANESTHESIA    Specimen(s):  ID Type Source Tests Collected by Time Destination   1 : [facility] Tissue Endometrium TISSUE EXAM Nirav Roach DO 2025 0744        Estimated Blood Loss:   Minimal    Drains:  None    Anesthesia Type:   General    Operative Indications:  PMB (postmenopausal bleeding) [N95.0]  Endometrial polyp [N84.0]      Operative Findings:  External genitalia grossly normal in appearance.  No ulcerations, lacerations, or lesions.  Bimanual exam revealed  uterus was anteverted and 10 weeks in size and consistency with no palpable uterine or adnexal masses.  Vagina and cervix were  grossly normal in appearance without any lacerations or lesions.  Uterus sounded to 10 cm.  Hysteroscopic examination revealed largely atrophic endometrial lining with mild thickening on the posterior uterine wall. Bilateral tubal ostia were  visualized.    Procedure and Technique:    The patient was taken to the operating room. General endotracheal anesthesia (GET) was administered.  Sequential compression devices were placed, and the patient was positioned on the OR table in the dorsal lithotomy position.  All pressure points were padded, and a felipe hugger was placed to maintain control of core body temperature.  A bimanual exam was performed, and the uterus was anteverted and about 10 weeks in size and consistency with no palpable uterine or adnexal masses.  The patient was prepped and draped in the usual sterile fashion using  chlorhexidine.    A time out was performed to confirm correct patient and procedure.  A straight catheter was introduced into the bladder, which was drained of 200 mL of clear yellow urine. A weighted speculum was inserted into the vagina, and a Placido retractor was used to visualize the anterior lip of the cervix, which was then grasped with a single toothed tenaculum.  The uterus was sounded to 10 cm.  The cervix was serially dilated to 16 [place] using Justo dilators for introduction of the hysteroscope.    Hysteroscope was introduced under direct visualization using normal saline solution as the distention media.  The Placido retractor was removed from the vagina.  The hysteroscope was advanced to the uterine fundus, and the entire uterine cavity was inspected in a systematic manner.  There was noted to be .  The hysteroscope was withdrawn.    Sharp curetting was performed, starting at the 12 o'clock position and rotating a total of 360 degrees to cover all surfaces.  Endometrial tissue was obtained and sent for pathology.     The fluid deficit reached 300 mL.  The single toothed tenaculum was removed from the anterior lip of the cervix, and good hemostasis was confirmed.  The weighted speculum was removed from the vagina.    At the conclusion of the procedure, all needle, sponge, and instrument counts were correct x2.    Dr. Roach was present and participated in all key portions of the case.    Complications:   None        Patient Disposition:  PACU          SIGNATURE: Dio Rock MD  DATE: May 20, 2025  TIME: 8:01 AM

## 2025-05-21 NOTE — ANESTHESIA POSTPROCEDURE EVALUATION
Post-Op Assessment Note    CV Status:  Stable  Pain Score: 0    Pain management: adequate       Mental Status:  Alert and awake   Hydration Status:  Euvolemic   PONV Controlled:  Controlled   Airway Patency:  Patent     Post Op Vitals Reviewed: Yes    No anethesia notable event occurred.    Staff: Anesthesiologist, with CRNAs         Last Filed PACU Vitals:  Vitals Value Taken Time   Temp 97.5 °F (36.4 °C) 05/20/25 08:16   Pulse 60 05/20/25 08:39   BP 99/62 05/20/25 08:31   Resp 14 05/20/25 08:39   SpO2 96 % 05/20/25 08:39   Vitals shown include unfiled device data.    Modified Rianna:     Vitals Value Taken Time   Activity 2 05/20/25 08:31   Respiration 2 05/20/25 08:31   Circulation 2 05/20/25 08:31   Consciousness 2 05/20/25 08:31   Oxygen Saturation 2 05/20/25 08:31     Modified Rianna Score: 10

## 2025-05-31 NOTE — PROGRESS NOTES
Name: Lakeshia Christianson      : 1965      MRN: [id number]  Encounter Provider: Marcelo Pillai MD  Encounter Date: 2025   Encounter department: [facility]  :  Assessment & Plan  Memory loss  MRI Brain w/wo contrast 25: Stable right petroclival meningioma. No acute process seen. Following with neurosurgery for meningioma and no further action except for yearly MRIs recommended.     MMSE score 25/30 at last visit No cognitive impairment  Metabolic causes ruled out with previous labs.   Postulate that memory loss is probably secondary to depression and underlying grief    PLAN:  - Treatment as per depression and fibromyalgia          Moderate episode of recurrent major depressive disorder (HCC)  Depression Screening Follow-up Plan: Patient's depression screening was positive with a PHQ-9 score of 13. Patient with underlying depression and was advised to continue current medications as prescribed.  Depression likely worsened by grief. Improvement compared to last visit.     Partial response to venlafaxine that was started on 25.     Now has comorbid fibromyalgia     PLAN  - Will switch to Duloxetine 60mg daily since that has more benefit with treatment of fibromyalgia and depression.   - Avoiding TCAs due to possible side effects of dizziness that could worsen pt's current vertigo   -Mental health resources provided         Orders:  •  DULoxetine (CYMBALTA) 60 mg delayed release capsule; Take 1 capsule (60 mg total) by mouth daily    Fibromyalgia  Newly diagnosed today based on patient's reported symptoms with comorbid depression.     PLAN  - Start duloxetine as above  - Fibromyalgia resources provided with instructions in AVS  - Continue with physical therapy.   - f/u in 4-6 weeks.     Orders:  •  DULoxetine (CYMBALTA) 60 mg delayed release capsule; Take 1 capsule (60 mg total) by mouth daily          Depression Screening and Follow-up Plan: Patient's  depression screening was positive with a PHQ-9 score of 13.   Patient with underlying depression and was advised to continue current medications as prescribed.       History of Present Illness {?Quick Links Encounters * My Last Note * Last Note in Specialty * Snapshot * Since Last Visit * History :69427}  Lakeshia Christianson is a 59 y.o. female w a PMH of HTN, hypothyroidism, Migraines, Depression, meningioma s/p SRT, presenting for a follow-up visit.  Patient has been following with the clinic due to concerns for memory loss and depression.  Today, she states she has noticed improvement in depression and forgetfulness but symptoms are still present.  She further endorses generalized body pain/aches, mostly in the neck, hips, thighs, and knee distribution.  She states she was told by her friend that she most likely has fibromyalgia and she started physical therapy.  She has been adherent with all her medications.  Other symptoms endorsed or denied per ROS.       Review of Systems   Constitutional:  Positive for fatigue. Negative for chills and fever.   HENT:  Negative for congestion, ear pain, facial swelling, rhinorrhea, sinus pressure and sore throat.    Eyes:  Positive for pain. Negative for photophobia, redness and visual disturbance.   Respiratory:  Negative for cough, chest tightness and shortness of breath.    Cardiovascular:  Negative for chest pain and palpitations.   Gastrointestinal:  Negative for abdominal pain, nausea and vomiting.   Genitourinary:  Negative for difficulty urinating.   Musculoskeletal:  Positive for arthralgias, back pain and neck pain. Negative for gait problem and neck stiffness.   Skin:  Negative for rash.   Neurological:  Positive for headaches. Negative for dizziness, weakness, light-headedness and numbness.   Psychiatric/Behavioral:  Positive for decreased concentration, dysphoric mood and sleep disturbance. Negative for suicidal ideas. The patient is not nervous/anxious.    All other  "systems reviewed and are negative.      Objective {?Quick Links Trend Vitals * Enter New Vitals * Results Review * Timeline (Adult) * Labs * Imaging * Cardiology * Procedures * Lung Cancer Screening * Surgical eConsent :66774}  /72 (BP Location: Left arm, Patient Position: Sitting, Cuff Size: Standard)   Pulse 77   Temp 98 °F (36.7 °C) (Temporal)   Resp 18   Ht 5' 7\" (1.702 m)   Wt 73.5 kg (162 lb)   LMP  (LMP Unknown)   SpO2 98%   BMI 25.37 kg/m²      Physical Exam  Vitals and nursing note reviewed.   Constitutional:       General: She is not in acute distress.     Appearance: Normal appearance. She is well-developed. She is not ill-appearing.   HENT:      Head: Normocephalic and atraumatic.     Eyes:      Extraocular Movements: Extraocular movements intact.      Conjunctiva/sclera: Conjunctivae normal.      Pupils: Pupils are equal, round, and reactive to light.       Cardiovascular:      Rate and Rhythm: Normal rate and regular rhythm.      Heart sounds: No murmur heard.  Pulmonary:      Effort: Pulmonary effort is normal. No respiratory distress.      Breath sounds: Normal breath sounds. No wheezing.   Abdominal:      General: Abdomen is flat.     Musculoskeletal:         General: No swelling or tenderness. Normal range of motion.      Cervical back: Normal range of motion and neck supple. No rigidity or tenderness.      Right lower leg: No edema.      Left lower leg: No edema.   Lymphadenopathy:      Cervical: No cervical adenopathy.     Skin:     General: Skin is warm and dry.      Capillary Refill: Capillary refill takes less than 2 seconds.     Neurological:      General: No focal deficit present.      Mental Status: She is alert and oriented to person, place, and time.     Psychiatric:         Attention and Perception: Attention normal.         Mood and Affect: Mood is depressed. Affect is blunt.         Speech: Speech normal.         Behavior: Behavior normal.         Thought Content: Thought " content does not include homicidal or suicidal ideation. Thought content does not include homicidal or suicidal plan.         Cognition and Memory: Cognition normal. Memory is impaired.

## 2025-05-31 NOTE — ASSESSMENT & PLAN NOTE
MRI Brain w/wo contrast 4/24/25: Stable right petroclival meningioma. No acute process seen. Following with neurosurgery for meningioma and no further action except for yearly MRIs recommended.     MMSE score 25/30 at last visit No cognitive impairment  Metabolic causes ruled out with previous labs.   Postulate that memory loss is probably secondary to depression and underlying grief    PLAN:  - Treatment as per depression and fibromyalgia

## 2025-06-02 ENCOUNTER — OFFICE VISIT (OUTPATIENT)
Dept: FAMILY MEDICINE CLINIC | Facility: CLINIC | Age: 60
End: 2025-06-02

## 2025-06-02 VITALS
SYSTOLIC BLOOD PRESSURE: 108 MMHG | HEIGHT: 67 IN | TEMPERATURE: 98 F | WEIGHT: 162 LBS | DIASTOLIC BLOOD PRESSURE: 72 MMHG | HEART RATE: 77 BPM | OXYGEN SATURATION: 98 % | RESPIRATION RATE: 18 BRPM | BODY MASS INDEX: 25.43 KG/M2

## 2025-06-02 DIAGNOSIS — R41.3 MEMORY LOSS: ICD-10-CM

## 2025-06-02 DIAGNOSIS — M79.7 FIBROMYALGIA: Primary | ICD-10-CM

## 2025-06-02 DIAGNOSIS — F33.1 MODERATE EPISODE OF RECURRENT MAJOR DEPRESSIVE DISORDER (HCC): ICD-10-CM

## 2025-06-02 PROCEDURE — 99213 OFFICE O/P EST LOW 20 MIN: CPT | Performed by: FAMILY MEDICINE

## 2025-06-02 RX ORDER — DULOXETIN HYDROCHLORIDE 60 MG/1
60 CAPSULE, DELAYED RELEASE ORAL DAILY
Qty: 60 CAPSULE | Refills: 1 | Status: SHIPPED | OUTPATIENT
Start: 2025-06-02

## 2025-06-02 NOTE — PATIENT INSTRUCTIONS
Recursos de Cdoie Mental    Si desea ser incluido en la lista de espera para los servicios de [facility], DEBE comunicarse con la admisión en [facility] - [phone]    Apoyo de Emergencia y Crisis    Línea de Maria Elena para la Suicidio y Crisis: Llama o envía un mensaje de texto al [phone] (Disponible las 24 horas)  Línea de Texto de Crisis: Envía un mensaje de texto con la palabra HOME al [id number] (Disponible 24/7)  Línea de Apoyo: Llama al [phone] (Apoyo confidencial para la codie mental, disponible de lunes a mirtha: 6 AM-10 AM y 4 PM-12 AM)  Crisis del [place] de [place]: Llama al [phone] (Para emergencias de codie mental, o visita tu Departamento de Emergencias local)  Consejo de Víctimas de Crimen: Llama al [phone] (Línea de apoyo para víctimas, consejería, acompañamiento a tribunales y hospitales, servicios gratuitos)    Servicios Locales de Codie Mental    [facility] [address]  [phone]  [email or web address]  Apoyo para condiciones de codie mental. Servicios gratuitos para todos    [place] [address] [place] [address] [phone]  Servicios para todas las edades; Bilingüe (Inglés/Español); Acepta Asistencia Médica, Medicare y seguros comerciales    [facility]  [address] [address] [phone]  Servicios para todas las edades; Bilingüe (Inglés/Español); Acepta High[employer] Blue Cross Blue Shield, Magellan, Aetna, Optum y Cigna    [facility]  [address] [place] [address] [phone]  Servicios para mayores de 4 años. Solo inglés; NO acepta Asistencia Médica (solo seguros comerciales)    [facility]  [address] [address], [place]  [phone]  Servicios para todas las edades; Solo inglés; Acepta [facility], [facility] y Medicare [facility]  [address], [place] [address] [phone]  Servicios para mayores  de 6 años. Bilingüe (Inglés/Español); Acepta solo Asistencia Médica    [facility]  [address] [place] [address]  [phone]  Servicios para mayores de 5 años. Bilingüe (Inglés/Español); Acepta Asistencia Médica    [facility]  [address] [place] [address]  [phone]  Servicios para mayores de 14 años. Bilingüe (Inglés/Español); Acepta Asistencia Médica, Medicare y seguros comerciales    [facility]  [address] [place] [address]  [phone]  Servicios para mayores de 5 años. Bilingüe (Inglés/Español); Acepta Asistencia Médica    [facility]  [address] [place] [address]  [phone]  Servicios para mayores de 3 años. Bilingüe (Inglés/Español); Acepta Asistencia Médica y algunos seguros comerciales    [facility]  [address], [address], [place] [address]  [phone]  Servicios para mayores de 5 años. Bilingüe (Inglés/Español); Acepta Asistencia Médica    [facility]  [address], [place] [address]  [phone]  Servicios para mayores de 6 años. Bilingüe (Inglés/Español); Acepta Asistencia Médica y seguros comerciales    [facility]  [address] [place] [address]  [phone]  Servicios para mayores de 5 años. Bilingüe (Inglés/Español); Acepta Asistencia Médica, Medicare y seguros comerciales    [facility]  [address], [address], [place] [address]  [phone]  Servicios para todas las edades ([place]ia); 18+ (Psiquiatría); Solo inglés; Acepta Capital Blue Cross, Aetna, Highmark, Magellan, Demetria (CHIP y seguros comerciales)    [email or web address] es un recurso para encontrar proveedores de psicoterapia. Los pacientes pueden filtrar los resultados por idioma, especialidad, género, seguro y más. Se recomienda contactar a varios proveedores del directorio, ya que muchos tienen listas de espera y es importante encontrar el adecuado para ti.    Por favor, contacte  a torres proveedor de seguro para más información.

## 2025-06-02 NOTE — ASSESSMENT & PLAN NOTE
Newly diagnosed today based on patient's reported symptoms with comorbid depression.     PLAN  - Start duloxetine as above  - Fibromyalgia resources provided with instructions in AVS  - Continue with physical therapy.   - f/u in 4-6 weeks.     Orders:  •  DULoxetine (CYMBALTA) 60 mg delayed release capsule; Take 1 capsule (60 mg total) by mouth daily

## 2025-06-02 NOTE — ASSESSMENT & PLAN NOTE
Depression Screening Follow-up Plan: Patient's depression screening was positive with a PHQ-9 score of 13. Patient with underlying depression and was advised to continue current medications as prescribed.  Depression likely worsened by grief. Improvement compared to last visit.     Partial response to venlafaxine that was started on 4/29/25.     Now has comorbid fibromyalgia     PLAN  - Will switch to Duloxetine 60mg daily since that has more benefit with treatment of fibromyalgia and depression.   - Avoiding TCAs due to possible side effects of dizziness that could worsen pt's current vertigo   -Mental health resources provided         Orders:  •  DULoxetine (CYMBALTA) 60 mg delayed release capsule; Take 1 capsule (60 mg total) by mouth daily

## 2025-06-03 ENCOUNTER — OFFICE VISIT (OUTPATIENT)
Dept: GYNECOLOGY | Facility: CLINIC | Age: 60
End: 2025-06-03

## 2025-06-03 VITALS
HEART RATE: 77 BPM | HEIGHT: 67 IN | BODY MASS INDEX: 25.43 KG/M2 | DIASTOLIC BLOOD PRESSURE: 72 MMHG | SYSTOLIC BLOOD PRESSURE: 108 MMHG | WEIGHT: 162 LBS

## 2025-06-03 DIAGNOSIS — Z48.89 POSTOPERATIVE VISIT: Primary | ICD-10-CM

## 2025-06-03 PROCEDURE — 99024 POSTOP FOLLOW-UP VISIT: CPT | Performed by: OBSTETRICS & GYNECOLOGY

## 2025-06-03 NOTE — PROGRESS NOTES
":  Assessment & Plan  Postoperative visit             History of Present Illness     Lakeshia Christianson is a 59 y.o. female   HPI patient presents the office today for postoperative check.  She is status post hysteroscopy D&C on May 20.  She is doing well since the surgery with no complaints.      Path report: Benign endometrium  Review of Systems  Objective   /72   Pulse 77   Ht 5' 7\" (1.702 m)   Wt 73.5 kg (162 lb)   LMP  (LMP Unknown)   BMI 25.37 kg/m²      Physical Exam  Uterus anteverted normal size and contour freely mobile and nontender.  No palpable adnexal masses or tenderness.  External genitalia normal.    Impression: Normal postoperative check    Plan: Return to Dr. Lopez for ongoing GYN care    "

## 2025-06-04 ENCOUNTER — HOSPITAL ENCOUNTER (OUTPATIENT)
Dept: MAMMOGRAPHY | Facility: CLINIC | Age: 60
Discharge: HOME | End: 2025-06-04
Payer: COMMERCIAL

## 2025-06-04 VITALS — WEIGHT: 162 LBS | BODY MASS INDEX: 25.43 KG/M2 | HEIGHT: 67 IN

## 2025-06-04 DIAGNOSIS — Z12.31 ENCOUNTER FOR SCREENING MAMMOGRAM FOR BREAST CANCER: ICD-10-CM

## 2025-06-04 PROCEDURE — 77063 BREAST TOMOSYNTHESIS BI: CPT

## 2025-06-04 PROCEDURE — 77067 SCR MAMMO BI INCL CAD: CPT

## 2025-07-30 ENCOUNTER — TELEPHONE (OUTPATIENT)
Age: 60
End: 2025-07-30

## 2025-08-01 ENCOUNTER — TELEPHONE (OUTPATIENT)
Age: 60
End: 2025-08-01

## 2025-08-06 ENCOUNTER — TELEPHONE (OUTPATIENT)
Dept: BEHAVIORAL/MENTAL HEALTH CLINIC | Facility: CLINIC | Age: 60
End: 2025-08-06

## 2025-08-21 ENCOUNTER — TELEPHONE (OUTPATIENT)
Dept: BEHAVIORAL/MENTAL HEALTH CLINIC | Facility: CLINIC | Age: 60
End: 2025-08-21

## (undated) DEVICE — ENDOPATH 5MM ENDOSCOPIC BLUNT TIP DISSECTORS (12 POUCHES CONTAINING 3 DISSECTORS EACH): Brand: ENDOPATH

## (undated) DEVICE — SCD SEQUENTIAL COMPRESSION COMFORT SLEEVE MEDIUM KNEE LENGTH: Brand: KENDALL SCD

## (undated) DEVICE — DRAPE EQUIPMENT RF WAND

## (undated) DEVICE — PREMIUM DRY TRAY LF: Brand: MEDLINE INDUSTRIES, INC.

## (undated) DEVICE — UNDER BUTTOCKS DRAPE W/FLUID CONTROL POUCH: Brand: CONVERTORS

## (undated) DEVICE — TROCAR: Brand: KII SLEEVE

## (undated) DEVICE — [HIGH FLOW INSUFFLATOR,  DO NOT USE IF PACKAGE IS DAMAGED,  KEEP DRY,  KEEP AWAY FROM SUNLIGHT,  PROTECT FROM HEAT AND RADIOACTIVE SOURCES.]: Brand: PNEUMOSURE

## (undated) DEVICE — GLOVE SRG BIOGEL ECLIPSE 7.5

## (undated) DEVICE — CYSTO TUBING SINGLE IRRIGATION

## (undated) DEVICE — GLOVE INDICATOR PI UNDERGLOVE SZ 6.5 BLUE

## (undated) DEVICE — SYRINGE 30ML LL

## (undated) DEVICE — STRL PENROSE DRAIN 18" X 1/4": Brand: CARDINAL HEALTH

## (undated) DEVICE — GLOVE PI ULTRA TOUCH SZ.7.5

## (undated) DEVICE — LIGAMAX 5 MM ENDOSCOPIC MULTIPLE CLIP APPLIER: Brand: LIGAMAX

## (undated) DEVICE — PVC URETHRAL CATHETER: Brand: DOVER

## (undated) DEVICE — ALLENTOWN LAP CHOLE APP PACK: Brand: CARDINAL HEALTH

## (undated) DEVICE — HARMONIC ACE 5MM DIAMETER SHEARS 36CM SHAFT LENGTH + ADAPTIVE TISSUE TECHNOLOGY FOR USE WITH GENERATOR G11: Brand: HARMONIC ACE

## (undated) DEVICE — ADHESIVE SKIN HIGH VISCOSITY EXOFIN 1ML

## (undated) DEVICE — SUT MONOCRYL 4-0 PS-2 27 IN Y426H

## (undated) DEVICE — 2000CC GUARDIAN II: Brand: GUARDIAN

## (undated) DEVICE — DISPOSABLE OR TOWEL: Brand: CARDINAL HEALTH

## (undated) DEVICE — PENCIL ELECTROSURG E-Z CLEAN -0035H

## (undated) DEVICE — TROCAR: Brand: KII FIOS FIRST ENTRY

## (undated) DEVICE — TUBING SUCTION 5MM X 12 FT

## (undated) DEVICE — URETERAL CATHETER ADAPTOR TIP

## (undated) DEVICE — GLOVE SRG BIOGEL 6.5

## (undated) DEVICE — BLUE HEAT SCOPE WARMER

## (undated) DEVICE — UNDYED BRAIDED (POLYGLACTIN 910), SYNTHETIC ABSORBABLE SUTURE: Brand: COATED VICRYL

## (undated) DEVICE — GLOVE INDICATOR PI UNDERGLOVE SZ 8 BLUE

## (undated) DEVICE — IV SET EXT 30 IN

## (undated) DEVICE — CHLORAPREP HI-LITE 26ML ORANGE

## (undated) DEVICE — METZENBAUM ADTEC SINGLE USE DISSECTING SCISSORS, SHAFT ONLY, MONOPOLAR, CURVED TO LEFT, WORKING LENGTH: 12 1/4", (310 MM), DIAM. 5 MM, INSULATED, DOUBLE ACTION, STERILE, DISPOSABLE, PACKAGE OF 10 PIECES: Brand: AESCULAP

## (undated) DEVICE — STRL ALLENTOWN HYSTEROSCOPY PK: Brand: CARDINAL HEALTH

## (undated) DEVICE — PMI DISPOSABLE PUNCTURE CLOSURE DEVICE / SUTURE GRASPER: Brand: PMI

## (undated) DEVICE — SUT ETHIBOND 0 SH/SH 36 IN X524H

## (undated) DEVICE — INTENDED FOR TISSUE SEPARATION, AND OTHER PROCEDURES THAT REQUIRE A SHARP SURGICAL BLADE TO PUNCTURE OR CUT.: Brand: BARD-PARKER SAFETY BLADES SIZE 11, STERILE